# Patient Record
Sex: MALE | Race: WHITE | NOT HISPANIC OR LATINO | Employment: OTHER | URBAN - METROPOLITAN AREA
[De-identification: names, ages, dates, MRNs, and addresses within clinical notes are randomized per-mention and may not be internally consistent; named-entity substitution may affect disease eponyms.]

---

## 2017-01-10 ENCOUNTER — TELEPHONE (OUTPATIENT)
Dept: INTERNAL MEDICINE | Facility: CLINIC | Age: 57
End: 2017-01-10

## 2017-01-10 ENCOUNTER — LAB VISIT (OUTPATIENT)
Dept: LAB | Facility: HOSPITAL | Age: 57
End: 2017-01-10
Attending: INTERNAL MEDICINE
Payer: MEDICARE

## 2017-01-10 ENCOUNTER — EPISODE CHANGES (OUTPATIENT)
Dept: HEPATOLOGY | Facility: CLINIC | Age: 57
End: 2017-01-10

## 2017-01-10 DIAGNOSIS — B18.2 CHRONIC HEPATITIS C WITHOUT HEPATIC COMA: ICD-10-CM

## 2017-01-10 LAB
ALBUMIN SERPL BCP-MCNC: 4.3 G/DL
ALP SERPL-CCNC: 70 U/L
ALT SERPL W/O P-5'-P-CCNC: 55 U/L
ANION GAP SERPL CALC-SCNC: 10 MMOL/L
AST SERPL-CCNC: 35 U/L
BILIRUB SERPL-MCNC: 0.7 MG/DL
BUN SERPL-MCNC: 13 MG/DL
CALCIUM SERPL-MCNC: 9.3 MG/DL
CHLORIDE SERPL-SCNC: 106 MMOL/L
CO2 SERPL-SCNC: 25 MMOL/L
CREAT SERPL-MCNC: 0.8 MG/DL
EST. GFR  (AFRICAN AMERICAN): >60 ML/MIN/1.73 M^2
EST. GFR  (NON AFRICAN AMERICAN): >60 ML/MIN/1.73 M^2
GLUCOSE SERPL-MCNC: 154 MG/DL
POTASSIUM SERPL-SCNC: 4.2 MMOL/L
PROT SERPL-MCNC: 8 G/DL
SODIUM SERPL-SCNC: 141 MMOL/L

## 2017-01-10 PROCEDURE — 80053 COMPREHEN METABOLIC PANEL: CPT

## 2017-01-10 PROCEDURE — 87522 HEPATITIS C REVRS TRNSCRPJ: CPT

## 2017-01-10 NOTE — TELEPHONE ENCOUNTER
Alissa in PAP asked if patient can come in , called pt no answer message left for pt to come in to see alissa

## 2017-01-10 NOTE — TELEPHONE ENCOUNTER
I think I've ordered the generic. He's part of the pharmacy assistance program. Can you call Alissa HERNANDEZ to see if she has any other ideas of how to get it cheaper? We can also try glipizide, which is a fairly cheap alternative. But there's a higher risk of low sugars so he'll have to monitor his glucose closely.

## 2017-01-10 NOTE — TELEPHONE ENCOUNTER
----- Message from Mitali Reyes PharmD sent at 1/10/2017  9:43 AM CST -----  Regarding: Januvia too expensive   Contact: [phone]  Mon[place]z is $47 under his [facility] medicare plan and he is requesting a cheaper alternative. We already used the free trial coupon last month. Please advise if you'd like to change to generic alternative. Thanks.

## 2017-01-13 LAB
HCV LOG: <1.08 LOG (10) IU/ML
HCV RNA QUANT PCR: <12 IU/ML
HCV, QUALITATIVE: NOT DETECTED IU/ML

## 2017-01-17 ENCOUNTER — TELEPHONE (OUTPATIENT)
Dept: HEPATOLOGY | Facility: CLINIC | Age: 57
End: 2017-01-17

## 2017-01-17 DIAGNOSIS — K74.60 HEPATIC CIRRHOSIS, UNSPECIFIED HEPATIC CIRRHOSIS TYPE: Primary | ICD-10-CM

## 2017-01-17 DIAGNOSIS — B18.2 CHRONIC HEPATITIS C WITHOUT HEPATIC COMA: ICD-10-CM

## 2017-01-18 ENCOUNTER — OFFICE VISIT (OUTPATIENT)
Dept: INTERNAL MEDICINE | Facility: CLINIC | Age: 57
End: 2017-01-18
Payer: MEDICARE

## 2017-01-18 VITALS
WEIGHT: 173.06 LBS | HEART RATE: 86 BPM | BODY MASS INDEX: 27.16 KG/M2 | SYSTOLIC BLOOD PRESSURE: 134 MMHG | HEIGHT: 67 IN | TEMPERATURE: 98 F | DIASTOLIC BLOOD PRESSURE: 88 MMHG | RESPIRATION RATE: 18 BRPM

## 2017-01-18 DIAGNOSIS — R07.81 PLEURITIC CHEST PAIN: ICD-10-CM

## 2017-01-18 DIAGNOSIS — E11.9 TYPE 2 DIABETES MELLITUS WITHOUT RETINOPATHY: Primary | ICD-10-CM

## 2017-01-18 DIAGNOSIS — E78.5 HYPERLIPIDEMIA, UNSPECIFIED HYPERLIPIDEMIA TYPE: ICD-10-CM

## 2017-01-18 DIAGNOSIS — K62.5 BRIGHT RED BLOOD PER RECTUM: ICD-10-CM

## 2017-01-18 DIAGNOSIS — B18.2 CHRONIC HEPATITIS C WITHOUT HEPATIC COMA: ICD-10-CM

## 2017-01-18 DIAGNOSIS — I87.2 CHRONIC VENOUS INSUFFICIENCY: ICD-10-CM

## 2017-01-18 DIAGNOSIS — K74.60 CIRRHOSIS OF LIVER WITHOUT ASCITES, UNSPECIFIED HEPATIC CIRRHOSIS TYPE: ICD-10-CM

## 2017-01-18 DIAGNOSIS — I85.10 SECONDARY ESOPHAGEAL VARICES WITHOUT BLEEDING: ICD-10-CM

## 2017-01-18 DIAGNOSIS — J06.9 UPPER RESPIRATORY TRACT INFECTION, UNSPECIFIED TYPE: ICD-10-CM

## 2017-01-18 PROCEDURE — 3044F HG A1C LEVEL LT 7.0%: CPT | Mod: S$GLB,,, | Performed by: INTERNAL MEDICINE

## 2017-01-18 PROCEDURE — 99499 UNLISTED E&M SERVICE: CPT | Mod: S$GLB,,, | Performed by: INTERNAL MEDICINE

## 2017-01-18 PROCEDURE — 99214 OFFICE O/P EST MOD 30 MIN: CPT | Mod: S$GLB,,, | Performed by: INTERNAL MEDICINE

## 2017-01-18 PROCEDURE — 1159F MED LIST DOCD IN RCRD: CPT | Mod: S$GLB,,, | Performed by: INTERNAL MEDICINE

## 2017-01-18 PROCEDURE — 99999 PR PBB SHADOW E&M-EST. PATIENT-LVL III: CPT | Mod: PBBFAC,,, | Performed by: INTERNAL MEDICINE

## 2017-01-18 PROCEDURE — 3079F DIAST BP 80-89 MM HG: CPT | Mod: S$GLB,,, | Performed by: INTERNAL MEDICINE

## 2017-01-18 PROCEDURE — 3075F SYST BP GE 130 - 139MM HG: CPT | Mod: S$GLB,,, | Performed by: INTERNAL MEDICINE

## 2017-01-18 PROCEDURE — 3060F POS MICROALBUMINURIA REV: CPT | Mod: S$GLB,,, | Performed by: INTERNAL MEDICINE

## 2017-01-18 RX ORDER — BENZONATATE 100 MG/1
100 CAPSULE ORAL 3 TIMES DAILY PRN
Qty: 30 CAPSULE | Refills: 0 | Status: SHIPPED | OUTPATIENT
Start: 2017-01-18 | End: 2017-01-28

## 2017-01-18 RX ORDER — PRAVASTATIN SODIUM 20 MG/1
20 TABLET ORAL DAILY
Qty: 90 TABLET | Refills: 3 | Status: SHIPPED | OUTPATIENT
Start: 2017-01-18 | End: 2018-03-05 | Stop reason: SDUPTHER

## 2017-01-18 NOTE — TELEPHONE ENCOUNTER
MA called pt to relay provider message. Call successful. Spoke with pt. Provider message relayed. Pt verbalized understanding. Labs scheduled. Appt reminder mailed.Lp

## 2017-01-18 NOTE — TELEPHONE ENCOUNTER
HCV LAB REVIEW  Completed 24 weeks of Harvoni - 11/29  Erika 1b, cirrhotic  Prior PegIFN + RBV failure    Pertinent labs:  1/10  CMP stable -- AST 35, ALT 55  HCV neg - SVR6    pls call pt:  1.) HCV is negative. This is great news  There is a small chance the virus can return. We will monitor blood for this.  2.) Liver enzymes are still elevated but have improved slightly. We need to evaluate this further and continue to monitor   3.) HBV labs were orderd after last labs but doesn't look like they were done      Next labs due - pls schedule  - HBV DNA, HBsAg now  - CMP, HCV RNA - SVR12 - 2/21

## 2017-01-18 NOTE — MR AVS SNAPSHOT
[facility]  [address]  [facility] [address]  Phone: [phone]  Fax: [phone]                  Graham Caceres   2017 8:40 AM   Office Visit    Description:  Male : 1960   Provider:  Jennifer Manzanares MD   Department:  [facility]           Reason for Visit     Medication Management     Follow-up           Diagnoses this Visit        Comments    Type 2 diabetes mellitus without retinopathy    -  Primary     Chronic hepatitis C without hepatic coma         Cirrhosis of liver without ascites, unspecified hepatic cirrhosis type         Secondary esophageal varices without bleeding         Upper respiratory tract infection, unspecified type         Hyperlipidemia, unspecified hyperlipidemia type         Bright red blood per rectum         Chronic venous insufficiency         Pleuritic chest pain                To Do List           Future Appointments        Provider Department Dept Phone    2017 9:45 AM [facility] [id number] LB LIMIT [facility] [phone]    2017 10:00 AM LAB, APPOINTMENT [facility] INTMED [facility] [phone]    4/10/2017 9:00 AM LAB, APPOINTMENT [place] [facility] [phone]    4/10/2017 9:30 AM [facility] US 11 ALL [facility] [phone]      Goals (5 Years of Data)     None       These Medications        Disp Refills Start End    pravastatin (PRAVACHOL) 20 MG tablet 90 tablet 3 2017    Take 1 tablet (20 mg total) by mouth once daily. - Oral    Pharmacy: [facility] [address] [facility] Ph #: [phone]       benzonatate (TESSALON) 100 MG capsule 30 capsule 0 2017    Take 1 capsule (100 mg total) by mouth 3 (three) times daily as needed. - Oral    Pharmacy: [facility]  [address] Ph #: [phone]         [facility]     [facility] Care [facility] -   Assistance  Registered nurses in the [facility] provide clinical advisement, health education, appointment booking, and other advisory services.  Call for this free service at [phone].             Medications           Message regarding Medications     Verify the changes and/or additions to your medication regime listed below are the same as discussed with your clinician today.  If any of these changes or additions are incorrect, please notify your healthcare provider.        START taking these NEW medications        Refills    pravastatin (PRAVACHOL) 20 MG tablet 3    Sig: Take 1 tablet (20 mg total) by mouth once daily.    Class: Normal    Route: Oral    benzonatate (TESSALON) 100 MG capsule 0    Sig: Take 1 capsule (100 mg total) by mouth 3 (three) times daily as needed.    Class: Normal    Route: Oral      STOP taking these medications     clotrimazole-betamethasone 1-0.05% (LOTRISONE) cream Apply topically 2 (two) times daily.           Verify that the below list of medications is an accurate representation of the medications you are currently taking.  If none reported, the list may be blank. If incorrect, please contact your healthcare provider. Carry this list with you in case of emergency.           Current Medications     blood sugar diagnostic Strp Use for blood glucose testing before meals and at bedtime    blood-glucose meter kit Use as instructed    lancets Misc Use for blood glucose testing before meals and at bedtime    SITagliptan (JANUVIA) 25 MG Tab Take 1 tablet (25 mg total) by mouth once daily.    benzonatate (TESSALON) 100 MG capsule Take 1 capsule (100 mg total) by mouth 3 (three) times daily as needed.    pravastatin (PRAVACHOL) 20 MG tablet Take 1 tablet (20 mg total) by mouth once daily.           Clinical Reference Information           Vital Signs - Last Recorded  Most recent update: 1/18/2017  9:02 AM by Taylor Bill MA    BP Pulse Temp Resp Ht Wt    134/88 86 98 °F  "(36.7 °C) (Oral) 18 5' 7" (1.702 m) 78.5 kg (173 lb 1 oz)    BMI                27.11 kg/m2          Blood Pressure          Most Recent Value    BP  134/88      Allergies as of 1/18/2017     No Known Allergies      Immunizations Administered on Date of Encounter - 1/18/2017     None      Orders Placed During Today's Visit      Normal Orders This Visit    COMPRESSION STOCKINGS     Future Labs/Procedures Expected by Expires    CBC auto differential  1/18/2017 3/19/2018    Hemoglobin A1c  1/18/2017 (Approximate) 3/19/2018    Lipid panel  1/18/2017 3/19/2018    X-Ray Chest PA And Lateral  1/18/2017 1/18/2018      [facility] Sign-Up     Activating your [facility] account is as easy as 1-2-3!     1) Visit [email or web address], select Sign Up Now, enter this activation code and your date of birth, then select Next.  Activation code not generated  Current Patient Portal Status: Account disabled      2) Create a username and password to use when you visit [facility] in the future and select a security question in case you lose your password and select Next.    3) Enter your e-mail address and click Sign Up!    Additional Information  If you have questions, please e-mail [email or web address] or call [phone] to talk to our [facility] staff. Remember, [facility] is NOT to be used for urgent needs. For medical emergencies, dial 911.         "

## 2017-01-18 NOTE — PROGRESS NOTES
"Subjective:       Patient ID: Graham Caceres is a 57 y.o. male.    Chief Complaint: Medication Management and Follow-up    HPI   DM2 - started on januvia 25mg daily; pharmacy assistance program was able to get him the medication at an affordable rate. Previously on insulin. Checks glucose daily - 130s-140s.     HLD - discussed cholesterol medicine but pt is not currently on it. Reports mom took lipitor and it caused "liver cancer".     Chronic hep C - esophageal varices. Hep C RNA 1/10/17 was undetectable. Does have elevated ALT.    HTN previously but no longer on medication and doing well    Does not exercise. Does not follow a diet. Eats a lot of seafood. Doesn't think he'll be able to change diet at this time.    Varicose veins of the legs.     Review of Systems   Constitutional: Negative for chills and fever.   HENT: Positive for congestion (chest congestion x 2-3 days) and rhinorrhea.         Ear fullness and popping on the R.   Eyes: Negative for visual disturbance.   Respiratory: Positive for cough (slight yellow mucus. takes tylenol sinus and cold. hoarse voice. ). Negative for shortness of breath.    Cardiovascular: Negative for chest pain, palpitations and leg swelling.   Gastrointestinal: Positive for blood in stool (intermittently. had Cscope 2016, which was normal). Negative for abdominal distention, abdominal pain, constipation, diarrhea, nausea and vomiting.   Endocrine: Positive for polyuria. Negative for polydipsia.   Skin: Negative for rash.   Neurological: Negative for dizziness, light-headedness and headaches.         Objective:      Physical Exam    Visit Vitals    /88    Pulse 86    Temp 98 °F (36.7 °C) (Oral)    Resp 18    Ht 5' 7" (1.702 m)    Wt 78.5 kg (173 lb 1 oz)    BMI 27.11 kg/m2     GEN - A+OX4, NAD   HEENT - PERRL, EOMI, OP clear  Neck - No thyromegaly or cervical LAD. No thyroid masses felt.  CV - RRR, no m/r   Chest - CTAB, no wheezing or rhonchi  Abd - S/NT/ND/+BS. "   Ext - 2+BDP and radial pulses. No C/C/E. Varicose veins.  Skin - No rash.    Labs reviewed.    Assessment/Plan     Hsian was seen today for medication management and follow-up.    Diagnoses and all orders for this visit:    Type 2 diabetes mellitus without retinopathy  -     Hemoglobin A1c; Future  -     Lipid panel; Future  -     pravastatin (PRAVACHOL) 20 MG tablet; Take 1 tablet (20 mg total) by mouth once daily.    Chronic hepatitis C without hepatic coma s/p Harvoni 11/2016. Undetectable VL.    Cirrhosis of liver without ascites, unspecified hepatic cirrhosis type - check CMP.    Secondary esophageal varices without bleeding - no issues currently.    Upper respiratory tract infection, unspecified type  -     X-Ray Chest PA And Lateral; Future  -     benzonatate (TESSALON) 100 MG capsule; Take 1 capsule (100 mg total) by mouth 3 (three) times daily as needed.    Hyperlipidemia, unspecified hyperlipidemia type  -     Lipid panel; Future  -     pravastatin (PRAVACHOL) 20 MG tablet; Take 1 tablet (20 mg total) by mouth once daily.    Bright red blood per rectum  -     CBC auto differential; Future    Chronic venous insufficiency  -     COMPRESSION STOCKINGS    Pleuritic chest pain  -     X-Ray Chest PA And Lateral; Future      Return in about 3 months (around 4/18/2017).      Jennifer Manzanares MD  Department of Internal Medicine - [facility]  9:09 AM

## 2017-01-19 ENCOUNTER — TELEPHONE (OUTPATIENT)
Dept: INTERNAL MEDICINE | Facility: CLINIC | Age: 57
End: 2017-01-19

## 2017-01-19 ENCOUNTER — HOSPITAL ENCOUNTER (OUTPATIENT)
Dept: RADIOLOGY | Facility: HOSPITAL | Age: 57
Discharge: HOME | End: 2017-01-19
Attending: INTERNAL MEDICINE
Payer: MEDICARE

## 2017-01-19 DIAGNOSIS — R07.81 PLEURITIC CHEST PAIN: ICD-10-CM

## 2017-01-19 DIAGNOSIS — J06.9 UPPER RESPIRATORY TRACT INFECTION, UNSPECIFIED TYPE: ICD-10-CM

## 2017-01-19 PROCEDURE — 71020 XR CHEST PA AND LATERAL: CPT | Mod: TC

## 2017-01-19 PROCEDURE — 71020 XR CHEST PA AND LATERAL: CPT | Mod: 26,,, | Performed by: RADIOLOGY

## 2017-01-19 NOTE — TELEPHONE ENCOUNTER
Chest xray does not show any signs of pneumonia. Please let him know to th take the cough medicine as needed. If symptoms worsen, he can return to get evaluated again. Please make sure he gets his labs done in a month also.

## 2017-01-20 NOTE — TELEPHONE ENCOUNTER
Spoke to patient, results given via phone as instructed, patient expressed understanding verbally, no additional questions.

## 2017-01-25 ENCOUNTER — EPISODE CHANGES (OUTPATIENT)
Dept: HEPATOLOGY | Facility: CLINIC | Age: 57
End: 2017-01-25

## 2017-01-26 ENCOUNTER — TELEPHONE (OUTPATIENT)
Dept: HEPATOLOGY | Facility: CLINIC | Age: 57
End: 2017-01-26

## 2017-01-26 DIAGNOSIS — R79.89 ELEVATED FERRITIN: Primary | ICD-10-CM

## 2017-01-26 NOTE — TELEPHONE ENCOUNTER
HCV LAB REVIEW  Completed 24 weeks of Harvoni - 11/29  Erika 1b, cirrhotic  Prior PegIFN + RBV failure    Pertinent labs:  1/19/17  HBV DNA neg  HBsAg neg    pls call pt:  1.) HBV labs were negative   3.) keep appt for labs 2/21      Next labs due   - CMP, HCV RNA - SVR12 - 2/21  - schedule f/u visit in 4/2017 when pt comes for u/s and labs    thanks

## 2017-02-21 ENCOUNTER — LAB VISIT (OUTPATIENT)
Dept: LAB | Facility: HOSPITAL | Age: 57
End: 2017-02-21
Attending: INTERNAL MEDICINE
Payer: MEDICARE

## 2017-02-21 DIAGNOSIS — B18.2 CHRONIC HEPATITIS C WITHOUT HEPATIC COMA: ICD-10-CM

## 2017-02-21 DIAGNOSIS — K74.60 HEPATIC CIRRHOSIS, UNSPECIFIED HEPATIC CIRRHOSIS TYPE: ICD-10-CM

## 2017-02-21 DIAGNOSIS — R79.89 ELEVATED FERRITIN: ICD-10-CM

## 2017-02-21 LAB
ALBUMIN SERPL BCP-MCNC: 4.3 G/DL
ALP SERPL-CCNC: 72 U/L
ALT SERPL W/O P-5'-P-CCNC: 56 U/L
ANION GAP SERPL CALC-SCNC: 9 MMOL/L
AST SERPL-CCNC: 32 U/L
BILIRUB SERPL-MCNC: 1.3 MG/DL
BUN SERPL-MCNC: 15 MG/DL
CALCIUM SERPL-MCNC: 9.6 MG/DL
CHLORIDE SERPL-SCNC: 104 MMOL/L
CO2 SERPL-SCNC: 26 MMOL/L
CREAT SERPL-MCNC: 0.8 MG/DL
EST. GFR  (AFRICAN AMERICAN): >60 ML/MIN/1.73 M^2
EST. GFR  (NON AFRICAN AMERICAN): >60 ML/MIN/1.73 M^2
FERRITIN SERPL-MCNC: 170 NG/ML
GLUCOSE SERPL-MCNC: 178 MG/DL
POTASSIUM SERPL-SCNC: 4.3 MMOL/L
PROT SERPL-MCNC: 8.3 G/DL
SODIUM SERPL-SCNC: 139 MMOL/L

## 2017-02-21 PROCEDURE — 36415 COLL VENOUS BLD VENIPUNCTURE: CPT

## 2017-02-21 PROCEDURE — 82728 ASSAY OF FERRITIN: CPT

## 2017-02-21 PROCEDURE — 87522 HEPATITIS C REVRS TRNSCRPJ: CPT

## 2017-02-21 PROCEDURE — 80053 COMPREHEN METABOLIC PANEL: CPT

## 2017-02-22 ENCOUNTER — EPISODE CHANGES (OUTPATIENT)
Dept: HEPATOLOGY | Facility: CLINIC | Age: 57
End: 2017-02-22

## 2017-02-24 ENCOUNTER — TELEPHONE (OUTPATIENT)
Dept: HEPATOLOGY | Facility: CLINIC | Age: 57
End: 2017-02-24

## 2017-02-24 DIAGNOSIS — Z86.19 HISTORY OF HEPATITIS C: Primary | ICD-10-CM

## 2017-02-24 LAB
HCV LOG: <1.08 LOG (10) IU/ML
HCV RNA QUANT PCR: <12 IU/ML
HCV, QUALITATIVE: NOT DETECTED IU/ML

## 2017-02-24 NOTE — TELEPHONE ENCOUNTER
HCV LAB REVIEW  Completed 24 weeks of Harvoni - 11/29  Erika 1b, cirrhotic  Prior PegIFN + RBV failure    Pertinent labs:  2/21/17  Ferritin 170  CMP stable - ALT 56, TBili 1.3  HCV neg    Spoke to pt:  1. These labs document SVR12 following successful HCV treatment with Harvoni   This is consistent with a cure. Relapse is not expected.   No immunity is conferred and patient could be reinfected.     2. Still needs longterm cirrhosis monitoring and HCC screening    3. Still has elevated ALT, likely due to fatty liver  Reviewed goal of wt loss, low fat diet, optimal mngmt of DM and hyperlipidemia    Next appts - Please schedule   - pt already scheduled for visit and HCC screening 4/2017  - HCV RNA - SVR24 - 5/22/17

## 2017-04-07 ENCOUNTER — OFFICE VISIT (OUTPATIENT)
Dept: HEPATOLOGY | Facility: CLINIC | Age: 57
End: 2017-04-07
Payer: MEDICARE

## 2017-04-07 ENCOUNTER — HOSPITAL ENCOUNTER (OUTPATIENT)
Dept: RADIOLOGY | Facility: HOSPITAL | Age: 57
Discharge: HOME | End: 2017-04-07
Attending: INTERNAL MEDICINE
Payer: MEDICARE

## 2017-04-07 VITALS
WEIGHT: 175.69 LBS | TEMPERATURE: 98 F | HEART RATE: 82 BPM | OXYGEN SATURATION: 97 % | SYSTOLIC BLOOD PRESSURE: 140 MMHG | DIASTOLIC BLOOD PRESSURE: 89 MMHG | BODY MASS INDEX: 27.57 KG/M2 | HEIGHT: 67 IN

## 2017-04-07 DIAGNOSIS — Z86.19 HISTORY OF HEPATITIS C: ICD-10-CM

## 2017-04-07 DIAGNOSIS — K76.0 FATTY LIVER DISEASE, NONALCOHOLIC: ICD-10-CM

## 2017-04-07 DIAGNOSIS — K74.60 UNSPECIFIED CIRRHOSIS OF LIVER: ICD-10-CM

## 2017-04-07 DIAGNOSIS — R74.8 ABNORMAL TRANSAMINASES: ICD-10-CM

## 2017-04-07 DIAGNOSIS — K74.60 CIRRHOSIS OF LIVER WITHOUT ASCITES, UNSPECIFIED HEPATIC CIRRHOSIS TYPE: Primary | ICD-10-CM

## 2017-04-07 PROCEDURE — 3077F SYST BP >= 140 MM HG: CPT | Mod: S$GLB,,, | Performed by: PHYSICIAN ASSISTANT

## 2017-04-07 PROCEDURE — 99999 PR PBB SHADOW E&M-EST. PATIENT-LVL III: CPT | Mod: PBBFAC,,, | Performed by: PHYSICIAN ASSISTANT

## 2017-04-07 PROCEDURE — 99214 OFFICE O/P EST MOD 30 MIN: CPT | Mod: S$GLB,,, | Performed by: PHYSICIAN ASSISTANT

## 2017-04-07 PROCEDURE — 3079F DIAST BP 80-89 MM HG: CPT | Mod: S$GLB,,, | Performed by: PHYSICIAN ASSISTANT

## 2017-04-07 PROCEDURE — 1160F RVW MEDS BY RX/DR IN RCRD: CPT | Mod: S$GLB,,, | Performed by: PHYSICIAN ASSISTANT

## 2017-04-07 PROCEDURE — 76705 ECHO EXAM OF ABDOMEN: CPT | Mod: 26,,, | Performed by: RADIOLOGY

## 2017-04-07 PROCEDURE — 99499 UNLISTED E&M SERVICE: CPT | Mod: S$GLB,,, | Performed by: PHYSICIAN ASSISTANT

## 2017-04-07 NOTE — PROGRESS NOTES
"HEPATOLOGY CLINIC VISIT NOTE - HCV clinic    CHIEF COMPLAINT:  HCV Cirrhosis    HISTORY: This is a 57 y.o.   male w/ HCV cirrhosis, s/p successful antiviral therapy, here for f/u.     Interval history:  Completed Harvoni for HCV, achieved SVR12 - 2/2017  Transaminases have trended down but ALT remains mildly elevated in 50s.    Feels well  Denies jaundice, dark urine, hematemesis, melena, slowed mentation, abdominal distention.     Noted pt has hyperlipidemia (11/2016 , Total chol 295, trig 167). Was prescribed pravastatin 1/2017 but tells me he is not taking it "worried it's not okay for his liver"  Also has DM but this is better controlled recently w/ HbA1c 6.2 in 11/2016 (down from 7.5 in 5/2016)  U/S reveals increased echogenicity consistent w/ hepatic steatosis    Suspect NAFLD  Serologic eval has been unyielding    HCV history:  Completed 24 weeks harvoni w/ SVR12 as of 2/2017  - genotype 1B  - Prior HCV tx w/ Dr Laurent: PegIFN + RBV (10-15 yrs ago) for few months - nonresponse      Cirrhosis history:  FibroScan 4/28/16 - kPA 17.3, F4  Labs / imaging / pretreatment APRI / FIB-4 were consistent w/ advanced fibrosis:    Well compensated. No ascites, jaundice, HE  MELD-Na score: 6 at 4/7/2017  9:06 AM  MELD score: 6 at 4/7/2017  9:06 AM  Calculated from:  Serum Creatinine: 0.8 mg/dL (Rounded to 1) at 4/7/2017  9:06 AM  Serum Sodium: 142 mmol/L (Rounded to 137) at 4/7/2017  9:06 AM  Total Bilirubin: 0.6 mg/dL (Rounded to 1) at 4/7/2017  9:06 AM  INR(ratio): 0.9 (Rounded to 1) at 4/7/2017  9:06 AM  Age: 57 years      - HCC screening - u/s 4/7/17 w/ no liver lesions, AFP 4/2017 normal  - Varices screening - EGD 5/2016 Dr Soto - no varices                     Past Medical History:   Diagnosis Date    Cirrhosis     Diverticulosis     History of hepatitis C, s/p successful Rx w/ SVR12 - 2/2017 4/7/2016    S/p harvoni w/ SVR    Hyperlipidemia 04/2016    Hypertension     Internal hemorrhoid     New " onset type 2 diabetes mellitus 4/2016    Positive QuantiFERON-TB Gold test 4/2016    Sebopsoriasis      Past Surgical History:   Procedure Laterality Date    COLONOSCOPY  05/19/2016    repeat in 10 yrs    COLONOSCOPY Left 5/19/2016    Procedure: COLONOSCOPY - colon cancer screening;  Surgeon: David Soto MD;  Location: [facility] ([address] [place] [address]);  Service: Endoscopy;  Laterality: Left;         FAMILY HISTORY: Negative for liver disease    MEDS & ALLERGIES:  Reviewed in epic  Takes ginseng occasionally. Denies other herbal products      SOCIAL HISTORY:   Moved from [facility] [facility] during teenage years  Resides in [place], not . 1 daughter from prior marriage  Not working, on disability. Worked at [employer] many years ago  Alcohol - none  Tobacco - none  Drugs - none      ROS:   No fever, chills, weight loss.   No chest pain, palpitations, dyspnea, cough  No abdominal pain, change in bowel pattern, nausea, vomiting  No skin rashes   No lower extremity edema  No depression or anxiety      PHYSICAL EXAM:  Friendly  male, in no acute distress; alert and oriented to person, place and time  VITALS: reviewed  HEENT: Sclerae anicteric.   LUNGS: Normal respiratory effort.   ABDOMEN: Flat, soft, nontender.   SKIN: Warm and dry. No jaundice, No obvious rashes.   NEURO/PSYCH: Normal gate. Memory intact. Thought and speech pattern appropriate. Behavior normal. No depression or anxiety noted.      RECENT LABS:  Lab Results   Component Value Date    WBC 6.78 04/07/2017    HGB 16.8 04/07/2017     04/07/2017     Lab Results   Component Value Date    INR 0.9 04/07/2017     Lab Results   Component Value Date    AST 29 04/07/2017    ALT 52 (H) 04/07/2017    BILITOT 0.6 04/07/2017    ALBUMIN 4.2 04/07/2017    ALKPHOS 79 04/07/2017    CREATININE 0.8 04/07/2017    BUN 16 04/07/2017     04/07/2017    K 4.5 04/07/2017    AFP 3.8 04/07/2017         RECENT IMAGING:  U/S abdomen 4/7/17  Narrative   Technique:  Limited abdominal ultrasound  Comparison: Limited abdominal ultrasound 10/07/2016    Clinical indication:  Follow up for cirrhosis of the liver.      Findings:    The visualized portion of the pancreas is unremarkable.      The liver is enlarged in size measuring 17.6 cm.  The liver demonstrates heterogeneously hyperechoic echogenicity with elevated hepatorenal index at 1.5.  No focal hepatic lesions are seen.    The gallbladder demonstrates a tiny, echogenic focus consistent with fine cholelithiasis and biliary sludge.  Additionally, there are multiple, punctate, echogenic foci at the gallbladder wall with comet tail artifact consistent with adenomyomatosis.  The common duct is not dilated, measuring 2 mm.  The gallbladder wall is not thickened.  There is no sonographic Mason sign.  No dilated intrahepatic radicles are seen.  No pericholecystic fluid.    The spleen is normal in size measuring 11.2 x 4.5 cm with a homogeneous echotexture.    There is no evidence of ascites.   Impression   Hepatomegaly with hepatic steatosis.  Cholelithiasis versus trace biliary sludge without evidence for acute cholecystitis.  Gallbladder adenomyomatosis             ASSESSMENT  57 y.o.   male with:  1. HISTORY OF CHRONIC HEPATITIS C, GENOTYPE 1B - S/P successful Rx w/ SVR12 2/2017  -- completed 24weeks of Harvoni  -- Prior HCV treatment - PegIFN + RBV for few months many years ago - nonresponse  -- (+) Immunity to HAV & HBV  2. WELL COMPENSATED CIRRHOSIS   -- MELD score 4/2017 - 6  -- HCC screening - up to date w/ U/S and AFP 4/2017  -- EGD 5/2016 varices screening - no varices  3. ELEVATED TRANSAMINASES - LIKELY DUE TO NAFLD  -- serologic eval unyielding  4. DIABETES  5. HYPERLIPIDEMIA        EDUCATION:  Discussed that SVR12 is equivalent to CURE. Relapse is not expected. No immunity conferred. Could be reinfected.    Cirrhosis will continue to exist. Needs lifelong HCC screening and liver monitoring every 6 months  indefinitely    Discussed lack of FDA approved therapies for fatty liver disease  Discussed risk of fatty liver, SANDOVAL, worsening of cirrhosis  Discussed importance of optimal lipid and glucose mngmt as well as health weight, exercise, diet        PLAN:  1. F/U visit w/ CBC, CMP, INR, AFP, U/S abdomen due 10/2017   2. EGD for varices screen due 5/2019 as long as liver disease remains well compensated  3. Take pravachol per PCP recommendations  4. Due for f/u w/ PCP per last notes.  5. Add LFT to next HCV RNA in 5/2017

## 2017-04-10 ENCOUNTER — EPISODE CHANGES (OUTPATIENT)
Dept: HEPATOLOGY | Facility: CLINIC | Age: 57
End: 2017-04-10

## 2017-05-22 ENCOUNTER — EPISODE CHANGES (OUTPATIENT)
Dept: HEPATOLOGY | Facility: CLINIC | Age: 57
End: 2017-05-22

## 2017-05-26 DIAGNOSIS — E11.9 TYPE 2 DIABETES MELLITUS WITHOUT COMPLICATION: ICD-10-CM

## 2017-06-09 ENCOUNTER — EPISODE CHANGES (OUTPATIENT)
Dept: HEPATOLOGY | Facility: CLINIC | Age: 57
End: 2017-06-09

## 2017-06-09 ENCOUNTER — TELEPHONE (OUTPATIENT)
Dept: PHARMACY | Facility: CLINIC | Age: 57
End: 2017-06-09

## 2017-06-09 ENCOUNTER — TELEPHONE (OUTPATIENT)
Dept: INTERNAL MEDICINE | Facility: CLINIC | Age: 57
End: 2017-06-09

## 2017-06-09 ENCOUNTER — LAB VISIT (OUTPATIENT)
Dept: LAB | Facility: HOSPITAL | Age: 57
End: 2017-06-09
Attending: INTERNAL MEDICINE
Payer: MEDICARE

## 2017-06-09 DIAGNOSIS — Z86.19 HISTORY OF HEPATITIS C: ICD-10-CM

## 2017-06-09 DIAGNOSIS — R74.8 ABNORMAL TRANSAMINASES: ICD-10-CM

## 2017-06-09 DIAGNOSIS — K74.60 CIRRHOSIS OF LIVER WITHOUT ASCITES, UNSPECIFIED HEPATIC CIRRHOSIS TYPE: ICD-10-CM

## 2017-06-09 DIAGNOSIS — K76.0 FATTY LIVER DISEASE, NONALCOHOLIC: ICD-10-CM

## 2017-06-09 LAB
ALBUMIN SERPL BCP-MCNC: 4.4 G/DL
ALP SERPL-CCNC: 72 U/L
ALT SERPL W/O P-5'-P-CCNC: 61 U/L
AST SERPL-CCNC: 38 U/L
BILIRUB DIRECT SERPL-MCNC: 0.2 MG/DL
BILIRUB SERPL-MCNC: 0.6 MG/DL
PROT SERPL-MCNC: 8 G/DL

## 2017-06-09 PROCEDURE — 87522 HEPATITIS C REVRS TRNSCRPJ: CPT

## 2017-06-09 PROCEDURE — 80076 HEPATIC FUNCTION PANEL: CPT

## 2017-06-12 LAB
HCV LOG: <1.08 LOG (10) IU/ML
HCV RNA QUANT PCR: <12 IU/ML
HCV, QUALITATIVE: NOT DETECTED IU/ML

## 2017-06-15 DIAGNOSIS — Z86.19 HISTORY OF HEPATITIS C: Primary | ICD-10-CM

## 2017-06-23 ENCOUNTER — LAB VISIT (OUTPATIENT)
Dept: LAB | Facility: HOSPITAL | Age: 57
End: 2017-06-23
Attending: INTERNAL MEDICINE
Payer: MEDICARE

## 2017-06-23 ENCOUNTER — OFFICE VISIT (OUTPATIENT)
Dept: INTERNAL MEDICINE | Facility: CLINIC | Age: 57
End: 2017-06-23
Payer: MEDICARE

## 2017-06-23 ENCOUNTER — TELEPHONE (OUTPATIENT)
Dept: INTERNAL MEDICINE | Facility: CLINIC | Age: 57
End: 2017-06-23

## 2017-06-23 VITALS
DIASTOLIC BLOOD PRESSURE: 86 MMHG | BODY MASS INDEX: 26.66 KG/M2 | WEIGHT: 175.94 LBS | SYSTOLIC BLOOD PRESSURE: 132 MMHG | TEMPERATURE: 98 F | HEART RATE: 85 BPM | RESPIRATION RATE: 17 BRPM | HEIGHT: 68 IN

## 2017-06-23 DIAGNOSIS — M54.5 CHRONIC MIDLINE LOW BACK PAIN, WITH SCIATICA PRESENCE UNSPECIFIED: ICD-10-CM

## 2017-06-23 DIAGNOSIS — E11.9 CONTROLLED TYPE 2 DIABETES MELLITUS WITHOUT COMPLICATION, WITHOUT LONG-TERM CURRENT USE OF INSULIN: Primary | ICD-10-CM

## 2017-06-23 DIAGNOSIS — M41.115 JUVENILE IDIOPATHIC SCOLIOSIS OF THORACOLUMBAR REGION: ICD-10-CM

## 2017-06-23 DIAGNOSIS — B35.3 TINEA PEDIS OF BOTH FEET: ICD-10-CM

## 2017-06-23 DIAGNOSIS — M25.511 RIGHT SHOULDER PAIN, UNSPECIFIED CHRONICITY: ICD-10-CM

## 2017-06-23 DIAGNOSIS — E11.69 HYPERLIPIDEMIA ASSOCIATED WITH TYPE 2 DIABETES MELLITUS: ICD-10-CM

## 2017-06-23 DIAGNOSIS — E78.5 HYPERLIPIDEMIA ASSOCIATED WITH TYPE 2 DIABETES MELLITUS: ICD-10-CM

## 2017-06-23 DIAGNOSIS — K74.60 CIRRHOSIS OF LIVER WITHOUT ASCITES, UNSPECIFIED HEPATIC CIRRHOSIS TYPE: ICD-10-CM

## 2017-06-23 DIAGNOSIS — E11.9 TYPE 2 DIABETES MELLITUS WITHOUT COMPLICATION: ICD-10-CM

## 2017-06-23 DIAGNOSIS — E11.9 CONTROLLED TYPE 2 DIABETES MELLITUS WITHOUT COMPLICATION, WITHOUT LONG-TERM CURRENT USE OF INSULIN: ICD-10-CM

## 2017-06-23 DIAGNOSIS — G89.29 CHRONIC MIDLINE LOW BACK PAIN, WITH SCIATICA PRESENCE UNSPECIFIED: ICD-10-CM

## 2017-06-23 LAB
ANION GAP SERPL CALC-SCNC: 8 MMOL/L
BASOPHILS # BLD AUTO: 0.03 K/UL
BASOPHILS NFR BLD: 0.5 %
BUN SERPL-MCNC: 14 MG/DL
CALCIUM SERPL-MCNC: 9.3 MG/DL
CHLORIDE SERPL-SCNC: 103 MMOL/L
CHOLEST/HDLC SERPL: 7 {RATIO}
CO2 SERPL-SCNC: 27 MMOL/L
CREAT SERPL-MCNC: 0.8 MG/DL
DIFFERENTIAL METHOD: ABNORMAL
EOSINOPHIL # BLD AUTO: 0.1 K/UL
EOSINOPHIL NFR BLD: 1.6 %
ERYTHROCYTE [DISTWIDTH] IN BLOOD BY AUTOMATED COUNT: 12.7 %
EST. GFR  (AFRICAN AMERICAN): >60 ML/MIN/1.73 M^2
EST. GFR  (NON AFRICAN AMERICAN): >60 ML/MIN/1.73 M^2
ESTIMATED AVG GLUCOSE: 146 MG/DL
GLUCOSE SERPL-MCNC: 183 MG/DL
HBA1C MFR BLD HPLC: 6.7 %
HCT VFR BLD AUTO: 47.7 %
HDL/CHOLESTEROL RATIO: 14.2 %
HDLC SERPL-MCNC: 281 MG/DL
HDLC SERPL-MCNC: 40 MG/DL
HGB BLD-MCNC: 16.7 G/DL
LDLC SERPL CALC-MCNC: 194.2 MG/DL
LYMPHOCYTES # BLD AUTO: 2.3 K/UL
LYMPHOCYTES NFR BLD: 40.9 %
MCH RBC QN AUTO: 32 PG
MCHC RBC AUTO-ENTMCNC: 35 %
MCV RBC AUTO: 91 FL
MONOCYTES # BLD AUTO: 0.2 K/UL
MONOCYTES NFR BLD: 4.2 %
NEUTROPHILS # BLD AUTO: 3 K/UL
NEUTROPHILS NFR BLD: 52.6 %
NONHDLC SERPL-MCNC: 241 MG/DL
PLATELET # BLD AUTO: 200 K/UL
PMV BLD AUTO: 11.4 FL
POTASSIUM SERPL-SCNC: 4.1 MMOL/L
RBC # BLD AUTO: 5.22 M/UL
SODIUM SERPL-SCNC: 138 MMOL/L
TRIGL SERPL-MCNC: 234 MG/DL
WBC # BLD AUTO: 5.65 K/UL

## 2017-06-23 PROCEDURE — 3044F HG A1C LEVEL LT 7.0%: CPT | Mod: S$GLB,,, | Performed by: INTERNAL MEDICINE

## 2017-06-23 PROCEDURE — 85025 COMPLETE CBC W/AUTO DIFF WBC: CPT

## 2017-06-23 PROCEDURE — 36415 COLL VENOUS BLD VENIPUNCTURE: CPT

## 2017-06-23 PROCEDURE — 83036 HEMOGLOBIN GLYCOSYLATED A1C: CPT

## 2017-06-23 PROCEDURE — 99999 PR PBB SHADOW E&M-EST. PATIENT-LVL IV: CPT | Mod: PBBFAC,,, | Performed by: INTERNAL MEDICINE

## 2017-06-23 PROCEDURE — 80048 BASIC METABOLIC PNL TOTAL CA: CPT

## 2017-06-23 PROCEDURE — 80061 LIPID PANEL: CPT

## 2017-06-23 PROCEDURE — 99499 UNLISTED E&M SERVICE: CPT | Mod: S$GLB,,, | Performed by: INTERNAL MEDICINE

## 2017-06-23 PROCEDURE — 99214 OFFICE O/P EST MOD 30 MIN: CPT | Mod: S$GLB,,, | Performed by: INTERNAL MEDICINE

## 2017-06-23 RX ORDER — INSULIN LISPRO 100 [IU]/ML
2 INJECTION, SOLUTION INTRAVENOUS; SUBCUTANEOUS
Qty: 15 ML | Refills: 1 | COMMUNITY
Start: 2017-06-23 | End: 2017-06-26

## 2017-06-23 RX ORDER — LANCETS
EACH MISCELLANEOUS
Qty: 100 EACH | Refills: 5 | Status: SHIPPED | OUTPATIENT
Start: 2017-06-23 | End: 2017-06-26 | Stop reason: SDUPTHER

## 2017-06-23 NOTE — TELEPHONE ENCOUNTER
Blood counts and kidney functions are normal.  Diabetes is controlled at 6.7, but is higher than it was 7 months ago, which was 6.2. Goal is for a1c to be <7.    His cholesterol is out of control. Please see if he's taking his pravastatin. If not, then start taking. If so, I'm going to increase the dosage. I recommend low fat diet. (let me know so I can order pravastatin if needed)

## 2017-06-23 NOTE — PROGRESS NOTES
"Subjective:       Patient ID: Graham Caceres is a 57 y.o. male.    Chief Complaint: Medication Management and Diabetes    HPI   DM2 - Januvia 25mg daily. Pt was doing humalog 2 units TID till he get's januvia via pharmacy assistance program. Checks glucose once every now and then. Watches what he eats.   Foot 5/18/16  Eye 5/24/16  MAC 5/2016  A1C 11/4/16 6.2  Has esophageal varices so no ASA.    HLD - on pravastatin 20mg daily.     H/o Hep C s/p Harvoni. RNA neg. Cirrhosis. ALT 61, chronic elevation.     C/o thinking he injured the R shoulder while sleeping. Cannot extend it back. Only minimally better.   Also has scoliosis. Chronically w/ intermittent lower back pain.     Review of Systems   Constitutional: Negative for appetite change, chills, fever and unexpected weight change.   HENT: Negative for congestion, postnasal drip and rhinorrhea.    Eyes: Negative for visual disturbance (wears glasses. ).   Respiratory: Negative for shortness of breath.    Cardiovascular: Negative for chest pain and leg swelling.   Gastrointestinal: Negative for abdominal pain, blood in stool, constipation, diarrhea, nausea and vomiting.   Endocrine: Negative for polydipsia and polyuria.   Genitourinary: Negative for dysuria and hematuria.   Musculoskeletal: Positive for arthralgias (hand pain sometimes. ) and back pain.   Skin: Negative for rash.   Neurological: Negative for dizziness and headaches.   Psychiatric/Behavioral: Negative for dysphoric mood. The patient is not nervous/anxious.          Objective:      Physical Exam    /86   Pulse 85   Temp 97.9 °F (36.6 °C) (Oral)   Resp 17   Ht 5' 8" (1.727 m)   Wt 79.8 kg (175 lb 14.8 oz)   BMI 26.75 kg/m²     Gen - A+OX4, NAD  HEENT PERRL, OP clear. MMM.   Neck - no cervical LAD  CV - RRR  CHEST - CTAB, no wheezing/rhonchi  Abd - S/NT/ND+BS  Ext -2 + B DP and radial pulses. No LE edema.   Feet - scaly skin at the ventral area. Calluses at the heels. Decreased sensation to " monofilament.   MSK - scoliosis of the thoracolumbar spine. Normal gait. No pain on palpation of the spine. Hypertrophy around the upper lumbar/lower T spine paraspinal on the R compared to the R. Pain on extension of the R shoulder - limited compared to L. Normal abduction and flexion. 5/5 BLE and BUE strength.     Previous labs reviewed.    Assessment/Plan     Hsian was seen today for medication management and diabetes.    Diagnoses and all orders for this visit:    Controlled type 2 diabetes mellitus without complication, without long-term current use of insulin - a1c today. Start januvia 25mg daily.   -     lancets Misc; Use daily.  -     blood sugar diagnostic Strp; DAILY  -     Ambulatory Referral to Optometry  -     Basic metabolic panel; Future    Hyperlipidemia associated with type 2 diabetes mellitus - cont pravastatin 20mg daily.  -     Lipid panel; Future    Cirrhosis of liver without ascites, unspecified hepatic cirrhosis type - f/u w/ Dr. Ly.  -     Basic metabolic panel; Future  -     CBC auto differential; Future    Tinea pedis of both feet - use the clotrimazole-hydrocortisone on B feet BID x 4 weeks.     Chronic midline low back pain, with sciatica presence unspecified  -     Ambulatory consult to Physical Therapy    Juvenile idiopathic scoliosis of thoracolumbar region  -     Ambulatory consult to Physical Therapy    Right shoulder pain, unspecified chronicity  -     Ambulatory consult to Physical Therap    Return in about 3 months (around 9/23/2017).      Jennifer Manzanares MD  Department of Internal Medicine - [facility]  9:15 AM

## 2017-06-26 ENCOUNTER — TELEPHONE (OUTPATIENT)
Dept: INTERNAL MEDICINE | Facility: CLINIC | Age: 57
End: 2017-06-26

## 2017-06-26 DIAGNOSIS — E11.9 CONTROLLED TYPE 2 DIABETES MELLITUS WITHOUT COMPLICATION, WITHOUT LONG-TERM CURRENT USE OF INSULIN: ICD-10-CM

## 2017-06-26 RX ORDER — LANCETS
EACH MISCELLANEOUS
Qty: 100 EACH | Refills: 5 | Status: SHIPPED | OUTPATIENT
Start: 2017-06-26 | End: 2020-01-14

## 2017-06-26 RX ORDER — INSULIN PUMP SYRINGE, 3 ML
EACH MISCELLANEOUS
Qty: 1 EACH | Refills: 0 | Status: ON HOLD | OUTPATIENT
Start: 2017-06-26 | End: 2018-08-12

## 2017-06-26 NOTE — TELEPHONE ENCOUNTER
I didn't specify any brands. Resent in accucheck. Let them know it's whatever brand is covered by insurance.

## 2017-06-26 NOTE — TELEPHONE ENCOUNTER
----- Message from Fabiola Dunbar sent at 6/26/2017  9:50 AM CDT -----  Contact: [employer]/ Sw[facility] [phone]  You prescribed OneTouch test strips. Pt's insurance won't cover this but they will cover Accu-Check.  Pt will need a rx for a meter, test strips and lancets called in .

## 2017-06-26 NOTE — TELEPHONE ENCOUNTER
The pharmacy would like to know if she can change the directions to test 4 times a day instead of daily.      Please advise,

## 2017-06-26 NOTE — LETTER
June 26, 2017    Graham Caceres  [address] [facility] [address]           [facility] - Internal Medicine  Internal Medicine  [facility]  [place] [address]  Phone: [phone]  Fax: [phone]   Dear Mr. Graham Caceres:    Below are the results from your recent visit:    Resulted Orders   Microalbumin/creatinine urine ratio   Result Value Ref Range    Microalbum.,U,Random 62.0 ug/mL    Creatinine, Random Ur 188.0 23.0 - 375.0 mg/dL      Comment:      The random urine reference ranges provided were established   for 24 hour urine collections.  No reference ranges exist for  random urine specimens.  Correlate clinically.      Microalb Creat Ratio 33.0 (H) 0.0 - 30.0 ug/mg    Narrative    CareTouch Bulk Order     The urine does show a mild amount of protein. This is the first sign of kidney issue with diabetes. I would like to restart you on a very small dose of the lisinopril (2.5mg) daily to help protect the kidneys. Let me know if you're ok with it and I can send it to your pharmacy.       Sincerely,    Jennifer Manzanares MD

## 2017-06-26 NOTE — TELEPHONE ENCOUNTER
His diabetes is well controlled so it's not appropriate. He's supposed to be off the humalog and take just the januvia (by mouth) daily. Please let pharmacist know to stop humalog.

## 2017-06-27 NOTE — ADDENDUM NOTE
Encounter addended by: Taylor Bill MA on: 6/27/2017  1:44 PM<BR>    Actions taken: Letter status changed

## 2017-07-10 ENCOUNTER — OFFICE VISIT (OUTPATIENT)
Dept: OPTOMETRY | Facility: CLINIC | Age: 57
End: 2017-07-10
Payer: MEDICARE

## 2017-07-10 DIAGNOSIS — E11.9 TYPE 2 DIABETES MELLITUS WITHOUT RETINOPATHY: Primary | ICD-10-CM

## 2017-07-10 DIAGNOSIS — H52.13 BILATERAL MYOPIA: ICD-10-CM

## 2017-07-10 DIAGNOSIS — H25.13 NUCLEAR SCLEROTIC CATARACT OF BOTH EYES: ICD-10-CM

## 2017-07-10 PROCEDURE — 99999 PR PBB SHADOW E&M-EST. PATIENT-LVL II: CPT | Mod: PBBFAC,,, | Performed by: OPTOMETRIST

## 2017-07-10 PROCEDURE — 92014 COMPRE OPH EXAM EST PT 1/>: CPT | Mod: S$GLB,,, | Performed by: OPTOMETRIST

## 2017-07-10 PROCEDURE — 92015 DETERMINE REFRACTIVE STATE: CPT | Mod: S$GLB,,, | Performed by: OPTOMETRIST

## 2017-07-10 PROCEDURE — 99499 UNLISTED E&M SERVICE: CPT | Mod: S$GLB,,, | Performed by: OPTOMETRIST

## 2017-07-10 NOTE — LETTER
July 11, 2017      Jennifer Manzanares MD  [address]  [facility] [address]           [facility] - Optometry  [address]  [facility] [address]  Phone: [phone]  Fax: [phone]          Patient: Graham Caceres   MR Number: [id number]   YOB: 1960   Date of Visit: 7/10/2017       Dear Dr. Jennifer Manzanares:    Thank you for referring Graham Caceres to me for evaluation. Attached you will find relevant portions of my assessment and plan of care.    If you have questions, please do not hesitate to call me. I look forward to following Graham Caceres along with you.    Sincerely,    Mulu Freed, OD    Enclosure  CC:  No Recipients    If you would like to receive this communication electronically, please contact [email or web address] or [phone] to request more information on [employer] Link access.    For providers and/or their staff who would like to refer a patient to [facility], please contact us through our one-stop-shop provider referral line, [facility], at [phone].    If you feel you have received this communication in error or would no longer like to receive these types of communications, please e-mail [email or web address]

## 2017-07-11 NOTE — PROGRESS NOTES
HPI     Last eye exam was 5/24/16 with Dr. Soler.  Patient states no vision changes since last exam. Didn't update glasses   after last exam.   Patient denies diplopia, headaches, flashes/floaters, and pain.      Hemoglobin A1C       Date                     Value               Ref Range             Status                06/23/2017               6.7 (H)             4.0 - 5.6 %           Final                  Last edited by Qiana Sadler on 7/10/2017  9:11 AM. (History)        ROS     Positive for: Endocrine, Eyes    Negative for: Constitutional, Gastrointestinal, Neurological, Skin,   Genitourinary, Musculoskeletal, HENT, Cardiovascular, Respiratory,   Psychiatric, Allergic/Imm, Heme/Lymph    Last edited by Mulu Freed, OD on 7/11/2017  8:26 AM. (History)        Assessment /Plan     For exam results, see Encounter Report.    Type 2 diabetes mellitus without retinopathy    Nuclear sclerotic cataract of both eyes    Bilateral myopia            1.  No retinopathy--monitor yearly.   Educated pt eye health correlates with blood sugar control.    2.  Educated on cataracts and affects on vision.  Early-monitor.  3.  Distance rx given.  Discussed LASIK.        RTC 1 year for dm exam.

## 2017-10-17 ENCOUNTER — OFFICE VISIT (OUTPATIENT)
Dept: INTERNAL MEDICINE | Facility: CLINIC | Age: 57
End: 2017-10-17
Payer: MEDICARE

## 2017-10-17 ENCOUNTER — LAB VISIT (OUTPATIENT)
Dept: LAB | Facility: HOSPITAL | Age: 57
End: 2017-10-17
Attending: INTERNAL MEDICINE
Payer: MEDICARE

## 2017-10-17 VITALS
HEIGHT: 68 IN | BODY MASS INDEX: 26.45 KG/M2 | RESPIRATION RATE: 16 BRPM | DIASTOLIC BLOOD PRESSURE: 86 MMHG | SYSTOLIC BLOOD PRESSURE: 132 MMHG | TEMPERATURE: 98 F | WEIGHT: 174.5 LBS | HEART RATE: 81 BPM

## 2017-10-17 DIAGNOSIS — E11.69 HYPERLIPIDEMIA ASSOCIATED WITH TYPE 2 DIABETES MELLITUS: ICD-10-CM

## 2017-10-17 DIAGNOSIS — E11.9 TYPE 2 DIABETES MELLITUS WITHOUT RETINOPATHY: ICD-10-CM

## 2017-10-17 DIAGNOSIS — E78.5 HYPERLIPIDEMIA ASSOCIATED WITH TYPE 2 DIABETES MELLITUS: ICD-10-CM

## 2017-10-17 DIAGNOSIS — Z91.148 NON COMPLIANCE W MEDICATION REGIMEN: ICD-10-CM

## 2017-10-17 DIAGNOSIS — E11.9 TYPE 2 DIABETES MELLITUS WITHOUT RETINOPATHY: Primary | ICD-10-CM

## 2017-10-17 LAB
ALBUMIN SERPL BCP-MCNC: 4.4 G/DL
ALP SERPL-CCNC: 78 U/L
ALT SERPL W/O P-5'-P-CCNC: 39 U/L
ANION GAP SERPL CALC-SCNC: 9 MMOL/L
AST SERPL-CCNC: 27 U/L
BILIRUB SERPL-MCNC: 1 MG/DL
BUN SERPL-MCNC: 12 MG/DL
CALCIUM SERPL-MCNC: 9.7 MG/DL
CHLORIDE SERPL-SCNC: 102 MMOL/L
CO2 SERPL-SCNC: 28 MMOL/L
CREAT SERPL-MCNC: 0.8 MG/DL
EST. GFR  (AFRICAN AMERICAN): >60 ML/MIN/1.73 M^2
EST. GFR  (NON AFRICAN AMERICAN): >60 ML/MIN/1.73 M^2
GLUCOSE SERPL-MCNC: 119 MG/DL
POTASSIUM SERPL-SCNC: 4.3 MMOL/L
PROT SERPL-MCNC: 8.6 G/DL
SODIUM SERPL-SCNC: 139 MMOL/L

## 2017-10-17 PROCEDURE — 80053 COMPREHEN METABOLIC PANEL: CPT

## 2017-10-17 PROCEDURE — 36415 COLL VENOUS BLD VENIPUNCTURE: CPT

## 2017-10-17 PROCEDURE — 99499 UNLISTED E&M SERVICE: CPT | Mod: S$GLB,,, | Performed by: INTERNAL MEDICINE

## 2017-10-17 PROCEDURE — 99214 OFFICE O/P EST MOD 30 MIN: CPT | Mod: S$GLB,,, | Performed by: INTERNAL MEDICINE

## 2017-10-17 PROCEDURE — 99999 PR PBB SHADOW E&M-EST. PATIENT-LVL III: CPT | Mod: PBBFAC,,, | Performed by: INTERNAL MEDICINE

## 2017-10-17 PROCEDURE — 83036 HEMOGLOBIN GLYCOSYLATED A1C: CPT

## 2017-10-17 NOTE — PROGRESS NOTES
"Subjective:       Patient ID: Graham Caceres is a 57 y.o. male.    Chief Complaint: Medication Refill (med adjustment may be needed)    HPI   He stopped all his medicines on his own 2-3 weeks ago.     DM2 - was on januvia 25mg daily. Checks glucose once a day in the morning and was 160s while he was on januvia.   Last a1c 6/23/17 - 6.7.    HLD - stopped pravastatin.     HCV s/p treatment. HCV undetectable 6/2017.    Review of Systems  Comprehensive review of systems otherwise negative. See history/subjective section for more details.    Objective:      Physical Exam    /86   Pulse 81   Temp 97.8 °F (36.6 °C) (Oral)   Resp 16   Ht 5' 8" (1.727 m)   Wt 79.2 kg (174 lb 8 oz)   BMI 26.53 kg/m²     Gen - A+OX4, NAD  HEENT PERRL, OP clear. MMM.   Neck - no cervical LAD  CV - RRR  CHEST - CTAB, no wheezing/rhonchi  Abd - S/NT/ND+BS  Ext -2 + B DP and radial pulses. No LE edema.     Previous labs reviewed.    Assessment/Plan     Graham was seen today for medication refill.    Diagnoses and all orders for this visit:    Type 2 diabetes mellitus without retinopathy - restart Januvia 25 mg daily.  Reassurance that we will monitor kidney function.  -     Hemoglobin A1c; Future  -     Comprehensive metabolic panel; Future    Hyperlipidemia associated with type 2 diabetes mellitus - restart pravastatin 20 mg daily.  -     Comprehensive metabolic panel; Future    Non compliance w medication regimen - long discussion in regards to taking medicine as prescribed and consistently.    Declines flu vaccine.  Return in about 3 months (around 1/17/2018).      Jennifer Manzanares MD  Department of Internal Medicine - [facility]  3:33 PM  "

## 2017-10-18 ENCOUNTER — TELEPHONE (OUTPATIENT)
Dept: INTERNAL MEDICINE | Facility: CLINIC | Age: 57
End: 2017-10-18

## 2017-10-18 LAB
ESTIMATED AVG GLUCOSE: 148 MG/DL
HBA1C MFR BLD HPLC: 6.8 %

## 2017-10-18 NOTE — TELEPHONE ENCOUNTER
His hemoglobin A1c is 6.8, which is still in the diabetes range.  Restart Januvia and pravastatin.  His kidney function is normal.  Please call and let patient know.

## 2017-10-20 ENCOUNTER — TELEPHONE (OUTPATIENT)
Dept: HEPATOLOGY | Facility: CLINIC | Age: 57
End: 2017-10-20

## 2017-10-20 NOTE — TELEPHONE ENCOUNTER
----- Message from Fabby Hoffman MA sent at 10/18/2017 10:15 AM CDT -----  Contact: patient       ----- Message -----  From: Christophe Madden  Sent: 10/17/2017   4:00 PM  To: [facility] Non-Clinical Staff    Patient called to schedule an appointment       Please call [phone]      Thanks!!!

## 2017-11-20 ENCOUNTER — OFFICE VISIT (OUTPATIENT)
Dept: HEPATOLOGY | Facility: CLINIC | Age: 57
End: 2017-11-20
Payer: MEDICARE

## 2017-11-20 ENCOUNTER — HOSPITAL ENCOUNTER (OUTPATIENT)
Dept: RADIOLOGY | Facility: HOSPITAL | Age: 57
Discharge: HOME | End: 2017-11-20
Attending: INTERNAL MEDICINE
Payer: MEDICARE

## 2017-11-20 VITALS
TEMPERATURE: 97 F | SYSTOLIC BLOOD PRESSURE: 167 MMHG | RESPIRATION RATE: 18 BRPM | BODY MASS INDEX: 26.97 KG/M2 | HEIGHT: 68 IN | DIASTOLIC BLOOD PRESSURE: 100 MMHG | HEART RATE: 87 BPM | WEIGHT: 177.94 LBS | OXYGEN SATURATION: 97 %

## 2017-11-20 DIAGNOSIS — R74.8 ABNORMAL TRANSAMINASES: ICD-10-CM

## 2017-11-20 DIAGNOSIS — K74.69 OTHER CIRRHOSIS OF LIVER: Primary | ICD-10-CM

## 2017-11-20 DIAGNOSIS — K76.0 FATTY LIVER DISEASE, NONALCOHOLIC: ICD-10-CM

## 2017-11-20 DIAGNOSIS — Z86.19 HISTORY OF HEPATITIS C: ICD-10-CM

## 2017-11-20 DIAGNOSIS — K76.0 NAFLD (NONALCOHOLIC FATTY LIVER DISEASE): ICD-10-CM

## 2017-11-20 DIAGNOSIS — K74.60 CIRRHOSIS OF LIVER WITHOUT ASCITES, UNSPECIFIED HEPATIC CIRRHOSIS TYPE: ICD-10-CM

## 2017-11-20 PROCEDURE — 76705 ECHO EXAM OF ABDOMEN: CPT | Mod: TC

## 2017-11-20 PROCEDURE — 76705 ECHO EXAM OF ABDOMEN: CPT | Mod: 26,,, | Performed by: INTERNAL MEDICINE

## 2017-11-20 PROCEDURE — 99213 OFFICE O/P EST LOW 20 MIN: CPT | Mod: S$GLB,,, | Performed by: PHYSICIAN ASSISTANT

## 2017-11-20 PROCEDURE — 99499 UNLISTED E&M SERVICE: CPT | Mod: S$GLB,,, | Performed by: PHYSICIAN ASSISTANT

## 2017-11-20 PROCEDURE — 99999 PR PBB SHADOW E&M-EST. PATIENT-LVL III: CPT | Mod: PBBFAC,,, | Performed by: PHYSICIAN ASSISTANT

## 2017-11-20 NOTE — PROGRESS NOTES
HEPATOLOGY CLINIC VISIT NOTE - HCV clinic    CHIEF COMPLAINT:  HCV Cirrhosis    HISTORY: This is a 57 y.o.   male w/ HCV cirrhosis, s/p successful antiviral therapy, as well as fatty liver disease, here for f/u.     Interval history:  At last visit pt stated he was not taking pravachol - prescribed by PCP for hyperlipidemia - due to concern for his liver. At that time I advised he take pravachol per PCP recommendations as it would be beneficial for his fatty liver disease    Today pt states he just recently started taking it; denies problems with it  Prior labs (after successful HCV rx) had revealed mild transaminase elevation.  Serologic eval was unyielding and fatty liver is felt to be etiology  Most recent LFT 10/17/17 improved w/ AST 27, ALT 39    Feels well  Denies jaundice, dark urine, hematemesis, melena, slowed mentation, abdominal distention.       Cirrhosis history:  FibroScan 4/28/16 - kPA 17.3, F4  Labs / imaging / pretreatment APRI / FIB-4 were consistent w/ advanced fibrosis:    Well compensated.   No ascites, jaundice, HE  MELD score - 6    - HCC screening - Prelim u/s today w/ no liver lesions, AFP today pending  - Varices screening - EGD 5/2016 Dr Soto - no varices    HCV history:  Completed 24 weeks harvoni w/ SVR12 as of 2/2017  - genotype 1B  - Prior HCV tx w/ Dr Laurent: PegIFN + RBV (10-15 yrs ago) for few months - nonresponse                     Past Medical History:   Diagnosis Date    Cirrhosis     Diverticulosis     History of hepatitis C, s/p successful Rx w/ SVR24 - 6/2017 4/7/2016    S/p harvoni w/ SVR    Hyperlipidemia 04/2016    Hypertension     Internal hemorrhoid     New onset type 2 diabetes mellitus 4/2016    Positive QuantiFERON-TB Gold test 4/2016    Sebopsoriasis      Past Surgical History:   Procedure Laterality Date    COLONOSCOPY  05/19/2016    repeat in 10 yrs    COLONOSCOPY Left 5/19/2016    Procedure: COLONOSCOPY - colon cancer screening;  Surgeon: David  MIRACLE Soto MD;  [facility] ([facility]);  Service: Endoscopy;  Laterality: Left;       FAMILY HISTORY: Negative for liver disease    MEDS & ALLERGIES:  Reviewed in epic  Takes ginseng occasionally. Denies other herbal products      SOCIAL HISTORY:   Moved from [place] during teenage years  Resides in [place], not . 1 daughter from prior marriage  Not working, on disability. Worked at [employer] many years ago  Alcohol - none  Tobacco - none  Drugs - none      ROS:   No fever, chills, weight loss. Some fatigue after eating  No chest pain, palpitations, dyspnea, cough  No abdominal pain, change in bowel pattern, nausea, vomiting  No skin rashes   No headaches  No lower extremity edema  No depression or anxiety      PHYSICAL EXAM:  Friendly  male, in no acute distress; alert and oriented to person, place and time  VITALS: reviewed.   HEENT: Sclerae anicteric.   LUNGS: Normal respiratory effort. Clear to auscultation bilaterally w/ good air exchange   CVS: Regular rate and rhythm w/ no murmurs  ABDOMEN: Nondistended. Soft. Nontender. No organomegaly or masses. Good bowel sounds. No ascites, hernias, masses.   EXTREMITIES: No edema.   SKIN: No jaundice or spider telangectasias. No rashes on exposed skin  NEURO/PSYCH: Normal gait. Memory intact. Thought and speech patterns appropriate. No obvious depression or anxiety.       RECENT LABS:  Lab Results   Component Value Date    WBC 5.13 11/20/2017    HGB 16.5 11/20/2017     11/20/2017     Lab Results   Component Value Date    INR 1.0 11/20/2017     Lab Results   Component Value Date    AST 27 10/17/2017    ALT 39 10/17/2017    BILITOT 1.0 10/17/2017    ALBUMIN 4.4 10/17/2017    ALKPHOS 78 10/17/2017    CREATININE 0.8 10/17/2017    BUN 12 10/17/2017     10/17/2017    K 4.3 10/17/2017    AFP 3.8 04/07/2017         RECENT IMAGING:  PRELIMINARY REPORT  Results for orders placed during the hospital encounter of 11/20/17   US Abdomen  Limited    Narrative Reason for study: Cirrhosis  Comparison: Ultrasound abdomen 4/7/17  Technique: Limited right upper quadrant ultrasound was performed.    Findings: The liver is enlarged in size, measuring 17.3cm.  Hepatic parenchyma is homogeneous without evidence for masses.  No intra- or extrahepatic biliary ductal dilatation. The common bile duct measures 0.3 cm.  The gallbladder demonstrates a few tiny gallstones. No evidence for pericholecystic fluid, abnormal gallbladder wall thickening, or increased vascularity.  Sonographic Mason's sign is negative. The visualized portions of the pancreas appear normal. The spleen is normal and measures 12.1 x 4.4 cm. No ascites.    Impression Hepatomegaly.    Few tiny gallstones without sonographic evidence for acute cholecystitis.  ______________________________________   Electronically signed by resident: TRACE KIM MD  Date:     11/20/17  Time:    10:00  As the supervising and teaching physician, I personally reviewed the images and resident's interpretation and I agree with the findings.  Electronically signed by: Katelin Murguia MD  Date:     11/20/17  Time:    10:33          ASSESSMENT  57 y.o.   male with:  1. WELL COMPENSATED CIRRHOSIS   -- MELD score - 6  -- HCC screening - AFP pending. Prelim report of u/s today w/ no lesions  -- EGD 5/2016 varices screening - no varices  2. HISTORY OF CHRONIC HEPATITIS C, GENOTYPE 1B - S/P successful Rx w/ SVR12 2/2017  -- completed 24weeks of Harvoni  -- Prior HCV treatment - PegIFN + RBV for few months many years ago - nonresponse  -- (+) Immunity to HAV & HBV  3. NAFLD  -- Hx of elevated transaminases w/ recent improvement  4. HYPERLIPIDEMIA  -- now on pravastatin  5. DM  -- recent HbA1c 6.8        EDUCATION:  Cirrhosis will continue to exist. Needs lifelong HCC screening and liver monitoring every 6 months indefinitely    Discussed importance of optimal lipid and glucose mngmt as well as health weight, exercise,  diet for fatty liver        PLAN:  1. Await AFP and final U/S report  2. Continue lipid and DM mngmt w/ PCP. Statins are okay  3. F/U visit w/ CBC, CMP, INR, AFP, U/S abdomen due 5/2018  4. EGD for varices screen due 5/2019 as long as liver disease remains well compensated

## 2017-12-05 ENCOUNTER — TELEPHONE (OUTPATIENT)
Dept: INTERNAL MEDICINE | Facility: CLINIC | Age: 57
End: 2017-12-05

## 2017-12-05 NOTE — TELEPHONE ENCOUNTER
----- Message from Catherine Peng sent at 12/5/2017  2:30 PM CST -----  Good Afternoon,               My name is Catherine and I schedule the HRA's here at [facility] in [place]. While I was on the line with patient Graham Caceres, he requested he would like to have his medication refilled and assistance with helping to pay for his medication. Please contact patient regarding his request. Thanks, your assistance in this matter would be greatly appreciated.

## 2017-12-19 ENCOUNTER — TELEPHONE (OUTPATIENT)
Dept: INTERNAL MEDICINE | Facility: CLINIC | Age: 57
End: 2017-12-19

## 2017-12-19 DIAGNOSIS — E11.9 TYPE 2 DIABETES MELLITUS WITHOUT RETINOPATHY: ICD-10-CM

## 2017-12-19 NOTE — TELEPHONE ENCOUNTER
----- Message from Tara Markham sent at 12/19/2017  2:38 PM CST -----  Good Afternoon    Patient called [facility] requesting a medication refill. Currently the patient does have enough medication until the next business day. However, the patient will need a refill. Please contact the patient for additional inquiries in regards to mediation refill. (Diabetes Medication)    Respectfully,  Tara Markham     ([facility])  [facility]

## 2017-12-19 NOTE — TELEPHONE ENCOUNTER
Refilled januvia. Please make sure this was all he needs refill on since it was not specified in the note. Thanks!

## 2018-01-16 ENCOUNTER — PES CALL (OUTPATIENT)
Dept: ADMINISTRATIVE | Facility: CLINIC | Age: 58
End: 2018-01-16

## 2018-02-20 ENCOUNTER — TELEPHONE (OUTPATIENT)
Dept: INTERNAL MEDICINE | Facility: CLINIC | Age: 58
End: 2018-02-20

## 2018-02-20 DIAGNOSIS — E11.65 TYPE 2 DIABETES MELLITUS WITH HYPERGLYCEMIA, WITHOUT LONG-TERM CURRENT USE OF INSULIN: Primary | ICD-10-CM

## 2018-02-20 DIAGNOSIS — E11.9 TYPE 2 DIABETES MELLITUS WITHOUT RETINOPATHY: ICD-10-CM

## 2018-02-20 NOTE — TELEPHONE ENCOUNTER
Pt sugar levels have been 150-200 for the past month. He can't make his upcoming appt and needs it rescheduled for next week. Will consult with Taylor.     Pt also would like to know if he should increase his Januvia mg to help. I advised him not to until I could consult.     Please advise.

## 2018-02-20 NOTE — TELEPHONE ENCOUNTER
----- Message from Jinny Jaquez sent at 2/20/2018 11:24 AM CST -----  Contact: pt   Pt would like to be called back regarding speaking with nurse about sugar levels and other health concerns.     Pt can be reached at [phone].

## 2018-02-21 NOTE — TELEPHONE ENCOUNTER
He can take 2 of the januvia 25mg daily. Please have him come in for labs this week then. Ordered a1c, CMP, lipid panel and microalbumin. Also given his complaint of blurry vision, please schedule him an eye appt on the same day.

## 2018-02-23 ENCOUNTER — TELEPHONE (OUTPATIENT)
Dept: INTERNAL MEDICINE | Facility: CLINIC | Age: 58
End: 2018-02-23

## 2018-02-23 NOTE — TELEPHONE ENCOUNTER
----- Message from Minor Lane sent at 2/22/2018  4:13 PM CST -----  Contact: Patient [phone]  He said he would like to know if he can start taking a low dose high blood pressure pill.    Thank you

## 2018-02-23 NOTE — TELEPHONE ENCOUNTER
150/90 130-140/89-93  Pt stated [facility] to [facility]

## 2018-02-23 NOTE — TELEPHONE ENCOUNTER
----- Message from Minor Lane sent at 2/22/2018  4:13 PM CST -----  Contact: Patient [phone]  He said he would like to know if he can start taking a low dose high blood pressure pill.    Thank you

## 2018-03-01 ENCOUNTER — LAB VISIT (OUTPATIENT)
Dept: LAB | Facility: HOSPITAL | Age: 58
End: 2018-03-01
Attending: INTERNAL MEDICINE
Payer: MEDICARE

## 2018-03-01 ENCOUNTER — NURSE TRIAGE (OUTPATIENT)
Dept: ADMINISTRATIVE | Facility: CLINIC | Age: 58
End: 2018-03-01

## 2018-03-01 ENCOUNTER — OFFICE VISIT (OUTPATIENT)
Dept: OPTOMETRY | Facility: CLINIC | Age: 58
End: 2018-03-01
Payer: MEDICARE

## 2018-03-01 DIAGNOSIS — E11.65 TYPE 2 DIABETES MELLITUS WITH HYPERGLYCEMIA, WITHOUT LONG-TERM CURRENT USE OF INSULIN: ICD-10-CM

## 2018-03-01 DIAGNOSIS — E11.9 TYPE 2 DIABETES MELLITUS WITHOUT RETINOPATHY: Primary | ICD-10-CM

## 2018-03-01 LAB
ALBUMIN SERPL BCP-MCNC: 4.2 G/DL
ALP SERPL-CCNC: 64 U/L
ALT SERPL W/O P-5'-P-CCNC: 42 U/L
ANION GAP SERPL CALC-SCNC: 9 MMOL/L
AST SERPL-CCNC: 27 U/L
BILIRUB SERPL-MCNC: 0.7 MG/DL
BUN SERPL-MCNC: 14 MG/DL
CALCIUM SERPL-MCNC: 9.5 MG/DL
CHLORIDE SERPL-SCNC: 105 MMOL/L
CHOLEST SERPL-MCNC: 249 MG/DL
CHOLEST/HDLC SERPL: 6.1 {RATIO}
CO2 SERPL-SCNC: 27 MMOL/L
CREAT SERPL-MCNC: 0.8 MG/DL
EST. GFR  (AFRICAN AMERICAN): >60 ML/MIN/1.73 M^2
EST. GFR  (NON AFRICAN AMERICAN): >60 ML/MIN/1.73 M^2
ESTIMATED AVG GLUCOSE: 137 MG/DL
GLUCOSE SERPL-MCNC: 164 MG/DL
HBA1C MFR BLD HPLC: 6.4 %
HDLC SERPL-MCNC: 41 MG/DL
HDLC SERPL: 16.5 %
LDLC SERPL CALC-MCNC: 163 MG/DL
NONHDLC SERPL-MCNC: 208 MG/DL
POTASSIUM SERPL-SCNC: 4.1 MMOL/L
PROT SERPL-MCNC: 8 G/DL
SODIUM SERPL-SCNC: 141 MMOL/L
TRIGL SERPL-MCNC: 225 MG/DL

## 2018-03-01 PROCEDURE — 99499 UNLISTED E&M SERVICE: CPT | Mod: S$GLB,,, | Performed by: OPTOMETRIST

## 2018-03-01 PROCEDURE — 80053 COMPREHEN METABOLIC PANEL: CPT

## 2018-03-01 PROCEDURE — 80061 LIPID PANEL: CPT

## 2018-03-01 PROCEDURE — 36415 COLL VENOUS BLD VENIPUNCTURE: CPT | Mod: PO

## 2018-03-01 PROCEDURE — 83036 HEMOGLOBIN GLYCOSYLATED A1C: CPT

## 2018-03-01 PROCEDURE — 92014 COMPRE OPH EXAM EST PT 1/>: CPT | Mod: S$GLB,,, | Performed by: OPTOMETRIST

## 2018-03-01 PROCEDURE — 99999 PR PBB SHADOW E&M-EST. PATIENT-LVL II: CPT | Mod: PBBFAC,,, | Performed by: OPTOMETRIST

## 2018-03-01 NOTE — PROGRESS NOTES
HPI     . Graham Caceres  Referred by Jennifer Manzanares MD   for diabetic eye exam     Patient says he is concern about the overall health of his eye, is wants   to know if his eyes has been affected by his diabetes and recent unstable   blood sugar levels     Patient declines refraction --had routine done 7/10/17 by Dr Freed    (-)drops  (-)flashes  (-)floaters  (-)diplopia    Diabetic yes   Last blood sugar level reading 167 yesterday morning   Hemoglobin A1C       Date                     Value               Ref Range             Status                10/17/2017               6.8 (H)             4.0 - 5.6 %           Final                      06/23/2017               6.7 (H)             4.0 - 5.6 %           Final                     11/04/2016               6.2                 4.5 - 6.2 %           Final                OCULAR HISTORY  Last Eye Exam 07/10/17 Dr. Freed   (-)eye surgery   +Nuclear sclerotic cataract of both eyes    FAMILY HISTORY  (-)Glaucoma none       Last edited by Lew Lujan, OD on 3/1/2018  8:38 AM. (History)        ROS     Positive for: Endocrine (DM)    Negative for: Constitutional, Gastrointestinal, Neurological, Skin,   Genitourinary, Musculoskeletal, HENT, Cardiovascular, Eyes, Respiratory,   Psychiatric, Allergic/Imm, Heme/Lymph    Last edited by Lew Lujan, OD on 3/1/2018  8:38 AM. (History)        Assessment /Plan     For exam results, see Encounter Report.    Type 2 diabetes mellitus without retinopathy      1. Small Cats OU (deferred refraction today since pt had it done in July)  2. DM- WITHOUT RETINOPATHY.  Advised yearly DFE.  Also discussed visual flux assoc w sugar flux    PLAN:    rtc 1 yr

## 2018-03-01 NOTE — LETTER
March 1, 2018      Jennifer Manzanares MD  [address]  [place] [address]           [place] - Optometry  [address] [facility]  [place] [address]  Phone: [phone]  Fax: [phone]          Patient: Graham Caceers   MR Number: [id number]   YOB: 1960   Date of Visit: 3/1/2018       Dear Dr. Jennifer Manzanares:    Thank you for referring Graham Caceres to me for evaluation. Attached you will find relevant portions of my assessment and plan of care.    If you have questions, please do not hesitate to call me. I look forward to following Graham Caceres along with you.    Sincerely,    Lew Lujan, OD    Enclosure  CC:  No Recipients    If you would like to receive this communication electronically, please contact [email or web address] or [phone] to request more information on [employer] Link access.    For providers and/or their staff who would like to refer a patient to [facility], please contact us through our one-stop-shop provider referral line, [facility], at [phone].    If you feel you have received this communication in error or would no longer like to receive these types of communications, please e-mail [email or web address]

## 2018-03-01 NOTE — TELEPHONE ENCOUNTER
Reason for Disposition   Intermittent chest pains persist > 3 days    Protocols used: ST CHEST PAIN-A-OH    Pt c/o chest pain that occurs at night for over a month now. No symptoms at this time. Care advice given.

## 2018-03-02 NOTE — TELEPHONE ENCOUNTER
"Um. Dispositions says "You need to be examined today. Let me get you an appointment" and I don't see any appointments.     Can you call pt to schedule UC visit. I'm not in office now or tomorrow?  "

## 2018-03-02 NOTE — TELEPHONE ENCOUNTER
Called pt, no answer msg left for pt to call office, pt needs UC appt asap referred by oncall nurse to be seen in clinic for chest pain

## 2018-03-05 ENCOUNTER — TELEPHONE (OUTPATIENT)
Dept: INTERNAL MEDICINE | Facility: CLINIC | Age: 58
End: 2018-03-05

## 2018-03-05 DIAGNOSIS — E11.9 TYPE 2 DIABETES MELLITUS WITHOUT RETINOPATHY: ICD-10-CM

## 2018-03-05 DIAGNOSIS — E78.5 HYPERLIPIDEMIA, UNSPECIFIED HYPERLIPIDEMIA TYPE: ICD-10-CM

## 2018-03-05 RX ORDER — PRAVASTATIN SODIUM 20 MG/1
20 TABLET ORAL DAILY
Qty: 90 TABLET | Refills: 3 | Status: SHIPPED | OUTPATIENT
Start: 2018-03-05 | End: 2018-06-08 | Stop reason: SDUPTHER

## 2018-03-05 NOTE — TELEPHONE ENCOUNTER
Diabetes is controlled. He needs to get back on his cholesterol medicine though. His cholesterol is out of control.

## 2018-03-12 ENCOUNTER — LAB VISIT (OUTPATIENT)
Dept: LAB | Facility: HOSPITAL | Age: 58
End: 2018-03-12
Attending: NURSE PRACTITIONER
Payer: MEDICARE

## 2018-03-12 ENCOUNTER — OFFICE VISIT (OUTPATIENT)
Dept: INTERNAL MEDICINE | Facility: CLINIC | Age: 58
End: 2018-03-12
Payer: MEDICARE

## 2018-03-12 VITALS
HEIGHT: 67 IN | BODY MASS INDEX: 27.78 KG/M2 | OXYGEN SATURATION: 96 % | WEIGHT: 177 LBS | DIASTOLIC BLOOD PRESSURE: 96 MMHG | TEMPERATURE: 98 F | SYSTOLIC BLOOD PRESSURE: 134 MMHG | HEART RATE: 87 BPM

## 2018-03-12 DIAGNOSIS — R07.89 OTHER CHEST PAIN: ICD-10-CM

## 2018-03-12 DIAGNOSIS — E11.9 CONTROLLED TYPE 2 DIABETES MELLITUS WITHOUT COMPLICATION, WITHOUT LONG-TERM CURRENT USE OF INSULIN: ICD-10-CM

## 2018-03-12 DIAGNOSIS — I10 HYPERTENSION, ESSENTIAL: ICD-10-CM

## 2018-03-12 DIAGNOSIS — R07.89 OTHER CHEST PAIN: Primary | ICD-10-CM

## 2018-03-12 LAB
ANION GAP SERPL CALC-SCNC: 11 MMOL/L
BASOPHILS # BLD AUTO: 0.06 K/UL
BASOPHILS NFR BLD: 0.8 %
BUN SERPL-MCNC: 13 MG/DL
CALCIUM SERPL-MCNC: 9.9 MG/DL
CHLORIDE SERPL-SCNC: 104 MMOL/L
CO2 SERPL-SCNC: 27 MMOL/L
CREAT SERPL-MCNC: 0.8 MG/DL
DIFFERENTIAL METHOD: NORMAL
EOSINOPHIL # BLD AUTO: 0.1 K/UL
EOSINOPHIL NFR BLD: 1.9 %
ERYTHROCYTE [DISTWIDTH] IN BLOOD BY AUTOMATED COUNT: 12.6 %
EST. GFR  (AFRICAN AMERICAN): >60 ML/MIN/1.73 M^2
EST. GFR  (NON AFRICAN AMERICAN): >60 ML/MIN/1.73 M^2
GLUCOSE SERPL-MCNC: 111 MG/DL
HCT VFR BLD AUTO: 47.1 %
HGB BLD-MCNC: 16.1 G/DL
LYMPHOCYTES # BLD AUTO: 3 K/UL
LYMPHOCYTES NFR BLD: 39.7 %
MCH RBC QN AUTO: 31 PG
MCHC RBC AUTO-ENTMCNC: 34.2 G/DL
MCV RBC AUTO: 91 FL
MONOCYTES # BLD AUTO: 0.4 K/UL
MONOCYTES NFR BLD: 5.5 %
NEUTROPHILS # BLD AUTO: 3.9 K/UL
NEUTROPHILS NFR BLD: 51.8 %
NRBC BLD-RTO: 0 /100 WBC
PLATELET # BLD AUTO: 225 K/UL
PMV BLD AUTO: 10.9 FL
POTASSIUM SERPL-SCNC: 4.7 MMOL/L
RBC # BLD AUTO: 5.2 M/UL
SODIUM SERPL-SCNC: 142 MMOL/L
TROPONIN I SERPL DL<=0.01 NG/ML-MCNC: <0.006 NG/ML
WBC # BLD AUTO: 7.51 K/UL

## 2018-03-12 PROCEDURE — 36415 COLL VENOUS BLD VENIPUNCTURE: CPT

## 2018-03-12 PROCEDURE — 85025 COMPLETE CBC W/AUTO DIFF WBC: CPT

## 2018-03-12 PROCEDURE — 3080F DIAST BP >= 90 MM HG: CPT | Mod: CPTII,S$GLB,, | Performed by: NURSE PRACTITIONER

## 2018-03-12 PROCEDURE — 84484 ASSAY OF TROPONIN QUANT: CPT

## 2018-03-12 PROCEDURE — 99214 OFFICE O/P EST MOD 30 MIN: CPT | Mod: S$GLB,,, | Performed by: NURSE PRACTITIONER

## 2018-03-12 PROCEDURE — 80048 BASIC METABOLIC PNL TOTAL CA: CPT

## 2018-03-12 PROCEDURE — 3075F SYST BP GE 130 - 139MM HG: CPT | Mod: CPTII,S$GLB,, | Performed by: NURSE PRACTITIONER

## 2018-03-12 PROCEDURE — 93005 ELECTROCARDIOGRAM TRACING: CPT | Mod: S$GLB,,, | Performed by: NURSE PRACTITIONER

## 2018-03-12 PROCEDURE — 93010 ELECTROCARDIOGRAM REPORT: CPT | Mod: S$GLB,,, | Performed by: INTERNAL MEDICINE

## 2018-03-12 PROCEDURE — 99499 UNLISTED E&M SERVICE: CPT | Mod: S$GLB,,, | Performed by: NURSE PRACTITIONER

## 2018-03-12 PROCEDURE — 99999 PR PBB SHADOW E&M-EST. PATIENT-LVL IV: CPT | Mod: PBBFAC,,, | Performed by: NURSE PRACTITIONER

## 2018-03-12 RX ORDER — OMEPRAZOLE 20 MG/1
20 CAPSULE, DELAYED RELEASE ORAL DAILY
Qty: 30 CAPSULE | Refills: 0 | Status: ON HOLD | OUTPATIENT
Start: 2018-03-12 | End: 2018-08-12

## 2018-03-12 RX ORDER — HYDROCHLOROTHIAZIDE 12.5 MG/1
12.5 TABLET ORAL DAILY
Qty: 90 TABLET | Refills: 1 | Status: SHIPPED | OUTPATIENT
Start: 2018-03-12 | End: 2018-05-25

## 2018-04-17 ENCOUNTER — TELEPHONE (OUTPATIENT)
Dept: INTERNAL MEDICINE | Facility: CLINIC | Age: 58
End: 2018-04-17

## 2018-04-17 DIAGNOSIS — I83.90 VARICOSE VEIN OF LEG: Primary | ICD-10-CM

## 2018-04-17 NOTE — TELEPHONE ENCOUNTER
----- Message from Albania Sarkar sent at 4/17/2018  8:15 AM CDT -----  Contact: Patient [phone]      ----- Message -----  From: Ivonne Watts  Sent: 4/17/2018   7:33 AM  To: Gl[facility] Staff    Requesting a Referral    Requesting to see: Podiatry or doctor who specializes varicose veins    Reason for request: Veins popping out in feet    Specific physician requested: No    Please call patient to schedule appt when referral has been placed.    Thank You

## 2018-04-17 NOTE — TELEPHONE ENCOUNTER
"Pt states that he's discussed this with Dr. Manzanares and she said she would send him to the "vein doctor." Pt says that veins are sometimes painful and warm. I believe pt is requesting referral for vascular surgery because he mentioned discussing surgery with Dr. Manzanares. Pt has referral to podiatry placed by another physician in March.  "

## 2018-04-19 DIAGNOSIS — R60.9 EDEMA, UNSPECIFIED TYPE: ICD-10-CM

## 2018-04-19 DIAGNOSIS — M79.606 PAIN OF LOWER EXTREMITY, UNSPECIFIED LATERALITY: Primary | ICD-10-CM

## 2018-04-19 NOTE — TELEPHONE ENCOUNTER
Spoke with pt and informed that referral for vascular surgery has been placed. Pt given vascular surgery info and transferred to dept to schedule appt.

## 2018-04-24 ENCOUNTER — TELEPHONE (OUTPATIENT)
Dept: PULMONOLOGY | Facility: CLINIC | Age: 58
End: 2018-04-24

## 2018-04-27 ENCOUNTER — INITIAL CONSULT (OUTPATIENT)
Dept: VASCULAR SURGERY | Facility: CLINIC | Age: 58
End: 2018-04-27
Attending: SURGERY
Payer: MEDICARE

## 2018-04-27 ENCOUNTER — HOSPITAL ENCOUNTER (OUTPATIENT)
Dept: VASCULAR SURGERY | Facility: CLINIC | Age: 58
Discharge: HOME | End: 2018-04-27
Attending: SURGERY
Payer: MEDICARE

## 2018-04-27 VITALS
SYSTOLIC BLOOD PRESSURE: 156 MMHG | WEIGHT: 174.19 LBS | HEIGHT: 68 IN | HEART RATE: 73 BPM | TEMPERATURE: 98 F | DIASTOLIC BLOOD PRESSURE: 88 MMHG | BODY MASS INDEX: 26.4 KG/M2

## 2018-04-27 DIAGNOSIS — M79.606 PAIN OF LOWER EXTREMITY, UNSPECIFIED LATERALITY: ICD-10-CM

## 2018-04-27 DIAGNOSIS — I83.90 VARICOSE VEIN OF LEG: Primary | ICD-10-CM

## 2018-04-27 DIAGNOSIS — R60.9 EDEMA, UNSPECIFIED TYPE: ICD-10-CM

## 2018-04-27 PROCEDURE — 3079F DIAST BP 80-89 MM HG: CPT | Mod: CPTII,S$GLB,, | Performed by: SURGERY

## 2018-04-27 PROCEDURE — 93970 EXTREMITY STUDY: CPT | Mod: S$GLB,,, | Performed by: SURGERY

## 2018-04-27 PROCEDURE — 3077F SYST BP >= 140 MM HG: CPT | Mod: CPTII,S$GLB,, | Performed by: SURGERY

## 2018-04-27 PROCEDURE — 99203 OFFICE O/P NEW LOW 30 MIN: CPT | Mod: S$GLB,,, | Performed by: SURGERY

## 2018-04-27 PROCEDURE — 99999 PR PBB SHADOW E&M-EST. PATIENT-LVL III: CPT | Mod: PBBFAC,,, | Performed by: SURGERY

## 2018-04-27 NOTE — LETTER
April 27, 2018      Jennifer Manzanares MD  [address]  [place] [address]           [facility] - Vascular Surgery  [address]  [facility] [address]  Phone: [phone]  Fax: [phone]          Patient: Graham Caceres   MR Number: [id number]   YOB: 1960   Date of Visit: 4/27/2018       Dear Dr. Jennifer Manzanares:    Thank you for referring Graham Caceres to me for evaluation. Attached you will find relevant portions of my assessment and plan of care.    If you have questions, please do not hesitate to call me. I look forward to following Graham Caceres along with you.    Sincerely,    Darryl Martinez MD    Enclosure  CC:  No Recipients    If you would like to receive this communication electronically, please contact [email or web address] or [phone] to request more information on [employer] access.    For providers and/or their staff who would like to refer a patient to [facility], please contact us through our one-stop-shop provider referral line, [facility] , at [phone].    If you feel you have received this communication in error or would no longer like to receive these types of communications, please e-mail [email or web address]

## 2018-04-27 NOTE — PROGRESS NOTES
"Graham MIRNA Morris  04/27/2018    HPI:  Patient is a 58 y.o. male referred by Dr. Manzanares with a h/o DM2, HTN, HLD, cirrhosis (just completed harvoni treatment 2017) who is here today for evaluation of  "hot and large" veins on bilateral lower extremities which occur every 2 weeks. They do not get inflamed. He denies any fevers or chills. He has BLE varicosities which have been present for a few years. Never worn compression stockings. Occasionally legs get heavy and tired as day goes on. Denies lower leg edema, ulceration, previous episodes of thrombophlebitis, previous venous procedures.     Denies any history of DVT    Denies  MI/stroke  Tobacco use: Denies    Past Medical History:   Diagnosis Date    Cirrhosis     Diverticulosis     History of hepatitis C, s/p successful Rx w/ SVR24 - 6/2017 4/7/2016    S/p harvoni w/ SVR    Hyperlipidemia 04/2016    Hypertension     Internal hemorrhoid     New onset type 2 diabetes mellitus 4/2016    Positive QuantiFERON-TB Gold test 4/2016    Sebopsoriasis      Past Surgical History:   Procedure Laterality Date    COLONOSCOPY  05/19/2016    repeat in 10 yrs    COLONOSCOPY Left 5/19/2016    Procedure: COLONOSCOPY - colon cancer screening;  Surgeon: David Soto MD;  Location: [facility] ([address] [place] [address]);  Service: Endoscopy;  Laterality: Left;     Family History   Problem Relation Age of Onset    Melanoma Neg Hx      Social History     Social History    Marital status: Single     Spouse name: N/A    Number of children: N/A    Years of education: N/A     Occupational History    disabled      Social History Main Topics    Smoking status: Never Smoker    Smokeless tobacco: Not on file    Alcohol use Not on file    Drug use: No    Sexual activity: Not on file     Other Topics Concern    Not on file     Social History Narrative    No narrative on file       Current Outpatient Prescriptions:     blood sugar diagnostic Strp, DAILY, Disp: 100 each, Rfl: 5    blood-glucose " meter kit, Use as instructed, Disp: 1 each, Rfl: 0    hydroCHLOROthiazide (HYDRODIURIL) 12.5 MG Tab, Take 1 tablet (12.5 mg total) by mouth once daily., Disp: 90 tablet, Rfl: 1    lancets Misc, Use daily., Disp: 100 each, Rfl: 5    omeprazole (PRILOSEC) 20 MG capsule, Take 1 capsule (20 mg total) by mouth once daily., Disp: 30 capsule, Rfl: 0    pravastatin (PRAVACHOL) 20 MG tablet, Take 1 tablet (20 mg total) by mouth once daily., Disp: 90 tablet, Rfl: 3    SITagliptin (JANUVIA) 25 MG Tab, Take 2 tablets (50 mg total) by mouth once daily., Disp: 90 tablet, Rfl: 3    REVIEW OF SYSTEMS:  General: negative; ENT: negative; Allergy and Immunology: negative; Hematological and Lymphatic: Negative; Endocrine: negative; Respiratory: no cough, shortness of breath, or wheezing; Cardiovascular: no chest pain or dyspnea on exertion; Gastrointestinal: no abdominal pain/back, change in bowel habits, or bloody stools; Genito-Urinary: no dysuria, trouble voiding, or hematuria; Musculoskeletal: negative  Neurological: no TIA or stroke symptoms; Psychiatric: no nervousness, anxiety or depression.    PHYSICAL EXAM:   Right Arm BP - Sittin/88 (18 1410)  Left Arm BP - Sittin/94 (18 1410)  Pulse: 73  Temp: 98.1 °F (36.7 °C)      General appearance:  Alert, well-appearing, and in no distress.  Oriented to person, place, and time   Neurological: Normal speech, no focal findings noted; CN II - XII grossly intact           Musculoskeletal: Digits/nail without cyanosis/clubbing.  Normal muscle strength/tone.                 Neck: Supple, no significant adenopathy; thyroid is not enlarged                  No carotid bruit can be auscultated                Chest:  Clear to auscultation, no wheezes, rales or rhonchi, symmetric air entry     No use of accessory muscles             Cardiac: Normal rate and regular rhythm, S1 and S2 normal; PMI non-displaced          Abdomen: Soft, nontender, nondistended, no masses or  organomegaly     No rebound tenderness noted; bowel sounds normal     Pulsatile aortic mass is not palpable.     No groin adenopathy     No caput medusa       Extremities:   Bilateral Lower legs varicosities     2+ pedal pulses palpable.     No pre-tibial edema     No ulcerations    LAB RESULTS:  Lab Results   Component Value Date    K 4.7 03/12/2018    K 4.1 03/01/2018    K 4.3 10/17/2017    CREATININE 0.8 03/12/2018    CREATININE 0.8 03/01/2018    CREATININE 0.8 10/17/2017     Lab Results   Component Value Date    WBC 7.51 03/12/2018    WBC 5.13 11/20/2017    WBC 5.65 06/23/2017    HCT 47.1 03/12/2018    HCT 48.9 11/20/2017    HCT 47.7 06/23/2017     03/12/2018     11/20/2017     06/23/2017     Lab Results   Component Value Date    HGBA1C 6.4 (H) 03/01/2018    HGBA1C 6.8 (H) 10/17/2017    HGBA1C 6.7 (H) 06/23/2017     IMAGING:  Venous Duplex 4/27/18:  No reflux, no thrombus noted bilaterally    Right Leg:        Compression       Color fill        Reflux            Thrombosis    Common Femoral    complete          complete          -                 none  Femoral prox      complete          complete          -                 none  Femoral mid       complete          complete          -                 none  Popliteal Fossa   complete          complete          -                 none  Posterior Tibial  complete          complete          -                 none  GSV               complete          complete          none              none  SSV               complete          complete          none              none  SFJ               complete          complete          -                 none    Left Leg:         Compression       Color fill        Reflux            Thrombosis    Common Femoral    complete          complete          -                 none  Femoral prox      complete          complete          -                 none  Femoral mid       complete          complete          -                  none  Popliteal Fossa   complete          complete          -                 none  Posterior Tibial  complete          complete          -                 none  GSV               complete          complete          none              none  SSV               complete          complete          none              none  SFJ               complete          complete          -                 none    The diagnostic criteria used in our lab: Significant reflux is present when retrograde flow is noted both in the vein and at the saphenofemoral junction (SFJ) or saphenopoliteal junction (SPJ)for 500 milliseconds (.5 second) following provocative   maneuvers when the patient  is in the  reverse trendelenburg position.    Report Summary:  Impression:   Right Leg:  Color flow evaluation of the lower extremity demonstrates fully compressible and patent veins. There is no evidence of venous thrombosis in the deep or superficial veins. No significant reflux noted in the GSV including the saphenofemoral   junction (SFJ). No reflux noted in the SSV or accessory saphenous vein.     Left Leg:  Color flow evaluation of the lower extremity demonstrates fully compressible and patent veins. There is no evidence of venous thrombosis in the deep or superficial veins. No significant reflux noted in the GSV including the saphenofemoral   junction (SFJ). No reflux noted in the SSV or accessory saphenous vein.        IMP/PLAN:  58 y.o. male with spider telangiectasias of the bilateral ankles.  No venous reflux on today's duplex.  No history of DVT.  He seems concerned re: cosmesis, I recommended he see a dermatologist or private vein center if he chose to pursue that further.      1) leg elevation with extended standing  2) continue statin    Darryl Martinez MD  Vascular & Endovascular Surgery

## 2018-05-25 ENCOUNTER — OFFICE VISIT (OUTPATIENT)
Dept: INTERNAL MEDICINE | Facility: CLINIC | Age: 58
End: 2018-05-25
Payer: MEDICARE

## 2018-05-25 VITALS
TEMPERATURE: 98 F | SYSTOLIC BLOOD PRESSURE: 136 MMHG | HEIGHT: 67 IN | DIASTOLIC BLOOD PRESSURE: 82 MMHG | BODY MASS INDEX: 27.41 KG/M2 | WEIGHT: 174.63 LBS | HEART RATE: 88 BPM

## 2018-05-25 DIAGNOSIS — E11.69 HYPERLIPIDEMIA ASSOCIATED WITH TYPE 2 DIABETES MELLITUS: ICD-10-CM

## 2018-05-25 DIAGNOSIS — I15.2 HYPERTENSION ASSOCIATED WITH DIABETES: ICD-10-CM

## 2018-05-25 DIAGNOSIS — E11.59 HYPERTENSION ASSOCIATED WITH DIABETES: ICD-10-CM

## 2018-05-25 DIAGNOSIS — Z86.19 HISTORY OF HEPATITIS C: ICD-10-CM

## 2018-05-25 DIAGNOSIS — E78.5 HYPERLIPIDEMIA ASSOCIATED WITH TYPE 2 DIABETES MELLITUS: ICD-10-CM

## 2018-05-25 DIAGNOSIS — E11.9 TYPE 2 DIABETES MELLITUS WITHOUT RETINOPATHY: Primary | ICD-10-CM

## 2018-05-25 LAB
CREAT UR-MCNC: 103 MG/DL
MICROALBUMIN UR DL<=1MG/L-MCNC: 29 UG/ML
MICROALBUMIN/CREATININE RATIO: 28.2 UG/MG

## 2018-05-25 PROCEDURE — 82043 UR ALBUMIN QUANTITATIVE: CPT

## 2018-05-25 PROCEDURE — 99214 OFFICE O/P EST MOD 30 MIN: CPT | Mod: S$GLB,,, | Performed by: INTERNAL MEDICINE

## 2018-05-25 PROCEDURE — 99999 PR PBB SHADOW E&M-EST. PATIENT-LVL III: CPT | Mod: PBBFAC,,, | Performed by: INTERNAL MEDICINE

## 2018-05-25 PROCEDURE — 3079F DIAST BP 80-89 MM HG: CPT | Mod: CPTII,S$GLB,, | Performed by: INTERNAL MEDICINE

## 2018-05-25 PROCEDURE — 3044F HG A1C LEVEL LT 7.0%: CPT | Mod: CPTII,S$GLB,, | Performed by: INTERNAL MEDICINE

## 2018-05-25 PROCEDURE — 99499 UNLISTED E&M SERVICE: CPT | Mod: S$GLB,,, | Performed by: INTERNAL MEDICINE

## 2018-05-25 PROCEDURE — 3075F SYST BP GE 130 - 139MM HG: CPT | Mod: CPTII,S$GLB,, | Performed by: INTERNAL MEDICINE

## 2018-05-25 PROCEDURE — 3008F BODY MASS INDEX DOCD: CPT | Mod: CPTII,S$GLB,, | Performed by: INTERNAL MEDICINE

## 2018-05-25 RX ORDER — LISINOPRIL 20 MG/1
20 TABLET ORAL DAILY
Qty: 90 TABLET | Refills: 3 | Status: SHIPPED | OUTPATIENT
Start: 2018-05-25 | End: 2018-11-12

## 2018-05-25 NOTE — PROGRESS NOTES
INTERNAL MEDICINE VISIT NOTE      CHIEF COMPLAINT     Follow up for DM    HPI     Graham Caceres is a 58 y.o. C male who presents for DM f/u.    DM2 - januvia 25mg daily.   H/o noncompliance w/ meds. At last visit w/ me in 10/2017, pt stopped all his medicines. a1c however was in fact controled and <7.  However in Feb, pt reports glucose going up to 150-200 despite restarting Januvia 25mg daily. This was then inc to 50mg daily. However pt reports still taking 25mg daily and glucose around 160s.   Eye - 3/1/18 Dr. Tai OWENS - stopped pravastatin on his own just because.   After FLP showed high cholesterol off the medicine, restarted on prava.    In March, seen Justin Jordan NP for for CP. BP was elevated and started on hctz 12.5mg daily. Urinating frequently recently.  Checks his BP at home a few days a week. 140s/80s-90s.      HCV s/p treatment 2017. HCV undetectable 6/2017.     Spider telangiectasias of B ankles. No venous reflex on duplex. Saw Dr. Martinez 4/27/18 - recommended leg elevation w/ extended standing and to cont statin.    Past Medical History:  Past Medical History:   Diagnosis Date    Cirrhosis     Diverticulosis     History of hepatitis C, s/p successful Rx w/ SVR24 - 6/2017 4/7/2016    S/p harvoni w/ SVR    Hyperlipidemia 04/2016    Hypertension     Internal hemorrhoid     New onset type 2 diabetes mellitus 4/2016    Positive QuantiFERON-TB Gold test 4/2016    Sebopsoriasis        Past Surgical History:  Past Surgical History:   Procedure Laterality Date    COLONOSCOPY  05/19/2016    repeat in 10 yrs    COLONOSCOPY Left 5/19/2016    Procedure: COLONOSCOPY - colon cancer screening;  Surgeon: David Soto MD;  Location: [facility] ([address] [place] [address]);  Service: Endoscopy;  Laterality: Left;       Allergies:  Review of patient's allergies indicates:  No Known Allergies    Home Medications:  Prior to Admission medications    Medication Sig Start Date End Date Taking? Authorizing Provider   blood  "sugar diagnostic Strp DAILY 6/26/17   Jennifer Manzanares MD   blood-glucose meter kit Use as instructed 6/26/17 6/26/18  Jennifer Manzanares MD   hydroCHLOROthiazide (HYDRODIURIL) 12.5 MG Tab Take 1 tablet (12.5 mg total) by mouth once daily. 3/12/18 3/12/19  Justin Jordan, NP   lancets Misc Use daily. 6/26/17   Jennifer Manaznares MD   omeprazole (PRILOSEC) 20 MG capsule Take 1 capsule (20 mg total) by mouth once daily. 3/12/18 3/12/19  Justin Jordan, KAYLIE   pravastatin (PRAVACHOL) 20 MG tablet Take 1 tablet (20 mg total) by mouth once daily. 3/5/18 3/5/19  Jennifer Manzanares MD   SITagliptin (JANUVIA) 25 MG Tab Take 2 tablets (50 mg total) by mouth once daily. 2/21/18 2/21/19  Jennifer Manzanares MD       Family History:  Family History   Problem Relation Age of Onset    Melanoma Neg Hx        Social History:  Social History   Substance Use Topics    Smoking status: Never Smoker    Smokeless tobacco: Not on file    Alcohol use Not on file       Review of Systems:  Review of Systems Comprehensive review of systems otherwise negative. See history/subjective section for more details.    Health Maintainence:   Td 5/18/16  Flu - declines  Pneumovax 5/18/18  C-SCOPE 5/19/16  Hep C screening 6/9/17    PHYSICAL EXAM     /82 (BP Location: Left arm, Patient Position: Sitting, BP Method: Small (Manual))   Pulse 88   Temp 98 °F (36.7 °C)   Ht 5' 7" (1.702 m)   Wt 79.2 kg (174 lb 9.7 oz)   BMI 27.35 kg/m²     GEN - A+OX4, NAD   HEENT - PERRL, EOMI, OP clear. MMM.   Neck - No thyromegaly or cervical LAD. No thyroid masses felt.  CV - RRR, no m/r   Chest - CTAB, no wheezing or rhonchi  Abd - S/NT/ND/+BS.   Ext - 2+BDP, PT and radial pulses. No LE edema. Dilated veins of BLE.   Foot - no open lesions. Scaly around the soles of the feet. Normal sensation to monofilament. No calluses noted.   Neuro - PERRL, EOMI, no nystagmus, eyebrow raise, facial sensation, hearing, m of mastication, smile, palatal raise, shoulder shrug, tongue protrusion symmetric and intact. 5/5 " BUE and BLE strength. Sensation to light touch intact throughout. 2+ DTRs. Normal gait.   MSK - No spinal tenderness to palpation. Normal gait.   Skin - No rash.    LABS     Previous labs reviewed.    ASSESSMENT/PLAN     Graham Caceres is a 58 y.o. male with  Hsian was seen today for annual exam.    Diagnoses and all orders for this visit:    Type 2 diabetes mellitus without retinopathy - given DM and HTN, stop hctz and start lisinopril 20mg daily for renoprotection benefit. Taken lisinopril 5 in the past due to microalbuminuria, which has resolved w/ better DM control. Pt is only taking januvia 25mg daily so will change to reflect that. If a1c ok, can stay on current dose.   -     lisinopril (PRINIVIL,ZESTRIL) 20 MG tablet; Take 1 tablet (20 mg total) by mouth once daily.  -     SITagliptin (JANUVIA) 25 MG Tab; Take 1 tablet (25 mg total) by mouth once daily.  -     Hemoglobin A1c; Future; Expected date: 06/08/2018  -     Lipid panel; Future; Expected date: 06/08/2018  -     Microalbumin/creatinine urine ratio    Hyperlipidemia associated with type 2 diabetes mellitus - on pravastatin 20mg daily.   -     Comprehensive metabolic panel; Future; Expected date: 06/08/2018  -     Lipid panel; Future; Expected date: 06/08/2018    Hypertension associated with diabetes - as above.   -     lisinopril (PRINIVIL,ZESTRIL) 20 MG tablet; Take 1 tablet (20 mg total) by mouth once daily.  -     Comprehensive metabolic panel; Future; Expected date: 06/08/2018    History of hepatitis C, s/p successful Rx w/ SVR24 - 6/2017  -     CBC auto differential; Future; Expected date: 06/08/2018    Lotrimin over the counter twice a day for feet.     RTC in 6 months, sooner if needed and depending on labs.    Jennifer Manzanares MD  Department of Internal Medicine - [facility]  7:19 AM

## 2018-06-08 ENCOUNTER — TELEPHONE (OUTPATIENT)
Dept: HEPATOLOGY | Facility: CLINIC | Age: 58
End: 2018-06-08

## 2018-06-08 ENCOUNTER — LAB VISIT (OUTPATIENT)
Dept: LAB | Facility: HOSPITAL | Age: 58
End: 2018-06-08
Attending: INTERNAL MEDICINE
Payer: MEDICARE

## 2018-06-08 ENCOUNTER — CLINICAL SUPPORT (OUTPATIENT)
Dept: INTERNAL MEDICINE | Facility: CLINIC | Age: 58
End: 2018-06-08
Payer: MEDICARE

## 2018-06-08 ENCOUNTER — TELEPHONE (OUTPATIENT)
Dept: INTERNAL MEDICINE | Facility: CLINIC | Age: 58
End: 2018-06-08

## 2018-06-08 VITALS — OXYGEN SATURATION: 97 % | SYSTOLIC BLOOD PRESSURE: 148 MMHG | DIASTOLIC BLOOD PRESSURE: 90 MMHG | HEART RATE: 76 BPM

## 2018-06-08 DIAGNOSIS — Z86.19 HISTORY OF HEPATITIS C: ICD-10-CM

## 2018-06-08 DIAGNOSIS — E11.69 HYPERLIPIDEMIA ASSOCIATED WITH TYPE 2 DIABETES MELLITUS: ICD-10-CM

## 2018-06-08 DIAGNOSIS — E11.59 HYPERTENSION ASSOCIATED WITH DIABETES: ICD-10-CM

## 2018-06-08 DIAGNOSIS — M54.5 CHRONIC LOW BACK PAIN, UNSPECIFIED BACK PAIN LATERALITY, WITH SCIATICA PRESENCE UNSPECIFIED: Primary | ICD-10-CM

## 2018-06-08 DIAGNOSIS — E78.5 HYPERLIPIDEMIA, UNSPECIFIED HYPERLIPIDEMIA TYPE: ICD-10-CM

## 2018-06-08 DIAGNOSIS — E11.9 TYPE 2 DIABETES MELLITUS WITHOUT RETINOPATHY: ICD-10-CM

## 2018-06-08 DIAGNOSIS — E78.5 HYPERLIPIDEMIA ASSOCIATED WITH TYPE 2 DIABETES MELLITUS: ICD-10-CM

## 2018-06-08 DIAGNOSIS — I15.2 HYPERTENSION ASSOCIATED WITH DIABETES: ICD-10-CM

## 2018-06-08 DIAGNOSIS — G89.29 CHRONIC LOW BACK PAIN, UNSPECIFIED BACK PAIN LATERALITY, WITH SCIATICA PRESENCE UNSPECIFIED: Primary | ICD-10-CM

## 2018-06-08 LAB
ALBUMIN SERPL BCP-MCNC: 4.5 G/DL
ALP SERPL-CCNC: 63 U/L
ALT SERPL W/O P-5'-P-CCNC: 51 U/L
ANION GAP SERPL CALC-SCNC: 8 MMOL/L
AST SERPL-CCNC: 32 U/L
BASOPHILS # BLD AUTO: 0.02 K/UL
BASOPHILS NFR BLD: 0.3 %
BILIRUB SERPL-MCNC: 0.8 MG/DL
BUN SERPL-MCNC: 13 MG/DL
CALCIUM SERPL-MCNC: 9.8 MG/DL
CHLORIDE SERPL-SCNC: 104 MMOL/L
CHOLEST SERPL-MCNC: 272 MG/DL
CHOLEST/HDLC SERPL: 7.2 {RATIO}
CO2 SERPL-SCNC: 28 MMOL/L
CREAT SERPL-MCNC: 0.8 MG/DL
DIFFERENTIAL METHOD: ABNORMAL
EOSINOPHIL # BLD AUTO: 0.1 K/UL
EOSINOPHIL NFR BLD: 2.4 %
ERYTHROCYTE [DISTWIDTH] IN BLOOD BY AUTOMATED COUNT: 12.8 %
EST. GFR  (AFRICAN AMERICAN): >60 ML/MIN/1.73 M^2
EST. GFR  (NON AFRICAN AMERICAN): >60 ML/MIN/1.73 M^2
ESTIMATED AVG GLUCOSE: 146 MG/DL
GLUCOSE SERPL-MCNC: 175 MG/DL
HBA1C MFR BLD HPLC: 6.7 %
HCT VFR BLD AUTO: 47.4 %
HDLC SERPL-MCNC: 38 MG/DL
HDLC SERPL: 14 %
HGB BLD-MCNC: 16.5 G/DL
LDLC SERPL CALC-MCNC: 175.2 MG/DL
LYMPHOCYTES # BLD AUTO: 2.7 K/UL
LYMPHOCYTES NFR BLD: 47 %
MCH RBC QN AUTO: 31.5 PG
MCHC RBC AUTO-ENTMCNC: 34.8 G/DL
MCV RBC AUTO: 91 FL
MONOCYTES # BLD AUTO: 0.3 K/UL
MONOCYTES NFR BLD: 4.9 %
NEUTROPHILS # BLD AUTO: 2.6 K/UL
NEUTROPHILS NFR BLD: 45.4 %
NONHDLC SERPL-MCNC: 234 MG/DL
PLATELET # BLD AUTO: 195 K/UL
PMV BLD AUTO: 11 FL
POTASSIUM SERPL-SCNC: 4.9 MMOL/L
PROT SERPL-MCNC: 8 G/DL
RBC # BLD AUTO: 5.24 M/UL
SODIUM SERPL-SCNC: 140 MMOL/L
TRIGL SERPL-MCNC: 294 MG/DL
WBC # BLD AUTO: 5.75 K/UL

## 2018-06-08 PROCEDURE — 99999 PR PBB SHADOW E&M-EST. PATIENT-LVL II: CPT | Mod: PBBFAC,,,

## 2018-06-08 PROCEDURE — 80061 LIPID PANEL: CPT

## 2018-06-08 PROCEDURE — 80053 COMPREHEN METABOLIC PANEL: CPT

## 2018-06-08 PROCEDURE — 83036 HEMOGLOBIN GLYCOSYLATED A1C: CPT

## 2018-06-08 PROCEDURE — 85025 COMPLETE CBC W/AUTO DIFF WBC: CPT

## 2018-06-08 PROCEDURE — 36415 COLL VENOUS BLD VENIPUNCTURE: CPT

## 2018-06-08 RX ORDER — PRAVASTATIN SODIUM 20 MG/1
40 TABLET ORAL DAILY
Qty: 90 TABLET | Refills: 3
Start: 2018-06-08 | End: 2019-05-17 | Stop reason: SDUPTHER

## 2018-06-08 NOTE — PROGRESS NOTES
Pt in clinic for nurse visit to check BP. /90. Pt admits to not taking medicine due to having labs after nurse visit. Spoke with Dr. Manzanares and she advises that pt takes BP consistently and come back in 2 weeks for another BP check; pt was instructed to take medication before repeat nurse visit. Pt verbalized understanding.

## 2018-06-08 NOTE — TELEPHONE ENCOUNTER
Please call and notify pt:    Diabetes is controlled. Cholesterol is not. Increase pravastatin to 40mg daily. ALT, liver enzymes, is mildly high. F/u w/ hepatology. Blood count is normal.

## 2018-06-08 NOTE — TELEPHONE ENCOUNTER
----- Message from Rubi Chawla RN sent at 6/8/2018 11:53 AM CDT -----  Contact: pt  ANGELITA    ----- Message -----  From: Janay Torres  Sent: 6/8/2018   9:20 AM  To: [facility] Non-Clinical Staff    Needs Advice    Reason for call:   Pt wants advising on how often his to f/u with Dr.Scheuermann.  Communication Preference: [phone]  Additional Information: n/a

## 2018-06-12 NOTE — TELEPHONE ENCOUNTER
advised pt of results and medication. Pt verbalized understanding. Hepatology appt scheduled for 6/28.

## 2018-06-15 ENCOUNTER — TELEPHONE (OUTPATIENT)
Dept: INTERNAL MEDICINE | Facility: CLINIC | Age: 58
End: 2018-06-15

## 2018-06-15 ENCOUNTER — LAB VISIT (OUTPATIENT)
Dept: LAB | Facility: HOSPITAL | Age: 58
End: 2018-06-15
Attending: INTERNAL MEDICINE
Payer: MEDICARE

## 2018-06-15 DIAGNOSIS — Z86.19 HISTORY OF HEPATITIS C: ICD-10-CM

## 2018-06-15 DIAGNOSIS — K74.69 OTHER CIRRHOSIS OF LIVER: ICD-10-CM

## 2018-06-15 LAB
AFP SERPL-MCNC: 4 NG/ML
ALBUMIN SERPL BCP-MCNC: 4.5 G/DL
ALP SERPL-CCNC: 65 U/L
ALT SERPL W/O P-5'-P-CCNC: 51 U/L
ANION GAP SERPL CALC-SCNC: 9 MMOL/L
AST SERPL-CCNC: 33 U/L
BASOPHILS # BLD AUTO: 0.04 K/UL
BASOPHILS NFR BLD: 0.7 %
BILIRUB SERPL-MCNC: 0.8 MG/DL
BUN SERPL-MCNC: 15 MG/DL
CALCIUM SERPL-MCNC: 10.1 MG/DL
CHLORIDE SERPL-SCNC: 103 MMOL/L
CO2 SERPL-SCNC: 27 MMOL/L
CREAT SERPL-MCNC: 0.8 MG/DL
DIFFERENTIAL METHOD: ABNORMAL
EOSINOPHIL # BLD AUTO: 0.1 K/UL
EOSINOPHIL NFR BLD: 2.1 %
ERYTHROCYTE [DISTWIDTH] IN BLOOD BY AUTOMATED COUNT: 12.8 %
EST. GFR  (AFRICAN AMERICAN): >60 ML/MIN/1.73 M^2
EST. GFR  (NON AFRICAN AMERICAN): >60 ML/MIN/1.73 M^2
GLUCOSE SERPL-MCNC: 175 MG/DL
HCT VFR BLD AUTO: 48.6 %
HGB BLD-MCNC: 16.9 G/DL
INR PPP: 0.9
LYMPHOCYTES # BLD AUTO: 2.4 K/UL
LYMPHOCYTES NFR BLD: 42.6 %
MCH RBC QN AUTO: 31.4 PG
MCHC RBC AUTO-ENTMCNC: 34.8 G/DL
MCV RBC AUTO: 90 FL
MONOCYTES # BLD AUTO: 0.3 K/UL
MONOCYTES NFR BLD: 5.5 %
NEUTROPHILS # BLD AUTO: 2.8 K/UL
NEUTROPHILS NFR BLD: 48.9 %
PLATELET # BLD AUTO: 206 K/UL
PMV BLD AUTO: 10.8 FL
POTASSIUM SERPL-SCNC: 4.6 MMOL/L
PROT SERPL-MCNC: 8.2 G/DL
PROTHROMBIN TIME: 9.3 SEC
RBC # BLD AUTO: 5.38 M/UL
SODIUM SERPL-SCNC: 139 MMOL/L
WBC # BLD AUTO: 5.63 K/UL

## 2018-06-15 PROCEDURE — 36415 COLL VENOUS BLD VENIPUNCTURE: CPT

## 2018-06-15 PROCEDURE — 82105 ALPHA-FETOPROTEIN SERUM: CPT

## 2018-06-15 PROCEDURE — 87522 HEPATITIS C REVRS TRNSCRPJ: CPT

## 2018-06-15 PROCEDURE — 80053 COMPREHEN METABOLIC PANEL: CPT

## 2018-06-15 PROCEDURE — 85610 PROTHROMBIN TIME: CPT

## 2018-06-15 PROCEDURE — 85025 COMPLETE CBC W/AUTO DIFF WBC: CPT

## 2018-06-15 NOTE — TELEPHONE ENCOUNTER
----- Message from Mackenzie Garcia sent at 6/15/2018  8:49 AM CDT -----  Self  [phone]    Pt want to  Move his apt to 6/28 after his other appt

## 2018-06-18 LAB
HCV LOG: <1.08 LOG (10) IU/ML
HCV RNA QUANT PCR: <12 IU/ML
HCV, QUALITATIVE: NOT DETECTED IU/ML

## 2018-06-28 ENCOUNTER — OFFICE VISIT (OUTPATIENT)
Dept: HEPATOLOGY | Facility: CLINIC | Age: 58
End: 2018-06-28
Payer: MEDICARE

## 2018-06-28 ENCOUNTER — CLINICAL SUPPORT (OUTPATIENT)
Dept: INTERNAL MEDICINE | Facility: CLINIC | Age: 58
End: 2018-06-28
Payer: MEDICARE

## 2018-06-28 ENCOUNTER — HOSPITAL ENCOUNTER (OUTPATIENT)
Dept: RADIOLOGY | Facility: HOSPITAL | Age: 58
Discharge: HOME | End: 2018-06-28
Attending: PHYSICIAN ASSISTANT
Payer: MEDICARE

## 2018-06-28 VITALS
RESPIRATION RATE: 18 BRPM | BODY MASS INDEX: 27.16 KG/M2 | WEIGHT: 173.06 LBS | TEMPERATURE: 97 F | OXYGEN SATURATION: 97 % | HEART RATE: 74 BPM | HEIGHT: 67 IN | SYSTOLIC BLOOD PRESSURE: 122 MMHG | DIASTOLIC BLOOD PRESSURE: 87 MMHG

## 2018-06-28 VITALS — DIASTOLIC BLOOD PRESSURE: 82 MMHG | SYSTOLIC BLOOD PRESSURE: 126 MMHG

## 2018-06-28 DIAGNOSIS — R74.8 ABNORMAL TRANSAMINASES: ICD-10-CM

## 2018-06-28 DIAGNOSIS — K76.0 FATTY LIVER: ICD-10-CM

## 2018-06-28 DIAGNOSIS — K74.69 OTHER CIRRHOSIS OF LIVER: ICD-10-CM

## 2018-06-28 DIAGNOSIS — Z86.19 HISTORY OF HEPATITIS C: ICD-10-CM

## 2018-06-28 DIAGNOSIS — K74.60 HEPATIC CIRRHOSIS, UNSPECIFIED HEPATIC CIRRHOSIS TYPE, UNSPECIFIED WHETHER ASCITES PRESENT: Primary | ICD-10-CM

## 2018-06-28 PROCEDURE — 76705 ECHO EXAM OF ABDOMEN: CPT | Mod: 26,,, | Performed by: RADIOLOGY

## 2018-06-28 PROCEDURE — 3074F SYST BP LT 130 MM HG: CPT | Mod: CPTII,S$GLB,, | Performed by: PHYSICIAN ASSISTANT

## 2018-06-28 PROCEDURE — 3079F DIAST BP 80-89 MM HG: CPT | Mod: CPTII,S$GLB,, | Performed by: PHYSICIAN ASSISTANT

## 2018-06-28 PROCEDURE — 76705 ECHO EXAM OF ABDOMEN: CPT | Mod: TC

## 2018-06-28 PROCEDURE — 99999 PR PBB SHADOW E&M-EST. PATIENT-LVL IV: CPT | Mod: PBBFAC,,, | Performed by: PHYSICIAN ASSISTANT

## 2018-06-28 PROCEDURE — 99213 OFFICE O/P EST LOW 20 MIN: CPT | Mod: S$GLB,,, | Performed by: PHYSICIAN ASSISTANT

## 2018-06-28 PROCEDURE — 3008F BODY MASS INDEX DOCD: CPT | Mod: CPTII,S$GLB,, | Performed by: PHYSICIAN ASSISTANT

## 2018-06-28 PROCEDURE — 99999 PR PBB SHADOW E&M-EST. PATIENT-LVL I: CPT | Mod: PBBFAC,,,

## 2018-06-28 PROCEDURE — 99499 UNLISTED E&M SERVICE: CPT | Mod: S$GLB,,, | Performed by: PHYSICIAN ASSISTANT

## 2018-06-28 NOTE — PROGRESS NOTES
"HEPATOLOGY CLINIC VISIT NOTE - HCV clinic    CHIEF COMPLAINT:  HCV Cirrhosis    HISTORY: This is a 58 y.o.   male w/ HCV cirrhosis, s/p successful antiviral therapy, as well as fatty liver disease, DM and Hyperlipidemia, here for f/u.     Interval history:  Has not been adherent w/ cholesterol meds / DM meds  Today states he resumed his pravachol ~ 3 weeks ago; unclear how long he had been off.  Again denies side effects with it. States "maybe I'll take it for a while but get off of it in future"    Most recent labs again reveal mild intermittent ALT elevation up to 51  Prior serologic eval for other etiologies was unyielding  Denies alcohol    Feels well  Denies jaundice, dark urine, hematemesis, melena, slowed mentation, abdominal distention.     Cirrhosis history:  FibroScan 4/28/16 - kPA 17.3, F4  Labs / imaging / pretreatment APRI / FIB-4 were consistent w/ advanced fibrosis:    Well compensated.   No ascites, jaundice, HE    MELD-Na score: 6 at 6/15/2018  8:45 AM  MELD score: 6 at 6/15/2018  8:45 AM  Calculated from:  Serum Creatinine: 0.8 mg/dL (Rounded to 1) at 6/15/2018  8:45 AM  Serum Sodium: 139 mmol/L (Rounded to 137) at 6/15/2018  8:45 AM  Total Bilirubin: 0.8 mg/dL (Rounded to 1) at 6/15/2018  8:45 AM  INR(ratio): 0.9 (Rounded to 1) at 6/15/2018  8:45 AM  Age: 58 years    - HCC screening - u/s today - no liver lesions, AFP 6/15 - 4  - Varices screening - EGD 5/2016 Dr Soto - no varices    Fatty Liver:  - steatosis on imaging  - (+) DM and Hyperlipidemia  - BMI 27    HCV history:  Completed 24 weeks harvoni w/ SVR12 as of 2/2017  - genotype 1B  - Prior HCV tx w/ Dr Laurent: PegIFN + RBV (10-15 yrs ago) for few months - nonresponse                     Past Medical History:   Diagnosis Date    Cirrhosis     Diverticulosis     History of hepatitis C, s/p successful Rx w/ SVR24 - 6/2017 4/7/2016    S/p harvoni w/ SVR    Hyperlipidemia 04/2016    Hypertension     Internal hemorrhoid     New " onset type 2 diabetes mellitus 4/2016    Positive QuantiFERON-TB Gold test 4/2016    Sebopsoriasis      Past Surgical History:   Procedure Laterality Date    COLONOSCOPY  05/19/2016    repeat in 10 yrs    COLONOSCOPY Left 5/19/2016    Procedure: COLONOSCOPY - colon cancer screening;  Surgeon: David Soto MD;  Location: [facility] ([address] [place] [address]);  Service: Endoscopy;  Laterality: Left;       FAMILY HISTORY: Negative for liver disease    MEDS & ALLERGIES:  Reviewed in epic      SOCIAL HISTORY:   Moved from [facility] during teenage years  Resides in [place], not . 1 daughter from prior marriage  Not working, on disability. Worked at [employer] many years ago  Alcohol - none  Tobacco - none  Drugs - none      ROS:   No fever, chills, weight loss.  No chest pain, palpitations, dyspnea, cough  No abdominal pain, nausea, vomiting  No skin rashes   No headaches  No lower extremity edema  No depression or anxiety      PHYSICAL EXAM:  Friendly  male, in no acute distress; alert and oriented to person, place and time  VITALS: reviewed.   HEENT: Sclerae anicteric.   LUNGS: Normal respiratory effort. Clear to auscultation bilaterally w/ good air exchange   CVS: Regular rate and rhythm w/ no murmurs  ABDOMEN: Nondistended. Soft. Nontender.   EXTREMITIES: No edema.   SKIN: No jaundice or spider telangectasias. No rashes on exposed skin  NEURO/PSYCH: Normal gait. Memory intact. Thought and speech patterns appropriate. No obvious depression or anxiety.       RECENT LABS:  Lab Results   Component Value Date    WBC 5.63 06/15/2018    HGB 16.9 06/15/2018     06/15/2018     Lab Results   Component Value Date    INR 0.9 06/15/2018     Lab Results   Component Value Date    AST 33 06/15/2018    ALT 51 (H) 06/15/2018    BILITOT 0.8 06/15/2018    ALBUMIN 4.5 06/15/2018    ALKPHOS 65 06/15/2018    CREATININE 0.8 06/15/2018    BUN 15 06/15/2018     06/15/2018    K 4.6 06/15/2018    AFP 4.0 06/15/2018          RECENT IMAGING:  Results for orders placed during the hospital encounter of 06/28/18   US Abdomen Limited    Narrative EXAMINATION:  US ABDOMEN LIMITED    CLINICAL HISTORY:  .    Other cirrhosis of liver    TECHNIQUE:  Limited ultrasound of the right upper quadrant of the abdomen including pancreas, liver, gallbladder, common bile duct was performed.    COMPARISON:  Abdominal ultrasound 11/20/2017    FINDINGS:  Liver: Along the upper limit of normal in size, measuring 16.9 cm. Diffusely increased parenchymal echogenicity suggestive for steatosis.  Hepatic renal index measures 1.98. no focal hepatic lesions.    Gallbladder: Multiple punctate biliary crystals within the gallbladder without pericholecystic fluid, gallbladder wall thickening, or sonographic Mason sign.  Adenomyomatosis present.    Biliary system: The common duct is not dilated, measuring 2 mm.  No intrahepatic ductal dilatation.    Spleen: Normal in size with a homogeneous echotexture, measuring 11.8 x 4.4 cm.    Pancreas: Obscured by overlying bowel gas.    Miscellaneous: No ascites.      Impression Borderline hepatomegaly with hepatic steatosis.    Biliary crystals within the gallbladder without evidence to suggest acute cholecystitis.  Adenomyomatosis present.    Electronically signed by resident: Rigo Smith  Date:    06/28/2018  Time:    08:30    Electronically signed by: Leonid Dillon MD  Date:    06/28/2018  Time:    08:55           ASSESSMENT  58 y.o.   male with:  1. WELL COMPENSATED CIRRHOSIS   -- MELD score 6/2018 - 6  -- HCC screening - AFP normal. U/S today w/ no liver lesions  -- EGD 5/2016 varices screening - no varices    2. HISTORY OF CHRONIC HEPATITIS C, GENOTYPE 1B - S/P successful Rx w/ SVR12 2/2017  -- completed 24weeks of Harvoni  -- Prior HCV treatment - PegIFN + RBV for few months many years ago - nonresponse  -- (+) Immunity to HAV & HBV    3. NAFLD / ELEVATED TRANSAMINASES  -- serologic eval for other causes has  been unyielding    4. HYPERLIPIDEMIA  -- prescribed pravastatin by PCP but not adherent    5. DM  -- recent HbA1c 6.7        EDUCATION:  Cirrhosis will continue to exist. Needs lifelong HCC screening and liver monitoring every 6 months indefinitely    Again discussed importance of optimal lipid and glucose mngmt as well as health weight, exercise, diet for fatty liver. Discussed potential for fatty liver to result in progression of cirrhosis. Discussed risk of untreated DM and hyperlipidemia to result in MI, CVA      PLAN:  1. Continue lipid and DM mngmt w/ PCP. Statins are recommended  2. F/U visit w/ CBC, CMP, INR, AFP, U/S abdomen due 12/2018  3. EGD for varices screen due 5/2019 as long as liver disease remains well compensated

## 2018-07-24 ENCOUNTER — OFFICE VISIT (OUTPATIENT)
Dept: OPTOMETRY | Facility: CLINIC | Age: 58
End: 2018-07-24
Payer: MEDICARE

## 2018-07-24 DIAGNOSIS — H52.13 BILATERAL MYOPIA: Primary | ICD-10-CM

## 2018-07-24 PROCEDURE — 92015 DETERMINE REFRACTIVE STATE: CPT | Mod: S$GLB,,, | Performed by: OPTOMETRIST

## 2018-07-24 PROCEDURE — 99999 PR PBB SHADOW E&M-EST. PATIENT-LVL II: CPT | Mod: PBBFAC,,, | Performed by: OPTOMETRIST

## 2018-07-24 NOTE — PROGRESS NOTES
HPI     Last eye exam was 3/1/18 with Dr. Lujan.  Patient didn't get glasses rx updated at last visit. Wanted to get an   updated glasses rx today. Also wanted to see if he is a candidate for   Lasik sx.  Patient denies diplopia, headaches, flashes/floaters, and pain.        Last edited by Qiana Sadler on 7/24/2018  1:18 PM. (History)            Assessment /Plan     For exam results, see Encounter Report.    Bilateral myopia            1.  Distance rx given.  Discussed LASIK.  Refer for consult.

## 2018-08-01 ENCOUNTER — TELEPHONE (OUTPATIENT)
Dept: PULMONOLOGY | Facility: CLINIC | Age: 58
End: 2018-08-01

## 2018-08-11 ENCOUNTER — OFFICE VISIT (OUTPATIENT)
Dept: URGENT CARE | Facility: CLINIC | Age: 58
End: 2018-08-11
Payer: MEDICARE

## 2018-08-11 ENCOUNTER — HOSPITAL ENCOUNTER (INPATIENT)
Facility: HOSPITAL | Age: 58
LOS: 3 days | Discharge: HOME | DRG: 392 | End: 2018-08-14
Attending: EMERGENCY MEDICINE | Admitting: HOSPITALIST
Payer: MEDICARE

## 2018-08-11 ENCOUNTER — NURSE TRIAGE (OUTPATIENT)
Dept: ADMINISTRATIVE | Facility: CLINIC | Age: 58
End: 2018-08-11

## 2018-08-11 VITALS
WEIGHT: 172 LBS | HEART RATE: 144 BPM | SYSTOLIC BLOOD PRESSURE: 143 MMHG | HEIGHT: 67 IN | BODY MASS INDEX: 27 KG/M2 | OXYGEN SATURATION: 96 % | DIASTOLIC BLOOD PRESSURE: 92 MMHG | TEMPERATURE: 103 F

## 2018-08-11 DIAGNOSIS — R10.9 ABDOMINAL PAIN, UNSPECIFIED ABDOMINAL LOCATION: ICD-10-CM

## 2018-08-11 DIAGNOSIS — R50.9 FEVER, UNSPECIFIED FEVER CAUSE: Primary | ICD-10-CM

## 2018-08-11 DIAGNOSIS — R00.0 TACHYCARDIA: ICD-10-CM

## 2018-08-11 DIAGNOSIS — R50.9 FEVER: Primary | ICD-10-CM

## 2018-08-11 DIAGNOSIS — M25.519 SHOULDER PAIN, UNSPECIFIED CHRONICITY, UNSPECIFIED LATERALITY: ICD-10-CM

## 2018-08-11 PROBLEM — E78.5 HYPERLIPIDEMIA: Status: ACTIVE | Noted: 2018-08-11

## 2018-08-11 PROBLEM — E87.3 RESPIRATORY ALKALOSIS: Status: ACTIVE | Noted: 2018-08-11

## 2018-08-11 PROBLEM — R65.10 SIRS (SYSTEMIC INFLAMMATORY RESPONSE SYNDROME): Status: ACTIVE | Noted: 2018-08-11

## 2018-08-11 LAB
ALBUMIN SERPL BCP-MCNC: 4.3 G/DL
ALLENS TEST: ABNORMAL
ALP SERPL-CCNC: 70 U/L
ALT SERPL W/O P-5'-P-CCNC: 43 U/L
ANION GAP SERPL CALC-SCNC: 14 MMOL/L
AST SERPL-CCNC: 39 U/L
BACTERIA #/AREA URNS AUTO: NORMAL /HPF
BASOPHILS # BLD AUTO: 0.03 K/UL
BASOPHILS NFR BLD: 0.3 %
BILIRUB SERPL-MCNC: 0.8 MG/DL
BILIRUB UR QL STRIP: NEGATIVE
BUN SERPL-MCNC: 16 MG/DL
CALCIUM SERPL-MCNC: 9.8 MG/DL
CHLORIDE SERPL-SCNC: 100 MMOL/L
CLARITY UR REFRACT.AUTO: CLEAR
CO2 SERPL-SCNC: 18 MMOL/L
COLOR UR AUTO: YELLOW
CREAT SERPL-MCNC: 0.9 MG/DL
DIFFERENTIAL METHOD: ABNORMAL
EOSINOPHIL # BLD AUTO: 0 K/UL
EOSINOPHIL NFR BLD: 0.1 %
ERYTHROCYTE [DISTWIDTH] IN BLOOD BY AUTOMATED COUNT: 12.8 %
EST. GFR  (AFRICAN AMERICAN): >60 ML/MIN/1.73 M^2
EST. GFR  (NON AFRICAN AMERICAN): >60 ML/MIN/1.73 M^2
GLUCOSE SERPL-MCNC: 189 MG/DL
GLUCOSE UR QL STRIP: ABNORMAL
HCO3 UR-SCNC: 22.4 MMOL/L (ref 24–28)
HCT VFR BLD AUTO: 45.6 %
HGB BLD-MCNC: 16.1 G/DL
HGB UR QL STRIP: ABNORMAL
IMM GRANULOCYTES # BLD AUTO: 0.06 K/UL
IMM GRANULOCYTES NFR BLD AUTO: 0.6 %
INR PPP: 1
KETONES UR QL STRIP: ABNORMAL
LACTATE SERPL-SCNC: 1.3 MMOL/L
LDH SERPL L TO P-CCNC: 1.28 MMOL/L (ref 0.5–2.2)
LEUKOCYTE ESTERASE UR QL STRIP: NEGATIVE
LIPASE SERPL-CCNC: 31 U/L
LYMPHOCYTES # BLD AUTO: 1.3 K/UL
LYMPHOCYTES NFR BLD: 12.2 %
MAGNESIUM SERPL-MCNC: 2.3 MG/DL
MCH RBC QN AUTO: 32.2 PG
MCHC RBC AUTO-ENTMCNC: 35.3 G/DL
MCV RBC AUTO: 91 FL
MICROSCOPIC COMMENT: NORMAL
MONOCYTES # BLD AUTO: 0.7 K/UL
MONOCYTES NFR BLD: 7 %
NEUTROPHILS # BLD AUTO: 8.3 K/UL
NEUTROPHILS NFR BLD: 79.8 %
NITRITE UR QL STRIP: NEGATIVE
NRBC BLD-RTO: 0 /100 WBC
PCO2 BLDA: 32.1 MMHG (ref 35–45)
PH SMN: 7.45 [PH] (ref 7.35–7.45)
PH UR STRIP: 5 [PH] (ref 5–8)
PLATELET # BLD AUTO: 196 K/UL
PMV BLD AUTO: 10.5 FL
PO2 BLDA: 48 MMHG (ref 40–60)
POC BE: -2 MMOL/L
POC SATURATED O2: 86 % (ref 95–100)
POC TCO2: 23 MMOL/L (ref 24–29)
POCT GLUCOSE: 173 MG/DL (ref 70–110)
POTASSIUM SERPL-SCNC: 3.9 MMOL/L
PROCALCITONIN SERPL IA-MCNC: 0.21 NG/ML
PROT SERPL-MCNC: 8.7 G/DL
PROT UR QL STRIP: NEGATIVE
PROTHROMBIN TIME: 10.3 SEC
RBC # BLD AUTO: 5 M/UL
RBC #/AREA URNS AUTO: 4 /HPF (ref 0–4)
SAMPLE: ABNORMAL
SITE: ABNORMAL
SODIUM SERPL-SCNC: 132 MMOL/L
SP GR UR STRIP: 1.01 (ref 1–1.03)
SQUAMOUS #/AREA URNS AUTO: 0 /HPF
URN SPEC COLLECT METH UR: ABNORMAL
UROBILINOGEN UR STRIP-ACNC: NEGATIVE EU/DL
WBC # BLD AUTO: 10.37 K/UL

## 2018-08-11 PROCEDURE — 96365 THER/PROPH/DIAG IV INF INIT: CPT

## 2018-08-11 PROCEDURE — 3008F BODY MASS INDEX DOCD: CPT | Mod: CPTII,S$GLB,, | Performed by: PHYSICIAN ASSISTANT

## 2018-08-11 PROCEDURE — 83690 ASSAY OF LIPASE: CPT

## 2018-08-11 PROCEDURE — 63600175 PHARM REV CODE 636 W HCPCS: Performed by: HOSPITALIST

## 2018-08-11 PROCEDURE — 83605 ASSAY OF LACTIC ACID: CPT

## 2018-08-11 PROCEDURE — 99285 EMERGENCY DEPT VISIT HI MDM: CPT | Mod: 25

## 2018-08-11 PROCEDURE — 96374 THER/PROPH/DIAG INJ IV PUSH: CPT | Mod: 59

## 2018-08-11 PROCEDURE — 85025 COMPLETE CBC W/AUTO DIFF WBC: CPT

## 2018-08-11 PROCEDURE — 96361 HYDRATE IV INFUSION ADD-ON: CPT | Mod: 59

## 2018-08-11 PROCEDURE — 99285 EMERGENCY DEPT VISIT HI MDM: CPT | Mod: ,,, | Performed by: EMERGENCY MEDICINE

## 2018-08-11 PROCEDURE — 81001 URINALYSIS AUTO W/SCOPE: CPT

## 2018-08-11 PROCEDURE — 93005 ELECTROCARDIOGRAM TRACING: CPT

## 2018-08-11 PROCEDURE — 3080F DIAST BP >= 90 MM HG: CPT | Mod: CPTII,S$GLB,, | Performed by: PHYSICIAN ASSISTANT

## 2018-08-11 PROCEDURE — 84145 PROCALCITONIN (PCT): CPT

## 2018-08-11 PROCEDURE — 3077F SYST BP >= 140 MM HG: CPT | Mod: CPTII,S$GLB,, | Performed by: PHYSICIAN ASSISTANT

## 2018-08-11 PROCEDURE — 99223 1ST HOSP IP/OBS HIGH 75: CPT | Mod: ,,, | Performed by: HOSPITALIST

## 2018-08-11 PROCEDURE — 87040 BLOOD CULTURE FOR BACTERIA: CPT | Mod: 59

## 2018-08-11 PROCEDURE — 93010 ELECTROCARDIOGRAM REPORT: CPT | Mod: ,,, | Performed by: INTERNAL MEDICINE

## 2018-08-11 PROCEDURE — 63600175 PHARM REV CODE 636 W HCPCS: Performed by: EMERGENCY MEDICINE

## 2018-08-11 PROCEDURE — 20600001 HC STEP DOWN PRIVATE ROOM

## 2018-08-11 PROCEDURE — 99214 OFFICE O/P EST MOD 30 MIN: CPT | Mod: S$GLB,,, | Performed by: PHYSICIAN ASSISTANT

## 2018-08-11 PROCEDURE — 83735 ASSAY OF MAGNESIUM: CPT

## 2018-08-11 PROCEDURE — 85610 PROTHROMBIN TIME: CPT

## 2018-08-11 PROCEDURE — 82803 BLOOD GASES ANY COMBINATION: CPT

## 2018-08-11 PROCEDURE — 25000003 PHARM REV CODE 250: Performed by: HOSPITALIST

## 2018-08-11 PROCEDURE — 25000003 PHARM REV CODE 250: Performed by: EMERGENCY MEDICINE

## 2018-08-11 PROCEDURE — 80053 COMPREHEN METABOLIC PANEL: CPT

## 2018-08-11 RX ORDER — HYDRALAZINE HYDROCHLORIDE 25 MG/1
25 TABLET, FILM COATED ORAL EVERY 8 HOURS PRN
Status: DISCONTINUED | OUTPATIENT
Start: 2018-08-12 | End: 2018-08-12

## 2018-08-11 RX ORDER — ACETAMINOPHEN 500 MG
500 TABLET ORAL EVERY 6 HOURS PRN
Status: DISCONTINUED | OUTPATIENT
Start: 2018-08-11 | End: 2018-08-14 | Stop reason: HOSPADM

## 2018-08-11 RX ORDER — IBUPROFEN 200 MG
24 TABLET ORAL
Status: DISCONTINUED | OUTPATIENT
Start: 2018-08-11 | End: 2018-08-14 | Stop reason: HOSPADM

## 2018-08-11 RX ORDER — SODIUM CHLORIDE 0.9 % (FLUSH) 0.9 %
5 SYRINGE (ML) INJECTION
Status: DISCONTINUED | OUTPATIENT
Start: 2018-08-11 | End: 2018-08-14 | Stop reason: HOSPADM

## 2018-08-11 RX ORDER — ENOXAPARIN SODIUM 100 MG/ML
40 INJECTION SUBCUTANEOUS EVERY 24 HOURS
Status: DISCONTINUED | OUTPATIENT
Start: 2018-08-11 | End: 2018-08-14 | Stop reason: HOSPADM

## 2018-08-11 RX ORDER — INSULIN ASPART 100 [IU]/ML
0-5 INJECTION, SOLUTION INTRAVENOUS; SUBCUTANEOUS
Status: DISCONTINUED | OUTPATIENT
Start: 2018-08-11 | End: 2018-08-14 | Stop reason: HOSPADM

## 2018-08-11 RX ORDER — GLUCAGON 1 MG
1 KIT INJECTION
Status: DISCONTINUED | OUTPATIENT
Start: 2018-08-11 | End: 2018-08-14 | Stop reason: HOSPADM

## 2018-08-11 RX ORDER — KETOROLAC TROMETHAMINE 30 MG/ML
15 INJECTION, SOLUTION INTRAMUSCULAR; INTRAVENOUS
Status: COMPLETED | OUTPATIENT
Start: 2018-08-11 | End: 2018-08-11

## 2018-08-11 RX ORDER — LOSARTAN POTASSIUM 25 MG/1
25 TABLET ORAL DAILY
Status: DISCONTINUED | OUTPATIENT
Start: 2018-08-11 | End: 2018-08-11

## 2018-08-11 RX ORDER — LOSARTAN POTASSIUM 25 MG/1
25 TABLET ORAL DAILY
Status: DISCONTINUED | OUTPATIENT
Start: 2018-08-12 | End: 2018-08-11

## 2018-08-11 RX ORDER — IBUPROFEN 200 MG
16 TABLET ORAL
Status: DISCONTINUED | OUTPATIENT
Start: 2018-08-11 | End: 2018-08-14 | Stop reason: HOSPADM

## 2018-08-11 RX ADMIN — ACETAMINOPHEN 500 MG: 500 TABLET, FILM COATED ORAL at 11:08

## 2018-08-11 RX ADMIN — KETOROLAC TROMETHAMINE 15 MG: 30 INJECTION, SOLUTION INTRAMUSCULAR at 07:08

## 2018-08-11 RX ADMIN — VANCOMYCIN HYDROCHLORIDE 1250 MG: 10 INJECTION, POWDER, LYOPHILIZED, FOR SOLUTION INTRAVENOUS at 11:08

## 2018-08-11 RX ADMIN — SODIUM CHLORIDE 2000 ML: 0.9 INJECTION, SOLUTION INTRAVENOUS at 06:08

## 2018-08-11 RX ADMIN — CEFTRIAXONE SODIUM 2 G: 2 INJECTION, POWDER, FOR SOLUTION INTRAMUSCULAR; INTRAVENOUS at 07:08

## 2018-08-11 NOTE — ED PROVIDER NOTES
Encounter Date: 8/11/2018    SCRIBE #1 NOTE: I, Bahman Charlton, am scribing for, and in the presence of,  Dr. Lee. I have scribed the entire note.       History     Chief Complaint   Patient presents with    Abdominal Pain     since yesterday.     Fever     highest 103     Time patient was seen by the provider: 6:14 PM      The patient is a 58 y.o. 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 He endorses chills and cough (not productive, he attributes this to taking Lisinopril). He denies shakes, pain radiating to his back, diarrhea, dysuria, painful bowel movements, sore throat, ear pain, chest pain, nausea, vomiting, other pain, and history of recent travel. He took 500 mg tylenol without relief of his symptoms at 4 PM yesterday and 9 AM this morning. He endorses history of a biopsy in 2010 and denies history of other abdominal surgeries.       The history is provided by the patient.     Review of patient's allergies indicates:  No Known Allergies  Past Medical History:   Diagnosis Date    Cirrhosis     Diverticulosis     History of hepatitis C, s/p successful Rx w/ [id number] - 6/2017 4/7/2016    S/p harvoni w/ SVR    Hyperlipidemia 04/2016    Hypertension     Internal hemorrhoid     New onset type 2 diabetes mellitus 4/2016    Positive QuantiFERON-TB Gold test 4/2016    Sebopsoriasis      Past Surgical History:   Procedure Laterality Date    COLONOSCOPY  05/19/2016    repeat in 10 yrs    COLONOSCOPY Left 5/19/2016    Procedure: COLONOSCOPY - colon cancer screening;  Surgeon: David Soto MD;  Location: [address];  Service: Endoscopy;  Laterality: Left;     Family History   Problem Relation Age of Onset    Melanoma Neg Hx      Social History   Substance Use Topics    Smoking status: Never Smoker    Smokeless tobacco: Not on  file    Alcohol use Not on file     Review of Systems   Constitutional: Positive for chills and fever.        Negative for shakes.    HENT: Negative for ear pain and sore throat.    Eyes: Negative for visual disturbance.   Respiratory: Positive for cough (not productive). Negative for shortness of breath.    Cardiovascular: Negative for chest pain.   Gastrointestinal: Positive for abdominal pain (does not radiate to his back). Negative for diarrhea, nausea and vomiting.        Negative for painful bowel movements.   Genitourinary: Negative for dysuria.   Musculoskeletal: Negative for back pain.   Skin: Negative for rash.   Neurological: Negative for weakness.       Physical Exam     Initial Vitals [08/11/18 1805]   BP Pulse Resp Temp SpO2   (!) 170/93 (!) 143 20 (!) 103.1 °F (39.5 °C) 95 %      MAP       --         Physical Exam    Nursing note and vitals reviewed.    Gen/Constitutional: Febrile. Interactive. No acute distress  Head: Normocephalic, Atraumatic  Neck: supple, no masses or LAD, no neck stiffness, negative brudzinkis, neg Kernigs sign  Eyes: Positive scleral icterus. PERRLA, conjunctiva clear  Ears, Nose and Throat: No rhinorrhea or stridor.  Cardiac: Reg Rhythm, No murmur  Pulmonary: CTA Bilat, no wheezes, rhonchi, rales.  GI: Abdomen slightly tender diffusely, soft,  non-distended; no rebound or guarding  : No CVA tenderness.  Musculoskeletal: Extremities warm, well perfused, no erythema, no edema  Skin: No rashes  Neuro: Alert and Oriented x 3; No focal motor or sensory deficits.    Psych: Normal affect      ED Course   Procedures  Labs Reviewed   CBC W/ AUTO DIFFERENTIAL - Abnormal; Notable for the following:        Result Value    MCH 32.2 (*)     Immature Granulocytes 0.6 (*)     Gran # (ANC) 8.3 (*)     Immature Grans (Abs) 0.06 (*)     Gran% 79.8 (*)     Lymph% 12.2 (*)     All other components within normal limits   COMPREHENSIVE METABOLIC PANEL - Abnormal; Notable for the following:      Sodium 132 (*)     CO2 18 (*)     Glucose 189 (*)     Total Protein 8.7 (*)     All other components within normal limits   URINALYSIS, REFLEX TO URINE CULTURE - Abnormal; Notable for the following:     Glucose, UA 1+ (*)     Ketones, UA Trace (*)     Occult Blood UA 1+ (*)     All other components within normal limits    Narrative:     Preferred Collection Type->Urine, Clean Catch   ISTAT PROCEDURE - Abnormal; Notable for the following:     POC PH 7.451 (*)     POC PCO2 32.1 (*)     POC HCO3 22.4 (*)     POC SATURATED O2 86 (*)     POC TCO2 23 (*)     All other components within normal limits   CULTURE, BLOOD   CULTURE, BLOOD   LACTIC ACID, PLASMA   MAGNESIUM   PROTIME-INR   LIPASE   URINALYSIS MICROSCOPIC    Narrative:     Preferred Collection Type->Urine, Clean Catch   PROCALCITONIN     EKG Readings: (Independently Interpreted)   Sinus tachycardia. Rate: 142. No STEMI.       Imaging Results          X-Ray Chest AP Portable (Final result)  Result time 08/11/18 18:41:57    Final result by Vineet Roche MD (08/11/18 18:41:57)                 Impression:      1. No acute cardiopulmonary process, hypoventilatory exam.  No large focal consolidation.      Electronically signed by: Vineet Roche MD  Date:    08/11/2018  Time:    18:41             Narrative:    EXAMINATION:  XR CHEST AP PORTABLE    CLINICAL HISTORY:  Sepsis;    TECHNIQUE:  Single frontal view of the chest was performed.    COMPARISON:  01/19/2017    FINDINGS:  The cardiomediastinal silhouette is not enlarged.  There is no pleural effusion.  The trachea is midline.  The lungs are symmetrically expanded bilaterally with mildly coarse interstitial attenuation, and bilateral basilar subsegmental atelectasis or scarring.  There is prominent bilateral nipple shadow..  No large focal consolidation seen.  There is no pneumothorax.  The osseous structures are remarkable for degenerative changes..                                 Medical Decision Making:    History:   Old Medical Records: I decided to obtain old medical records.  Independently Interpreted Test(s):   I have ordered and independently interpreted EKG Reading(s) - see prior notes  Clinical Tests:   Lab Tests: Reviewed and Ordered  Radiological Study: Reviewed and Ordered  Medical Tests: Reviewed and Ordered    The patient is a 58 y.o. 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    DDX: SBP, PNA, UTI, Bacteremia, Viral, gastroenteritis, colitis, meningitis    Tachycardia and febrile on arrival. ECG and telemetry w/o signs of ischemia. 2/4 SIRS criteria w/ tachycardia and fever to 103. Toradol 15mg given for fever and body aches. Ceftriaxone given for abd tenderness, although no significant ascites fluid appreciated on exam or bedside US. CXR w/o acute consolidations and UA clear. At this time unknown etiology of source. No meningitic signs, doubt acute meningitis. Blood cultures obtained to r/o bacteremia. Discussed case with hospital medicine team and will admit for IV antibiotics, further infectious work-up and ongoing management. Patient agreeable to admission plan and no hemodynamic instability.              Scribe Attestation:   Scribe #1: I performed the above scribed service and the documentation accurately describes the services I performed. I attest to the accuracy of the note.    I, Dr. Bridger Lee, personally performed the services described in this documentation. All medical record entries made by the scribe were at my direction and in my presence.  I have reviewed the chart and agree that the record reflects my personal performance and is accurate and complete.              Clinical Impression:   The primary encounter diagnosis was Fever. A diagnosis of Abdominal pain, unspecified abdominal location was also pertinent to this visit.      Disposition:   Disposition:  Admitted  Condition: Fair             Bridger Lee DO  Dept of Emergency Medicine   [facility]  Spectralink: [id number]             Bridger Lee DO  08/14/18 [id number]

## 2018-08-11 NOTE — ED TRIAGE NOTES
Pt reports fever that began this am and abdominal pain x couple days.  Pt was seen at urgent care today and sent here for further evaluation.

## 2018-08-12 PROBLEM — R19.7 WATERY DIARRHEA: Status: ACTIVE | Noted: 2018-08-12

## 2018-08-12 LAB
ALBUMIN SERPL BCP-MCNC: 3.6 G/DL
ALP SERPL-CCNC: 59 U/L
ALT SERPL W/O P-5'-P-CCNC: 33 U/L
ANION GAP SERPL CALC-SCNC: 11 MMOL/L
AST SERPL-CCNC: 27 U/L
BASOPHILS # BLD AUTO: 0.02 K/UL
BASOPHILS NFR BLD: 0.2 %
BILIRUB SERPL-MCNC: 0.8 MG/DL
BUN SERPL-MCNC: 11 MG/DL
CALCIUM SERPL-MCNC: 8.8 MG/DL
CCP AB SER IA-ACNC: <0.5 U/ML
CHLORIDE SERPL-SCNC: 103 MMOL/L
CO2 SERPL-SCNC: 21 MMOL/L
CREAT SERPL-MCNC: 0.7 MG/DL
CRP SERPL-MCNC: 136.9 MG/L
DIFFERENTIAL METHOD: ABNORMAL
EOSINOPHIL # BLD AUTO: 0 K/UL
EOSINOPHIL NFR BLD: 0 %
ERYTHROCYTE [DISTWIDTH] IN BLOOD BY AUTOMATED COUNT: 13 %
ERYTHROCYTE [SEDIMENTATION RATE] IN BLOOD BY WESTERGREN METHOD: 30 MM/HR
EST. GFR  (AFRICAN AMERICAN): >60 ML/MIN/1.73 M^2
EST. GFR  (NON AFRICAN AMERICAN): >60 ML/MIN/1.73 M^2
ESTIMATED AVG GLUCOSE: 143 MG/DL
GLUCOSE SERPL-MCNC: 227 MG/DL
HBA1C MFR BLD HPLC: 6.6 %
HCT VFR BLD AUTO: 40.8 %
HGB BLD-MCNC: 14.4 G/DL
IMM GRANULOCYTES # BLD AUTO: 0.02 K/UL
IMM GRANULOCYTES NFR BLD AUTO: 0.2 %
LYMPHOCYTES # BLD AUTO: 1.1 K/UL
LYMPHOCYTES NFR BLD: 12.8 %
MAGNESIUM SERPL-MCNC: 1.8 MG/DL
MCH RBC QN AUTO: 32.4 PG
MCHC RBC AUTO-ENTMCNC: 35.3 G/DL
MCV RBC AUTO: 92 FL
MONOCYTES # BLD AUTO: 0.5 K/UL
MONOCYTES NFR BLD: 5.8 %
NEUTROPHILS # BLD AUTO: 7.1 K/UL
NEUTROPHILS NFR BLD: 81 %
NRBC BLD-RTO: 0 /100 WBC
PHOSPHATE SERPL-MCNC: 2 MG/DL
PLATELET # BLD AUTO: 153 K/UL
PLATELET BLD QL SMEAR: ABNORMAL
PMV BLD AUTO: 10.6 FL
POCT GLUCOSE: 159 MG/DL (ref 70–110)
POCT GLUCOSE: 167 MG/DL (ref 70–110)
POCT GLUCOSE: 229 MG/DL (ref 70–110)
POCT GLUCOSE: 242 MG/DL (ref 70–110)
POTASSIUM SERPL-SCNC: 3.4 MMOL/L
PROT SERPL-MCNC: 7.1 G/DL
RBC # BLD AUTO: 4.45 M/UL
RHEUMATOID FACT SERPL-ACNC: 33 IU/ML
SODIUM SERPL-SCNC: 135 MMOL/L
WBC # BLD AUTO: 8.76 K/UL
WBC #/AREA STL HPF: NORMAL /[HPF]

## 2018-08-12 PROCEDURE — 86200 CCP ANTIBODY: CPT

## 2018-08-12 PROCEDURE — 87427 SHIGA-LIKE TOXIN AG IA: CPT

## 2018-08-12 PROCEDURE — 63600175 PHARM REV CODE 636 W HCPCS: Performed by: STUDENT IN AN ORGANIZED HEALTH CARE EDUCATION/TRAINING PROGRAM

## 2018-08-12 PROCEDURE — 85652 RBC SED RATE AUTOMATED: CPT

## 2018-08-12 PROCEDURE — 36415 COLL VENOUS BLD VENIPUNCTURE: CPT

## 2018-08-12 PROCEDURE — 86592 SYPHILIS TEST NON-TREP QUAL: CPT

## 2018-08-12 PROCEDURE — 83036 HEMOGLOBIN GLYCOSYLATED A1C: CPT

## 2018-08-12 PROCEDURE — 87521 HEPATITIS C PROBE&RVRS TRNSC: CPT

## 2018-08-12 PROCEDURE — 63600175 PHARM REV CODE 636 W HCPCS: Performed by: HOSPITALIST

## 2018-08-12 PROCEDURE — 87045 FECES CULTURE AEROBIC BACT: CPT

## 2018-08-12 PROCEDURE — 86140 C-REACTIVE PROTEIN: CPT

## 2018-08-12 PROCEDURE — 25000003 PHARM REV CODE 250: Performed by: HOSPITALIST

## 2018-08-12 PROCEDURE — 86431 RHEUMATOID FACTOR QUANT: CPT

## 2018-08-12 PROCEDURE — 89055 LEUKOCYTE ASSESSMENT FECAL: CPT

## 2018-08-12 PROCEDURE — 84100 ASSAY OF PHOSPHORUS: CPT

## 2018-08-12 PROCEDURE — 87209 SMEAR COMPLEX STAIN: CPT

## 2018-08-12 PROCEDURE — 25000003 PHARM REV CODE 250: Performed by: STUDENT IN AN ORGANIZED HEALTH CARE EDUCATION/TRAINING PROGRAM

## 2018-08-12 PROCEDURE — 86038 ANTINUCLEAR ANTIBODIES: CPT

## 2018-08-12 PROCEDURE — 80053 COMPREHEN METABOLIC PANEL: CPT

## 2018-08-12 PROCEDURE — 99233 SBSQ HOSP IP/OBS HIGH 50: CPT | Mod: ,,, | Performed by: HOSPITALIST

## 2018-08-12 PROCEDURE — 85025 COMPLETE CBC W/AUTO DIFF WBC: CPT

## 2018-08-12 PROCEDURE — 86703 HIV-1/HIV-2 1 RESULT ANTBDY: CPT

## 2018-08-12 PROCEDURE — 20600001 HC STEP DOWN PRIVATE ROOM

## 2018-08-12 PROCEDURE — 83735 ASSAY OF MAGNESIUM: CPT

## 2018-08-12 PROCEDURE — 94761 N-INVAS EAR/PLS OXIMETRY MLT: CPT

## 2018-08-12 PROCEDURE — 87046 STOOL CULTR AEROBIC BACT EA: CPT

## 2018-08-12 RX ORDER — POTASSIUM CHLORIDE 20 MEQ/1
40 TABLET, EXTENDED RELEASE ORAL ONCE
Status: COMPLETED | OUTPATIENT
Start: 2018-08-12 | End: 2018-08-12

## 2018-08-12 RX ADMIN — VANCOMYCIN HYDROCHLORIDE 1250 MG: 10 INJECTION, POWDER, LYOPHILIZED, FOR SOLUTION INTRAVENOUS at 11:08

## 2018-08-12 RX ADMIN — PIPERACILLIN AND TAZOBACTAM 4.5 G: 4; .5 INJECTION, POWDER, LYOPHILIZED, FOR SOLUTION INTRAVENOUS; PARENTERAL at 05:08

## 2018-08-12 RX ADMIN — PIPERACILLIN AND TAZOBACTAM 4.5 G: 4; .5 INJECTION, POWDER, LYOPHILIZED, FOR SOLUTION INTRAVENOUS; PARENTERAL at 08:08

## 2018-08-12 RX ADMIN — ACETAMINOPHEN 500 MG: 500 TABLET, FILM COATED ORAL at 05:08

## 2018-08-12 RX ADMIN — ACETAMINOPHEN 500 MG: 500 TABLET, FILM COATED ORAL at 08:08

## 2018-08-12 RX ADMIN — INSULIN ASPART 2 UNITS: 100 INJECTION, SOLUTION INTRAVENOUS; SUBCUTANEOUS at 11:08

## 2018-08-12 RX ADMIN — POTASSIUM CHLORIDE 40 MEQ: 1500 TABLET, EXTENDED RELEASE ORAL at 08:08

## 2018-08-12 RX ADMIN — ENOXAPARIN SODIUM 40 MG: 100 INJECTION SUBCUTANEOUS at 05:08

## 2018-08-12 RX ADMIN — PIPERACILLIN AND TAZOBACTAM 4.5 G: 4; .5 INJECTION, POWDER, LYOPHILIZED, FOR SOLUTION INTRAVENOUS; PARENTERAL at 01:08

## 2018-08-12 RX ADMIN — VANCOMYCIN HYDROCHLORIDE 1250 MG: 10 INJECTION, POWDER, LYOPHILIZED, FOR SOLUTION INTRAVENOUS at 12:08

## 2018-08-12 RX ADMIN — INSULIN ASPART 2 UNITS: 100 INJECTION, SOLUTION INTRAVENOUS; SUBCUTANEOUS at 08:08

## 2018-08-12 NOTE — ASSESSMENT & PLAN NOTE
--meets 2/4 SIRS criteria (fever, tachycardia)  --however no obvious source of infection at this time   --procal 0.2   --UA only significant for rare bacteria - not convincing of UTI   --CXR  no consolidations - not convincing for PNA however he does complain of cough for past several weeks and started requiring oxygen for brief desaturation to 89% on RA   --BCx pending  --received Rocephin x 1 in the ER and 3 L IVF  --although low suspicion for infection, given fever of unclear etiology, will start with broad spectrum antibiotics with vanc and zosyn   --differential: infection, DVT/PE, cancer, rheumatologic  --ESR, CRP, CARY, RF, RPR, HIV  --considered PE/DVT however patient appears to be at low risk.  Wells score 16.2 % chance of PE/DVT.  If oxygen requirement increases, will considering imaging.    --considered HCC, however recently had an undetectably low level of his HepC and underwent recent liver US 6/2018 that revealed no masses, next US liver is due in 12/2018.

## 2018-08-12 NOTE — PROGRESS NOTES
[facility] Hospital Medicine  Progress Note    Patient Name: Graham Caceres  MRN: [id number]  Patient Class: IP- Inpatient   Admission Date: 8/11/2018  Length of Stay: 1 days  Attending Physician: Diandra Armstrong MD  Primary Care Provider: Jennifer Manzanares MD    Hospital Medicine Team: [facility] HOSP MED 3 Fredy Blum MD    Subjective:     Principal Problem:SIRS (systemic inflammatory response syndrome)    HPI:  57 yo M with PMH Hep C Cirrhosis (s/p Harvoni treatment), Fatty Liver Disease, DM2, HLD, HTN presents on 8/11 to the [facility] ER after presenting at urgent care with fevers at home and generalized achiness x 2 days.  Patient states he first started feeling feverish in the afternoon in 8/10.  He took two tylenol (?500 mg each) but his fever did not resolve.  He took another two tylenol in the evening and went to bed.  In the morning he awoke still feeling feverish.  He took his temp with an oral thermometer and got a read of 39.3 Celsius.  He took one more tylenol.  He is aware of his daily 2 gram limit of tylenol given his liver history.  He then presented to urgent care who advised him to go to the ER where his Tmax was 103.3.  The ER reported he had abdominal pain and reportedly performed a bedside US which revealed no ascites to tap.  They gave him a dose of Rocephin 2 grams after collecting blood cultures.  Upon my interview, he initially denied abdominal pain but then stated he had mild upper abdominal pain the previous day but not the day of admission.  He denies headache, neck pain/stiffness, sore throat, chest pain, SOB, nausea, vomiting, diarrhea, dysuria, skin changes/rashes, leg swelling.  He states he has been coughing for the past several weeks.  He attributed the cough to the lisinopril he was recently prescribed about 3 months ago by his PCP.  The cough has not worsened in the recent days.  He denies recent travel, recent surgery, recent trauma.  He says he spent time with a friend a week prior  who was coughing.  He does not know how he got Hep C but is from [facility].  He denies previous IVDA.  He has had one lifetime sexual partner.  Per hepatology outpatient notes, he is to undergo q 6 monthly screening for HCC and liver monitoring.  As of 6/25, quantitative HCV RNA was undetectably low.      In the ER, Tmax was 103.3 but improved to 99.6 without intervention.  He was tachycardic to the 130s.  WBC was 10.7, lactate 1.3, procal 0.21, UA significant only for rare bacteria, CXR negative for consolidations.        Hospital Course:  No notes on file    Interval History: NAEON. Patient reports having watery diarrhea for the past 3 days. Has not gotten antibiotics recently. Still spiking fevers on vanc/zosyn. Denies nausea/vomiting. Abdominal pain has subsided. Denies rash, dysuria, HA.     Review of Systems   Constitutional: Positive for fever. Negative for chills.   HENT: Negative for congestion and sore throat.    Eyes: Negative for itching and visual disturbance.   Respiratory: Negative for cough, chest tightness and shortness of breath.    Cardiovascular: Negative for chest pain and palpitations.   Gastrointestinal: Positive for diarrhea. Negative for abdominal distention, abdominal pain, nausea and vomiting.   Genitourinary: Negative for dysuria and frequency.   Musculoskeletal: Negative for back pain and joint swelling.   Skin: Negative for color change and pallor.   Neurological: Negative for light-headedness and headaches.     Objective:     Vital Signs (Most Recent):  Temp: 99.4 °F (37.4 °C) (08/12/18 1150)  Pulse: 107 (08/12/18 1150)  Resp: 20 (08/12/18 1150)  BP: 132/80 (08/12/18 1150)  SpO2: (!) 94 % (08/12/18 1150) Vital Signs (24h Range):  Temp:  [99.2 °F (37.3 °C)-103.3 °F (39.6 °C)] 99.4 °F (37.4 °C)  Pulse:  [105-144] 107  Resp:  [12-20] 20  SpO2:  [89 %-98 %] 94 %  BP: (129-170)/(72-93) 132/80     Weight: 79.7 kg (175 lb 11.3 oz)  Body mass index is 27.52 kg/m².    Intake/Output Summary (Last  24 hours) at 8/12/2018 1349  Last data filed at 8/12/2018 0300  Gross per 24 hour   Intake 240 ml   Output 475 ml   Net -235 ml      Physical Exam   Constitutional: No distress.   HENT:   Head: Normocephalic and atraumatic.   Eyes: Conjunctivae are normal. No scleral icterus.   Neck: Neck supple.   Cardiovascular: Normal rate, regular rhythm and normal heart sounds.   Pulmonary/Chest: Effort normal and breath sounds normal. No stridor. No respiratory distress.   Abdominal: Soft. Bowel sounds are normal. He exhibits no distension. There is no tenderness. There is no rebound and no guarding.   Musculoskeletal: He exhibits no edema or tenderness.   Neurological: He is alert. No cranial nerve deficit.   Skin: Skin is warm and dry. No rash noted. He is not diaphoretic. No erythema.   Psychiatric: He has a normal mood and affect. His behavior is normal.       Significant Labs:   Recent Lab Results       08/12/18  1208 08/12/18  1149 08/12/18  1046 08/12/18  0809 08/12/18  0126      Immature Granulocytes          Immature Grans (Abs)          Procalcitonin          Albumin     3.6     Alkaline Phosphatase     59     Allens Test          ALT     33     Anion Gap     11     Appearance, UA          AST     27     Bacteria, UA          Baso #          Basophil%          Bilirubin (UA)          Total Bilirubin     0.8  Comment:  For infants and newborns, interpretation of results should be based  on gestational age, weight and in agreement with clinical  observations.  Premature Infant recommended reference ranges:  Up to 24 hours.............<8.0 mg/dL  Up to 48 hours............<12.0 mg/dL  3-5 days..................<15.0 mg/dL  6-29 days.................<15.0 mg/dL       Blood Culture, Routine          Site          BUN, Bld     11     Calcium     8.8     CCP Antibodies   <0.5       Chloride     103     CO2     21     Color, UA          Creatinine     0.7     CRP     136.9     Differential Method          eGFR if   American     >60.0     eGFR if non      >60.0  Comment:  Calculation used to obtain the estimated glomerular filtration  rate (eGFR) is the CKD-EPI equation.        Eos #          Eosinophil%          Estimated Avg Glucose     143     Glucose     227     Glucose, UA          Gran # (ANC)          Gran%          Hematocrit 40.8         Hemoglobin 14.4         Hemoglobin A1C     6.6  Comment:  ADA Screening Guidelines:  5.7-6.4%  Consistent with prediabetes  >or=6.5%  Consistent with diabetes  High levels of fetal hemoglobin interfere with the HbA1C  assay. Heterozygous hemoglobin variants (HbS, HgC, etc)do  not significantly interfere with this assay.   However, presence of multiple variants may affect accuracy.       Coumadin Monitoring INR          Ketones, UA          Lactate, Andrea          Leukocytes, UA          Lipase          Lymph #          Lymph%          Magnesium     1.8     MCH 32.4         MCHC 35.3         MCV 92         Microscopic Comment          Mono #          Mono%          MPV 10.6         Nitrite, UA          nRBC          Occult Blood UA          pH, UA          Phosphorus     2.0     Platelets 153         POC BE          POC HCO3          POC Lactate          POC PCO2          POC PH          POC PO2          POC SATURATED O2          POC TCO2          POCT Glucose  242  229      Potassium     3.4     Total Protein     7.1     Protein, UA          Protime          RBC 4.45         RBC, UA          RDW 13.0         Rheumatoid Factor     33.0     Sample          Sed Rate     30     Sodium     135     Specific [place], UA          Specimen UA          Squam Epithel, UA          Urobilinogen, UA          WBC 8.76                     08/11/18  2125 08/11/18  1831 08/11/18  1830 08/11/18  1824 08/11/18  1821      Immature Granulocytes     0.6     Immature Grans (Abs)     0.06  Comment:  Mild elevation in immature granulocytes is non specific and   can be seen in a variety of  conditions including stress response,   acute inflammation, trauma and pregnancy. Correlation with other   laboratory and clinical findings is essential.       Procalcitonin   0.21  Comment:  Please re-baseline procalcitonin if a patient is transferred from  other facilities to [facility].  A concentration < 0.25 ng/mL represents a low risk bacterial   infection.  Procalcitonin may not be accurate among patients with localized   infection, recent trauma or major surgery, immunosuppressed state,   invasive fungal infection, renal dysfunction. Decisions regarding   initiation or continuation of antibiotic therapy should not be based   solely on procalcitonin levels.         Albumin     4.3     Alkaline Phosphatase     70     Allens Test    N/A      ALT     43     Anion Gap     14     Appearance, UA  Clear        AST     39  Comment:  *Result may be interfered by visible hemolysis     Bacteria, UA  Rare        Baso #     0.03     Basophil%     0.3     Bilirubin (UA)  Negative        Total Bilirubin     0.8  Comment:  For infants and newborns, interpretation of results should be based  on gestational age, weight and in agreement with clinical  observations.  Premature Infant recommended reference ranges:  Up to 24 hours.............<8.0 mg/dL  Up to 48 hours............<12.0 mg/dL  3-5 days..................<15.0 mg/dL  6-29 days.................<15.0 mg/dL       Blood Culture, Routine   No Growth to date[P]  No Growth to date[P]     Site    Other      BUN, Bld     16     Calcium     9.8     CCP Antibodies          Chloride     100     CO2     18     Color, UA  Yellow        Creatinine     0.9     CRP          Differential Method     Automated     eGFR if      >60.0     eGFR if non      >60.0  Comment:  Calculation used to obtain the estimated glomerular filtration  rate (eGFR) is the CKD-EPI equation.        Eos #     0.0     Eosinophil%     0.1     Estimated Avg Glucose           Glucose     189     Glucose, UA  1+        Gran # (ANC)     8.3     Gran%     79.8     Hematocrit     45.6     Hemoglobin     16.1     Hemoglobin A1C          Coumadin Monitoring INR     1.0  Comment:  Coumadin Therapy:  2.0 - 3.0 for INR for all indicators except mechanical heart valves  and antiphospholipid syndromes which should use 2.5 - 3.5.       Ketones, UA  Trace        Lactate, Andrea     1.3  Comment:  Falsely low lactic acid results can be found in samples   containing >=13.0 mg/dL total bilirubin and/or >=3.5 mg/dL   direct bilirubin.       Leukocytes, UA  Negative        Lipase     31     Lymph #     1.3     Lymph%     12.2     Magnesium     2.3     MCH     32.2     MCHC     35.3     MCV     91     Microscopic Comment  SEE COMMENT  Comment:  Other formed elements not mentioned in the report are not   present in the microscopic examination.           Mono #     0.7     Mono%     7.0     MPV     10.5     Nitrite, UA  Negative        nRBC     0     Occult Blood UA  1+        pH, UA  5.0        Phosphorus          Platelets     196     POC BE    -2      POC HCO3    22.4      POC Lactate    1.28      POC PCO2    32.1      POC PH    7.451      POC PO2    48      POC SATURATED O2    86      POC TCO2    23      POCT Glucose 173         Potassium     3.9     Total Protein     8.7     Protein, UA  Negative  Comment:  Recommend a 24 hour urine protein or a urine   protein/creatinine ratio if globulin induced proteinuria is  clinically suspected.          Protime     10.3     RBC     5.00     RBC, UA  4        RDW     12.8     Rheumatoid Factor          Sample    VENOUS      Sed Rate          Sodium     132     Specific [place], UA  1.010        Specimen UA  Urine, Clean Catch        Squam Epithel, UA  0        Urobilinogen, UA  Negative        WBC     10.37                     Significant Imaging: I have reviewed all pertinent imaging results/findings within the past 24 hours.     CXR: No acute cardiopulmonary  process, hypoventilatory exam.  No large focal consolidation.    EKG: sinus tachycardia    Assessment/Plan:      * SIRS (systemic inflammatory response syndrome)    --meets 2/4 SIRS criteria (fever, tachycardia)  --however no obvious source of infection at this time   --procal 0.2   --UA only significant for rare bacteria - not convincing of UTI   --CXR  no consolidations - not convincing for PNA however he does complain of cough for past several weeks and started requiring oxygen for brief desaturation to 89% on RA   --BCx pending  --received Rocephin x 1 in the ER and 3 L IVF  --although low suspicion for infection, given fever of unclear etiology, will start with broad spectrum antibiotics with vanc and zosyn   --differential: infection, DVT/PE, cancer, rheumatologic  --ESR, CRP, CARY, RF, RPR, HIV  --considered PE/DVT however patient appears to be at low risk.  Wells score 16.2 % chance of PE/DVT.  If oxygen requirement increases, will considering imaging.    --considered HCC, however recently had an undetectably low level of his HepC and underwent recent liver US 6/2018 that revealed no masses, next US liver is due in 12/2018.        Watery diarrhea    Watery diarrhea associated with fever; no recent abx therapy  -colonoscopy 2015: mild diverticular disease  -EGD 2015: normal    -f/u stool culture        Hyperlipidemia    --continue home pravastatin  --likely on this particular statin due to chronic liver disease        Respiratory alkalosis    --VBG on admission: pH 7.451, pCO2 32.1  --possibly 2/2 tachypnea, coughing  --bicarb is 18 on admission (likely compensatory however would monitor).        Fever    -Persistent fever in the setting of watery diarrhea X 3 days  -BCx NGTD  -CXR negative for pneumonia  -Stool culture ordered  -on empirical vanc/zosyn    -increase vanc to 1750mg BID, monitor vanc trough    -Elevated CRP, RF; considered rheumatologic etiology; ordered CCP antibodies, which was negative         Hepatic cirrhosis due to chronic hepatitis C infection    --s/p 24 week Harvoni treatment  --As of 6/25, quantitative HCV RNA was undetectably low and underwent recent liver US 6/2018 that revealed no masses, next US liver is due in 12/2018.  --Per hepatology outpatient notes, he has to undergo q 6 monthly screening for HCC and liver monitoring.    --cirrhosis likely the cause of his hyponatremia on admission  --LFTs normal, coags normal, platelets normal, albumin normal        Type 2 diabetes mellitus without retinopathy    --last A1C 6.7 on 6/8  --takes Januvia at home, hold while inpatient  --SSI + hypoglycemic protocol + BID glucose checks (adjust if necessary)  --on statin and ACE/ARB        HTN (hypertension)    --takes lisinopril 20 mg at home and was started on this for renoprotection for T2DM 3 months ago, however patient was made aware this class of drugs can make him cough.  He states his PCP has been out on maternity leave and has been unable to ask for a medication change due to the coughing.  He would like to be on something else.  --given he meets SIRS criteria, will hold off on an antihypertensive for now.  Consider changing home antihypertensive regimen on discharge with outpatient follow up  --PRN hydralazine PO for SBP >180          VTE Risk Mitigation (From admission, onward)        Ordered     enoxaparin injection 40 mg  Daily      08/11/18 2217     IP VTE LOW RISK PATIENT  Once      08/11/18 2217              Fredy Blum MD  Department of Hospital Medicine   [facility]

## 2018-08-12 NOTE — ASSESSMENT & PLAN NOTE
Watery diarrhea associated with fever; no recent abx therapy  -colonoscopy 2015: mild diverticular disease  -EGD 2015: normal    -f/u stool culture

## 2018-08-12 NOTE — ASSESSMENT & PLAN NOTE
--VBG on admission: pH 7.451, pCO2 32.1  --possibly 2/2 tachypnea, coughing  --bicarb is 18 on admission (likely compensatory however would monitor).

## 2018-08-12 NOTE — PLAN OF CARE
CM met with patient to obtain discharge planning assessment.  Patient admitted with fever.  Planned discharge is home - Plan (A) or home with home health - Plan (B).    PCP:  Jennifer Manzanares MD     Payor:  Payor: [employer] [place] MEDICARE / Plan: [employer] MEDICARE HMO / Product Type: Capitation /      Pharmacy:    [id number] - [place]  [address]  [address]  [place] [address]  Phone: [phone] Fax: [phone]    [facility] [address] [place] [address] [place]  [address] AT [facility] [place]  [address]  [place] [address]  Phone: [phone] Fax: [phone]       08/12/18 1528   Discharge Assessment   Assessment Type Discharge Planning Assessment   Confirmed/corrected address and phone number on facesheet? Yes   Assessment information obtained from? Patient   Communicated expected length of stay with patient/caregiver no   Prior to hospitilization cognitive status: Alert/Oriented   Prior to hospitalization functional status: Independent   Current cognitive status: Alert/Oriented   Current Functional Status: Independent   Lives With alone   Able to Return to Prior Arrangements yes   Is patient able to care for self after discharge? Yes   Patient's perception of discharge disposition home or selfcare   Readmission Within The Last 30 Days no previous admission in last 30 days   Patient currently being followed by outpatient case management? No   Patient currently receives any other outside agency services? No   Equipment Currently Used at Home none   Do you have any problems affording any of your prescribed medications? No   Is the patient taking medications as prescribed? yes   Does the patient have transportation home? Yes   Transportation Available family or friend will provide   Does the patient receive services at the Coumadin Clinic? No   Discharge Plan A Home   Discharge Plan B Home;Home Health   Patient/Family In Agreement With Plan yes

## 2018-08-12 NOTE — ASSESSMENT & PLAN NOTE
-Persistent fever in the setting of watery diarrhea X 3 days  -BCx NGTD  -CXR negative for pneumonia  -Stool culture ordered  -on empirical vanc/zosyn    -increase vanc to 1750mg BID, monitor vanc trough    -Elevated CRP, RF; considered rheumatologic etiology; ordered CCP antibodies, which was negative

## 2018-08-12 NOTE — SUBJECTIVE & OBJECTIVE
Interval History: NAEON. Patient reports having watery diarrhea for the past 3 days. Has not gotten antibiotics recently. Still spiking fevers on vanc/zosyn. Denies nausea/vomiting. Abdominal pain has subsided. Denies rash, dysuria, HA.     Review of Systems   Constitutional: Positive for fever. Negative for chills.   HENT: Negative for congestion and sore throat.    Eyes: Negative for itching and visual disturbance.   Respiratory: Negative for cough, chest tightness and shortness of breath.    Cardiovascular: Negative for chest pain and palpitations.   Gastrointestinal: Positive for diarrhea. Negative for abdominal distention, abdominal pain, nausea and vomiting.   Genitourinary: Negative for dysuria and frequency.   Musculoskeletal: Negative for back pain and joint swelling.   Skin: Negative for color change and pallor.   Neurological: Negative for light-headedness and headaches.     Objective:     Vital Signs (Most Recent):  Temp: 99.4 °F (37.4 °C) (08/12/18 1150)  Pulse: 107 (08/12/18 1150)  Resp: 20 (08/12/18 1150)  BP: 132/80 (08/12/18 1150)  SpO2: (!) 94 % (08/12/18 1150) Vital Signs (24h Range):  Temp:  [99.2 °F (37.3 °C)-103.3 °F (39.6 °C)] 99.4 °F (37.4 °C)  Pulse:  [105-144] 107  Resp:  [12-20] 20  SpO2:  [89 %-98 %] 94 %  BP: (129-170)/(72-93) 132/80     Weight: 79.7 kg (175 lb 11.3 oz)  Body mass index is 27.52 kg/m².    Intake/Output Summary (Last 24 hours) at 8/12/2018 1349  Last data filed at 8/12/2018 0300  Gross per 24 hour   Intake 240 ml   Output 475 ml   Net -235 ml      Physical Exam   Constitutional: No distress.   HENT:   Head: Normocephalic and atraumatic.   Eyes: Conjunctivae are normal. No scleral icterus.   Neck: Neck supple.   Cardiovascular: Normal rate, regular rhythm and normal heart sounds.   Pulmonary/Chest: Effort normal and breath sounds normal. No stridor. No respiratory distress.   Abdominal: Soft. Bowel sounds are normal. He exhibits no distension. There is no tenderness. There  is no rebound and no guarding.   Musculoskeletal: He exhibits no edema or tenderness.   Neurological: He is alert. No cranial nerve deficit.   Skin: Skin is warm and dry. No rash noted. He is not diaphoretic. No erythema.   Psychiatric: He has a normal mood and affect. His behavior is normal.       Significant Labs:   Recent Lab Results       08/12/18  1208 08/12/18  1149 08/12/18  1046 08/12/18  0809 08/12/18  0126      Immature Granulocytes          Immature Grans (Abs)          Procalcitonin          Albumin     3.6     Alkaline Phosphatase     59     Allens Test          ALT     33     Anion Gap     11     Appearance, UA          AST     27     Bacteria, UA          Baso #          Basophil%          Bilirubin (UA)          Total Bilirubin     0.8  Comment:  For infants and newborns, interpretation of results should be based  on gestational age, weight and in agreement with clinical  observations.  Premature Infant recommended reference ranges:  Up to 24 hours.............<8.0 mg/dL  Up to 48 hours............<12.0 mg/dL  3-5 days..................<15.0 mg/dL  6-29 days.................<15.0 mg/dL       Blood Culture, Routine          Site          BUN, Bld     11     Calcium     8.8     CCP Antibodies   <0.5       Chloride     103     CO2     21     Color, UA          Creatinine     0.7     CRP     136.9     Differential Method          eGFR if      >60.0     eGFR if non      >60.0  Comment:  Calculation used to obtain the estimated glomerular filtration  rate (eGFR) is the CKD-EPI equation.        Eos #          Eosinophil%          Estimated Avg Glucose     143     Glucose     227     Glucose, UA          Gran # (ANC)          Gran%          Hematocrit 40.8         Hemoglobin 14.4         Hemoglobin A1C     6.6  Comment:  ADA Screening Guidelines:  5.7-6.4%  Consistent with prediabetes  >or=6.5%  Consistent with diabetes  High levels of fetal hemoglobin interfere with the  HbA1C  assay. Heterozygous hemoglobin variants (HbS, HgC, etc)do  not significantly interfere with this assay.   However, presence of multiple variants may affect accuracy.       Coumadin Monitoring INR          Ketones, UA          Lactate, Andrea          Leukocytes, UA          Lipase          Lymph #          Lymph%          Magnesium     1.8     MCH 32.4         MCHC 35.3         MCV 92         Microscopic Comment          Mono #          Mono%          MPV 10.6         Nitrite, UA          nRBC          Occult Blood UA          pH, UA          Phosphorus     2.0     Platelets 153         POC BE          POC HCO3          POC Lactate          POC PCO2          POC PH          POC PO2          POC SATURATED O2          POC TCO2          POCT Glucose  242  229      Potassium     3.4     Total Protein     7.1     Protein, UA          Protime          RBC 4.45         RBC, UA          RDW 13.0         Rheumatoid Factor     33.0     Sample          Sed Rate     30     Sodium     135     Specific [place], UA          Specimen UA          Squam Epithel, UA          Urobilinogen, UA          WBC 8.76                     08/11/18  2125 08/11/18  1831 08/11/18  1830 08/11/18  1824 08/11/18  1821      Immature Granulocytes     0.6     Immature Grans (Abs)     0.06  Comment:  Mild elevation in immature granulocytes is non specific and   can be seen in a variety of conditions including stress response,   acute inflammation, trauma and pregnancy. Correlation with other   laboratory and clinical findings is essential.       Procalcitonin   0.21  Comment:  Please re-baseline procalcitonin if a patient is transferred from  other facilities to [facility].  A concentration < 0.25 ng/mL represents a low risk bacterial   infection.  Procalcitonin may not be accurate among patients with localized   infection, recent trauma or major surgery, immunosuppressed state,   invasive fungal infection, renal dysfunction. Decisions regarding    initiation or continuation of antibiotic therapy should not be based   solely on procalcitonin levels.         Albumin     4.3     Alkaline Phosphatase     70     Allens Test    N/A      ALT     43     Anion Gap     14     Appearance, UA  Clear        AST     39  Comment:  *Result may be interfered by visible hemolysis     Bacteria, UA  Rare        Baso #     0.03     Basophil%     0.3     Bilirubin (UA)  Negative        Total Bilirubin     0.8  Comment:  For infants and newborns, interpretation of results should be based  on gestational age, weight and in agreement with clinical  observations.  Premature Infant recommended reference ranges:  Up to 24 hours.............<8.0 mg/dL  Up to 48 hours............<12.0 mg/dL  3-5 days..................<15.0 mg/dL  6-29 days.................<15.0 mg/dL       Blood Culture, Routine   No Growth to date[P]  No Growth to date[P]     Site    Other      BUN, Bld     16     Calcium     9.8     CCP Antibodies          Chloride     100     CO2     18     Color, UA  Yellow        Creatinine     0.9     CRP          Differential Method     Automated     eGFR if      >60.0     eGFR if non      >60.0  Comment:  Calculation used to obtain the estimated glomerular filtration  rate (eGFR) is the CKD-EPI equation.        Eos #     0.0     Eosinophil%     0.1     Estimated Avg Glucose          Glucose     189     Glucose, UA  1+        Gran # (ANC)     8.3     Gran%     79.8     Hematocrit     45.6     Hemoglobin     16.1     Hemoglobin A1C          Coumadin Monitoring INR     1.0  Comment:  Coumadin Therapy:  2.0 - 3.0 for INR for all indicators except mechanical heart valves  and antiphospholipid syndromes which should use 2.5 - 3.5.       Ketones, UA  Trace        Lactate, Andrea     1.3  Comment:  Falsely low lactic acid results can be found in samples   containing >=13.0 mg/dL total bilirubin and/or >=3.5 mg/dL   direct bilirubin.       Leukocytes, UA   Negative        Lipase     31     Lymph #     1.3     Lymph%     12.2     Magnesium     2.3     MCH     32.2     MCHC     35.3     MCV     91     Microscopic Comment  SEE COMMENT  Comment:  Other formed elements not mentioned in the report are not   present in the microscopic examination.           Mono #     0.7     Mono%     7.0     MPV     10.5     Nitrite, UA  Negative        nRBC     0     Occult Blood UA  1+        pH, UA  5.0        Phosphorus          Platelets     196     POC BE    -2      POC HCO3    22.4      POC Lactate    1.28      POC PCO2    32.1      POC PH    7.451      POC PO2    48      POC SATURATED O2    86      POC TCO2    23      POCT Glucose 173         Potassium     3.9     Total Protein     8.7     Protein, UA  Negative  Comment:  Recommend a 24 hour urine protein or a urine   protein/creatinine ratio if globulin induced proteinuria is  clinically suspected.          Protime     10.3     RBC     5.00     RBC, UA  4        RDW     12.8     Rheumatoid Factor          Sample    VENOUS      Sed Rate          Sodium     132     Specific [place], UA  1.010        Specimen UA  Urine, Clean Catch        Squam Epithel, UA  0        Urobilinogen, UA  Negative        WBC     10.37                     Significant Imaging: I have reviewed all pertinent imaging results/findings within the past 24 hours.     CXR: No acute cardiopulmonary process, hypoventilatory exam.  No large focal consolidation.    EKG: sinus tachycardia

## 2018-08-12 NOTE — ASSESSMENT & PLAN NOTE
--takes lisinopril 20 mg at home and was started on this for renoprotection for T2DM 3 months ago, however patient was made aware this class of drugs can make him cough.  He states his PCP has been out on maternity leave and has been unable to ask for a medication change due to the coughing.  He would like to be on something else.  --given he meets SIRS criteria, will hold off on an antihypertensive for now.  Consider changing home antihypertensive regimen on discharge with outpatient follow up  --PRN hydralazine PO for SBP >180

## 2018-08-12 NOTE — ASSESSMENT & PLAN NOTE
--last A1C 6.7 on 6/8  --takes Januvia at home, hold while inpatient  --SSI + hypoglycemic protocol + BID glucose checks (adjust if necessary)  --on statin and ACE/ARB

## 2018-08-12 NOTE — HPI
59 yo M with PMH Hep C Cirrhosis (s/p Harvoni treatment), Fatty Liver Disease, DM2, HLD, HTN [place] on 8/11 [place] [facility] [place] presenting at urgent care with fevers at home and generalized achiness x 2 days.  Patient states he first started feeling feverish in the afternoon in 8/10.  He took two tylenol (?500 mg each) but his fever did not resolve.  He took another two tylenol in the evening and went to bed.  In the morning he awoke still feeling feverish.  He took his temp with an oral thermometer and got a read of 39.3 Celsius.  He took one more tylenol.  He is aware of his daily 2 gram limit of tylenol given his liver history.  He then presented to urgent care who advised him to go to the ER where his Tmax was 103.3.  The ER reported he had abdominal pain and reportedly performed a bedside US which revealed no ascites to tap.  They gave him a dose of Rocephin 2 grams after collecting blood cultures.  Upon my interview, he initially denied abdominal pain but then stated he had mild upper abdominal pain the previous day but not the day of admission.  He denies headache, neck pain/stiffness, sore throat, chest pain, SOB, nausea, vomiting, diarrhea, dysuria, skin changes/rashes, leg swelling.  He states he has been coughing for the past several weeks.  He attributed the cough to the lisinopril he was recently prescribed about 3 months ago by his PCP.  The cough has not worsened in the recent days.  He denies recent travel, recent surgery, recent trauma.  He says he spent time with a friend a week prior who was coughing.  He does not know how he got Hep C but is from [facility].  He denies previous IVDA.  He has had one lifetime sexual partner.  Per hepatology outpatient notes, he is to undergo q 6 monthly screening for HCC and liver monitoring.  As of 6/25, quantitative HCV RNA was undetectably low.      In the ER, Tmax was 103.3 but improved to 99.6 without intervention.  He was tachycardic to the 130s.  WBC was  10.7, lactate 1.3, procal 0.21, UA significant only for rare bacteria, CXR negative for consolidations.

## 2018-08-12 NOTE — NURSING
Pt arrived to floor via stretcher. Oriented pt to room. Temp 99.6F, . Sats 88% on room air. Placed pt on 2L nasal cannula, sats 96%. Pt denies pain. Call light in reach. TM

## 2018-08-12 NOTE — SUBJECTIVE & OBJECTIVE
Past Medical History:   Diagnosis Date    Cirrhosis     Diverticulosis     History of hepatitis C, s/p successful Rx w/ [id number] - 6/2017 4/7/2016    S/p harvoni w/ SVR    Hyperlipidemia 04/2016    Hypertension     Internal hemorrhoid     New onset type 2 diabetes mellitus 4/2016    Positive QuantiFERON-TB Gold test 4/2016    Sebopsoriasis        Past Surgical History:   Procedure Laterality Date    COLONOSCOPY  05/19/2016    repeat in 10 yrs    COLONOSCOPY Left 5/19/2016    Procedure: COLONOSCOPY - colon cancer screening;  Surgeon: David Soto MD;  Location: [address];  Service: Endoscopy;  Laterality: Left;       Review of patient's allergies indicates:  No Known Allergies    No current facility-administered medications on file prior to encounter.      Current Outpatient Prescriptions on File Prior to Encounter   Medication Sig    blood sugar diagnostic Strp DAILY    blood-glucose meter kit Use as instructed    lancets Misc Use daily.    lisinopril (PRINIVIL,ZESTRIL) 20 MG tablet Take 1 tablet (20 mg total) by mouth once daily.    omeprazole (PRILOSEC) 20 MG capsule Take 1 capsule (20 mg total) by mouth once daily.    pravastatin (PRAVACHOL) 20 MG tablet Take 2 tablets (40 mg total) by mouth once daily.    SITagliptin (JANUVIA) 25 MG Tab Take 1 tablet (25 mg total) by mouth once daily.     Family History     None        Social History Main Topics    Smoking status: Never Smoker    Smokeless tobacco: Not on file    Alcohol use Not on file    Drug use: No    Sexual activity: Not on file     Review of Systems   Constitutional: Positive for fever. Negative for chills.   HENT: Negative for congestion and sore throat.    Eyes: Negative for discharge and visual disturbance.   Respiratory: Negative for cough and shortness of breath.    Cardiovascular: Negative for chest pain and leg swelling.   Gastrointestinal: Negative for constipation, diarrhea, nausea and vomiting.   Genitourinary:  Negative for dysuria and frequency.   Musculoskeletal: Negative for joint swelling and myalgias.   Skin: Negative for rash.   Neurological: Negative for dizziness and light-headedness.     Objective:     Vital Signs (Most Recent):  Temp: 99.6 °F (37.6 °C) (08/11/18 2058)  Pulse: (!) 113 (08/11/18 2151)  Resp: 18 (08/11/18 2058)  BP: 129/72 (08/11/18 2058)  SpO2: 96 % (08/11/18 2105) Vital Signs (24h Range):  Temp:  [99.6 °F (37.6 °C)-103.3 °F (39.6 °C)] 99.6 °F (37.6 °C)  Pulse:  [113-144] 113  Resp:  [12-20] 18  SpO2:  [89 %-96 %] 96 %  BP: (129-170)/(72-93) 129/72     Weight: 79.7 kg (175 lb 11.3 oz)  Body mass index is 27.52 kg/m².    Physical Exam   Constitutional: He appears well-developed.   Well appearing male   HENT:   Head: Normocephalic and atraumatic.   No neck stiffness, negative Kernig and Brudzinske' sign   Eyes: Conjunctivae and EOM are normal. Right eye exhibits no discharge. Left eye exhibits no discharge. No scleral icterus.   Neck: Normal range of motion.   Cardiovascular: Normal rate.    No murmur heard.  Pulmonary/Chest: Effort normal. No respiratory distress.   Abdominal: Soft. Bowel sounds are normal. He exhibits no distension. There is no tenderness.   Musculoskeletal: Normal range of motion. He exhibits no edema.   Neurological: He is alert.   Skin: Skin is warm and dry. No rash noted.         CRANIAL NERVES     CN III, IV, VI   Extraocular motions are normal.        Significant Labs:   CBC:   Recent Labs  Lab 08/11/18  1821   WBC 10.37   HGB 16.1   HCT 45.6        CMP:   Recent Labs  Lab 08/11/18  1821   *   K 3.9      CO2 18*   *   BUN 16   CREATININE 0.9   CALCIUM 9.8   PROT 8.7*   ALBUMIN 4.3   BILITOT 0.8   ALKPHOS 70   AST 39   ALT 43   ANIONGAP 14   EGFRNONAA >60.0       Significant Imaging: I have reviewed all pertinent imaging results/findings within the past 24 hours.

## 2018-08-12 NOTE — ASSESSMENT & PLAN NOTE
--s/p 24 week Harvoni treatment  --As of 6/25, quantitative HCV RNA was undetectably low and underwent recent liver US 6/2018 that revealed no masses, next US liver is due in 12/2018.  --Per hepatology outpatient notes, he has to undergo q 6 monthly screening for HCC and liver monitoring.    --cirrhosis likely the cause of his hyponatremia on admission  --LFTs normal, coags normal, platelets normal, albumin normal

## 2018-08-12 NOTE — NURSING
MD Shaikh notified of pt's vitals (temp 101.9 oral, , /89). Antibiotics already started on pt. Tylenol given. WCTM

## 2018-08-12 NOTE — ED NOTES
Telemetry Verification   Patient placed on Telemetry Box  Verified with War Room  Box # [id number]   Monitor Tech Nicky   Rate 126   Rhythm Sinus tach

## 2018-08-12 NOTE — H&P
[facility] Hospital Medicine  History & Physical    Patient Name: Graham Caceres  MRN: [id number]  Admission Date: 8/11/2018  Attending Physician: Silver Lyn MD   Primary Care Provider: Jennifer Manzanares MD    Hospital Medicine Team: [facility] [place] HOSP MED 2 Kaitlynn Shaikh MD     Patient information was obtained from patient, past medical records and ER records.     Subjective:     Principal Problem:SIRS (systemic inflammatory response syndrome)    Chief Complaint:   Chief Complaint   Patient presents with    Abdominal Pain     since yesterday.     Fever     highest 103        HPI: 59 yo M with PMH Hep C Cirrhosis (s/p Harvoni treatment), Fatty Liver Disease, DM2, HLD, HTN presents on 8/11 to the [facility] [place] ER after presenting at urgent care with fevers at home and generalized achiness x 2 days.  Patient states he first started feeling feverish in the afternoon in 8/10.  He took two tylenol (?500 mg each) but his fever did not resolve.  He took another two tylenol in the evening and went to bed.  In the morning he awoke still feeling feverish.  He took his temp with an oral thermometer and got a read of 39.3 Celsius.  He took one more tylenol.  He is aware of his daily 2 gram limit of tylenol given his liver history.  He then presented to urgent care who advised him to go to the ER where his Tmax was 103.3.  The ER reported he had abdominal pain and reportedly performed a bedside US which revealed no ascites to tap.  They gave him a dose of Rocephin 2 grams after collecting blood cultures.  Upon my interview, he initially denied abdominal pain but then stated he had mild upper abdominal pain the previous day but not the day of admission.  He denies headache, neck pain/stiffness, sore throat, chest pain, SOB, nausea, vomiting, diarrhea, dysuria, skin changes/rashes, leg swelling.  He states he has been coughing for the past several weeks.  He attributed the cough to the lisinopril he was recently prescribed about 3  months ago by his PCP.  The cough has not worsened in the recent days.  He denies recent travel, recent surgery, recent trauma.  He says he spent time with a friend a week prior who was coughing.  He does not know how he got Hep C but is from [facility].  He denies previous IVDA.  He has had one lifetime sexual partner.  Per hepatology outpatient notes, he is to undergo q 6 monthly screening for HCC and liver monitoring.  As of 6/25, quantitative HCV RNA was undetectably low.      In the ER, Tmax was 103.3 but improved to 99.6 without intervention.  He was tachycardic to the 130s.  WBC was 10.7, lactate 1.3, procal 0.21, UA significant only for rare bacteria, CXR negative for consolidations.        Past Medical History:   Diagnosis Date    Cirrhosis     Diverticulosis     History of hepatitis C, s/p successful Rx w/ [id number] - 6/2017 4/7/2016    S/p harvoni w/ SVR    Hyperlipidemia 04/2016    Hypertension     Internal hemorrhoid     New onset type 2 diabetes mellitus 4/2016    Positive QuantiFERON-TB Gold test 4/2016    Sebopsoriasis        Past Surgical History:   Procedure Laterality Date    COLONOSCOPY  05/19/2016    repeat in 10 yrs    COLONOSCOPY Left 5/19/2016    Procedure: COLONOSCOPY - colon cancer screening;  Surgeon: David Soto MD;  Location: [address]);  Service: Endoscopy;  Laterality: Left;       Review of patient's allergies indicates:  No Known Allergies    No current facility-administered medications on file prior to encounter.      Current Outpatient Prescriptions on File Prior to Encounter   Medication Sig    blood sugar diagnostic Strp DAILY    blood-glucose meter kit Use as instructed    lancets Misc Use daily.    lisinopril (PRINIVIL,ZESTRIL) 20 MG tablet Take 1 tablet (20 mg total) by mouth once daily.    omeprazole (PRILOSEC) 20 MG capsule Take 1 capsule (20 mg total) by mouth once daily.    pravastatin (PRAVACHOL) 20 MG tablet Take 2 tablets (40 mg total) by mouth  once daily.    SITagliptin (JANUVIA) 25 MG Tab Take 1 tablet (25 mg total) by mouth once daily.     Family History     None        Social History Main Topics    Smoking status: Never Smoker    Smokeless tobacco: Not on file    Alcohol use Not on file    Drug use: No    Sexual activity: Not on file     Review of Systems   Constitutional: Positive for fever. Negative for chills.   HENT: Negative for congestion and sore throat.    Eyes: Negative for discharge and visual disturbance.   Respiratory: Negative for cough and shortness of breath.    Cardiovascular: Negative for chest pain and leg swelling.   Gastrointestinal: Negative for constipation, diarrhea, nausea and vomiting.   Genitourinary: Negative for dysuria and frequency.   Musculoskeletal: Negative for joint swelling and myalgias.   Skin: Negative for rash.   Neurological: Negative for dizziness and light-headedness.     Objective:     Vital Signs (Most Recent):  Temp: 99.6 °F (37.6 °C) (08/11/18 2058)  Pulse: (!) 113 (08/11/18 2151)  Resp: 18 (08/11/18 2058)  BP: 129/72 (08/11/18 2058)  SpO2: 96 % (08/11/18 2105) Vital Signs (24h Range):  Temp:  [99.6 °F (37.6 °C)-103.3 °F (39.6 °C)] 99.6 °F (37.6 °C)  Pulse:  [113-144] 113  Resp:  [12-20] 18  SpO2:  [89 %-96 %] 96 %  BP: (129-170)/(72-93) 129/72     Weight: 79.7 kg (175 lb 11.3 oz)  Body mass index is 27.52 kg/m².    Physical Exam   Constitutional: He appears well-developed.   Well appearing male   HENT:   Head: Normocephalic and atraumatic.   No neck stiffness, negative Kernig and Brudzinske' sign   Eyes: Conjunctivae and EOM are normal. Right eye exhibits no discharge. Left eye exhibits no discharge. No scleral icterus.   Neck: Normal range of motion.   Cardiovascular: Normal rate.    No murmur heard.  Pulmonary/Chest: Effort normal. No respiratory distress.   Abdominal: Soft. Bowel sounds are normal. He exhibits no distension. There is no tenderness.   Musculoskeletal: Normal range of motion. He  exhibits no edema.   Neurological: He is alert.   Skin: Skin is warm and dry. No rash noted.         CRANIAL NERVES     CN III, IV, VI   Extraocular motions are normal.        Significant Labs:   CBC:   Recent Labs  Lab 08/11/18  1821   WBC 10.37   HGB 16.1   HCT 45.6        CMP:   Recent Labs  Lab 08/11/18  1821   *   K 3.9      CO2 18*   *   BUN 16   CREATININE 0.9   CALCIUM 9.8   PROT 8.7*   ALBUMIN 4.3   BILITOT 0.8   ALKPHOS 70   AST 39   ALT 43   ANIONGAP 14   EGFRNONAA >60.0       Significant Imaging: I have reviewed all pertinent imaging results/findings within the past 24 hours.    Assessment/Plan:     * SIRS (systemic inflammatory response syndrome)    --meets 2/4 SIRS criteria (fever, tachycardia)  --however no obvious source of infection at this time   --procal 0.2   --UA only significant for rare bacteria - not convincing of UTI   --CXR  no consolidations - not convincing for PNA however he does complain of cough for past several weeks and started requiring oxygen for brief desaturation to 89% on RA   --BCx pending  --received Rocephin x 1 in the ER and 3 L IVF  --although low suspicion for infection, given fever of unclear etiology, will start with broad spectrum antibiotics with vanc and zosyn   --differential: infection, DVT/PE, cancer, rheumatologic  --ESR, CRP, CARY, RF, RPR, HIV  --considered PE/DVT however patient appears to be at low risk.  Wells score 16.2 % chance of PE/DVT.  If oxygen requirement increases, will considering imaging.    --considered HCC, however recently had an undetectably low level of his HepC and underwent recent liver US 6/2018 that revealed no masses, next US liver is due in 12/2018.  No signs of decompensation such as ascites or jaundice.  No palpable masses.  Last AFP 6/15/18 was WNL.        Respiratory alkalosis    --VBG on admission: pH 7.451, pCO2 32.1  --possibly 2/2 tachypnea, coughing  --bicarb is 18 on admission (likely compensatory  however would monitor).        Hepatic cirrhosis due to chronic hepatitis C infection    --s/p 24 week Harvoni treatment  --As of 6/25, quantitative HCV RNA was undetectably low and underwent recent liver US 6/2018 that revealed no masses, next US liver is due in 12/2018.  --Per hepatology outpatient notes, he has to undergo q 6 monthly screening for HCC and liver monitoring.    --cirrhosis likely the cause of his hyponatremia on admission  --LFTs normal, coags normal, platelets normal, albumin normal  --for fevers, PRN tylenol 500 mg q6 not to exceed 2 grams      HTN (hypertension)    --takes lisinopril 20 mg at home and was started on this for renoprotection for T2DM 3 months ago, however patient was made aware this class of drugs can make him cough.  He states his PCP has been out on maternity leave and has been unable to ask for a medication change due to the coughing.  He would like to be on something else.  --given he meets SIRS criteria, will hold off on an antihypertensive for now.  Consider changing home antihypertensive regimen on discharge with outpatient follow up  --PRN hydralazine PO for SBP >180        Type 2 diabetes mellitus without retinopathy    --last A1C 6.7 on 6/8  --takes Januvia at home, hold while inpatient  --SSI + hypoglycemic protocol + BID glucose checks (adjust if necessary)  --on statin and ACE/ARB        Hyperlipidemia    --continue home pravastatin  --likely on this particular statin due to chronic liver disease          VTE Risk Mitigation         Ordered     enoxaparin injection 40 mg  Daily      08/11/18 2217     IP VTE LOW RISK PATIENT  Once      08/11/18 2217     Place sequential compression device  Until discontinued      08/11/18 2217     Place DEEP hose  Until discontinued      08/11/18 2217             Kaitlynn Shaikh MD  Department of Hospital Medicine   [facility]

## 2018-08-12 NOTE — PLAN OF CARE
Problem: Patient Care Overview  Goal: Plan of Care Review  Outcome: Ongoing (interventions implemented as appropriate)  Overnight, pt Tmax 101.9F. Tylenol given. IV abx administered as ordered. Blood glucose monitoring performed. Weaned pt to 1L nasal cannula. Pt stated he has diarrhea, not seen by RN. Pt denies pain. Call light in reach. WCTM

## 2018-08-13 LAB
ALBUMIN SERPL BCP-MCNC: 3.5 G/DL
ALP SERPL-CCNC: 67 U/L
ALT SERPL W/O P-5'-P-CCNC: 56 U/L
ANA SER QL IF: NORMAL
ANION GAP SERPL CALC-SCNC: 10 MMOL/L
AST SERPL-CCNC: 50 U/L
BASOPHILS # BLD AUTO: 0.02 K/UL
BASOPHILS NFR BLD: 0.3 %
BILIRUB SERPL-MCNC: 1 MG/DL
BUN SERPL-MCNC: 7 MG/DL
C DIFF GDH STL QL: NEGATIVE
C DIFF TOX A+B STL QL IA: NEGATIVE
CALCIUM SERPL-MCNC: 9.2 MG/DL
CHLORIDE SERPL-SCNC: 102 MMOL/L
CO2 SERPL-SCNC: 24 MMOL/L
CREAT SERPL-MCNC: 0.8 MG/DL
DIFFERENTIAL METHOD: ABNORMAL
EOSINOPHIL # BLD AUTO: 0.1 K/UL
EOSINOPHIL NFR BLD: 0.7 %
ERYTHROCYTE [DISTWIDTH] IN BLOOD BY AUTOMATED COUNT: 13.2 %
EST. GFR  (AFRICAN AMERICAN): >60 ML/MIN/1.73 M^2
EST. GFR  (NON AFRICAN AMERICAN): >60 ML/MIN/1.73 M^2
GLUCOSE SERPL-MCNC: 173 MG/DL
HCT VFR BLD AUTO: 41.6 %
HGB BLD-MCNC: 14.2 G/DL
HIV 1+2 AB+HIV1 P24 AG SERPL QL IA: NEGATIVE
IMM GRANULOCYTES # BLD AUTO: 0.02 K/UL
IMM GRANULOCYTES NFR BLD AUTO: 0.3 %
LYMPHOCYTES # BLD AUTO: 1.6 K/UL
LYMPHOCYTES NFR BLD: 20.9 %
MCH RBC QN AUTO: 31.5 PG
MCHC RBC AUTO-ENTMCNC: 34.1 G/DL
MCV RBC AUTO: 92 FL
MONOCYTES # BLD AUTO: 0.6 K/UL
MONOCYTES NFR BLD: 8 %
NEUTROPHILS # BLD AUTO: 5.2 K/UL
NEUTROPHILS NFR BLD: 69.8 %
NRBC BLD-RTO: 0 /100 WBC
O+P STL TRI STN: NORMAL
PLATELET # BLD AUTO: 164 K/UL
PMV BLD AUTO: 10.8 FL
POCT GLUCOSE: 141 MG/DL (ref 70–110)
POCT GLUCOSE: 147 MG/DL (ref 70–110)
POCT GLUCOSE: 173 MG/DL (ref 70–110)
POCT GLUCOSE: 192 MG/DL (ref 70–110)
POTASSIUM SERPL-SCNC: 3.9 MMOL/L
PROT SERPL-MCNC: 7.6 G/DL
RBC # BLD AUTO: 4.51 M/UL
RPR SER QL: NORMAL
SODIUM SERPL-SCNC: 136 MMOL/L
WBC # BLD AUTO: 7.42 K/UL

## 2018-08-13 PROCEDURE — G8979 MOBILITY GOAL STATUS: HCPCS | Mod: CH

## 2018-08-13 PROCEDURE — 63600175 PHARM REV CODE 636 W HCPCS: Performed by: HOSPITALIST

## 2018-08-13 PROCEDURE — 25000003 PHARM REV CODE 250: Performed by: HOSPITALIST

## 2018-08-13 PROCEDURE — 20600001 HC STEP DOWN PRIVATE ROOM

## 2018-08-13 PROCEDURE — 87324 CLOSTRIDIUM AG IA: CPT

## 2018-08-13 PROCEDURE — 85025 COMPLETE CBC W/AUTO DIFF WBC: CPT

## 2018-08-13 PROCEDURE — 36415 COLL VENOUS BLD VENIPUNCTURE: CPT

## 2018-08-13 PROCEDURE — G8980 MOBILITY D/C STATUS: HCPCS | Mod: CH

## 2018-08-13 PROCEDURE — G8978 MOBILITY CURRENT STATUS: HCPCS | Mod: CH

## 2018-08-13 PROCEDURE — 97161 PT EVAL LOW COMPLEX 20 MIN: CPT

## 2018-08-13 PROCEDURE — 80053 COMPREHEN METABOLIC PANEL: CPT

## 2018-08-13 PROCEDURE — 99232 SBSQ HOSP IP/OBS MODERATE 35: CPT | Mod: GC,,, | Performed by: HOSPITALIST

## 2018-08-13 PROCEDURE — 97165 OT EVAL LOW COMPLEX 30 MIN: CPT

## 2018-08-13 PROCEDURE — 86480 TB TEST CELL IMMUN MEASURE: CPT

## 2018-08-13 RX ADMIN — ENOXAPARIN SODIUM 40 MG: 100 INJECTION SUBCUTANEOUS at 05:08

## 2018-08-13 RX ADMIN — PIPERACILLIN AND TAZOBACTAM 4.5 G: 4; .5 INJECTION, POWDER, LYOPHILIZED, FOR SOLUTION INTRAVENOUS; PARENTERAL at 01:08

## 2018-08-13 RX ADMIN — PIPERACILLIN AND TAZOBACTAM 4.5 G: 4; .5 INJECTION, POWDER, LYOPHILIZED, FOR SOLUTION INTRAVENOUS; PARENTERAL at 09:08

## 2018-08-13 NOTE — PHARMACY MED REC
"Admission Medication Reconciliation - Pharmacy Consult Note    The home medication history was taken by the medication reconciliation technician - Carmen Peralta.    You may click on the "Admission" tab below to review the medication list.  Based on information gathered and subsequent review by the clinical pharmacist, the items below may need attention.    Potentially problematic discrepancies with current MAR    o Patient IS taking the following which was not ordered upon admit  o Pravastatin 20 mg daily      Potential issues to be addressed PRIOR TO DISCHARGE    None    Please address this information as you see fit.  Feel free to contact us if you have any questions or require assistance.    PHARMACIST NAME  Angeline Ackerman  EXT  [phone]                      .    .            "

## 2018-08-13 NOTE — ASSESSMENT & PLAN NOTE
-Persistent fever in the setting of watery diarrhea X 3 days  -BCx NGTD  -CXR negative for pneumonia  -Stool culture ordered  -discontinue  vanc/zosyn  -- c.diff ordered

## 2018-08-13 NOTE — PLAN OF CARE
Problem: Patient Care Overview  Goal: Plan of Care Review  Outcome: Ongoing (interventions implemented as appropriate)  No acute changes overnight. Tmax 99F overnight. Pt denies pain. IV abx administered. Blood glucose monitoring performed. No SSI needed.Pt states he is ready to go home. Call light in reach. WCTM

## 2018-08-13 NOTE — PLAN OF CARE
Problem: Patient Care Overview  Goal: Plan of Care Review  Outcome: Ongoing (interventions implemented as appropriate)  Pt has remained afebrile throughout shift, IV abx d/c'd. Neg stool cultures to date. Pending c diff. No c/o pain, no falls this shift. WCTM.

## 2018-08-13 NOTE — ASSESSMENT & PLAN NOTE
--meets 2/4 SIRS criteria (fever, tachycardia)  -- cultures NGTD  -- most likely secondary to colitis secondary to viral, patient reports multiple episodes of watery BM  -- improving since admission  -- discontinue abx and follow fevers  -- c.diff ordered

## 2018-08-13 NOTE — SUBJECTIVE & OBJECTIVE
Interval History:   NAEON. Patient had low grade fever, on ROS patient reports history of diarrhea, suspecting viral colitis, states diarrhea improving. Will discontinue abx. And follow patient, C.diff ordered. Plan for discharge alejandro.     Review of Systems   Constitutional: Positive for fever. Negative for chills.   HENT: Negative for congestion and sore throat.    Eyes: Negative for itching and visual disturbance.   Respiratory: Negative for cough, chest tightness and shortness of breath.    Cardiovascular: Negative for chest pain and palpitations.   Gastrointestinal: Positive for diarrhea. Negative for abdominal distention, abdominal pain, nausea and vomiting.   Genitourinary: Negative for dysuria and frequency.   Musculoskeletal: Negative for back pain and joint swelling.   Skin: Negative for color change and pallor.   Neurological: Negative for light-headedness and headaches.     Objective:     Vital Signs (Most Recent):  Temp: 98.3 °F (36.8 °C) (08/13/18 1236)  Pulse: 98 (08/13/18 1236)  Resp: 18 (08/13/18 1236)  BP: 127/78 (08/13/18 1236)  SpO2: 96 % (08/13/18 1236) Vital Signs (24h Range):  Temp:  [97.7 °F (36.5 °C)-100.9 °F (38.3 °C)] 98.3 °F (36.8 °C)  Pulse:  [] 98  Resp:  [14-18] 18  SpO2:  [92 %-98 %] 96 %  BP: (120-158)/(78-87) 127/78     Weight: 79.7 kg (175 lb 11.3 oz)  Body mass index is 27.52 kg/m².    Intake/Output Summary (Last 24 hours) at 8/13/2018 1555  Last data filed at 8/13/2018 0400  Gross per 24 hour   Intake 480 ml   Output --   Net 480 ml      Physical Exam   Constitutional: No distress.   HENT:   Head: Normocephalic and atraumatic.   Eyes: Conjunctivae are normal. No scleral icterus.   Neck: Neck supple.   Cardiovascular: Normal rate, regular rhythm and normal heart sounds.   Pulmonary/Chest: Effort normal and breath sounds normal. No stridor. No respiratory distress.   Abdominal: Soft. Bowel sounds are normal. He exhibits no distension. There is no tenderness. There is no rebound  and no guarding.   Musculoskeletal: He exhibits no edema or tenderness.   Neurological: He is alert. No cranial nerve deficit.   Skin: Skin is warm and dry. No rash noted. He is not diaphoretic. No erythema.   Psychiatric: He has a normal mood and affect. His behavior is normal.       Significant Labs:   A1C:   Recent Labs   Lab  03/01/18   0739  06/08/18   0918  08/12/18   0126   HGBA1C  6.4*  6.7*  6.6*     CBC:   Recent Labs   Lab  08/11/18   1821  08/12/18   1208  08/13/18   0611   WBC  10.37  8.76  7.42   HGB  16.1  14.4  14.2   HCT  45.6  40.8  41.6   PLT  196  153  164     CMP:   Recent Labs   Lab  08/11/18   1821  08/12/18   0126  08/13/18   0611   NA  132*  135*  136   K  3.9  3.4*  3.9   CL  100  103  102   CO2  18*  21*  24   GLU  189*  227*  173*   BUN  16  11  7   CREATININE  0.9  0.7  0.8   CALCIUM  9.8  8.8  9.2   PROT  8.7*  7.1  7.6   ALBUMIN  4.3  3.6  3.5   BILITOT  0.8  0.8  1.0   ALKPHOS  70  59  67   AST  39  27  50*   ALT  43  33  56*   ANIONGAP  14  11  10   EGFRNONAA  >60.0  >60.0  >60.0     Lactic Acid:   Recent Labs   Lab  08/11/18   1821   LACTATE  1.3     Urine Culture: No results for input(s): LABURIN in the last 48 hours.  Urine Studies:   Recent Labs   Lab  08/11/18   1831   COLORU  Yellow   APPEARANCEUA  Clear   PHUR  5.0   SPECGRAV  1.010   PROTEINUA  Negative   GLUCUA  1+*   KETONESU  Trace*   BILIRUBINUA  Negative   OCCULTUA  1+*   NITRITE  Negative   UROBILINOGEN  Negative   LEUKOCYTESUR  Negative   RBCUA  4   BACTERIA  Rare   SQUAMEPITHEL  0     All pertinent labs within the past 24 hours have been reviewed.    Significant Imaging: I have reviewed all pertinent imaging results/findings within the past 24 hours.

## 2018-08-13 NOTE — PLAN OF CARE
Rounded with IM3. Patient with SO at bedside. Patient states that he is feeling better but still with several episodes of diarrhea. Patient has no discharge needs; may benefit from outpatient therapy for chronic back and shoulder pain. Will follow.

## 2018-08-13 NOTE — ASSESSMENT & PLAN NOTE
Watery diarrhea associated with fever; no recent abx therapy  -colonoscopy 2015: mild diverticular disease  -EGD 2015: normal

## 2018-08-13 NOTE — PROGRESS NOTES
[facility] Hospital Medicine  Progress Note    Patient Name: Graham Caceres  MRN: [id number]  Patient Class: IP- Inpatient   Admission Date: 8/11/2018  Length of Stay: 2 days  Attending Physician: Edith Ramos MD  Primary Care Provider: Jennifer Manzanares MD    Hospital Medicine Team: [facility] 3 Xavier Kee MD    Subjective:     Principal Problem:SIRS (systemic inflammatory response syndrome)    HPI:  57 yo M with PMH Hep C Cirrhosis (s/p Harvoni treatment), Fatty Liver Disease, DM2, HLD, HTN presents on 8/11 to the [facility] after presenting at urgent care with fevers at home and generalized achiness x 2 days.  Patient states he first started feeling feverish in the afternoon in 8/10.  He took two tylenol (?500 mg each) but his fever did not resolve.  He took another two tylenol in the evening and went to bed.  In the morning he awoke still feeling feverish.  He took his temp with an oral thermometer and got a read of 39.3 Celsius.  He took one more tylenol.  He is aware of his daily 2 gram limit of tylenol given his liver history.  He then presented to urgent care who advised him to go to the ER where his Tmax was 103.3.  The ER reported he had abdominal pain and reportedly performed a bedside US which revealed no ascites to tap.  They gave him a dose of Rocephin 2 grams after collecting blood cultures.  Upon my interview, he initially denied abdominal pain but then stated he had mild upper abdominal pain the previous day but not the day of admission.  He denies headache, neck pain/stiffness, sore throat, chest pain, SOB, nausea, vomiting, diarrhea, dysuria, skin changes/rashes, leg swelling.  He states he has been coughing for the past several weeks.  He attributed the cough to the lisinopril he was recently prescribed about 3 months ago by his PCP.  The cough has not worsened in the recent days.  He denies recent travel, recent surgery, recent trauma.  He says he spent time with a friend a  week prior who was coughing.  He does not know how he got Hep C but is from [facility].  He denies previous IVDA.  He has had one lifetime sexual partner.  Per hepatology outpatient notes, he is to undergo q 6 monthly screening for HCC and liver monitoring.  As of 6/25, quantitative HCV RNA was undetectably low.      In the ER, Tmax was 103.3 but improved to 99.6 without intervention.  He was tachycardic to the 130s.  WBC was 10.7, lactate 1.3, procal 0.21, UA significant only for rare bacteria, CXR negative for consolidations.        Hospital Course:  No notes on file    Interval History:   NAEON. Patient had low grade fever, on ROS patient reports history of diarrhea, suspecting viral colitis, states diarrhea improving. Will discontinue abx. And follow patient, C.diff ordered. Plan for discharge alejadnro.     Review of Systems   Constitutional: Positive for fever. Negative for chills.   HENT: Negative for congestion and sore throat.    Eyes: Negative for itching and visual disturbance.   Respiratory: Negative for cough, chest tightness and shortness of breath.    Cardiovascular: Negative for chest pain and palpitations.   Gastrointestinal: Positive for diarrhea. Negative for abdominal distention, abdominal pain, nausea and vomiting.   Genitourinary: Negative for dysuria and frequency.   Musculoskeletal: Negative for back pain and joint swelling.   Skin: Negative for color change and pallor.   Neurological: Negative for light-headedness and headaches.     Objective:     Vital Signs (Most Recent):  Temp: 98.3 °F (36.8 °C) (08/13/18 1236)  Pulse: 98 (08/13/18 1236)  Resp: 18 (08/13/18 1236)  BP: 127/78 (08/13/18 1236)  SpO2: 96 % (08/13/18 1236) Vital Signs (24h Range):  Temp:  [97.7 °F (36.5 °C)-100.9 °F (38.3 °C)] 98.3 °F (36.8 °C)  Pulse:  [] 98  Resp:  [14-18] 18  SpO2:  [92 %-98 %] 96 %  BP: (120-158)/(78-87) 127/78     Weight: 79.7 kg (175 lb 11.3 oz)  Body mass index is 27.52 kg/m².    Intake/Output Summary (Last  24 hours) at 8/13/2018 1555  Last data filed at 8/13/2018 0400  Gross per 24 hour   Intake 480 ml   Output --   Net 480 ml      Physical Exam   Constitutional: No distress.   HENT:   Head: Normocephalic and atraumatic.   Eyes: Conjunctivae are normal. No scleral icterus.   Neck: Neck supple.   Cardiovascular: Normal rate, regular rhythm and normal heart sounds.   Pulmonary/Chest: Effort normal and breath sounds normal. No stridor. No respiratory distress.   Abdominal: Soft. Bowel sounds are normal. He exhibits no distension. There is no tenderness. There is no rebound and no guarding.   Musculoskeletal: He exhibits no edema or tenderness.   Neurological: He is alert. No cranial nerve deficit.   Skin: Skin is warm and dry. No rash noted. He is not diaphoretic. No erythema.   Psychiatric: He has a normal mood and affect. His behavior is normal.       Significant Labs:   A1C:   Recent Labs   Lab  03/01/18   0739  06/08/18   0918  08/12/18   0126   HGBA1C  6.4*  6.7*  6.6*     CBC:   Recent Labs   Lab  08/11/18   1821  08/12/18   1208  08/13/18   0611   WBC  10.37  8.76  7.42   HGB  16.1  14.4  14.2   HCT  45.6  40.8  41.6   PLT  196  153  164     CMP:   Recent Labs   Lab  08/11/18   1821  08/12/18   0126  08/13/18   0611   NA  132*  135*  136   K  3.9  3.4*  3.9   CL  100  103  102   CO2  18*  21*  24   GLU  189*  227*  173*   BUN  16  11  7   CREATININE  0.9  0.7  0.8   CALCIUM  9.8  8.8  9.2   PROT  8.7*  7.1  7.6   ALBUMIN  4.3  3.6  3.5   BILITOT  0.8  0.8  1.0   ALKPHOS  70  59  67   AST  39  27  50*   ALT  43  33  56*   ANIONGAP  14  11  10   EGFRNONAA  >60.0  >60.0  >60.0     Lactic Acid:   Recent Labs   Lab  08/11/18   1821   LACTATE  1.3     Urine Culture: No results for input(s): LABURIN in the last 48 hours.  Urine Studies:   Recent Labs   Lab  08/11/18   1831   COLORU  Yellow   APPEARANCEUA  Clear   PHUR  5.0   SPECGRAV  1.010   PROTEINUA  Negative   GLUCUA  1+*   KETONESU  Trace*   BILIRUBINUA  Negative    OCCULTUA  1+*   NITRITE  Negative   UROBILINOGEN  Negative   LEUKOCYTESUR  Negative   RBCUA  4   BACTERIA  Rare   SQUAMEPITHEL  0     All pertinent labs within the past 24 hours have been reviewed.    Significant Imaging: I have reviewed all pertinent imaging results/findings within the past 24 hours.    Assessment/Plan:      * SIRS (systemic inflammatory response syndrome)    --meets 2/4 SIRS criteria (fever, tachycardia)  -- cultures NGTD  -- most likely secondary to colitis secondary to viral, patient reports multiple episodes of watery BM  -- improving since admission  -- discontinue abx and follow fevers  -- c.diff ordered         Watery diarrhea    Watery diarrhea associated with fever; no recent abx therapy  -colonoscopy 2015: mild diverticular disease  -EGD 2015: normal            Hyperlipidemia    --continue home pravastatin  --likely on this particular statin due to chronic liver disease        Respiratory alkalosis    --VBG on admission: pH 7.451, pCO2 32.1  --possibly 2/2 tachypnea, coughing  --bicarb is 18 on admission (likely compensatory however would monitor).        Fever    -Persistent fever in the setting of watery diarrhea X 3 days  -BCx NGTD  -CXR negative for pneumonia  -Stool culture ordered  -discontinue  vanc/zosyn  -- c.diff ordered           Hepatic cirrhosis due to chronic hepatitis C infection    --s/p 24 week Harvoni treatment  --As of 6/25, quantitative HCV RNA was undetectably low and underwent recent liver US 6/2018 that revealed no masses, next US liver is due in 12/2018.  --Per hepatology outpatient notes, he has to undergo q 6 monthly screening for HCC and liver monitoring.    --cirrhosis likely the cause of his hyponatremia on admission  --LFTs normal, coags normal, platelets normal, albumin normal        Type 2 diabetes mellitus without retinopathy    --last A1C 6.7 on 6/8  --takes Januvia at home, hold while inpatient  --SSI + hypoglycemic protocol + BID glucose checks  (adjust if necessary)  --on statin and ACE/ARB        HTN (hypertension)    --takes lisinopril 20 mg at home and was started on this for renoprotection for T2DM 3 months ago, however patient was made aware this class of drugs can make him cough.  He states his PCP has been out on maternity leave and has been unable to ask for a medication change due to the coughing.  He would like to be on something else.  --given he meets SIRS criteria, will hold off on an antihypertensive for now.  Consider changing home antihypertensive regimen on discharge with outpatient follow up  --PRN hydralazine PO for SBP >180          VTE Risk Mitigation (From admission, onward)        Ordered     enoxaparin injection 40 mg  Daily      08/11/18 2217     IP VTE LOW RISK PATIENT  Once      08/11/18 2217              Xavier Kee MD  Department of Hospital Medicine   [facility]

## 2018-08-13 NOTE — PLAN OF CARE
Problem: Occupational Therapy Goal  Goal: Occupational Therapy Goal  Outcome: Outcome(s) achieved Date Met: 08/13/18  Eval and D/C - no needs.    LUIS Arias

## 2018-08-13 NOTE — PT/OT/SLP EVAL
Physical Therapy Evaluation and Discharge Note    Patient Name:  Graham Caceres   MRN:  [id number]    Recommendations:     Discharge Recommendations:  outpatient PT   Discharge Equipment Recommendations: none   Barriers to discharge: None    Assessment:     Graham Caceres is a 58 y.o. male admitted with a medical diagnosis of SIRS (systemic inflammatory response syndrome). .  At this time, patient is functioning at their prior level of function and does not require further acute PT services.     Recent Surgery: * No surgery found *      Plan:     During this hospitalization, patient does not require further acute PT services.  Please re-consult if situation changes.     Plan of Care Reviewed with: patient    Subjective     Communicated with RN prior to session.  Patient found sitting on couch upon PT entry to room, agreeable to evaluation.      Chief Complaint: pt endorse hx of back pain and shoulder pain  Patient comments/goals: to return home and receive OP PT for back/shoulder  Pain/Comfort:  · Pain Rating 1: (not rated)  · Location - Side 1: Left  · Location - Orientation 1: lower  · Location 1: back  · Pain Rating Post-Intervention 1: (not rated)    Patients cultural, spiritual, Moravian conflicts given the current situation: none stated    Living Environment:  Pt lives with a SO in a [facility] with 1 ANTOINETTE.   Prior to admission, patients level of function was ( I ) with all ADLs and chores. Pt was still driving but not working.  Patient has the following equipment: none.  DME owned (not currently used): none.  Upon discharge, patient will have assistance from ( none needed at this time ).    Objective:     Patient found with: (no active lines)     General Precautions: Standard, fall   Orthopedic Precautions:N/A   Braces: N/A     Exams:  · Cognitive Exam:  Patient is oriented to Person, Place, Time and Situation and follows 100% of verbal commands   · Fine Motor Coordination: -       Intact  · Gross Motor Coordination:   WFL  · RLE ROM: WFL  · RLE Strength: WFL  · LLE ROM: WFL  · LLE Strength: WFL    Functional Mobility:  · Transfers:  Sit to Stand:  independence with no AD  · Bed to Chair: independence with  no AD  using  Step Transfer  · Gait: x 74 feet ( I ) with no AD. no increase in sway or LOB  · Balance: ( I ) withy dynamic balance    AM-PAC 6 CLICK MOBILITY  Total Score:24       Therapeutic Activities and Exercises:  ·  Whiteboard updated in patients room to current assistance level  · All of patients questions were answered within the scope of PT    · Patient education  · Pt edu on benefits of OP PT  · Patient educated on the role of PT and POC  · Patient educated on importance  activity while in the hosptial per tolerance for improved endurance and to limit deconditioning   · Patient educated on safe transfers with nursing as appropriate    Patient left standing in room with all lines intact, call button in reach and RN present.    GOALS:   Multidisciplinary Problems     Physical Therapy Goals     Not on file          Multidisciplinary Problems (Resolved)        Problem: Physical Therapy Goal    Goal Priority Disciplines Outcome Goal Variances Interventions   Physical Therapy Goal   (Resolved)     PT, PT/OT Outcome(s) achieved                     History:     Past Medical History:   Diagnosis Date    Cirrhosis     Diverticulosis     History of hepatitis C, s/p successful Rx w/ SVR24 - 6/2017 4/7/2016    S/p harvoni w/ SVR    Hyperlipidemia 04/2016    Hypertension     Internal hemorrhoid     New onset type 2 diabetes mellitus 4/2016    Positive QuantiFERON-TB Gold test 4/2016    Sebopsoriasis        Past Surgical History:   Procedure Laterality Date    COLONOSCOPY  05/19/2016    repeat in 10 yrs       Clinical Decision Making:     History  Co-morbidities and personal factors that may impact the plan of care Examination  Body Structures and Functions, activity limitations and participation restrictions that may  impact the plan of care Clinical Presentation   Decision Making/ Complexity Score   Co-morbidities:   [] Time since onset of injury / illness / exacerbation  [] Status of current condition  []Patient's cognitive status and safety concerns    [] Multiple Medical Problems (see med hx)  Personal Factors:   [] Patient's age  [] Prior Level of function   [] Patient's home situation (environment and family support)  [] Patient's level of motivation  [] Expected progression of patient      HISTORY:(criteria)    [] 02008 - no personal factors/history    [x] 62627 - has 1-2 personal factor/comorbidity     [] 24858 - has >3 personal factor/comorbidity     Body Regions:  [] Objective examination findings  [] Head     []  Neck  [x] Trunk   [] Upper Extremity  [] Lower Extremity    Body Systems:  [] For communication ability, affect, cognition, language, and learning style: the assessment of the ability to make needs known, consciousness, orientation (person, place, and time), expected emotional /behavioral responses, and learning preferences (eg, learning barriers, education  needs)  [] For the neuromuscular system: a general assessment of gross coordinated movement (eg, balance, gait, locomotion, transfers, and transitions) and motor function  (motor control and motor learning)  [x] For the musculoskeletal system: the assessment of gross symmetry, gross range of motion, gross strength, height, and weight  [] For the integumentary system: the assessment of pliability(texture), presence of scar formation, skin color, and skin integrity  [] For cardiovascular/pulmonary system: the assessment of heart rate, respiratory rate, blood pressure, and edema     Activity limitations:    [] Patient's cognitive status and saf ety concerns          [] Status of current condition      [] Weight bearing restriction  [] Cardiopulmunary Restriction    Participation Restrictions:   [] Goals and goal agreement with the patient     [] Rehab  potential (prognosis) and probable outcome      Examination of Body System: (criteria)    [] 03228 - addressing 1-2 elements    [] 94199 - addressing a total of 3 or more elements     [] 09825 -  Addressing a total of 4 or more elements         Clinical Presentation: (criteria)  Stable - 30343     On examination of body system using standardized tests and measures patient presents with 1-2 elements from any of the following: body structures and functions, activity limitations, and/or participation restrictions.  Leading to a clinical presentation that is considered stable and/or uncomplicated                              Clinical Decision Making  (Eval Complexity):  Low- 93974     Time Tracking:     PT Received On: 08/13/18  PT Start Time: 0922     PT Stop Time: 0931  PT Total Time (min): 10 min     Billable Minutes: Evaluation 10 min      Shayan Monet PT  08/13/2018

## 2018-08-13 NOTE — PT/OT/SLP EVAL
Occupational Therapy   Evaluation and Discharge Note    Name: Graham Caceres  MRN: [id number]  Admitting Diagnosis:  SIRS (systemic inflammatory response syndrome)      Recommendations:     Discharge Recommendations: outpatient PT  Discharge Equipment Recommendations:  none  Barriers to discharge:       History:     Occupational Profile:  Living Environment: Pt lives with a friend in a [place] with a tub/shower.  Previous level of function: Independent but on disability.  Equipment Owned:  none  Assistance upon Discharge: Friend    Past Medical History:   Diagnosis Date    Cirrhosis     Diverticulosis     History of hepatitis C, s/p successful Rx w/ SVR24 - 6/2017 4/7/2016    S/p harvoni w/ SVR    Hyperlipidemia 04/2016    Hypertension     Internal hemorrhoid     New onset type 2 diabetes mellitus 4/2016    Positive QuantiFERON-TB Gold test 4/2016    Sebopsoriasis        Past Surgical History:   Procedure Laterality Date    COLONOSCOPY  05/19/2016    repeat in 10 yrs       Subjective     Pain/Comfort:  · Pain Rating 1: 0/10   · Pt with c/o R shld pain but did not rate - plans to be treated in OP setting.    Patients cultural, spiritual, Caodaism conflicts given the current situation:      Objective:     Patient found with: peripheral IV    General Precautions: Standard,     Orthopedic Precautions:    Braces:       Occupational Performance:    Bed Mobility:    · Independent    Functional Mobility/Transfers:  Independent    Activities of Daily Living:  Independent    Cognitive/Visual Perceptual:  Cognitive/Psychosocial Skills:     -       Oriented to: Person, Place, Time and Situation   -       Safety awareness/insight to disability: intact     Physical Exam:  BUE AROM/MMT: WNL    Patient left up in chair with call button in reach    AMPA 6 Click:  [facility] Total Score: 24    Treatment & Education:  UE ROM/MMT  Bed mobility training / assessment  Functional mobility assessment  Sit/standing balance  "assessment  Educated on importance of sitting OOB in bedside chair to promote increased strength, endurance & breathing.  Discussed D/C of OT  Education:    Assessment:     Graham Caceres is a 58 y.o. male with a medical diagnosis of SIRS (systemic inflammatory response syndrome). At this time, patient is functioning at their prior level of function and does not require further acute OT services.     Clinical Decision Makin.  OT Low:  "Pt evaluation falls under low complexity for evaluation coding due to performance deficits noted in 1-3 areas as stated above and 0 co-morbities affecting current functional status. Data obtained from problem focused assessments. No modifications or assistance was required for completion of evaluation. Only brief occupational profile and history review completed."     Plan:     During this hospitalization, patient does not require further acute OT services.  Please re-consult if situation changes.    · Plan of Care Reviewed with: patient    This Plan of care has been discussed with the patient who was involved in its development and understands and is in agreement with the identified goals and treatment plan    GOALS:   Multidisciplinary Problems     Occupational Therapy Goals     Not on file          Multidisciplinary Problems (Resolved)        Problem: Occupational Therapy Goal    Goal Priority Disciplines Outcome Interventions   Occupational Therapy Goal   (Resolved)     OT, PT/OT Outcome(s) achieved                    Time Tracking:     OT Date of Treatment: 18  OT Start Time: 922  OT Stop Time: 931  OT Total Time (min): 9 min    Billable Minutes:Evaluation 9    LUIS Arias  2018    "

## 2018-08-13 NOTE — PLAN OF CARE
Problem: Patient Care Overview  Goal: Plan of Care Review  Outcome: Ongoing (interventions implemented as appropriate)  Continue ATC IV abx, source of fevers still unclear. Tmax 100.9 this shift, given prn tylenol as pt feeling chills. Stool samples sent. Multiple loose bowel movements today. No c/o pain or N/V. WCTM.

## 2018-08-14 ENCOUNTER — TELEPHONE (OUTPATIENT)
Dept: URGENT CARE | Facility: CLINIC | Age: 58
End: 2018-08-14

## 2018-08-14 VITALS
BODY MASS INDEX: 27.57 KG/M2 | SYSTOLIC BLOOD PRESSURE: 134 MMHG | OXYGEN SATURATION: 91 % | WEIGHT: 175.69 LBS | TEMPERATURE: 98 F | HEART RATE: 79 BPM | HEIGHT: 67 IN | DIASTOLIC BLOOD PRESSURE: 85 MMHG | RESPIRATION RATE: 20 BRPM

## 2018-08-14 LAB
ALBUMIN SERPL BCP-MCNC: 3.5 G/DL
ALP SERPL-CCNC: 74 U/L
ALT SERPL W/O P-5'-P-CCNC: 91 U/L
ANION GAP SERPL CALC-SCNC: 11 MMOL/L
AST SERPL-CCNC: 66 U/L
BASOPHILS # BLD AUTO: 0.03 K/UL
BASOPHILS NFR BLD: 0.6 %
BILIRUB SERPL-MCNC: 0.8 MG/DL
BUN SERPL-MCNC: 11 MG/DL
CALCIUM SERPL-MCNC: 9.5 MG/DL
CHLORIDE SERPL-SCNC: 103 MMOL/L
CO2 SERPL-SCNC: 24 MMOL/L
CREAT SERPL-MCNC: 0.8 MG/DL
DIFFERENTIAL METHOD: ABNORMAL
EOSINOPHIL # BLD AUTO: 0.1 K/UL
EOSINOPHIL NFR BLD: 2 %
ERYTHROCYTE [DISTWIDTH] IN BLOOD BY AUTOMATED COUNT: 13.1 %
EST. GFR  (AFRICAN AMERICAN): >60 ML/MIN/1.73 M^2
EST. GFR  (NON AFRICAN AMERICAN): >60 ML/MIN/1.73 M^2
GLUCOSE SERPL-MCNC: 148 MG/DL
HCT VFR BLD AUTO: 41.1 %
HGB BLD-MCNC: 14.1 G/DL
IMM GRANULOCYTES # BLD AUTO: 0.03 K/UL
IMM GRANULOCYTES NFR BLD AUTO: 0.6 %
LYMPHOCYTES # BLD AUTO: 2.1 K/UL
LYMPHOCYTES NFR BLD: 40.4 %
MCH RBC QN AUTO: 31.8 PG
MCHC RBC AUTO-ENTMCNC: 34.3 G/DL
MCV RBC AUTO: 93 FL
MITOGEN IGNF BCKGRD COR BLD-ACNC: 0.22 IU/ML
MITOGEN NIL: 7.77 IU/ML
MONOCYTES # BLD AUTO: 0.4 K/UL
MONOCYTES NFR BLD: 7.6 %
NEUTROPHILS # BLD AUTO: 2.5 K/UL
NEUTROPHILS NFR BLD: 48.8 %
NRBC BLD-RTO: 0 /100 WBC
PLATELET # BLD AUTO: 178 K/UL
PMV BLD AUTO: 10.9 FL
POCT GLUCOSE: 162 MG/DL (ref 70–110)
POCT GLUCOSE: 192 MG/DL (ref 70–110)
POTASSIUM SERPL-SCNC: 3.7 MMOL/L
PROT SERPL-MCNC: 7.5 G/DL
RBC # BLD AUTO: 4.44 M/UL
SODIUM SERPL-SCNC: 138 MMOL/L
TB GOLD PLUS: POSITIVE
TB1 - NIL: 1.95 IU/ML
TB2 - NIL: 1.4 IU/ML
WBC # BLD AUTO: 5.12 K/UL

## 2018-08-14 PROCEDURE — 99238 HOSP IP/OBS DSCHRG MGMT 30/<: CPT | Mod: GC,,, | Performed by: HOSPITALIST

## 2018-08-14 PROCEDURE — 85025 COMPLETE CBC W/AUTO DIFF WBC: CPT

## 2018-08-14 PROCEDURE — 80053 COMPREHEN METABOLIC PANEL: CPT

## 2018-08-14 PROCEDURE — 36415 COLL VENOUS BLD VENIPUNCTURE: CPT

## 2018-08-14 NOTE — ASSESSMENT & PLAN NOTE
--meets 2/4 SIRS criteria (fever, tachycardia)  -- cultures NGTD  -- most likely secondary to colitis secondary to viral, patient reports multiple episodes of watery BM  -- improving since admission  -- discontinue abx and follow fevers

## 2018-08-14 NOTE — NURSING
Pt to return to home today with wife. Discharge papers given, all questions encouraged and answered. No prescriptions needed. IV out, all belongings with pt. Pt refused w/c, walked down to parking garage.

## 2018-08-14 NOTE — HOSPITAL COURSE
Mr. Caceres presented with fever 103F, abdominal pain, diarrhea for few days. Was put on empirical abx with vanc/zosyn. Blood culture, urine culture, and stool cultures are NGTD, stool negative for ova or parasites. C. Diff negative. CXR was negative for pneumonia, and did not show any sign of acute exacerbation of his latent TB. No source of infection was found. Rheumatological etiology was suspected, follow-up labs showed elevated CRP, ESR, and RF. Regarding patient's h/o Hep C cirrhosis, he was treated with Harvoni, and is being followed outpatient. LFT on admission was normal. Preliminary diagnosis was viral gastroenteritis. He has been afebrile for 48 hours, and is stable for discharge with PCP outpatient follow-up.     Vitals:    08/14/18 1203   BP: 134/85   Pulse: 79   Resp: 20   Temp: 97.8 °F (36.6 °C)     Physical Exam   Constitutional: No distress.   HENT:   Head: Normocephalic and atraumatic.   Eyes: Conjunctivae are normal. No scleral icterus.   Neck: Neck supple.   Cardiovascular: Normal rate, regular rhythm and normal heart sounds.   Pulmonary/Chest: Effort normal and breath sounds normal. No stridor. No respiratory distress.   Abdominal: Soft. Bowel sounds are normal. He exhibits no distension. There is no tenderness. There is no rebound and no guarding.   Musculoskeletal: He exhibits no edema or tenderness.   Neurological: He is alert. No cranial nerve deficit.   Skin: Skin is warm and dry. No rash noted. He is not diaphoretic. No erythema.   Psychiatric: He has a normal mood and affect. His behavior is normal.

## 2018-08-14 NOTE — ASSESSMENT & PLAN NOTE
Watery diarrhea associated with fever; no recent abx therapy  -colonoscopy 2015: mild diverticular disease  -EGD 2015: normal    - stool workup negative  - likely due to viral gastroenteritis

## 2018-08-14 NOTE — PLAN OF CARE
Problem: Patient Care Overview  Goal: Plan of Care Review  Outcome: Ongoing (interventions implemented as appropriate)  Negative screening for C-diff : soft stools x 2, urine output good : Afebrile this shift : uneventful shift : will cont to monitor.

## 2018-08-14 NOTE — ASSESSMENT & PLAN NOTE
-Persistent fever in the setting of watery diarrhea X 3 days  -BCx NGTD  -CXR negative for pneumonia  -Stool culture ordered  -discontinue  vanc/zosyn  -- stool workup negative  -- rheum workup: elevated ESR, CRP, RF  -- more likely to be 2/2 viral gastroenteritis; rec f/u outpatient of elevated rheum workup

## 2018-08-14 NOTE — PLAN OF CARE
08/14/18 1320   Final Note   Assessment Type Discharge Planning Assessment   Discharge Disposition Home

## 2018-08-15 LAB
BACTERIA STL CULT: NO GROWTH
BACTERIA STL CULT: NORMAL
BACTERIA STL CULT: NORMAL
HCV RNA SERPL QL NAA+PROBE: NOT DETECTED

## 2018-08-16 ENCOUNTER — PATIENT OUTREACH (OUTPATIENT)
Dept: ADMINISTRATIVE | Facility: CLINIC | Age: 58
End: 2018-08-16

## 2018-08-16 LAB
BACTERIA BLD CULT: NORMAL
BACTERIA BLD CULT: NORMAL

## 2018-08-16 NOTE — PATIENT INSTRUCTIONS
Febrile Illness, Uncertain Cause (Adult)  You have a fever, but the cause is not certain. A fever is a natural reaction of the body to an illness such as infection due to a virus or bacteria. In most cases, the temperature itself is not harmful. It actually helps the body fight infections. A fever does not need to be treated unless you feel very uncomfortable.  Sometimes a fever can be an early sign of a more serious infection, so make sure to follow up if your condition worsens.  Home care  Unless given other instructions by your healthcare provider, follow these guidelines when caring for yourself at home.  General care  · If your symptoms are severe, rest at home for the first 2 to 3 days. When you resume activity, don't let yourself get too tired.  · Do not smoke. Also avoid being exposed to secondhand smoke.  · Your appetite may be poor, so a light diet is fine. Avoid dehydration by drinking 6 to 8 glasses of fluids per day (such as water, soft drinks, sports drinks, juices, tea, or soup). Extra fluids will help loosen secretions in the nose and lungs.  Medicines  · You can take acetaminophen or ibuprofen for pain, unless you were given a different fever-reducing/pain medicine to use. (Note: If you have chronic liver or kidney disease or have ever had a stomach ulcer or gastrointestinal bleeding, talk with your healthcare provider before using these medicines. Also talk to your provider if you are taking medicine to prevent blood clots.) Aspirin should never be given to anyone younger than 18 years of age who is ill with a viral infection or fever. It may cause severe liver or brain damage.  · If you were given antibiotics, take them until they are used up, or your healthcare provider tells you to stop. It is important to finish the antibiotics even though you feel better. This is to make sure the infection has cleared. Be aware that antibiotics are not usually given for a fever with an unknown  cause.  · Over-the-counter medicines will not shorten the duration of the illness. However, they may be helpful for the following symptoms: cough, sore throat, or nasal and sinus congestion. Ask your pharmacist for product suggestions. (Note: Do not use decongestants if you have high blood pressure.)  Follow-up care  Follow up with your healthcare provider, or as advised.  · If a culture was done, you will be notified if your treatment needs to be changed. You can call as directed for the results.  · If X-rays, a CT, or an ultrasound were done, a specialist will review them. You will be notified of any findings that may affect your care.  Call 911  Contact emergency services right away if any of these occur:  · Trouble breathing or swallowing, or wheezing  · Chest pain  · Confusion  · Extreme drowsiness or trouble awakening  · Fainting or loss of consciousness  · Rapid heart rate  · Low blood pressure  · Vomiting blood, or large amounts of blood in stool  · Seizure  When to seek medical advice  Call your healthcare provider right away if any of these occur:  · Cough with lots of colored sputum (mucus) or blood in your sputum  · Severe headache  · Face, neck, throat, or ear pain  · Feeling drowsy  · Abdominal pain  · Repeated vomiting or diarrhea  · Joint pain or a new rash  · Burning when urinating  · Fever of 100.4°F (38°C) or higher, that does not get better after taking fever-reducing medicine  · Feeling weak or dizzy  Date Last Reviewed: 7/30/2015  © 8103-1103 [employer]. [address] [place] [address], [place] [address]. All rights reserved. This information is not intended as a substitute for professional medical care. Always follow your healthcare professional's instructions.

## 2018-08-22 ENCOUNTER — TELEPHONE (OUTPATIENT)
Dept: INTERNAL MEDICINE | Facility: CLINIC | Age: 58
End: 2018-08-22

## 2018-08-29 ENCOUNTER — CLINICAL SUPPORT (OUTPATIENT)
Dept: REHABILITATION | Facility: HOSPITAL | Age: 58
End: 2018-08-29
Payer: MEDICARE

## 2018-08-29 DIAGNOSIS — M25.611 DECREASED ROM OF RIGHT SHOULDER: ICD-10-CM

## 2018-08-29 DIAGNOSIS — M54.50 CHRONIC BILATERAL LOW BACK PAIN WITHOUT SCIATICA: ICD-10-CM

## 2018-08-29 DIAGNOSIS — M62.81 MUSCLE WEAKNESS: ICD-10-CM

## 2018-08-29 DIAGNOSIS — G89.29 CHRONIC BILATERAL LOW BACK PAIN WITHOUT SCIATICA: ICD-10-CM

## 2018-08-29 DIAGNOSIS — M25.511 ACUTE PAIN OF RIGHT SHOULDER: ICD-10-CM

## 2018-08-29 PROCEDURE — G8979 MOBILITY GOAL STATUS: HCPCS | Mod: CI,PO

## 2018-08-29 PROCEDURE — 97110 THERAPEUTIC EXERCISES: CPT | Mod: PO

## 2018-08-29 PROCEDURE — G8978 MOBILITY CURRENT STATUS: HCPCS | Mod: CJ,PO

## 2018-08-29 PROCEDURE — 97161 PT EVAL LOW COMPLEX 20 MIN: CPT | Mod: PO

## 2018-08-29 NOTE — PLAN OF CARE
[facility]  Physical Therapy Initial Evaluation    Name: Graham Caceres  Clinic Number: [id number]    Therapy Diagnosis:   Encounter Diagnoses   Name Primary?    Chronic bilateral low back pain without sciatica     Acute pain of right shoulder     Decreased ROM of right shoulder     Muscle weakness      Physician: Fredy Blum MD    Physician Orders: PT Eval and Treat   Evaluation Date: 8/29/2018  Authorization period Expiration: 12/31/2018  Plan of Care Certification Period: 10/24/2018    Visit #: 1/ Visits authorized: 20  Time In:1100  Time Out: 1130    Precautions: Standard  Subjective     History of current condition - Graham reports: onset of R shoulder pain approx 2 months ago, states he thinks he slept on it wrong and woke up with pain in the back of his shoulder that has gotten a little better since but still hurts.  R hand dominant.  Patient also c/o several year hx of LBP, more than 10 years. States he was told that his L hip wasn't sitting correctly in the joint which was affecting his low back.        Past Medical History:   Diagnosis Date    Cirrhosis     Diverticulosis     History of hepatitis C, s/p successful Rx w/ SVR24 - 6/2017 4/7/2016    S/p harvoni w/ SVR    Hyperlipidemia 04/2016    Hypertension     Internal hemorrhoid     New onset type 2 diabetes mellitus 4/2016    Positive QuantiFERON-TB Gold test 4/2016    Sebopsoriasis      Graham Caceres  has a past surgical history that includes Colonoscopy (05/19/2016); ESOPHAGOGASTRODUODENOSCOPY (EGD), varices screening, also needs colonoscopy. needs labs prior (Left, 5/19/2016); and COLONOSCOPY - colon cancer screening (Left, 5/19/2016).    Graham has a current medication list which includes the following prescription(s): blood sugar diagnostic, lancets, lisinopril, pravastatin, and sitagliptin.    Review of patient's allergies indicates:  No Known Allergies     Prior Therapy: none for shoulder, chiropractic for low  back previously with some relief  Occupation: N/A  Prior Level of Function: no limtiation with all reaching/lifting activity with R UE  Current Functional Limitations:    Reaching: increased R shoulder pain overhead and behind his back  Dressing/grooming: mild limitation  Sleeping: mild limitation, will wake up intermittently with R shoulder pain  Lifting: avoids heavy lifting due to LBP  Sitting: increased LBP after 10-20 minutes  Standing/walking: increased LBP after 30 minutes    Pain: R shoulder  Current 3/10  Worst 5/10  Best 0/10     Lumbar spine:  Current 4/10  Worst 8/10  Best 4/10     Pts goals: decreased LBP, pain-free R shoulder    Objective     Observation/Posture: Gait WNL, mild rounded shoulders B      Active Range of Motion:   Shoulder RIGHT  LEFT   Flexion 150 165   Abduction 110 175   Extension 40 60   ER at 90 80 90   IR Thumb to L5 Thumb to T10      Passive Range of Motion:   Shoulder RIGHT LEFT   Flexion 170 180   Abduction 140 180   Extension NT NT   ER at 90 85 90   IR 70 70   **increased pain reported at end range ER, abd, flexion    Upper Extremity Strength   RIGHT  LEFT   Shoulder flexion: 4/5 Shoulder flexion: 5/5   Shoulder Abduction: 4-/5 Shoulder abduction: 4+/5   Shoulder ER 4+/5 Shoulder ER 4+/5   Shoulder IR 5/5 Shoulder IR 5/5   Lower Trap 4-/5 Lower Trap 4/5   Middle Trap 4/5 Middle Trap 4/5         Special Tests: (N)Negative, (P)Positive   RIGHT LEFT   AC Joint Compression Test N N   Empty Can Test P N   Drop Arm test N N   Subscaputlaris Lift Off N N   Clunk test N N   Speeds test P N   Anterior Apprehension test N N     Lumbar AROM: (measured in percentage)   Degrees Tolerance   Flexion 75    Extension 25    Left Side Bending 100    Right Side Bending 75 Pain L   Left rotation 75    Right Rotation 75      LOWER EXTREMITY STRENGTH:   RIGHT LEFT   Quadriceps 4+/5 4+/5   Hamstrings 4+/5 4+/5     Hip Flexion 4+/5 4/5   Hip Abduction 4/5 4/5   Hip IR 4/5 4/5   Hip ER 4/5 4/5   Hip  Ext 4+/5 4+/5     SLR (-) B    Palpation: denies TTP throughout R shoulder and lsp paraspinals    TREATMENT   Treatment Time In: 1130  Treatment Time Out: 1200  Total Treatment time separate from Evaluation time:30    Educated pt on treatment goals and plan of care. Pt demo good understanding along with good return demonstration of home exercise program.    Hsian received therapeutic exercises to develop strength, ROM, flexibility, posture and core stabilization for 28 minutes including:    Pelvic tilt 30 x 5 seconds  Supine clams OTB 2 x 10  Supine dowel flexion 2 x 10  LTR x 3 minutes  IR/ER walkout OTB 10x each  Supine ab iso 20 x 5 second hold  Resisted shld extension (to neutral) OTB 2 x 10    Issued written HEP for all ex's performed except for supine ab iso      Assessment     Patient presents with decreased functional use of R UE secondary to R shoulder pain, weakness, and limited mobility with signs and symptoms consistent with mild RC and biceps tendon involvement. Patient will benefit from progressive stretching and strengthening to restore functional strength and mobility. Patient also presenting with hx of chronic LBP, demo mild deficits in core strength, increased pain at end ranges of motion, and audible popping in L hip with c/o L hip pain.  He will benefit from progressive core strengthening, lumbopelvic stretching, and manual tx prn to increase segmental mobility. Pt responds well to initial session and demo good understanding of written HEP.  Pt prognosis is Good.   Pt will benefit from skilled outpatient Physical Therapy to address the deficits stated above and in the chart below, provide pt/family education, and to maximize pt's level of independence.     Plan of care discussed with patient: Yes  Pt's spiritual, cultural and educational needs considered and pt agreeable to plan of care and goals as stated below:     Anticipated Barriers for therapy: none    Medical Necessity is demonstrated by  the following  History  Co-morbidities and personal factors that may impact the plan of care Co-morbidities:   DM, Cirrhosis    Personal Factors:   no deficits     low   Examination  Body Structures and Functions, activity limitations and participation restrictions that may impact the plan of care Body Regions:   back  lower extremities  upper extremities  trunk    Body Systems:    gross symmetry  ROM  strength    Participation Restrictions:   none    Activity limitations:   Learning and applying knowledge  no deficits    General Tasks and Commands  no deficits    Communication  no deficits    Mobility  lifting and carrying objects  walking    Self care  dressing    Domestic Life  no deficits    Interactions/Relationships  no deficits    Life Areas  no deficits    Community and Social Life  no deficits         moderate   Clinical Presentation stable and uncomplicated low   Decision Making/ Complexity Score: low       Functional Limitations Reports - G Codes  Category: Mobility    Tool: FOTO Lumbar Survey   Modifier  Impairment Limitation Restriction    CH  0 % impaired, limited or restricted    CI  @ least 1% but less than 20% impaired, limited or restricted    CJ  @ least 20%<40% impaired, limited or restricted    CK  @ least 40%<60% impaired, limited or restricted    CL  @ least 60% <80% impaired, limited or restricted    CM  @ least 80%<100% impaired limited or restricted    CN  100% impaired, limited or restricted     Current/: CJ = 30% Limitation   Goal/ : CI = 15% Limitation    Short Term Goals:    1. Patient to consistently report L shoulder pain at rest 0/10 within 2 weeks  2. Increase L shoulder AROM WNL all planes, pain-free, within 3 weeks to restore normal glenohumeral mechanics  3.  Patient able to use L shoulder for all dressing/grooming activity without limitation within 3 weeks to return to prior level of function  4.. Patient to consistently report LBP less than 2/10 pain at rest within 3  weeks   5. Patient able to tolerate at least 15 minutes sustained standing/walking with low back pain less than 3/10 within 3 weeks to improve cody to functional activity      Long Term Goals:  1.  Patient to report L shoulder pain less than 1/10 at all times within 8 weeks to improve tolerance to all reaching/lifting activity  2. Increase L UE strength to at least 4+/5 throughout within 8 weeks to allow patient to perform lifting/carrying tasks without limitation  3. Patient able to lift 5# overhead x 5 reps using L UE only to allow for full use of UE with overhead activity  4.. Patient able to tolerate at least 60 minutes sustained standing/walking with LBP less than 2/10 within 8 weeks for improved functional mobility  5. Increase lumbar AROM to WFL all planes, pain-free, within 6 weeks for improved functional mobility  6.  Patient able to lift 15# from floor to waist without increased pain or compensation within 8 weeks to return to prior level of function  7. Patient able to tolerate at least 60 minutes sustained sitting with LBP less than 2/10 within 8 weeks    Plan       Outpatient Physical Therapy 1 times weekly for 8 weeks to include the following interventions: Manual Therapy, Neuromuscular Re-ed and Therapeutic Exercise.     Hailey Brownlee, PT

## 2018-09-11 NOTE — PROGRESS NOTES
Physical Therapy Daily Treatment Note     Name: Graham Caceres  Clinic Number: [id number]    Therapy Diagnosis:   Encounter Diagnoses   Name Primary?    Chronic bilateral low back pain without sciatica Yes    Acute pain of right shoulder     Decreased ROM of right shoulder     Muscle weakness      Physician: Fredy Blum MD    Visit Date: 9/12/2018    Evaluation Date: 8/29/2018  Authorization period Expiration: 12/31/2018  Plan of Care Certification Period: 10/24/2018     Visit #: 2/ Visits authorized: 20  Time In:10:00  Time Out:11:00  Treatment time: 60  1: ! Time: 30     Precautions: Standard    Subjective     Pt reports:chronic c/o .(R) shoulder, neck and LBP. States that the pain is present all of the time.   Pain: 6/10       Objective     Hsian received therapeutic exercises to develop lumbar/core/Shoulder strength, endurance, ROM, flexibility, posture and core stabilization for 60 minutes including:    AAROM shoulder flex/abd on pulleys x 2 minutes each    Standing ex's:   Serratus wall slides 2 x 10  Scap retract/ROw with OTB 2 x 10  Shoulder ext with OTB 2 x 10  Shoulder IR/ER walkouts with OTB x 10 each    Supine ex's:  AAROM shoulder flex with dowel 2 x 10   Scap protract 2 x 10  LTR stretch x 20 with 5 sec hold  Pelvic tilt 20 x 5 seconds   Hooklying clams OTB 2 x 10  Hooklying hip add iso 2 x 10  Bridging 2 x 10   Pelvic til with hooklying march x 10 each    Seated exs  (R) UT stretch 3 x 20 sec         Written Home Exercises Provided: Patient edcucated to continue with previously issued HEP to tolerance along with Updated copy of HEP to UT stretching. WIll look to add additional ex's to HEP in future visits.   Exercises were reviewed and Hsian was able to demonstrate them prior to the end of the session.  Hsian demonstrated good  understanding of the education provided.        Assessment     Patient cody Tx fairy well. He was able to perform the above ex's/activities within tolerable level of muscular  fatigue without increased pain. He requires some verbal and tactile cues for overall postural awareness and correct ex performance.WIll monitor and attempt to progress as tolerated.     Pt will continue to benefit from skilled outpatient physical therapy to address the deficits listed in the problem list box on initial evaluation, provide pt/family education and to maximize pt's level of independence in the home and community environment.      Short Term Goals:    1. Patient to consistently report R shoulder pain at rest 0/10 within 2 weeks  2. Increase R shoulder AROM WNL all planes, pain-free, within 3 weeks to restore normal glenohumeral mechanics  3.  Patient able to use R shoulder for all dressing/grooming activity without limitation within 3 weeks to return to prior level of function  4.. Patient to consistently report LBP less than 2/10 pain at rest within 3 weeks   5. Patient able to tolerate at least 15 minutes sustained standing/walking with low back pain less than 3/10 within 3 weeks to improve cody to functional activity        Long Term Goals:  1.  Patient to report R shoulder pain less than 1/10 at all times within 8 weeks to improve tolerance to all reaching/lifting activity  2. Increase R UE strength to at least 4+/5 throughout within 8 weeks to allow patient to perform lifting/carrying tasks without limitation  3. Patient able to lift 5# overhead x 5 reps using R UE only to allow for full use of UE with overhead activity  4.. Patient able to tolerate at least 60 minutes sustained standing/walking with LBP less than 2/10 within 8 weeks for improved functional mobility  5. Increase lumbar AROM to WFL all planes, pain-free, within 6 weeks for improved functional mobility  6.  Patient able to lift 15# from floor to waist without increased pain or compensation within 8 weeks to return to prior level of function  7. Patient able to tolerate at least 60 minutes sustained sitting with LBP less than 2/10  within 8 weeks    Plan     Continue PT towards established plan of care and goals.     Jasen Mooney, PTA

## 2018-09-12 ENCOUNTER — CLINICAL SUPPORT (OUTPATIENT)
Dept: REHABILITATION | Facility: HOSPITAL | Age: 58
End: 2018-09-12
Payer: MEDICARE

## 2018-09-12 DIAGNOSIS — M25.511 ACUTE PAIN OF RIGHT SHOULDER: ICD-10-CM

## 2018-09-12 DIAGNOSIS — M62.81 MUSCLE WEAKNESS: ICD-10-CM

## 2018-09-12 DIAGNOSIS — M54.50 CHRONIC BILATERAL LOW BACK PAIN WITHOUT SCIATICA: Primary | ICD-10-CM

## 2018-09-12 DIAGNOSIS — M25.611 DECREASED ROM OF RIGHT SHOULDER: ICD-10-CM

## 2018-09-12 DIAGNOSIS — G89.29 CHRONIC BILATERAL LOW BACK PAIN WITHOUT SCIATICA: Primary | ICD-10-CM

## 2018-09-12 PROCEDURE — 97110 THERAPEUTIC EXERCISES: CPT | Mod: PO

## 2018-09-26 ENCOUNTER — CLINICAL SUPPORT (OUTPATIENT)
Dept: REHABILITATION | Facility: HOSPITAL | Age: 58
End: 2018-09-26
Payer: MEDICARE

## 2018-09-26 DIAGNOSIS — M54.50 CHRONIC BILATERAL LOW BACK PAIN WITHOUT SCIATICA: ICD-10-CM

## 2018-09-26 DIAGNOSIS — M62.81 MUSCLE WEAKNESS: ICD-10-CM

## 2018-09-26 DIAGNOSIS — M25.511 ACUTE PAIN OF RIGHT SHOULDER: ICD-10-CM

## 2018-09-26 DIAGNOSIS — M25.611 DECREASED ROM OF RIGHT SHOULDER: ICD-10-CM

## 2018-09-26 DIAGNOSIS — G89.29 CHRONIC BILATERAL LOW BACK PAIN WITHOUT SCIATICA: ICD-10-CM

## 2018-09-26 PROCEDURE — 97110 THERAPEUTIC EXERCISES: CPT | Mod: PO

## 2018-09-26 NOTE — PROGRESS NOTES
Physical Therapy Daily Treatment Note     Name: Graham Caceres  Clinic Number: [id number]    Therapy Diagnosis:   Encounter Diagnoses   Name Primary?    Chronic bilateral low back pain without sciatica     Acute pain of right shoulder     Decreased ROM of right shoulder     Muscle weakness      Physician: Fredy Blum MD    Visit Date: 9/26/2018    Evaluation Date: 8/29/2018  Authorization period Expiration: 12/31/2018  Plan of Care Certification Period: 10/24/2018     Visit #: 3/ Visits authorized: 20  Time In:10:05  Time Out:11:00  Treatment time: 55  1: ! Time: 30     Precautions: Standard    Subjective     Pt reports:some continued chronic c/o .(R) shoulder, neck and LBP. States that the pain is present all of the time.  He reports no problems after last session with slight reduction in symptoms..  Pain: 5/10 to (R) shoulder/UT and lower back      Objective     Graham received therapeutic exercises to develop lumbar/core/Shoulder strength, endurance, ROM, flexibility, posture and core stabilization for 45 minutes including:    AAROM shoulder flex/abd on pulleys x 2 minutes each    Standing ex's:   Serratus wall slides 2 x 10  Scap retract/ROw with GTB2 x 10  Shoulder ext with GTB 2 x 10  Shoulder IR/ER walkouts with OTB x 10 each ( Cues for scap stabilization)       Seated exs  (R) UT stretch 3 x 20 sec ((R) hand grasping side of table)     Supine ex's:  AAROM shoulder flex with dowel 2 x 10   Scap protract with 2# 2 x 10  LTR stretch x 20 with 5 sec hold  Pelvic tilt 20 x 5 seconds   Hooklying clams GTB 2 x 10  Hooklying hip add iso 2 x 10  Bridging + Iso hip abd  GTB 2 x 10   Pelvic tilt with hooklying march x 10 each--NP    Patient receives manual therapy for pain control/improved ROM to (R) shoulder/lower back x 10 minutes to include: ( Receives  cryotherapy to lower back while receiving mobs)   GH oscillations.   Inferior/posterior glides   Long axis distraction (B) LE's      Written Home Exercises Provided:  Patient edcucated to continue with previously issued HEP to tolerance along with Updated copy of HEP to UT stretching.   visits.   Exercises were reviewed and Hsian was able to demonstrate them prior to the end of the session.  Hsian demonstrated good  understanding of the education provided.        Assessment     Patient cody Tx fairy well. He was able to perform and progress slightly with the above ex's/activities within tolerable level of muscular fatigue without increased pain. Still requires some verbal and tactile cues for overall postural awareness and correct ex performance. Tactile cues required for stabilization when performing isometric shoulder ER/IR walk outs with Tband.  He rpeorts fair/good relief of symptoms with manual techniques and cryotherapy today. WIll monitor and attempt to progress as tolerated.     Pt will continue to benefit from skilled outpatient physical therapy to address the deficits listed in the problem list box on initial evaluation, provide pt/family education and to maximize pt's level of independence in the home and community environment.      Short Term Goals:    1. Patient to consistently report R shoulder pain at rest 0/10 within 2 weeks  2. Increase R shoulder AROM WNL all planes, pain-free, within 3 weeks to restore normal glenohumeral mechanics  3.  Patient able to use R shoulder for all dressing/grooming activity without limitation within 3 weeks to return to prior level of function  4.. Patient to consistently report LBP less than 2/10 pain at rest within 3 weeks   5. Patient able to tolerate at least 15 minutes sustained standing/walking with low back pain less than 3/10 within 3 weeks to improve cody to functional activity        Long Term Goals:  1.  Patient to report R shoulder pain less than 1/10 at all times within 8 weeks to improve tolerance to all reaching/lifting activity  2. Increase R UE strength to at least 4+/5 throughout within 8 weeks to allow patient to  perform lifting/carrying tasks without limitation  3. Patient able to lift 5# overhead x 5 reps using R UE only to allow for full use of UE with overhead activity  4.. Patient able to tolerate at least 60 minutes sustained standing/walking with LBP less than 2/10 within 8 weeks for improved functional mobility  5. Increase lumbar AROM to WFL all planes, pain-free, within 6 weeks for improved functional mobility  6.  Patient able to lift 15# from floor to waist without increased pain or compensation within 8 weeks to return to prior level of function  7. Patient able to tolerate at least 60 minutes sustained sitting with LBP less than 2/10 within 8 weeks    Plan     Continue PT towards established plan of care and goals.     Jasen Mooney, PTA

## 2018-11-12 ENCOUNTER — TELEPHONE (OUTPATIENT)
Dept: INTERNAL MEDICINE | Facility: CLINIC | Age: 58
End: 2018-11-12

## 2018-11-12 DIAGNOSIS — I15.2 HYPERTENSION ASSOCIATED WITH DIABETES: Primary | ICD-10-CM

## 2018-11-12 DIAGNOSIS — E11.59 HYPERTENSION ASSOCIATED WITH DIABETES: Primary | ICD-10-CM

## 2018-11-12 RX ORDER — LOSARTAN POTASSIUM 50 MG/1
50 TABLET ORAL DAILY
Qty: 90 TABLET | Refills: 3 | Status: SHIPPED | OUTPATIENT
Start: 2018-11-12 | End: 2020-01-17

## 2018-11-12 NOTE — TELEPHONE ENCOUNTER
----- Message from Luciana Hurtado sent at 11/12/2018  2:54 PM CST -----  Contact: [phone]  Patient requesting if the doctor can change his blood pressure medication that takes,  Patient stated it makes him cough at night.  Patient did not know the name of medication.    Please advise, thank you

## 2018-11-12 NOTE — TELEPHONE ENCOUNTER
Changed from lisinopril 20 to losartan 50. Please schedule BP check w/ either Geetha or Alie in 4 weeks.

## 2018-11-12 NOTE — TELEPHONE ENCOUNTER
Gave pt a call and let him know about the medication change. Pt. Verbalized understanding of this thought it was too high of a dosage but I assured the pt that the medications all have different dosages when you change from one to another. Also scheduled a follow up with Alie and sent him out a letter.

## 2018-11-13 ENCOUNTER — TELEPHONE (OUTPATIENT)
Dept: PULMONOLOGY | Facility: CLINIC | Age: 58
End: 2018-11-13

## 2018-11-20 ENCOUNTER — TELEPHONE (OUTPATIENT)
Dept: INTERNAL MEDICINE | Facility: CLINIC | Age: 58
End: 2018-11-20

## 2018-11-20 ENCOUNTER — TELEPHONE (OUTPATIENT)
Dept: HEPATOLOGY | Facility: CLINIC | Age: 58
End: 2018-11-20

## 2018-11-20 DIAGNOSIS — I10 ESSENTIAL HYPERTENSION: ICD-10-CM

## 2018-11-20 DIAGNOSIS — E11.9 TYPE 2 DIABETES MELLITUS WITHOUT RETINOPATHY: Primary | ICD-10-CM

## 2018-11-20 DIAGNOSIS — E78.5 HYPERLIPIDEMIA, UNSPECIFIED HYPERLIPIDEMIA TYPE: ICD-10-CM

## 2018-11-20 NOTE — TELEPHONE ENCOUNTER
----- Message from Luciana Hurtado sent at 11/20/2018 11:07 AM CST -----  Contact: [phone]  Patient is requesting a call from the nurse in regards to lab work. Patient wants to know if should blood work before the appt .with the doctor on 12-.    Please advise, thank you

## 2018-11-20 NOTE — TELEPHONE ENCOUNTER
----- Message from Janay Torres sent at 11/20/2018 10:48 AM CST -----  Contact: Patient  Needs Advice    Reason for call: called to schedule 6 month f/u appt.         Communication Preference: [phone]    Additional Information: n/a

## 2018-12-06 ENCOUNTER — TELEPHONE (OUTPATIENT)
Dept: INTERNAL MEDICINE | Facility: CLINIC | Age: 58
End: 2018-12-06

## 2018-12-06 ENCOUNTER — LAB VISIT (OUTPATIENT)
Dept: LAB | Facility: HOSPITAL | Age: 58
End: 2018-12-06
Attending: INTERNAL MEDICINE
Payer: MEDICARE

## 2018-12-06 DIAGNOSIS — Z86.19 HISTORY OF HEPATITIS C: ICD-10-CM

## 2018-12-06 DIAGNOSIS — E78.5 HYPERLIPIDEMIA, UNSPECIFIED HYPERLIPIDEMIA TYPE: ICD-10-CM

## 2018-12-06 DIAGNOSIS — K74.60 HEPATIC CIRRHOSIS, UNSPECIFIED HEPATIC CIRRHOSIS TYPE, UNSPECIFIED WHETHER ASCITES PRESENT: ICD-10-CM

## 2018-12-06 DIAGNOSIS — K76.0 FATTY LIVER: ICD-10-CM

## 2018-12-06 DIAGNOSIS — E11.9 TYPE 2 DIABETES MELLITUS WITHOUT RETINOPATHY: ICD-10-CM

## 2018-12-06 DIAGNOSIS — I10 ESSENTIAL HYPERTENSION: ICD-10-CM

## 2018-12-06 LAB
AFP SERPL-MCNC: 3.4 NG/ML
ALBUMIN SERPL BCP-MCNC: 4.2 G/DL
ALP SERPL-CCNC: 60 U/L
ALT SERPL W/O P-5'-P-CCNC: 33 U/L
ANION GAP SERPL CALC-SCNC: 7 MMOL/L
AST SERPL-CCNC: 29 U/L
BASOPHILS # BLD AUTO: 0.04 K/UL
BASOPHILS NFR BLD: 0.7 %
BILIRUB SERPL-MCNC: 0.9 MG/DL
BUN SERPL-MCNC: 15 MG/DL
CALCIUM SERPL-MCNC: 9.4 MG/DL
CHLORIDE SERPL-SCNC: 104 MMOL/L
CHOLEST SERPL-MCNC: 261 MG/DL
CHOLEST/HDLC SERPL: 7.1 {RATIO}
CO2 SERPL-SCNC: 29 MMOL/L
CREAT SERPL-MCNC: 0.8 MG/DL
DIFFERENTIAL METHOD: ABNORMAL
EOSINOPHIL # BLD AUTO: 0.1 K/UL
EOSINOPHIL NFR BLD: 2.4 %
ERYTHROCYTE [DISTWIDTH] IN BLOOD BY AUTOMATED COUNT: 12.8 %
EST. GFR  (AFRICAN AMERICAN): >60 ML/MIN/1.73 M^2
EST. GFR  (NON AFRICAN AMERICAN): >60 ML/MIN/1.73 M^2
ESTIMATED AVG GLUCOSE: 154 MG/DL
GLUCOSE SERPL-MCNC: 183 MG/DL
HBA1C MFR BLD HPLC: 7 %
HCT VFR BLD AUTO: 48.1 %
HDLC SERPL-MCNC: 37 MG/DL
HDLC SERPL: 14.2 %
HGB BLD-MCNC: 16.1 G/DL
IMM GRANULOCYTES # BLD AUTO: 0.02 K/UL
IMM GRANULOCYTES NFR BLD AUTO: 0.4 %
INR PPP: 0.9
LDLC SERPL CALC-MCNC: 175.4 MG/DL
LYMPHOCYTES # BLD AUTO: 2 K/UL
LYMPHOCYTES NFR BLD: 37 %
MCH RBC QN AUTO: 31.5 PG
MCHC RBC AUTO-ENTMCNC: 33.5 G/DL
MCV RBC AUTO: 94 FL
MONOCYTES # BLD AUTO: 0.3 K/UL
MONOCYTES NFR BLD: 5.9 %
NEUTROPHILS # BLD AUTO: 2.9 K/UL
NEUTROPHILS NFR BLD: 53.6 %
NONHDLC SERPL-MCNC: 224 MG/DL
NRBC BLD-RTO: 0 /100 WBC
PLATELET # BLD AUTO: 246 K/UL
PMV BLD AUTO: 11 FL
POTASSIUM SERPL-SCNC: 4.5 MMOL/L
PROT SERPL-MCNC: 7.9 G/DL
PROTHROMBIN TIME: 9.9 SEC
RBC # BLD AUTO: 5.11 M/UL
SODIUM SERPL-SCNC: 140 MMOL/L
TRIGL SERPL-MCNC: 243 MG/DL
WBC # BLD AUTO: 5.41 K/UL

## 2018-12-06 PROCEDURE — 83036 HEMOGLOBIN GLYCOSYLATED A1C: CPT | Mod: HCNC

## 2018-12-06 PROCEDURE — 36415 COLL VENOUS BLD VENIPUNCTURE: CPT | Mod: HCNC

## 2018-12-06 PROCEDURE — 80053 COMPREHEN METABOLIC PANEL: CPT | Mod: HCNC

## 2018-12-06 PROCEDURE — 82105 ALPHA-FETOPROTEIN SERUM: CPT | Mod: HCNC

## 2018-12-06 PROCEDURE — 85610 PROTHROMBIN TIME: CPT | Mod: HCNC

## 2018-12-06 PROCEDURE — 80061 LIPID PANEL: CPT | Mod: HCNC

## 2018-12-06 PROCEDURE — 85025 COMPLETE CBC W/AUTO DIFF WBC: CPT | Mod: HCNC

## 2018-12-06 NOTE — TELEPHONE ENCOUNTER
Alie, I just looked at Mr. Caceres's labs. He has an appt w/ you in 4 days. Please confirm he's taking his pravastatin and januvia like he's supposed to; he has a h/o noncompliance w/ meds. If he is, then please increase januvia and consider changing pravastatin 20 to atorva 40. Thanks!

## 2018-12-10 ENCOUNTER — NURSE TRIAGE (OUTPATIENT)
Dept: ADMINISTRATIVE | Facility: CLINIC | Age: 58
End: 2018-12-10

## 2018-12-10 NOTE — TELEPHONE ENCOUNTER
"  Reason for Disposition   Intermittent chest pains persist > 3 days    Protocols used: ST CHEST PAIN-A-[place]    Pt c/o intermittent CP x "a couple of weeks." Pt states pain began when he changed his BP medication. Pt denies CP at this time. Pt states he recently had negative work up for CP. Pt advised per protocol, pt verbalizes understanding, and appointment made today.   "

## 2018-12-19 ENCOUNTER — HOSPITAL ENCOUNTER (OUTPATIENT)
Dept: RADIOLOGY | Facility: HOSPITAL | Age: 58
Discharge: HOME | End: 2018-12-19
Attending: PHYSICIAN ASSISTANT
Payer: MEDICARE

## 2018-12-19 ENCOUNTER — OFFICE VISIT (OUTPATIENT)
Dept: HEPATOLOGY | Facility: CLINIC | Age: 58
End: 2018-12-19
Payer: MEDICARE

## 2018-12-19 VITALS
DIASTOLIC BLOOD PRESSURE: 90 MMHG | SYSTOLIC BLOOD PRESSURE: 129 MMHG | HEART RATE: 80 BPM | OXYGEN SATURATION: 97 % | WEIGHT: 177.94 LBS | TEMPERATURE: 98 F | RESPIRATION RATE: 18 BRPM | HEIGHT: 67 IN | BODY MASS INDEX: 27.93 KG/M2

## 2018-12-19 DIAGNOSIS — Z86.19 HISTORY OF HEPATITIS C: ICD-10-CM

## 2018-12-19 DIAGNOSIS — K74.60 HEPATIC CIRRHOSIS, UNSPECIFIED HEPATIC CIRRHOSIS TYPE, UNSPECIFIED WHETHER ASCITES PRESENT: ICD-10-CM

## 2018-12-19 DIAGNOSIS — K76.0 FATTY LIVER: ICD-10-CM

## 2018-12-19 DIAGNOSIS — K74.60 HEPATIC CIRRHOSIS, UNSPECIFIED HEPATIC CIRRHOSIS TYPE, UNSPECIFIED WHETHER ASCITES PRESENT: Primary | ICD-10-CM

## 2018-12-19 PROCEDURE — 3074F SYST BP LT 130 MM HG: CPT | Mod: CPTII,HCNC,S$GLB, | Performed by: PHYSICIAN ASSISTANT

## 2018-12-19 PROCEDURE — 99999 PR PBB SHADOW E&M-EST. PATIENT-LVL IV: CPT | Mod: PBBFAC,HCNC,, | Performed by: PHYSICIAN ASSISTANT

## 2018-12-19 PROCEDURE — 76705 ECHO EXAM OF ABDOMEN: CPT | Mod: 26,HCNC,, | Performed by: RADIOLOGY

## 2018-12-19 PROCEDURE — 99213 OFFICE O/P EST LOW 20 MIN: CPT | Mod: HCNC,S$GLB,, | Performed by: PHYSICIAN ASSISTANT

## 2018-12-19 PROCEDURE — 3008F BODY MASS INDEX DOCD: CPT | Mod: CPTII,HCNC,S$GLB, | Performed by: PHYSICIAN ASSISTANT

## 2018-12-19 PROCEDURE — 76705 ECHO EXAM OF ABDOMEN: CPT | Mod: TC,HCNC

## 2018-12-19 PROCEDURE — 3080F DIAST BP >= 90 MM HG: CPT | Mod: CPTII,HCNC,S$GLB, | Performed by: PHYSICIAN ASSISTANT

## 2018-12-19 NOTE — PROGRESS NOTES
HEPATOLOGY CLINIC VISIT NOTE - HCV clinic    CHIEF COMPLAINT:  HCV Cirrhosis    HISTORY: This is a 58 y.o.   male w/ HCV cirrhosis (s/p successful antiviral therapy) as well as fatty liver disease, DM and Hyperlipidemia, here for f/u.     Has hx of medication non adherence  States he is taking pravachol most days   Denies side effects with it.    Current labs reveal normalization of liver panel (AST 29, ALT 33)  Prior serologic eval for other etiologies was unyielding    Feels well  Denies jaundice, dark urine, hematemesis, melena, slowed mentation, abdominal distention.     Cirrhosis history:  FibroScan 4/28/16 - kPA 17.3, F4  Labs / imaging / pretreatment APRI / FIB-4 were consistent w/ advanced fibrosis:    Well compensated.   No ascites, jaundice, HE  Albumin 4.2    MELD-Na score: 6 at 12/6/2018  8:20 AM  MELD score: 6 at 12/6/2018  8:20 AM  Calculated from:  Serum Creatinine: 0.8 mg/dL (Rounded to 1 mg/dL) at 12/6/2018  8:20 AM  Serum Sodium: 140 mmol/L (Rounded to 137 mmol/L) at 12/6/2018  8:20 AM  Total Bilirubin: 0.9 mg/dL (Rounded to 1 mg/dL) at 12/6/2018  8:20 AM  INR(ratio): 0.9 (Rounded to 1) at 12/6/2018  8:20 AM  Age: 58 years    - HCC screening - u/s today - no liver lesions, AFP 12/6/18 -3.4  - Varices screening - EGD 5/2016 Dr Soto - no varices    Fatty Liver:  - steatosis on imaging  - (+) DM and Hyperlipidemia w/ hx of medication non adherence  - BMI 27    HCV history:  Completed 24 weeks harvoni w/ SVR12 as of 2/2017  - genotype 1B  - Prior HCV tx w/ Dr Laurent: PegIFN + RBV (10-15 yrs ago) for few months - nonresponse                     Past Medical History:   Diagnosis Date    Cirrhosis     Diverticulosis     History of hepatitis C, s/p successful Rx w/ SVR24 - 6/2017 4/7/2016    S/p harvoni w/ SVR    Hyperlipidemia 04/2016    Hypertension     Internal hemorrhoid     New onset type 2 diabetes mellitus 4/2016    Positive QuantiFERON-TB Gold test 4/2016    Sebopsoriasis      Past  Surgical History:   Procedure Laterality Date    COLONOSCOPY  05/19/2016    repeat in 10 yrs    COLONOSCOPY Left 5/19/2016    Procedure: COLONOSCOPY - colon cancer screening;  Surgeon: David Soto MD;  Location: [facility] (4TH FLR);  Service: Endoscopy;  Laterality: Left;    COLONOSCOPY - colon cancer screening Left 5/19/2016    Performed by David Soto MD at [facility] (4TH FLR)    ESOPHAGOGASTRODUODENOSCOPY (EGD), varices screening, also needs colonoscopy. needs labs prior Left 5/19/2016    Performed by David Soto MD at [facility] (4TH FLR)       FAMILY HISTORY: Negative for liver disease    MEDS & ALLERGIES:  Reviewed in epic      SOCIAL HISTORY:   Moved from [place] during teenage years  Resides in [place], not . 1 daughter from prior marriage  Not working, on disability. Worked at [employer] many years ago  Alcohol - none  Tobacco - none  Drugs - none      ROS:   No fever, chills, weight loss.  No chest pain, palpitations, dyspnea, cough  No abdominal pain, nausea, vomiting  No skin rashes   No headaches  No lower extremity edema  No depression or anxiety      PHYSICAL EXAM:  Friendly  male, in no acute distress; alert and oriented to person, place and time  VITALS: reviewed.   HEENT: Sclerae anicteric.   LUNGS: Normal respiratory effort. Clear to auscultation bilaterally w/ good air exchange   CVS: Regular rate and rhythm w/ no murmurs  ABDOMEN: Nondistended. Soft. Nontender.   EXTREMITIES: No edema.   SKIN: No jaundice or spider telangectasias. No rashes on exposed skin  NEURO/PSYCH: Normal gait. Memory intact. Thought and speech patterns appropriate. No obvious depression or anxiety.       RECENT LABS:  Lab Results   Component Value Date    WBC 5.41 12/06/2018    HGB 16.1 12/06/2018     12/06/2018     Lab Results   Component Value Date    INR 0.9 12/06/2018     Lab Results   Component Value Date    AST 29 12/06/2018    ALT 33 12/06/2018    BILITOT 0.9  12/06/2018    ALBUMIN 4.2 12/06/2018    ALKPHOS 60 12/06/2018    CREATININE 0.8 12/06/2018    BUN 15 12/06/2018     12/06/2018    K 4.5 12/06/2018    AFP 3.4 12/06/2018         RECENT IMAGING:  Results for orders placed during the hospital encounter of 12/19/18   US Abdomen Limited    Narrative EXAMINATION:  US ABDOMEN LIMITED    CLINICAL HISTORY:  .Unspecified cirrhosis of liver    TECHNIQUE:  Limited ultrasound of the right upper quadrant of the abdomen including pancreas, liver, gallbladder, common bile duct was performed.    COMPARISON:  Ultrasound abdomen limited 06/28/2018.    FINDINGS:  Liver: Upper normal in size, measuring 17.6 cm. Increased echogenicity and hepatic renal index at 1.45.  There is fatty sparing near the gallbladder.  No focal hepatic lesions.    Gallbladder: There are multiple gallstones adherent to the gallbladder wall.  Sonographic Mason's negative.  No wall thickening or pericholecystic fluid.  Adenomyomatosis present noting comet tail artifact.    Biliary system: The common duct is not dilated, measuring 2 mm.  No intrahepatic ductal dilatation.    Spleen: Normal in size with a homogeneous echotexture, measuring 11.8 x 3.8 cm.    Pancreas: The visualized portions of pancreas appear normal.    Miscellaneous: No ascites.      Impression Hepatic steatosis.    Multiple small gallstones adherent to the wall without evidence of cholecystitis.  Adenomyomatosis present.    Electronically signed by resident: Josue Hampton  Date:    12/19/2018  Time:    08:29  Electronically signed by: Marlon Orosco MD  Date:    12/19/2018  Time:    08:44           ASSESSMENT  58 y.o.   male with:  1. WELL COMPENSATED CIRRHOSIS   -- MELD score 12/2018 - 6  -- HCC screening - AFP normal. U/S today w/ no liver lesions  -- EGD 5/2016 varices screening - no varices    2. HISTORY OF CHRONIC HEPATITIS C, GENOTYPE 1B - S/P successful Rx w/ SVR12 2/2017  -- completed 24weeks of Harvoni  -- Prior HCV  treatment - PegIFN + RBV for few months many years ago - nonresponse  -- (+) Immunity to HAV & HBV    3. NAFLD / HX OF ELEVATED TRANSAMINASES  -- serologic eval for other causes has been unyielding    4. HYPERLIPIDEMIA  -- prescribed pravastatin by PCP but not adherent    5. DM  -- recent HbA1c 7.0        EDUCATION:  Cirrhosis will continue to exist. Needs lifelong HCC screening and liver monitoring every 6 months indefinitely    Again discussed importance of optimal lipid and glucose mngmt as well as health weight, exercise, diet for fatty liver. Discussed potential for fatty liver to result in progression of cirrhosis. Discussed risk of untreated DM and hyperlipidemia to result in MI, CVA      PLAN:  1. Strongly advised pt to r/s his recent missed PCP appt for continued lipid and DM mngmt w/ PCP. Statins are recommended  2. F/U visit w/ CBC, CMP, INR, AFP, U/S abdomen due 6/2019  3. EGD for varices screen due 5/2019 as long as liver disease remains well compensated

## 2019-05-08 ENCOUNTER — PES CALL (OUTPATIENT)
Dept: ADMINISTRATIVE | Facility: CLINIC | Age: 59
End: 2019-05-08

## 2019-05-09 ENCOUNTER — TELEPHONE (OUTPATIENT)
Dept: INTERNAL MEDICINE | Facility: CLINIC | Age: 59
End: 2019-05-09

## 2019-05-09 DIAGNOSIS — E11.65 UNCONTROLLED TYPE 2 DIABETES MELLITUS WITH HYPERGLYCEMIA: Primary | ICD-10-CM

## 2019-05-09 NOTE — TELEPHONE ENCOUNTER
----- Message from Brooklynn Dueñas sent at 5/9/2019 11:29 AM CDT -----  Contact: Pt  Pt is requesting an appt due to annual. Patient also stated that his sugar level is high and would like for Dr. Manzanares to refer him to a specialist concerning his diabetes.     Pt can be reached at [phone]

## 2019-05-09 NOTE — TELEPHONE ENCOUNTER
Before calling to schedule annual, do you want to put in referral and I can schedule that as well? Or wait until seen.

## 2019-05-10 NOTE — TELEPHONE ENCOUNTER
Called pt and scheduled his appointment with Dr. Manzanares also made pt aware that Dr. Manzanares put in an endocrin referral gave pt number to schedule.

## 2019-05-16 NOTE — PROGRESS NOTES
CHIEF COMPLAINT: Type 2 Diabetes     HPI: Mr. Graham Caceres is a 59 y.o. male who was diagnosed with Type 2 DM x 4-5 years ago.   Pt is being seen by me for the first time.  Needs lab work, has h/o hepatitis (treated)/cirrhosis of liver/elevated liver enzymes.  Lab Results   Component Value Date    HGBA1C 7.0 (H) 12/06/2018   Having higher readings -upper 100s, 195 mg/dl.     PREVIOUS DIABETES MEDICATIONS TRIED  lantus     CURRENT DIABETIC MEDS: januvia 25 mg daily     Pt is monitoring blood glucose readings 2 times a day.  Needs >100 strips per month related to fluctuations with blood glucose reading, a1c trends, and activity level.    Diabetes Management Status    Statin: Taking  ACE/ARB: Taking    Screening or Prevention Patient's value Goal Complete/Controlled?   HgA1C Testing and Control   Lab Results   Component Value Date    HGBA1C 7.0 (H) 12/06/2018      Annually/Less than 8% Yes   Lipid profile : 12/06/2018 Annually Yes   LDL control Lab Results   Component Value Date    LDLCALC 175.4 (H) 12/06/2018    Annually/Less than 100 mg/dl  No   Nephropathy screening Lab Results   Component Value Date    LABMICR 29.0 05/25/2018     Lab Results   Component Value Date    PROTEINUA Negative 08/11/2018    Annually Yes   Blood pressure BP Readings from Last 1 Encounters:   12/19/18 (!) 129/90    Less than 140/90 Yes   Dilated retinal exam : 03/01/2018 Annually No   Foot exam   : 05/25/2018 Annually Yes     REVIEW OF SYSTEMS  General: no weakness, fatigue, or weight changes.   Eyes: no double or blurred vision, eye pain, or redness; Last Eye Exam=3/2018  Cardiovascular: no chest pain, palpitations, edema, or murmurs.   Respiratory: no cough or dyspnea.   GI: no heartburn, nausea, or changes in bowel patterns; good appetite.   Skin: no rashes, dryness, itching, or reactions at insulin injection sites.  Neuro: no numbness, tingling, tremors, or vertigo.   Endocrine: no polyuria, polydipsia, polyphagia, heat or cold  "intolerance.     Vital Signs  /85 (BP Location: Left arm, Patient Position: Sitting, BP Method: Medium (Manual))   Pulse 77   Ht 5' 7" (1.702 m)   Wt 79.9 kg (176 lb 2.4 oz)   BMI 27.59 kg/m²     Hemoglobin A1C   Date Value Ref Range Status   12/06/2018 7.0 (H) 4.0 - 5.6 % Final     Comment:     ADA Screening Guidelines:  5.7-6.4%  Consistent with prediabetes  >or=6.5%  Consistent with diabetes  High levels of fetal hemoglobin interfere with the HbA1C  assay. Heterozygous hemoglobin variants (HbS, HgC, etc)do  not significantly interfere with this assay.   However, presence of multiple variants may affect accuracy.     08/12/2018 6.6 (H) 4.0 - 5.6 % Final     Comment:     ADA Screening Guidelines:  5.7-6.4%  Consistent with prediabetes  >or=6.5%  Consistent with diabetes  High levels of fetal hemoglobin interfere with the HbA1C  assay. Heterozygous hemoglobin variants (HbS, HgC, etc)do  not significantly interfere with this assay.   However, presence of multiple variants may affect accuracy.     06/08/2018 6.7 (H) 4.0 - 5.6 % Final     Comment:     ADA Screening Guidelines:  5.7-6.4%  Consistent with prediabetes  >or=6.5%  Consistent with diabetes  High levels of fetal hemoglobin interfere with the HbA1C  assay. Heterozygous hemoglobin variants (HbS, HgC, etc)do  not significantly interfere with this assay.   However, presence of multiple variants may affect accuracy.          Chemistry        Component Value Date/Time     12/06/2018 0820    K 4.5 12/06/2018 0820     12/06/2018 0820    CO2 29 12/06/2018 0820    BUN 15 12/06/2018 0820    CREATININE 0.8 12/06/2018 0820     (H) 12/06/2018 0820        Component Value Date/Time    CALCIUM 9.4 12/06/2018 0820    ALKPHOS 60 12/06/2018 0820    AST 29 12/06/2018 0820    ALT 33 12/06/2018 0820    BILITOT 0.9 12/06/2018 0820    ESTGFRAFRICA >60.0 12/06/2018 0820    EGFRNONAA >60.0 12/06/2018 0820           No results found for: TSH   Lab Results "   Component Value Date    CHOL 261 (H) 12/06/2018    CHOL 272 (H) 06/08/2018    CHOL 249 (H) 03/01/2018     Lab Results   Component Value Date    HDL 37 (L) 12/06/2018    HDL 38 (L) 06/08/2018    HDL 41 03/01/2018     Lab Results   Component Value Date    LDLCALC 175.4 (H) 12/06/2018    LDLCALC 175.2 (H) 06/08/2018    LDLCALC 163.0 (H) 03/01/2018     Lab Results   Component Value Date    TRIG 243 (H) 12/06/2018    TRIG 294 (H) 06/08/2018    TRIG 225 (H) 03/01/2018     Lab Results   Component Value Date    CHOLHDL 14.2 (L) 12/06/2018    CHOLHDL 14.0 (L) 06/08/2018    CHOLHDL 16.5 (L) 03/01/2018         PHYSICAL EXAMINATION  Constitutional: Appears well, no distress  Neck: Supple, trachea midline.   Respiratory: CTA without wheezes, even and unlabored.  Cardiovascular: RRR; no carotid bruits or murmurs; (+) DP pulses; no edema. +varicose veins  Lymph: no lymphadenopathy palpated  Skin: warm and dry; no injection site reactions, no acanthosis nigracans observed.  Neuro:patient alert and cooperative, normal affect; steady gait.    Diabetes Foot Exam:     Visual Inspection:  Dry Skin -  Bilateral and Onychomycosis -  Bilateral\  Fungal infection to great toes     Pedal Pulses:   Right: Present  Left: Present      Assessment/Plan  1. Type 2 diabetes mellitus without retinopathy  Hemoglobin A1c    Hemoglobin A1c    pravastatin (PRAVACHOL) 20 MG tablet   2. Hyperlipidemia, unspecified hyperlipidemia type  pravastatin (PRAVACHOL) 20 MG tablet   3. Hepatic cirrhosis due to chronic hepatitis C infection     4. Overweight (BMI 25.0-29.9)     1. Reviewed renal functions, can either do januvia 100 mg daily  Will start trulicity 0.75 mg weekly.  Send message to pt assistance.   BG goal  mg/dl  a1c goal less than 7%  a1c today and next time  BG checks 1-2 times a day  2.   Lab Results   Component Value Date    LDLCALC 175.4 (H) 12/06/2018     Send rx -PA needed for pravastatin  3. F/u with hepatology  4. Body mass index is  27.59 kg/m². glp1a added   Discussed MOA, SEs   H/o pancreatitis or medullary thyroid ca     FOLLOW UP  Follow up in about 4 months (around 9/17/2019).

## 2019-05-17 ENCOUNTER — OFFICE VISIT (OUTPATIENT)
Dept: ENDOCRINOLOGY | Facility: CLINIC | Age: 59
End: 2019-05-17
Payer: MEDICARE

## 2019-05-17 ENCOUNTER — TELEPHONE (OUTPATIENT)
Dept: ENDOCRINOLOGY | Facility: CLINIC | Age: 59
End: 2019-05-17

## 2019-05-17 VITALS
HEIGHT: 67 IN | WEIGHT: 176.13 LBS | HEART RATE: 77 BPM | SYSTOLIC BLOOD PRESSURE: 134 MMHG | DIASTOLIC BLOOD PRESSURE: 85 MMHG | BODY MASS INDEX: 27.64 KG/M2

## 2019-05-17 DIAGNOSIS — E11.9 TYPE 2 DIABETES MELLITUS WITHOUT RETINOPATHY: Primary | ICD-10-CM

## 2019-05-17 DIAGNOSIS — B18.2 HEPATIC CIRRHOSIS DUE TO CHRONIC HEPATITIS C INFECTION: ICD-10-CM

## 2019-05-17 DIAGNOSIS — E78.5 HYPERLIPIDEMIA, UNSPECIFIED HYPERLIPIDEMIA TYPE: ICD-10-CM

## 2019-05-17 DIAGNOSIS — K74.60 HEPATIC CIRRHOSIS DUE TO CHRONIC HEPATITIS C INFECTION: ICD-10-CM

## 2019-05-17 DIAGNOSIS — E66.3 OVERWEIGHT (BMI 25.0-29.9): ICD-10-CM

## 2019-05-17 DIAGNOSIS — E11.9 TYPE 2 DIABETES MELLITUS WITHOUT RETINOPATHY: ICD-10-CM

## 2019-05-17 DIAGNOSIS — L84 PRE-ULCERATIVE CALLUSES: ICD-10-CM

## 2019-05-17 PROBLEM — R50.9 FEVER: Status: RESOLVED | Noted: 2018-08-11 | Resolved: 2019-05-17

## 2019-05-17 PROCEDURE — 3008F PR BODY MASS INDEX (BMI) DOCUMENTED: ICD-10-PCS | Mod: HCNC,CPTII,S$GLB, | Performed by: NURSE PRACTITIONER

## 2019-05-17 PROCEDURE — 99499 UNLISTED E&M SERVICE: CPT | Mod: HCNC,S$GLB,, | Performed by: NURSE PRACTITIONER

## 2019-05-17 PROCEDURE — 3079F PR MOST RECENT DIASTOLIC BLOOD PRESSURE 80-89 MM HG: ICD-10-PCS | Mod: HCNC,CPTII,S$GLB, | Performed by: NURSE PRACTITIONER

## 2019-05-17 PROCEDURE — 3045F PR MOST RECENT HEMOGLOBIN A1C LEVEL 7.0-9.0%: CPT | Mod: HCNC,CPTII,S$GLB, | Performed by: NURSE PRACTITIONER

## 2019-05-17 PROCEDURE — 99999 PR PBB SHADOW E&M-EST. PATIENT-LVL III: ICD-10-PCS | Mod: PBBFAC,HCNC,, | Performed by: NURSE PRACTITIONER

## 2019-05-17 PROCEDURE — 3075F PR MOST RECENT SYSTOLIC BLOOD PRESS GE 130-139MM HG: ICD-10-PCS | Mod: HCNC,CPTII,S$GLB, | Performed by: NURSE PRACTITIONER

## 2019-05-17 PROCEDURE — 3075F SYST BP GE 130 - 139MM HG: CPT | Mod: HCNC,CPTII,S$GLB, | Performed by: NURSE PRACTITIONER

## 2019-05-17 PROCEDURE — 3008F BODY MASS INDEX DOCD: CPT | Mod: HCNC,CPTII,S$GLB, | Performed by: NURSE PRACTITIONER

## 2019-05-17 PROCEDURE — 99214 OFFICE O/P EST MOD 30 MIN: CPT | Mod: HCNC,S$GLB,, | Performed by: NURSE PRACTITIONER

## 2019-05-17 PROCEDURE — 99999 PR PBB SHADOW E&M-EST. PATIENT-LVL III: CPT | Mod: PBBFAC,HCNC,, | Performed by: NURSE PRACTITIONER

## 2019-05-17 PROCEDURE — 99499 RISK ADDL DX/OHS AUDIT: ICD-10-PCS | Mod: HCNC,S$GLB,, | Performed by: NURSE PRACTITIONER

## 2019-05-17 PROCEDURE — 3045F PR MOST RECENT HEMOGLOBIN A1C LEVEL 7.0-9.0%: ICD-10-PCS | Mod: HCNC,CPTII,S$GLB, | Performed by: NURSE PRACTITIONER

## 2019-05-17 PROCEDURE — 3079F DIAST BP 80-89 MM HG: CPT | Mod: HCNC,CPTII,S$GLB, | Performed by: NURSE PRACTITIONER

## 2019-05-17 PROCEDURE — 99214 PR OFFICE/OUTPT VISIT, EST, LEVL IV, 30-39 MIN: ICD-10-PCS | Mod: HCNC,S$GLB,, | Performed by: NURSE PRACTITIONER

## 2019-05-17 RX ORDER — PRAVASTATIN SODIUM 20 MG/1
40 TABLET ORAL DAILY
Qty: 180 TABLET | Refills: 3 | Status: SHIPPED | OUTPATIENT
Start: 2019-05-17 | End: 2019-06-25

## 2019-05-17 RX ORDER — LANCETS
EACH MISCELLANEOUS
Qty: 50 EACH | Refills: 11 | Status: SHIPPED | OUTPATIENT
Start: 2019-05-17 | End: 2020-04-24 | Stop reason: SDUPTHER

## 2019-05-17 RX ORDER — INSULIN PUMP SYRINGE, 3 ML
EACH MISCELLANEOUS
Qty: 1 EACH | Refills: 0 | Status: SHIPPED | OUTPATIENT
Start: 2019-05-17 | End: 2020-04-24 | Stop reason: SDUPTHER

## 2019-05-17 NOTE — PATIENT INSTRUCTIONS
Snacks can be an important part of a balanced, healthy meal plan. They allow you to eat more frequently, feeling full and satisfied throughout the day. Also, they allow you to spread carbohydrates evenly, which may stabilize blood sugars.  Plus, snacks are enjoyable!     The amount of carbohydrate needed at snacks varies. Generally, about 15-30 grams of carbohydrate per snack is recommended.  Below you will find some tasty treats.       0-5 gm carb   Crystal Light   Vitamin Water Zero   Herbal tea, unsweetened   2 tsp peanut butter on celery   1./2 cup sugar-free jell-o   1 sugar-free popsicle   ¼ cup blueberries   8oz Blue Halle unsweetened almond milk   5 baby carrots & celery sticks, cucumbers, bell peppers dipped in ¼ cup salsa, 2Tbsp light ranch dressing or 2Tbsp plain Greek yogurt   10 Goldfish crackers   ½ oz low-fat cheese or string cheese   1 closed handful of nuts, unsalted   1 Tbsp of sunflower seeds, unsalted   1 cup Smart Pop popcorn   1 whole grain brown rice cake        15 gm carb   1 small piece of fruit or ½ banana or 1/2 cup lite canned fruit   3 fanny cracker squares   3 cups Smart Pop popcorn, top spray butter, Mendoza lite salt or cinnamon and Truvia   5 Vanilla Wafers   ½ cup low fat, no added sugar ice cream or frozen yogurt (Blue bell, Blue Bunny, Weight Watchers, Skinny Cow)   ½ turkey, ham, or chicken sandwich   ½ c fruit with ½ c Cottage cheese   4-6 unsalted wheat crackers with 1 oz low fat cheese or 1 tbsp peanut butter    30-45 goldfish crackers (depending on flavor)    7-8 Church mini brown rice cakes (caramel, apple cinnamon, chocolate)    12 Church mini brown rice cakes (cheddar, bbq, ranch)    1/3 cup hummus dip with raw veg   1/2 whole wheat prosper, 1Tbsp hummus   Mini Pizza (1/2 whole wheat English muffin, low-fat  cheese, tomato sauce)   100 calorie snack pack (Oreo, Chips Ahoy, Ritz Mix, Baked Cheetos)   4-6 oz. light or Greek Style yogurt  (Chodevyn, Yoplaileroy, Okmayra, [facility])   ½ cup sugar-free pudding     6 in. wheat tortilla or prosper oven toasted chips (topped with spray butter flavoring, cinnamon, Truvia OR spray butter, garlic powder, chili powder)    18 BBQ Popchips (available at [employer], Whole Foods, Fresh Market)                   Diabetes Support Group Meetings         Date: Topic:   February 14 Eat Fit SHAHANA/Health Promotion   March 14 Taking Care of Your Smile   April 11 Spring into Healthy Eating/Cooking Demo   May 9 Ease Your Mind with Diabetes   Rafia 13 Summer Treats/Cooking Demo   July 11 Eat Fit SHAHANA/Super Market Sweep   August 8 Taking Care of Your Eyes and Feet   Sept 12 Technology/ADA updates   October 10 Recipes & Treats/Cooking Demo   November 14 Heart Health/Pump it up!   December 12 Year-End Close Out        Meetings are held in the [facility] (A) of the [facility] located at [address] [place] [address]. Please call [phone] for additional information.    Free service, offered every 2nd Thursday of every month! Family members and/or friends are welcome as well!  Support group is for patients with type 1 or type 2 diabetes.    From 3:30p to 4:30p        Dulaglutide injection  What is this medicine?  DULAGLUTIDE (DOO la GLOO tide) is used to improve blood sugar control in adults with type 2 diabetes. This medicine may be used with other oral diabetes medicines.  How should I use this medicine?  This medicine is for injection under the skin of your upper leg (thigh), stomach area, or upper arm. It is usually given once every week (every 7 days). You will be taught how to prepare and give this medicine. Use exactly as directed. Take your medicine at regular intervals. Do not take it more often than directed.  If you use this medicine with insulin, you should inject this medicine and the insulin separately. Do not mix them together. Do not give the injections right next to  each other. Change (rotate) injection sites with each injection.  It is important that you put your used needles and syringes in a special sharps container. Do not put them in a trash can. If you do not have a sharps container, call your pharmacist or healthcare provider to get one.  A special MedGuide will be given to you by the pharmacist with each prescription and refill. Be sure to read this information carefully each time.  Talk to your pediatrician regarding the use of this medicine in children. Special care may be needed.  What side effects may I notice from receiving this medicine?  Side effects that you should report to your doctor or health care professional as soon as possible:  · allergic reactions like skin rash, itching or hives, swelling of the face, lips, or tongue  · breathing problems  · signs and symptoms of low blood sugar such as feeling anxious, confusion, dizziness, increased hunger, unusually weak or tired, sweating, shakiness, cold, irritable, headache, blurred vision, fast heartbeat, loss of consciousness  · unusual stomach upset or pain  · vomiting  Side effects that usually do not require medical attention (Report these to your doctor or health care professional if they continue or are bothersome.):diarrhea  · heartburn  · loss of appetite  · nausea  · pain, redness, or irritation at site where injected  What may interact with this medicine?  Do not take this medicine with any of the following medications:  · gatifloxacin  Many medications may cause changes in blood sugar, these include:  · alcohol containing beverages  · aspirin and aspirin-like drugs  · chloramphenicol  · chromium  · diuretics  · female hormones, such as estrogens or progestins, birth control pills  · heart medicines  · isoniazid  · male hormones or anabolic steroids  · medications for weight loss  · medicines for allergies, asthma, cold, or cough  · medicines for mental problems  · medicines called MAO inhibitors -  Nardil, Parnate, Marplan, Eldepryl  · niacin  · NSAIDS, such as ibuprofen  · pentamidine  · phenytoin  · probenecid  · quinolone antibiotics such as ciprofloxacin, levofloxacin, ofloxacin  · some herbal dietary supplements  · steroid medicines such as prednisone or cortisone  · thyroid hormonesSome medications can hide the warning symptoms of low blood sugar (hypoglycemia). You may need to monitor your blood sugar more closely if you are taking one of these medications. These include:  · beta-blockers, often used for high blood pressure or heart problems (examples include atenolol, metoprolol, propranolol)  · clonidine  · guanethidine  · reserpine  What if I miss a dose?  If you miss a dose, take it as soon as you can within 3 days after the missed dose. Then take your next dose at your regular weekly time. If it has been longer than 3 days after the missed dose, do not take the missed dose. Take the next dose at your regular time. Do not take double or extra doses. If you have questions about a missed dose, contact your health care provider for advice.  Where should I keep my medicine?  Keep out of the reach of children.  Store this medicine in a refrigerator between 2 and 8 degrees C (36 and 46 degrees F). Do not freeze or use if the medicine has been frozen. Protect from light and excessive heat. Each single-dose pen or prefilled syringe can be kept at room temperature, not to exceed 30 degrees C (86 degrees F) for a total of 14 days, if needed. Store in the carton until use. Throw away any unused medicine after the expiration date.  What should I tell my health care provider before I take this medicine?  They need to know if you have any of these conditions:  · endocrine tumors (MEN 2) or if someone in your family had these tumors  · history of pancreatitis  · kidney disease  · liver disease  · stomach problems  · thyroid cancer or if someone in your family had thyroid cancer  · an unusual or allergic reaction  to dulaglutide, other medicines, foods, dyes, or preservatives  · pregnant or trying to get pregnant  · breast-feeding  What should I watch for while using this medicine?  Visit your doctor or health care professional for regular checks on your progress.  A test called the HbA1C (A1C) will be monitored. This is a simple blood test. It measures your blood sugar control over the last 2 to 3 months. You will receive this test every 3 to 6 months.  Learn how to check your blood sugar. Learn the symptoms of low and high blood sugar and how to manage them.  Always carry a quick-source of sugar with you in case you have symptoms of low blood sugar. Examples include hard sugar candy or glucose tablets. Make sure others know that you can choke if you eat or drink when you develop serious symptoms of low blood sugar, such as seizures or unconsciousness. They must get medical help at once.  Tell your doctor or health care professional if you have high blood sugar. You might need to change the dose of your medicine. If you are sick or exercising more than usual, you might need to change the dose of your medicine.  Do not skip meals. Ask your doctor or health care professional if you should avoid alcohol. Many nonprescription cough and cold products contain sugar or alcohol. These can affect blood sugar.  Wear a medical ID bracelet or chain, and carry a card that describes your disease and details of your medicine and dosage times.  NOTE:This sheet is a summary. It may not cover all possible information. If you have questions about this medicine, talk to your doctor, pharmacist, or health care provider. Copyright© 2017 [employer] Standard        Hypoglycemia (Low Blood Sugar)     Fast-acting sugar includes a cup of nonfat milk.     Too little sugar (glucose) in your blood is called hypoglycemia or low blood sugar. Low blood sugar usually means anything lower than 70 mg/dL. Talk with your healthcare provider about your target range and  what level is too low for you. Diabetes itself doesnt cause low blood sugar. But some of the treatments for diabetes, such as pills or insulin, may raise your risk for it. Low blood sugar may cause you to pass out or have a seizure. So always treat low blood sugar right away, but don't overeat.  Special note: Always carry a source of fast-acting sugar and a snack in case of hypoglycemia.   What you may notice  If you have low blood sugar, you may have one or more of these symptoms:  · Shakiness or dizziness  · Cold, clammy skin or sweating  · Feelings of hunger  · Headache  · Nervousness  · A hard, fast heartbeat  · Weakness  · Confusion or irritability  · Blurred vision  · Having nightmares or waking up confused or sweating  · Numbness or tingling in the lips or tongue  What you should do  Here are tips to follow if you have hypoglycemia:   · First check your blood sugar. If it is too low (out of your target range), eat or drink 15 to 20 grams of fast-acting sugar. This may be 3 to 4 glucose tablets, 4 ounces (half a cup) of fruit juice or regular (nondiet) soda, 8 ounces (1 cup) of fat-free milk, or 1 tablespoon of honey. Dont take more than this, or your blood sugar may go too high.  · Wait 15 minutes. Then recheck your blood sugar if you can.  · If your blood sugar is still too low, repeat the steps above and check your blood sugar again. If your blood sugar still has not returned to your target range, contact your healthcare provider or seek emergency care.  · Once your blood sugar returns to target range, eat a snack or meal.  Preventing low blood sugar  Things you can do include the following:   · If your condition needs a strict treatment plan, eat your meals and snacks at the same times each day. Dont skip meals!  · If your treatment plan lets you change when you eat and what you eat, learn how to change the time and dose of your rapid-acting insulin to match this.   · Ask your healthcare provider if it  is safe for you to drink alcohol. Never drink on an empty stomach.  · Take your medicine at the prescribed times.  · Always carry a source of fast-acting sugar and a snack when youre away from home.  Other things to do  Additional tips include the following:  · Carry a medical ID card, a compact USB drive, or wear a medical alert bracelet or necklace. It should say that you have diabetes. It should also say what to do if you pass out or have a seizure.  · Make sure your family, friends, and coworkers know the signs of low blood sugar. Tell them what to do if your blood sugar falls very low and you cant treat yourself.  · Keep a glucagon emergency kit handy. Be sure your family, friends, and coworkers know how and when to use it. Check it regularly and replace the glucagon before it expires.  · Talk with your health care team about other things you can do to prevent low blood sugar.     If you have unexplained hypoglycemia or hypoglycemia several times, call your healthcare provider.   Date Last Reviewed: 5/1/2016 © 2000-2017 [employer]. [address] [place] [address], [place] [address]. All rights reserved. This information is not intended as a substitute for professional medical care. Always follow your healthcare professional's instructions.

## 2019-05-17 NOTE — LETTER
May 17, 2019      Jennifer Manzanares MD  [address]  [facility] [address]           [facility] End[facility]inology 6[facility] [place]  [address]  [facility] [address]  Phone: [phone]          Patient: Graham Caceres   MR Number: [id number]   YOB: 1960   Date of Visit: 5/17/2019       Dear Dr. Jennifer Manzanares:    Thank you for referring Graham Caceres to me for evaluation. Attached you will find relevant portions of my assessment and plan of care.    If you have questions, please do not hesitate to call me. I look forward to following Graham Caceres along with you.    Sincerely,    RALF Kan, FNP    Enclosure  CC:  No Recipients    If you would like to receive this communication electronically, please contact [email or web address] or [phone] to request more information on [facility] Link access.    For providers and/or their staff who would like to refer a patient to [facility], please contact us through our one-stop-shop provider referral line, [facility], at [phone].    If you feel you have received this communication in error or would no longer like to receive these types of communications, please e-mail [email or web address]

## 2019-05-27 ENCOUNTER — OFFICE VISIT (OUTPATIENT)
Dept: OPTOMETRY | Facility: CLINIC | Age: 59
End: 2019-05-27
Payer: COMMERCIAL

## 2019-05-27 ENCOUNTER — OFFICE VISIT (OUTPATIENT)
Dept: PODIATRY | Facility: CLINIC | Age: 59
End: 2019-05-27
Payer: MEDICARE

## 2019-05-27 VITALS
DIASTOLIC BLOOD PRESSURE: 88 MMHG | SYSTOLIC BLOOD PRESSURE: 124 MMHG | WEIGHT: 176 LBS | HEART RATE: 95 BPM | HEIGHT: 67 IN | BODY MASS INDEX: 27.62 KG/M2

## 2019-05-27 DIAGNOSIS — H52.13 BILATERAL MYOPIA: ICD-10-CM

## 2019-05-27 DIAGNOSIS — L84 CORN OR CALLUS: ICD-10-CM

## 2019-05-27 DIAGNOSIS — E11.9 TYPE 2 DIABETES MELLITUS WITHOUT RETINOPATHY: ICD-10-CM

## 2019-05-27 DIAGNOSIS — H25.13 NUCLEAR SCLEROTIC CATARACT OF BOTH EYES: ICD-10-CM

## 2019-05-27 DIAGNOSIS — Z01.00 ROUTINE EYE EXAM: Primary | ICD-10-CM

## 2019-05-27 DIAGNOSIS — L60.9 DISEASE OF NAIL: ICD-10-CM

## 2019-05-27 DIAGNOSIS — E11.9 TYPE 2 DIABETES MELLITUS WITHOUT RETINOPATHY: Primary | ICD-10-CM

## 2019-05-27 PROCEDURE — 92015 PR REFRACTION: ICD-10-PCS | Mod: S$GLB,,, | Performed by: OPTOMETRIST

## 2019-05-27 PROCEDURE — 92014 PR EYE EXAM, EST PATIENT,COMPREHESV: ICD-10-PCS | Mod: S$GLB,,, | Performed by: OPTOMETRIST

## 2019-05-27 PROCEDURE — 99999 PR PBB SHADOW E&M-EST. PATIENT-LVL III: CPT | Mod: PBBFAC,HCNC,, | Performed by: PODIATRIST

## 2019-05-27 PROCEDURE — 99999 PR PBB SHADOW E&M-EST. PATIENT-LVL III: ICD-10-PCS | Mod: PBBFAC,HCNC,, | Performed by: PODIATRIST

## 2019-05-27 PROCEDURE — 99203 OFFICE O/P NEW LOW 30 MIN: CPT | Mod: HCNC,S$GLB,, | Performed by: PODIATRIST

## 2019-05-27 PROCEDURE — 99999 PR PBB SHADOW E&M-EST. PATIENT-LVL II: ICD-10-PCS | Mod: PBBFAC,,, | Performed by: OPTOMETRIST

## 2019-05-27 PROCEDURE — 3008F PR BODY MASS INDEX (BMI) DOCUMENTED: ICD-10-PCS | Mod: HCNC,CPTII,S$GLB, | Performed by: PODIATRIST

## 2019-05-27 PROCEDURE — 99499 RISK ADDL DX/OHS AUDIT: ICD-10-PCS | Mod: S$GLB,,, | Performed by: OPTOMETRIST

## 2019-05-27 PROCEDURE — 99499 UNLISTED E&M SERVICE: CPT | Mod: S$GLB,,, | Performed by: OPTOMETRIST

## 2019-05-27 PROCEDURE — 3074F PR MOST RECENT SYSTOLIC BLOOD PRESSURE < 130 MM HG: ICD-10-PCS | Mod: HCNC,CPTII,S$GLB, | Performed by: PODIATRIST

## 2019-05-27 PROCEDURE — 3045F PR MOST RECENT HEMOGLOBIN A1C LEVEL 7.0-9.0%: CPT | Mod: HCNC,CPTII,S$GLB, | Performed by: PODIATRIST

## 2019-05-27 PROCEDURE — 3074F SYST BP LT 130 MM HG: CPT | Mod: HCNC,CPTII,S$GLB, | Performed by: PODIATRIST

## 2019-05-27 PROCEDURE — 99999 PR PBB SHADOW E&M-EST. PATIENT-LVL II: CPT | Mod: PBBFAC,,, | Performed by: OPTOMETRIST

## 2019-05-27 PROCEDURE — 3045F PR MOST RECENT HEMOGLOBIN A1C LEVEL 7.0-9.0%: ICD-10-PCS | Mod: HCNC,CPTII,S$GLB, | Performed by: PODIATRIST

## 2019-05-27 PROCEDURE — 99203 PR OFFICE/OUTPT VISIT, NEW, LEVL III, 30-44 MIN: ICD-10-PCS | Mod: HCNC,S$GLB,, | Performed by: PODIATRIST

## 2019-05-27 PROCEDURE — 92015 DETERMINE REFRACTIVE STATE: CPT | Mod: S$GLB,,, | Performed by: OPTOMETRIST

## 2019-05-27 PROCEDURE — 3008F BODY MASS INDEX DOCD: CPT | Mod: HCNC,CPTII,S$GLB, | Performed by: PODIATRIST

## 2019-05-27 PROCEDURE — 3079F PR MOST RECENT DIASTOLIC BLOOD PRESSURE 80-89 MM HG: ICD-10-PCS | Mod: HCNC,CPTII,S$GLB, | Performed by: PODIATRIST

## 2019-05-27 PROCEDURE — 3079F DIAST BP 80-89 MM HG: CPT | Mod: HCNC,CPTII,S$GLB, | Performed by: PODIATRIST

## 2019-05-27 PROCEDURE — 92014 COMPRE OPH EXAM EST PT 1/>: CPT | Mod: S$GLB,,, | Performed by: OPTOMETRIST

## 2019-05-27 RX ORDER — KETOCONAZOLE 20 MG/G
CREAM TOPICAL DAILY
Qty: 1 TUBE | Refills: 2 | Status: SHIPPED | OUTPATIENT
Start: 2019-05-27 | End: 2022-01-26 | Stop reason: SDUPTHER

## 2019-05-27 RX ORDER — AMMONIUM LACTATE 12 G/100G
CREAM TOPICAL
Qty: 140 G | Refills: 11 | Status: SHIPPED | OUTPATIENT
Start: 2019-05-27 | End: 2020-09-28

## 2019-05-27 NOTE — LETTER
May 27, 2019      Lizeth Mcdonald, APRN, FNP  [address]  [facility] [address]           [facility]  [address]  [facility] [address]  Phone: [phone]          Patient: Graham Caceres   MR Number: [id number]   YOB: 1960   Date of Visit: 5/27/2019       Dear Lizeth Mcdonald:    Thank you for referring Graham Caceres to me for evaluation. Attached you will find relevant portions of my assessment and plan of care.    If you have questions, please do not hesitate to call me. I look forward to following Graham Caceres along with you.    Sincerely,    Matteo Marks, DPMIRNA    Enclosure  CC:  No Recipients    If you would like to receive this communication electronically, please contact [email or web address] or [phone] to request more information on [employer] Link access.    For providers and/or their staff who would like to refer a patient to [facility], please contact us through our one-stop-shop provider referral line, [facility], at [phone].    If you feel you have received this communication in error or would no longer like to receive these types of communications, please e-mail [email or web address]

## 2019-05-27 NOTE — PROGRESS NOTES
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 (History)            Assessment /Plan     For exam results, see Encounter Report.    Routine eye exam    Bilateral myopia    Type 2 diabetes mellitus without retinopathy    Nuclear sclerotic cataract of both eyes            1-2.  Distance rx given.    3.  No retinopathy--monitor yearly.  BS control.  Eye health normal OU.  4.  Educated on cataracts and affects on vision.  Monitor.

## 2019-05-27 NOTE — PROGRESS NOTES
Subjective:      Patient ID: Graham Caceres is a 59 y.o. male.    Chief Complaint: Diabetes Mellitus (bilateral pcp Dr Manzanares  5/25/18); Diabetic Foot Exam; Ankle Pain (somtimes swellening); and Nail Problem (fungus)    Graham is a 59 y.o. male who presents to the clinic upon referral from Dr. Mcdonald  for evaluation and treatment of diabetic feet. Graham has a past medical history of Cirrhosis, Diverticulosis, History of hepatitis C, s/p successful Rx w/ SVR24 - 6/2017 (4/7/2016), Hyperlipidemia (04/2016), Hypertension, Internal hemorrhoid, New onset type 2 diabetes mellitus (4/2016), Positive QuantiFERON-TB Gold test (4/2016), and Sebopsoriasis. Patient relates no major problem with feet. Only complaints today consist of routine diabetic foot evaluation.    PCP: Jennifer Manzanares MD    Date Last Seen by PCP:   Chief Complaint   Patient presents with    Diabetes Mellitus     bilateral pcp Dr Manzanares  5/25/18    Diabetic Foot Exam    Ankle Pain     somtimes swellening    Nail Problem     fungus         Current shoe gear: Casual shoes    Hemoglobin A1C   Date Value Ref Range Status   05/17/2019 8.1 (H) 4.0 - 5.6 % Final     Comment:     ADA Screening Guidelines:  5.7-6.4%  Consistent with prediabetes  >or=6.5%  Consistent with diabetes  High levels of fetal hemoglobin interfere with the HbA1C  assay. Heterozygous hemoglobin variants (HbS, HgC, etc)do  not significantly interfere with this assay.   However, presence of multiple variants may affect accuracy.     12/06/2018 7.0 (H) 4.0 - 5.6 % Final     Comment:     ADA Screening Guidelines:  5.7-6.4%  Consistent with prediabetes  >or=6.5%  Consistent with diabetes  High levels of fetal hemoglobin interfere with the HbA1C  assay. Heterozygous hemoglobin variants (HbS, HgC, etc)do  not significantly interfere with this assay.   However, presence of multiple variants may affect accuracy.     08/12/2018 6.6 (H) 4.0 - 5.6 % Final     Comment:     ADA Screening Guidelines:  5.7-6.4%  Consistent  with prediabetes  >or=6.5%  Consistent with diabetes  High levels of fetal hemoglobin interfere with the HbA1C  assay. Heterozygous hemoglobin variants (HbS, HgC, etc)do  not significantly interfere with this assay.   However, presence of multiple variants may affect accuracy.             Review of Systems   Constitution: Negative for chills and fever.   HENT: Negative for congestion and tinnitus.    Eyes: Negative for double vision and visual disturbance.   Cardiovascular: Negative for chest pain and claudication.   Respiratory: Negative for hemoptysis and shortness of breath.    Endocrine: Negative for cold intolerance and heat intolerance.   Hematologic/Lymphatic: Negative for adenopathy and bleeding problem.   Skin: Positive for color change, dry skin and nail changes.   Musculoskeletal: Positive for stiffness. Negative for myalgias.   Gastrointestinal: Negative for nausea and vomiting.   Genitourinary: Negative for dysuria and hematuria.   Neurological: Positive for numbness and paresthesias.   Psychiatric/Behavioral: Negative for altered mental status and suicidal ideas.   Allergic/Immunologic: Negative for environmental allergies and persistent infections.           Objective:      Physical Exam   Constitutional: He is oriented to person, place, and time. Vital signs are normal. He appears well-developed and well-nourished.   Cardiovascular:   Pulses:       Dorsalis pedis pulses are 2+ on the right side, and 2+ on the left side.        Posterior tibial pulses are 2+ on the right side, and 2+ on the left side.   Musculoskeletal:        Right foot: There is normal range of motion and no deformity.        Left foot: There is normal range of motion and no deformity.   Inspection and palpation of the muscles joints and bones of both lower extremities reveal that muscle strength for the anterior, lateral, and posterior muscle groups and intrinsic muscle groups of the foot are all 5 over 5 symmetrical. Ankle,  subtalar, midtarsal, and digital joint range of motion  are within normal limits, nonpainful, without crepitus or effusion. Patient exhibits a normal angle and base of gait. Palpation of the tendons reveal no defects.  Limb length discrepancy noted left greater than right.   Feet:   Right Foot:   Skin Integrity: Negative for skin breakdown or erythema.   Left Foot:   Skin Integrity: Negative for skin breakdown or erythema.   Neurological: He is oriented to person, place, and time. He has normal strength. No sensory deficit.   Reflex Scores:       Patellar reflexes are 2+ on the right side and 2+ on the left side.       Achilles reflexes are 2+ on the right side and 2+ on the left side.  Sharp, dull, light touch, vibratory, and proprioceptive sensation are intact bilaterally. Deep tendon reflexes to patellar and Achilles tendon are symmetrical, 2/4 bilaterally. No ankle clonus or Babinski reflexes noted bilaterally. Coordination is normal to both feet and lower extremities.   Skin: Skin is warm, dry and intact. No cyanosis. Nails show no clubbing.    No lesions or rashes or wounds appreciated bilaterally. Bilateral hallux nails thickened and discolored and dry scaly skin noted bilaterally             Assessment:       Encounter Diagnoses   Name Primary?    Type 2 diabetes mellitus without retinopathy Yes    Disease of nail     Corn or callus          Plan:       Hsian was seen today for diabetes mellitus, diabetic foot exam, ankle pain and nail problem.    Diagnoses and all orders for this visit:    Type 2 diabetes mellitus without retinopathy  -     DIABETIC SHOES FOR HOME USE    Disease of nail    Corn or callus    Other orders  -     ketoconazole (NIZORAL) 2 % cream; Apply topically once daily.  -     ammonium lactate 12 % Crea; Apply twice daily to affected parts both feet as needed.      I counseled the patient on his conditions, their implications and medical management.      Shoe inspection. Diabetic Foot  Education. Patient reminded of the importance of good nutrition and blood sugar control to help prevent podiatric complications of diabetes. Patient instructed on proper foot hygeine. We discussed wearing proper shoe gear, daily foot inspections and Diabetic foot education in detail.    Appropriate shoe gear and prescription inserts discussed and prescribed.  Return to clinic in 12 months or sooner if problems arise   .

## 2019-06-20 ENCOUNTER — TELEPHONE (OUTPATIENT)
Dept: PULMONOLOGY | Facility: CLINIC | Age: 59
End: 2019-06-20

## 2019-06-24 ENCOUNTER — HOSPITAL ENCOUNTER (OUTPATIENT)
Dept: RADIOLOGY | Facility: HOSPITAL | Age: 59
Discharge: HOME | End: 2019-06-24
Attending: PHYSICIAN ASSISTANT
Payer: MEDICARE

## 2019-06-24 ENCOUNTER — OFFICE VISIT (OUTPATIENT)
Dept: HEPATOLOGY | Facility: CLINIC | Age: 59
End: 2019-06-24
Payer: MEDICARE

## 2019-06-24 VITALS
HEIGHT: 67 IN | RESPIRATION RATE: 16 BRPM | DIASTOLIC BLOOD PRESSURE: 82 MMHG | HEART RATE: 78 BPM | TEMPERATURE: 97 F | BODY MASS INDEX: 27.78 KG/M2 | WEIGHT: 177 LBS | SYSTOLIC BLOOD PRESSURE: 138 MMHG

## 2019-06-24 DIAGNOSIS — K74.60 HEPATIC CIRRHOSIS, UNSPECIFIED HEPATIC CIRRHOSIS TYPE, UNSPECIFIED WHETHER ASCITES PRESENT: Primary | ICD-10-CM

## 2019-06-24 DIAGNOSIS — K76.0 FATTY LIVER: ICD-10-CM

## 2019-06-24 DIAGNOSIS — Z86.19 HISTORY OF HEPATITIS C: ICD-10-CM

## 2019-06-24 DIAGNOSIS — K74.60 HEPATIC CIRRHOSIS, UNSPECIFIED HEPATIC CIRRHOSIS TYPE, UNSPECIFIED WHETHER ASCITES PRESENT: ICD-10-CM

## 2019-06-24 PROCEDURE — 3075F SYST BP GE 130 - 139MM HG: CPT | Mod: HCNC,CPTII,S$GLB, | Performed by: PHYSICIAN ASSISTANT

## 2019-06-24 PROCEDURE — 99213 OFFICE O/P EST LOW 20 MIN: CPT | Mod: HCNC,S$GLB,, | Performed by: PHYSICIAN ASSISTANT

## 2019-06-24 PROCEDURE — 99999 PR PBB SHADOW E&M-EST. PATIENT-LVL IV: ICD-10-PCS | Mod: PBBFAC,HCNC,, | Performed by: PHYSICIAN ASSISTANT

## 2019-06-24 PROCEDURE — 99999 PR PBB SHADOW E&M-EST. PATIENT-LVL IV: CPT | Mod: PBBFAC,HCNC,, | Performed by: PHYSICIAN ASSISTANT

## 2019-06-24 PROCEDURE — 76705 ECHO EXAM OF ABDOMEN: CPT | Mod: TC,HCNC

## 2019-06-24 PROCEDURE — 3079F DIAST BP 80-89 MM HG: CPT | Mod: HCNC,CPTII,S$GLB, | Performed by: PHYSICIAN ASSISTANT

## 2019-06-24 PROCEDURE — 3008F PR BODY MASS INDEX (BMI) DOCUMENTED: ICD-10-PCS | Mod: HCNC,CPTII,S$GLB, | Performed by: PHYSICIAN ASSISTANT

## 2019-06-24 PROCEDURE — 76705 ECHO EXAM OF ABDOMEN: CPT | Mod: 26,HCNC,, | Performed by: INTERNAL MEDICINE

## 2019-06-24 PROCEDURE — 3075F PR MOST RECENT SYSTOLIC BLOOD PRESS GE 130-139MM HG: ICD-10-PCS | Mod: HCNC,CPTII,S$GLB, | Performed by: PHYSICIAN ASSISTANT

## 2019-06-24 PROCEDURE — 76705 US ABDOMEN LIMITED: ICD-10-PCS | Mod: 26,HCNC,, | Performed by: INTERNAL MEDICINE

## 2019-06-24 PROCEDURE — 3079F PR MOST RECENT DIASTOLIC BLOOD PRESSURE 80-89 MM HG: ICD-10-PCS | Mod: HCNC,CPTII,S$GLB, | Performed by: PHYSICIAN ASSISTANT

## 2019-06-24 PROCEDURE — 3008F BODY MASS INDEX DOCD: CPT | Mod: HCNC,CPTII,S$GLB, | Performed by: PHYSICIAN ASSISTANT

## 2019-06-24 PROCEDURE — 99213 PR OFFICE/OUTPT VISIT, EST, LEVL III, 20-29 MIN: ICD-10-PCS | Mod: HCNC,S$GLB,, | Performed by: PHYSICIAN ASSISTANT

## 2019-06-24 NOTE — PROGRESS NOTES
"HEPATOLOGY CLINIC VISIT NOTE - HCV clinic    CHIEF COMPLAINT:  HCV Cirrhosis    HISTORY: This is a 59 y.o.   male w/ well compensated HCV cirrhosis (s/p successful antiviral therapy) as well as fatty liver disease, DM and Hyperlipidemia, here for f/u.     Has hx of medication non adherence  Noted he saw Endocrine (Lizeth Mcdonald NP) for first visit 5/17/19  Labs at that time revealed worsening of lipid panel and HbA1c.  · Trulicity was prescribed -> pt states today he has not started it yet "letting januvia get out of my system"  · Appears pravastatin was increased from 20mg to 40mg -> pt reports still taking 20mg pills b/c of problems getting new dose filled.     Labs today stable w/ mild ALT elevation  (Prior serologic eval for other etiologies was unyielding)  U/S w/ no liver lesions    Feels well  Denies jaundice, dark urine, hematemesis, melena, slowed mentation, abdominal distention.       Cirrhosis history:  FibroScan 4/28/16 - kPA 17.3, F4  Labs / imaging / pretreatment APRI / FIB-4 were consistent w/ advanced fibrosis:    Well compensated.   No ascites, jaundice, HE  Albumin 4.4    MELD-Na score: 6 at 6/24/2019 10:24 AM  MELD score: 6 at 6/24/2019 10:24 AM  Calculated from:  Serum Creatinine: 0.8 mg/dL (Rounded to 1 mg/dL) at 6/24/2019 10:24 AM  Serum Sodium: 138 mmol/L (Rounded to 137 mmol/L) at 6/24/2019 10:24 AM  Total Bilirubin: 0.8 mg/dL (Rounded to 1 mg/dL) at 6/24/2019 10:24 AM  INR(ratio): 1.0 at 6/24/2019 10:24 AM  Age: 59 years    - HCC screening - u/s today - no liver lesions, AFP today - 3.4  - Varices screening - EGD 5/2016 Dr Soto - no varices    Fatty Liver:  - steatosis on imaging  - (+) DM and Hyperlipidemia (both poorly controlled) w/ hx of medication non adherence  - BMI 27    HCV history:  Completed 24 weeks harvoni w/ SVR12 as of 2/2017  - genotype 1B  - Prior HCV tx w/ Dr Laurent: PegIFN + RBV (10-15 yrs ago) for few months - nonresponse                   Past Medical History: "   Diagnosis Date    Cirrhosis     Diverticulosis     History of hepatitis C, s/p successful Rx w/ SVR24 - 6/2017 4/7/2016    S/p harvoni w/ SVR    Hyperlipidemia 04/2016    Hypertension     Internal hemorrhoid     New onset type 2 diabetes mellitus 4/2016    Positive QuantiFERON-TB Gold test 4/2016    Sebopsoriasis      Past Surgical History:   Procedure Laterality Date    COLONOSCOPY  05/19/2016    repeat in 10 yrs    COLONOSCOPY - colon cancer screening Left 5/19/2016    Performed by David [facility] at [facility] (4TH FLR)    ESOPHAGOGASTRODUODENOSCOPY (EGD), varices screening, also needs colonoscopy. needs labs prior Left 5/19/2016    Performed by David Soto MD at [facility] (4TH FLR)       FAMILY HISTORY: Negative for liver disease    MEDS & ALLERGIES:  Reviewed in epic      SOCIAL HISTORY:   Moved from [facility] during teenage years  Resides in [place], not . 1 daughter from prior marriage  Not working, on disability. Worked at [employer] many years ago  Alcohol - none  Tobacco - none  Drugs - none      ROS:   No fever, chills, weight loss.  No chest pain, palpitations, dyspnea, cough  No abdominal pain, nausea, vomiting  No skin rashes   No headaches  No lower extremity edema  No depression or anxiety      PHYSICAL EXAM:  Friendly  male, in no acute distress; alert and oriented to person, place and time  VITALS: reviewed.   HEENT: Sclerae anicteric.   LUNGS: Normal respiratory effort. Clear to auscultation bilaterally w/ good air exchange   CVS: Regular rate and rhythm w/ no murmurs  ABDOMEN: Nondistended. Soft. Nontender.   EXTREMITIES: No edema.   SKIN: No jaundice or spider telangectasias. No rashes on exposed skin  NEURO/PSYCH: Normal gait. Memory intact. Thought and speech patterns appropriate. No obvious depression or anxiety.       RECENT LABS:  Lab Results   Component Value Date    WBC 5.24 06/24/2019    HGB 16.3 06/24/2019     06/24/2019     Lab Results    Component Value Date    INR 0.9 12/06/2018     Lab Results   Component Value Date    AST 29 12/06/2018    ALT 33 12/06/2018    BILITOT 0.9 12/06/2018    ALBUMIN 4.2 12/06/2018    ALKPHOS 60 12/06/2018    CREATININE 0.8 12/06/2018    BUN 15 12/06/2018     12/06/2018    K 4.5 12/06/2018    AFP 3.4 12/06/2018         RECENT IMAGING:  Results for orders placed during the hospital encounter of 06/24/19   US Abdomen Limited    Narrative EXAMINATION:  US ABDOMEN LIMITED    CLINICAL HISTORY:  Unspecified cirrhosis of liver    TECHNIQUE:  Limited ultrasound of the right upper quadrant of the abdomen (including pancreas, liver, gallbladder, common bile duct, and right kidney) was performed.    COMPARISON:  Ultrasound abdomen 12/19/2018    FINDINGS:  The liver measures 16.7 cm and demonstrates diffusely increased parenchymal echogenicity with an elevated HR I of 1.38, consistent with fatty infiltration.  There is no intra or extrahepatic bile duct dilatation.  The common duct measures 2 mm.    The gallbladder contains tiny floating hyperechoic foci, the largest measuring 1.0 cm, likely a combination of biliary crystals and stones.  No evidence of wall thickening, pericholecystic fluid, or sonographic Mason's sign.    The visualized portions of the pancreas are unremarkable.    The spleen measures 11.3 x 3.9 cm and is unremarkable.    There is no free fluid within the visualized abdomen.      Impression 1.  Hepatic steatosis.    2.  Gallbladder contains tiny floating hyperechoic foci, likely a combination of tiny stones and biliary crystals.    Electronically signed by resident: Anna Hensley  Date:    06/24/2019  Time:    10:28    Electronically signed by: Katelin Murguia MD  Date:    06/24/2019  Time:    11:01         ASSESSMENT  59 y.o.   male with:  1. WELL COMPENSATED CIRRHOSIS   -- MELD score today - 6  -- HCC screening - AFP normal. U/S today w/ no liver lesions  -- EGD 5/2016 varices screening - no  varices    2. HISTORY OF CHRONIC HEPATITIS C, GENOTYPE 1B - S/P successful Rx w/ SVR12 2/2017  -- completed 24weeks of Harvoni  -- Prior HCV treatment - PegIFN + RBV for few months many years ago - nonresponse  -- (+) Immunity to HAV & HBV    3. NAFLD / HX OF ELEVATED TRANSAMINASES  -- serologic eval for other causes has been unyielding    4. HYPERLIPIDEMIA  -- has pravastatin but adherence is in question; possible problems obtaining new dose (see above)    5. DM  -- recent HbA1c 8.1  -- following w/ endocrine now, meds changed recently but not yet started by pt        EDUCATION:  Cirrhosis will continue to exist. Needs lifelong HCC screening and liver monitoring every 6 months indefinitely    Again discussed importance of optimal lipid and glucose mngmt as well as health weight, exercise, diet for fatty liver. Discussed potential for fatty liver to result in progression of cirrhosis. Discussed risk of untreated DM and hyperlipidemia to result in MI, CVA      PLAN:  1. Strongly advised pt to begin trulicity per endocrine instructions and to increase pravastatin to 40mg daily.  2. F/U visit w/ CBC, CMP, INR, AFP, U/S abdomen due 12/2019    Per AASLD guidelines, can hold off on varices screen for now: kPa 17, normal plt, no findings of portal HTN on u/s.

## 2019-06-25 RX ORDER — PRAVASTATIN SODIUM 40 MG/1
40 TABLET ORAL DAILY
Qty: 90 TABLET | Refills: 3 | Status: SHIPPED | OUTPATIENT
Start: 2019-06-25 | End: 2019-06-28 | Stop reason: SDUPTHER

## 2019-06-27 ENCOUNTER — PATIENT OUTREACH (OUTPATIENT)
Dept: ADMINISTRATIVE | Facility: HOSPITAL | Age: 59
End: 2019-06-27

## 2019-06-28 RX ORDER — PRAVASTATIN SODIUM 40 MG/1
40 TABLET ORAL DAILY
Qty: 90 TABLET | Refills: 3 | Status: SHIPPED | OUTPATIENT
Start: 2019-06-28 | End: 2019-07-30 | Stop reason: SDUPTHER

## 2019-07-10 ENCOUNTER — TELEPHONE (OUTPATIENT)
Dept: INTERNAL MEDICINE | Facility: CLINIC | Age: 59
End: 2019-07-10

## 2019-07-10 NOTE — TELEPHONE ENCOUNTER
----- Message from Karen Sotomayor sent at 7/10/2019  9:29 AM CDT -----  Contact: self  Pt accidentally canceled his appt for tomorrow. He would like to know if it can be reinstated. The appt was not available for me to do so.    He can be reached at [phone].    Thank you

## 2019-07-11 ENCOUNTER — OFFICE VISIT (OUTPATIENT)
Dept: INTERNAL MEDICINE | Facility: CLINIC | Age: 59
End: 2019-07-11
Payer: MEDICARE

## 2019-07-11 VITALS
BODY MASS INDEX: 27.57 KG/M2 | HEIGHT: 67 IN | DIASTOLIC BLOOD PRESSURE: 70 MMHG | OXYGEN SATURATION: 95 % | SYSTOLIC BLOOD PRESSURE: 115 MMHG | WEIGHT: 175.69 LBS | HEART RATE: 96 BPM | TEMPERATURE: 99 F

## 2019-07-11 DIAGNOSIS — E11.59 HYPERTENSION ASSOCIATED WITH DIABETES: ICD-10-CM

## 2019-07-11 DIAGNOSIS — K74.60 HEPATIC CIRRHOSIS DUE TO CHRONIC HEPATITIS C INFECTION: ICD-10-CM

## 2019-07-11 DIAGNOSIS — E11.65 UNCONTROLLED TYPE 2 DIABETES MELLITUS WITH HYPERGLYCEMIA: ICD-10-CM

## 2019-07-11 DIAGNOSIS — E11.69 HYPERLIPIDEMIA ASSOCIATED WITH TYPE 2 DIABETES MELLITUS: ICD-10-CM

## 2019-07-11 DIAGNOSIS — E78.5 HYPERLIPIDEMIA ASSOCIATED WITH TYPE 2 DIABETES MELLITUS: ICD-10-CM

## 2019-07-11 DIAGNOSIS — Z00.00 ANNUAL PHYSICAL EXAM: Primary | ICD-10-CM

## 2019-07-11 DIAGNOSIS — B18.2 HEPATIC CIRRHOSIS DUE TO CHRONIC HEPATITIS C INFECTION: ICD-10-CM

## 2019-07-11 DIAGNOSIS — I15.2 HYPERTENSION ASSOCIATED WITH DIABETES: ICD-10-CM

## 2019-07-11 PROCEDURE — 99396 PR PREVENTIVE VISIT,EST,40-64: ICD-10-PCS | Mod: HCNC,S$GLB,, | Performed by: INTERNAL MEDICINE

## 2019-07-11 PROCEDURE — 3078F DIAST BP <80 MM HG: CPT | Mod: HCNC,CPTII,S$GLB, | Performed by: INTERNAL MEDICINE

## 2019-07-11 PROCEDURE — 3074F PR MOST RECENT SYSTOLIC BLOOD PRESSURE < 130 MM HG: ICD-10-PCS | Mod: HCNC,CPTII,S$GLB, | Performed by: INTERNAL MEDICINE

## 2019-07-11 PROCEDURE — 99999 PR PBB SHADOW E&M-EST. PATIENT-LVL III: CPT | Mod: PBBFAC,HCNC,, | Performed by: INTERNAL MEDICINE

## 2019-07-11 PROCEDURE — 3045F PR MOST RECENT HEMOGLOBIN A1C LEVEL 7.0-9.0%: ICD-10-PCS | Mod: HCNC,CPTII,S$GLB, | Performed by: INTERNAL MEDICINE

## 2019-07-11 PROCEDURE — 3078F PR MOST RECENT DIASTOLIC BLOOD PRESSURE < 80 MM HG: ICD-10-PCS | Mod: HCNC,CPTII,S$GLB, | Performed by: INTERNAL MEDICINE

## 2019-07-11 PROCEDURE — 99999 PR PBB SHADOW E&M-EST. PATIENT-LVL III: ICD-10-PCS | Mod: PBBFAC,HCNC,, | Performed by: INTERNAL MEDICINE

## 2019-07-11 PROCEDURE — 3045F PR MOST RECENT HEMOGLOBIN A1C LEVEL 7.0-9.0%: CPT | Mod: HCNC,CPTII,S$GLB, | Performed by: INTERNAL MEDICINE

## 2019-07-11 PROCEDURE — 3074F SYST BP LT 130 MM HG: CPT | Mod: HCNC,CPTII,S$GLB, | Performed by: INTERNAL MEDICINE

## 2019-07-11 PROCEDURE — 99396 PREV VISIT EST AGE 40-64: CPT | Mod: HCNC,S$GLB,, | Performed by: INTERNAL MEDICINE

## 2019-07-11 NOTE — PROGRESS NOTES
INTERNAL MEDICINE ANNUAL VISIT NOTE      CHIEF COMPLAINT     Chief Complaint   Patient presents with    Annual Exam     HPI     Graham Caceres is a 59 y.o. C male who presents for annual exam.    DM2 - trulicity 0.75mg weekly (only on it for 3 weeks).  Checking sugars once every 3 days.   Doesn't feel like it decreases his appetite.   Last a1c 5/17/19 8.1   Follows w/ Lizeth Mcdonald NP  Plan on going to [facility] in Nov. Worried that that trulicity will not work since he'll be on long flights and unable to refrigerate his trulicity. Would like pharmacy assistance on januvia.    HTN -losartan 50mg daily.     Cholesterol is off the charts. Pt reports pravastatin was upped to 40mg daily.   Still eating a lot of crabs and shrimp and high cholesterol foods.     Hep C cirrhosis s/p Harvoni.   Elevated ALT 50.     Past Medical History:  Past Medical History:   Diagnosis Date    Cirrhosis     Diverticulosis     History of hepatitis C, s/p successful Rx w/ SVR24 - 6/2017 4/7/2016    S/p harvoni w/ SVR    Hyperlipidemia 04/2016    Hypertension     Internal hemorrhoid     New onset type 2 diabetes mellitus 4/2016    Positive QuantiFERON-TB Gold test 4/2016    Sebopsoriasis        Past Surgical History:  Past Surgical History:   Procedure Laterality Date    COLONOSCOPY  05/19/2016    repeat in 10 yrs    COLONOSCOPY - colon cancer screening Left 5/19/2016    Performed by David Soto MD at [facility] (4TH FLR)    ESOPHAGOGASTRODUODENOSCOPY (EGD), varices screening, also needs colonoscopy. needs labs prior Left 5/19/2016    Performed by David Soto MD at [facility] (4TH FLR)       Allergies:  Review of patient's allergies indicates:  No Known Allergies    Home Medications:  Prior to Admission medications    Medication Sig Start Date End Date Taking? Authorizing Provider   ammonium lactate 12 % Crea Apply twice daily to affected parts both feet as needed. 5/27/19   Matteo Marks DPM   blood sugar diagnostic  "Strp DAILY  Patient taking differently: Test blood sugar once a week 6/26/17   Jennifer Manzanares MD   blood sugar diagnostic Strp To check BG 2 times daily, to use with insurance preferred meter 5/17/19   RALF Kan FNP   blood-glucose meter kit To check BG 2 times daily, to use with insurance preferred meter 5/17/19   RALF Kan FNP   dulaglutide (TRULICITY) 0.75 mg/0.5 mL PnIj Inject 0.5 mLs (0.75 mg total) into the skin every 7 days. 5/17/19   RALF Kan FNP   ketoconazole (NIZORAL) 2 % cream Apply topically once daily. 5/27/19   Matteo Marks DPM   lancets Misc Use daily.  Patient taking differently: Test blood sugar once a week 6/26/17   Jennifer Manzanares MD   lancets Misc To check BG 2 times daily, to use with insurance preferred meter 5/17/19   RALF Kan FNP   losartan (COZAAR) 50 MG tablet Take 1 tablet (50 mg total) by mouth once daily. 11/12/18 11/12/19  Jennifer Manzanares MD   pravastatin (PRAVACHOL) 40 MG tablet Take 1 tablet (40 mg total) by mouth once daily. 6/28/19 6/27/20  RALF Kan FNP       Family History:  Family History   Problem Relation Age of Onset    Melanoma Neg Hx        Social History:  Social History     Tobacco Use    Smoking status: Never Smoker   Substance Use Topics    Alcohol use: Not on file    Drug use: No       Review of Systems:  Review of Systems Comprehensive review of systems otherwise negative. See history/subjective section for more details.    Health Maintainence:    reviewed.     PHYSICAL EXAM     /70 (BP Location: Left arm, Patient Position: Sitting, BP Method: Medium (Manual))   Pulse 96   Temp 98.9 °F (37.2 °C)   Ht 5' 7" (1.702 m)   Wt 79.7 kg (175 lb 11.3 oz)   SpO2 95%   BMI 27.52 kg/m²     GEN - A+OX4, NAD   HEENT - PERRL, EOMI, OP clear. MMM.   Neck - No thyromegaly or cervical LAD. No thyroid masses felt.  CV - RRR, no m/r   Chest - CTAB, no wheezing or rhonchi  Abd - S/NT/ND/+BS.   Ext - 2+BDP and " radial pulses. No LE edema.   Neuro - 5/5 BUE and BLE strength. Sensation to light touch intact throughout. 2+ DTRs. Normal gait.   MSK - No spinal tenderness to palpation. Normal gait.   Skin - No rash.    LABS     Previous labs reviewed.    ASSESSMENT/PLAN     Graham Caceres is a 59 y.o. male with  Hsian was seen today for annual exam.    Diagnoses and all orders for this visit:    Annual physical exam  -     Hemoglobin A1c; Future; Expected date: 07/25/2019  -     Lipid panel; Future; Expected date: 07/25/2019  -     Comprehensive metabolic panel; Future; Expected date: 07/25/2019    Uncontrolled type 2 diabetes mellitus with hyperglycemia - on trulicity 0.75mg weekly x 3-4 weeks. Repeat a1c in 2 weeks to see how he's doing. H/o noncompliance. Pharmacy assistance is trying to help him get Januvia as he'll be going to [facility] in Nov and no fridge.   -     Hemoglobin A1c; Future; Expected date: 07/25/2019  -     Comprehensive metabolic panel; Future; Expected date: 07/25/2019    Hyperlipidemia associated with type 2 diabetes mellitus - increased from prava 20 to 40mg daily. Lipids in 2 weeks. Likely will have to go up to 80mg daily. HLD due to dietary indiscretion.   -     Lipid panel; Future; Expected date: 07/25/2019  -     Comprehensive metabolic panel; Future; Expected date: 07/25/2019    Hypertension associated with diabetes - Stable and controlled. Continue current medications.  -     Comprehensive metabolic panel; Future; Expected date: 07/25/2019    Hepatic cirrhosis due to chronic hepatitis C infection - f/u w/ hepatology.     Pt to make appt to f/u w/ Lizeth Mcdonald NP in Nov.  RTC in 6 months, sooner if needed and depending on labs.    Jennifer Manzanares MD  Department of Internal Medicine - [facility]  10:11 AM

## 2019-07-16 ENCOUNTER — PES CALL (OUTPATIENT)
Dept: ADMINISTRATIVE | Facility: CLINIC | Age: 59
End: 2019-07-16

## 2019-07-26 ENCOUNTER — LAB VISIT (OUTPATIENT)
Dept: LAB | Facility: HOSPITAL | Age: 59
End: 2019-07-26
Attending: INTERNAL MEDICINE
Payer: MEDICARE

## 2019-07-26 DIAGNOSIS — E78.5 HYPERLIPIDEMIA ASSOCIATED WITH TYPE 2 DIABETES MELLITUS: ICD-10-CM

## 2019-07-26 DIAGNOSIS — E11.65 UNCONTROLLED TYPE 2 DIABETES MELLITUS WITH HYPERGLYCEMIA: ICD-10-CM

## 2019-07-26 DIAGNOSIS — Z00.00 ANNUAL PHYSICAL EXAM: ICD-10-CM

## 2019-07-26 DIAGNOSIS — E11.59 HYPERTENSION ASSOCIATED WITH DIABETES: ICD-10-CM

## 2019-07-26 DIAGNOSIS — E11.69 HYPERLIPIDEMIA ASSOCIATED WITH TYPE 2 DIABETES MELLITUS: ICD-10-CM

## 2019-07-26 DIAGNOSIS — I15.2 HYPERTENSION ASSOCIATED WITH DIABETES: ICD-10-CM

## 2019-07-26 LAB
ALBUMIN SERPL BCP-MCNC: 4.2 G/DL (ref 3.5–5.2)
ALP SERPL-CCNC: 57 U/L (ref 55–135)
ALT SERPL W/O P-5'-P-CCNC: 53 U/L (ref 10–44)
ANION GAP SERPL CALC-SCNC: 8 MMOL/L (ref 8–16)
AST SERPL-CCNC: 31 U/L (ref 10–40)
BILIRUB SERPL-MCNC: 0.8 MG/DL (ref 0.1–1)
BUN SERPL-MCNC: 9 MG/DL (ref 6–20)
CALCIUM SERPL-MCNC: 9.9 MG/DL (ref 8.7–10.5)
CHLORIDE SERPL-SCNC: 104 MMOL/L (ref 95–110)
CHOLEST SERPL-MCNC: 210 MG/DL (ref 120–199)
CHOLEST/HDLC SERPL: 5.3 {RATIO} (ref 2–5)
CO2 SERPL-SCNC: 28 MMOL/L (ref 23–29)
CREAT SERPL-MCNC: 0.8 MG/DL (ref 0.5–1.4)
EST. GFR  (AFRICAN AMERICAN): >60 ML/MIN/1.73 M^2
EST. GFR  (NON AFRICAN AMERICAN): >60 ML/MIN/1.73 M^2
ESTIMATED AVG GLUCOSE: 160 MG/DL (ref 68–131)
GLUCOSE SERPL-MCNC: 138 MG/DL (ref 70–110)
HBA1C MFR BLD HPLC: 7.2 % (ref 4–5.6)
HDLC SERPL-MCNC: 40 MG/DL (ref 40–75)
HDLC SERPL: 19 % (ref 20–50)
LDLC SERPL CALC-MCNC: 110.8 MG/DL (ref 63–159)
NONHDLC SERPL-MCNC: 170 MG/DL
POTASSIUM SERPL-SCNC: 4.3 MMOL/L (ref 3.5–5.1)
PROT SERPL-MCNC: 7.6 G/DL (ref 6–8.4)
SODIUM SERPL-SCNC: 140 MMOL/L (ref 136–145)
TRIGL SERPL-MCNC: 296 MG/DL (ref 30–150)

## 2019-07-26 PROCEDURE — 36415 COLL VENOUS BLD VENIPUNCTURE: CPT | Mod: HCNC

## 2019-07-26 PROCEDURE — 83036 HEMOGLOBIN GLYCOSYLATED A1C: CPT | Mod: HCNC

## 2019-07-26 PROCEDURE — 80053 COMPREHEN METABOLIC PANEL: CPT | Mod: HCNC

## 2019-07-26 PROCEDURE — 80061 LIPID PANEL: CPT | Mod: HCNC

## 2019-07-30 ENCOUNTER — TELEPHONE (OUTPATIENT)
Dept: INTERNAL MEDICINE | Facility: CLINIC | Age: 59
End: 2019-07-30

## 2019-07-30 RX ORDER — PRAVASTATIN SODIUM 40 MG/1
60 TABLET ORAL DAILY
Qty: 135 TABLET | Refills: 3 | Status: SHIPPED | OUTPATIENT
Start: 2019-07-30 | End: 2020-01-14

## 2019-07-30 NOTE — TELEPHONE ENCOUNTER
Please call and notify pt:    His a1c is better but still not quite at goal of <7. I recommend increasing the trulicity to 0.75mg weekly to 1.5mg weekly. In addition, his cholesterol is better but still high. I recommend increasing pravastatin 40mg to 1.5 pills (60mg) daily. I'll send in both scripts to the pharmacy. Follow up with the liver doctor as scheduled. Kidney function is normal.

## 2019-07-31 NOTE — TELEPHONE ENCOUNTER
Spoke with pt, notified of results. Advised of medication increase, pt repeated back and verbalized understanding

## 2019-08-02 ENCOUNTER — TELEPHONE (OUTPATIENT)
Dept: ENDOCRINOLOGY | Facility: CLINIC | Age: 59
End: 2019-08-02

## 2019-08-19 ENCOUNTER — TELEPHONE (OUTPATIENT)
Dept: OPTOMETRY | Facility: CLINIC | Age: 59
End: 2019-08-19

## 2019-09-25 NOTE — DISCHARGE SUMMARY
[facility] Hospital Medicine  Discharge Summary      Patient Name: Graham Caceres  MRN: [id number]  Admission Date: 8/11/2018  Hospital Length of Stay: 3 days  Discharge Date and Time:  08/14/2018 2:23 PM  Attending Physician: No att. providers found   Discharging Provider: Fredy Blum MD  Primary Care Provider: Jennifer Manzanares MD  [facility] Medicine Team: [facility]P MED 3 Fredy Blum MD    HPI:   57 yo M with PMH Hep C Cirrhosis (s/p Harvoni treatment), Fatty Liver Disease, DM2, HLD, HTN presents on 8/11 to the [facility] ER after presenting at urgent care with fevers at home and generalized achiness x 2 days.  Patient states he first started feeling feverish in the afternoon in 8/10.  He took two tylenol (?500 mg each) but his fever did not resolve.  He took another two tylenol in the evening and went to bed.  In the morning he awoke still feeling feverish.  He took his temp with an oral thermometer and got a read of 39.3 Celsius.  He took one more tylenol.  He is aware of his daily 2 gram limit of tylenol given his liver history.  He then presented to urgent care who advised him to go to the ER where his Tmax was 103.3.  The ER reported he had abdominal pain and reportedly performed a bedside US which revealed no ascites to tap.  They gave him a dose of Rocephin 2 grams after collecting blood cultures.  Upon my interview, he initially denied abdominal pain but then stated he had mild upper abdominal pain the previous day but not the day of admission.  He denies headache, neck pain/stiffness, sore throat, chest pain, SOB, nausea, vomiting, diarrhea, dysuria, skin changes/rashes, leg swelling.  He states he has been coughing for the past several weeks.  He attributed the cough to the lisinopril he was recently prescribed about 3 months ago by his PCP.  The cough has not worsened in the recent days.  He denies recent travel, recent surgery, recent trauma.  He says he spent time with a friend a week prior who was  Quality 111:Pneumonia Vaccination Status For Older Adults: Pneumococcal Vaccination Previously Received Detail Level: Detailed coughing.  He does not know how he got Hep C but is from [facility].  He denies previous IVDA.  He has had one lifetime sexual partner.  Per hepatology outpatient notes, he is to undergo q 6 monthly screening for HCC and liver monitoring.  As of 6/25, quantitative HCV RNA was undetectably low.      In the ER, Tmax was 103.3 but improved to 99.6 without intervention.  He was tachycardic to the 130s.  WBC was 10.7, lactate 1.3, procal 0.21, UA significant only for rare bacteria, CXR negative for consolidations.        * No surgery found *      Hospital Course:   Mr. Caceres presented with fever 103F, abdominal pain, diarrhea for few days. Was put on empirical abx with vanc/zosyn. Blood culture, urine culture, and stool cultures are NGTD, stool negative for ova or parasites. C. Diff negative. CXR was negative for pneumonia, and did not show any sign of acute exacerbation of his latent TB. No source of infection was found. Rheumatological etiology was suspected, follow-up labs showed elevated CRP, ESR, and RF. Regarding patient's h/o Hep C cirrhosis, he was treated with Harvoni, and is being followed outpatient. LFT on admission was normal. Preliminary diagnosis was viral gastroenteritis. He has been afebrile for 48 hours, and is stable for discharge with PCP outpatient follow-up.     Vitals:    08/14/18 1203   BP: 134/85   Pulse: 79   Resp: 20   Temp: 97.8 °F (36.6 °C)     Physical Exam   Constitutional: No distress.   HENT:   Head: Normocephalic and atraumatic.   Eyes: Conjunctivae are normal. No scleral icterus.   Neck: Neck supple.   Cardiovascular: Normal rate, regular rhythm and normal heart sounds.   Pulmonary/Chest: Effort normal and breath sounds normal. No stridor. No respiratory distress.   Abdominal: Soft. Bowel sounds are normal. He exhibits no distension. There is no tenderness. There is no rebound and no guarding.   Musculoskeletal: He exhibits no edema or tenderness.   Neurological: He is alert. No cranial  Quality 474: Zoster Vaccination Status: Shingrix Vaccination Administered or Previously Received nerve deficit.   Skin: Skin is warm and dry. No rash noted. He is not diaphoretic. No erythema.   Psychiatric: He has a normal mood and affect. His behavior is normal.        Consults:     No new Assessment & Plan notes have been filed under this hospital service since the last note was generated.  Service: Hospital Medicine    Final Active Diagnoses:    Diagnosis Date Noted POA    PRINCIPAL PROBLEM:  SIRS (systemic inflammatory response syndrome) [R65.10] 08/11/2018 Yes    Watery diarrhea [R19.7] 08/12/2018 Yes    Fever [R50.9] 08/11/2018 Yes    Respiratory alkalosis [E87.3] 08/11/2018 Yes    Hyperlipidemia [E78.5] 08/11/2018 Yes    Hepatic cirrhosis due to chronic hepatitis C infection [B18.2, K74.60] 11/02/2016 Yes    Type 2 diabetes mellitus without retinopathy [E11.9] 05/24/2016 Yes    HTN (hypertension) [I10] 04/26/2016 Yes      Problems Resolved During this Admission:       Discharged Condition: stable    Disposition: Home or Self Care    Follow Up:  Follow-up Information     Jennifer Manzanares MD In 1 week.    Specialty:  Internal Medicine  Contact information:  [address]  [place] [address]  [phone]                 Patient Instructions:      Ambulatory consult to Physical Therapy   Referral Priority: Routine Referral Type: Physical Medicine   Referral Reason: Specialty Services Required   Requested Specialty: Physical Therapy   Number of Visits Requested: 1       Significant Diagnostic Studies: Labs:   CMP   Recent Labs   Lab  08/13/18   0611  08/14/18   0339   NA  136  138   K  3.9  3.7   CL  102  103   CO2  24  24   GLU  173*  148*   BUN  7  11   CREATININE  0.8  0.8   CALCIUM  9.2  9.5   PROT  7.6  7.5   ALBUMIN  3.5  3.5   BILITOT  1.0  0.8   ALKPHOS  67  74   AST  50*  66*   ALT  56*  91*   ANIONGAP  10  11   ESTGFRAFRICA  >60.0  >60.0   EGFRNONAA  >60.0  >60.0    and CBC   Recent Labs   Lab  08/13/18   0611  08/14/18   0340   WBC  7.42  5.12   HGB  14.2  14.1   HCT  41.6  41.1   PLT  164   178     Microbiology:   Blood Culture   Lab Results   Component Value Date    LABBLOO No Growth to date 08/11/2018    LABBLOO No Growth to date 08/11/2018    LABBLOO No Growth to date 08/11/2018     Radiology: X-Ray: CXR: No acute cardiopulmonary process, hypoventilatory exam.  No large focal consolidation.    Pending Diagnostic Studies:     Procedure Component Value Units Date/Time    HCV RNA Qualitative [[id number]] Collected:  08/12/18 0126    Order Status:  Sent Lab Status:  In process Updated:  08/12/18 0157    Specimen:  Blood          Medications:  Reconciled Home Medications:      Medication List      CHANGE how you take these medications    blood sugar diagnostic Strp  DAILY  What changed:  additional instructions     lancets Misc  Use daily.  What changed:  additional instructions     pravastatin 20 MG tablet  Commonly known as:  PRAVACHOL  Take 2 tablets (40 mg total) by mouth once daily.  What changed:  how much to take        CONTINUE taking these medications    lisinopril 20 MG tablet  Commonly known as:  PRINIVIL,ZESTRIL  Take 1 tablet (20 mg total) by mouth once daily.     SITagliptin 25 MG Tab  Commonly known as:  JANUVIA  Take 1 tablet (25 mg total) by mouth once daily.            Indwelling Lines/Drains at time of discharge:   Lines/Drains/Airways          None          Time spent on the discharge of patient: 60 minutes  Patient was seen and examined on the date of discharge and determined to be suitable for discharge.         Fredy Blum MD  Department of Hospital Medicine  [facility]   Quality 110: Preventive Care And Screening: Influenza Immunization: Influenza Immunization previously received during influenza season

## 2019-10-25 ENCOUNTER — PES CALL (OUTPATIENT)
Dept: ADMINISTRATIVE | Facility: CLINIC | Age: 59
End: 2019-10-25

## 2019-10-31 ENCOUNTER — OFFICE VISIT (OUTPATIENT)
Dept: FAMILY MEDICINE | Facility: CLINIC | Age: 59
End: 2019-10-31
Payer: MEDICARE

## 2019-10-31 VITALS
DIASTOLIC BLOOD PRESSURE: 88 MMHG | WEIGHT: 179.44 LBS | OXYGEN SATURATION: 97 % | SYSTOLIC BLOOD PRESSURE: 130 MMHG | TEMPERATURE: 98 F | HEART RATE: 92 BPM | HEIGHT: 67 IN | BODY MASS INDEX: 28.16 KG/M2

## 2019-10-31 DIAGNOSIS — K76.0 NAFLD (NONALCOHOLIC FATTY LIVER DISEASE): ICD-10-CM

## 2019-10-31 DIAGNOSIS — E11.69 HYPERLIPIDEMIA ASSOCIATED WITH TYPE 2 DIABETES MELLITUS: ICD-10-CM

## 2019-10-31 DIAGNOSIS — E11.65 TYPE 2 DIABETES MELLITUS WITH HYPERGLYCEMIA, WITHOUT LONG-TERM CURRENT USE OF INSULIN: ICD-10-CM

## 2019-10-31 DIAGNOSIS — K74.60 HEPATIC CIRRHOSIS DUE TO CHRONIC HEPATITIS C INFECTION: ICD-10-CM

## 2019-10-31 DIAGNOSIS — E66.3 OVERWEIGHT (BMI 25.0-29.9): ICD-10-CM

## 2019-10-31 DIAGNOSIS — I15.2 HYPERTENSION ASSOCIATED WITH DIABETES: ICD-10-CM

## 2019-10-31 DIAGNOSIS — B18.2 HEPATIC CIRRHOSIS DUE TO CHRONIC HEPATITIS C INFECTION: ICD-10-CM

## 2019-10-31 DIAGNOSIS — E11.59 HYPERTENSION ASSOCIATED WITH DIABETES: ICD-10-CM

## 2019-10-31 DIAGNOSIS — E78.5 HYPERLIPIDEMIA ASSOCIATED WITH TYPE 2 DIABETES MELLITUS: ICD-10-CM

## 2019-10-31 DIAGNOSIS — Z00.00 ENCOUNTER FOR PREVENTIVE HEALTH EXAMINATION: Primary | ICD-10-CM

## 2019-10-31 PROBLEM — R65.10 SIRS (SYSTEMIC INFLAMMATORY RESPONSE SYNDROME): Status: RESOLVED | Noted: 2018-08-11 | Resolved: 2019-10-31

## 2019-10-31 PROCEDURE — 3079F PR MOST RECENT DIASTOLIC BLOOD PRESSURE 80-89 MM HG: ICD-10-PCS | Mod: HCNC,CPTII,S$GLB, | Performed by: NURSE PRACTITIONER

## 2019-10-31 PROCEDURE — G0439 PR MEDICARE ANNUAL WELLNESS SUBSEQUENT VISIT: ICD-10-PCS | Mod: HCNC,S$GLB,, | Performed by: NURSE PRACTITIONER

## 2019-10-31 PROCEDURE — 99999 PR PBB SHADOW E&M-EST. PATIENT-LVL V: CPT | Mod: PBBFAC,HCNC,, | Performed by: NURSE PRACTITIONER

## 2019-10-31 PROCEDURE — G0439 PPPS, SUBSEQ VISIT: HCPCS | Mod: HCNC,S$GLB,, | Performed by: NURSE PRACTITIONER

## 2019-10-31 PROCEDURE — 3075F PR MOST RECENT SYSTOLIC BLOOD PRESS GE 130-139MM HG: ICD-10-PCS | Mod: HCNC,CPTII,S$GLB, | Performed by: NURSE PRACTITIONER

## 2019-10-31 PROCEDURE — 99999 PR PBB SHADOW E&M-EST. PATIENT-LVL V: ICD-10-PCS | Mod: PBBFAC,HCNC,, | Performed by: NURSE PRACTITIONER

## 2019-10-31 PROCEDURE — 3079F DIAST BP 80-89 MM HG: CPT | Mod: HCNC,CPTII,S$GLB, | Performed by: NURSE PRACTITIONER

## 2019-10-31 PROCEDURE — 3075F SYST BP GE 130 - 139MM HG: CPT | Mod: HCNC,CPTII,S$GLB, | Performed by: NURSE PRACTITIONER

## 2019-10-31 NOTE — PROGRESS NOTES
"Graham Caceres presented for a  Medicare AWV and comprehensive Health Risk Assessment today. The following components were reviewed and updated:    · Medical history  · Family History  · Social history  · Allergies and Current Medications  · Health Risk Assessment  · Health Maintenance  · Care Team     ** See Completed Assessments for Annual Wellness Visit within the encounter summary.**       The following assessments were completed:  · Living Situation  · CAGE  · Depression Screening  · Timed Get Up and Go  · Whisper Test  · Cognitive Function Screening  · Nutrition Screening  · ADL Screening  · PAQ Screening    Vitals:    10/31/19 0832   BP: 130/88   BP Location: Right arm   Patient Position: Sitting   BP Method: Large (Manual)   Pulse: 92   Temp: 97.9 °F (36.6 °C)   TempSrc: Oral   SpO2: 97%   Weight: 81.4 kg (179 lb 7.3 oz)   Height: 5' 7" (1.702 m)     Body mass index is 28.11 kg/m².  Physical Exam   Constitutional: He is oriented to person, place, and time. He appears well-developed and well-nourished. No distress.   HENT:   Head: Normocephalic.   Eyes: Pupils are equal, round, and reactive to light. Conjunctivae are normal.   Neck: Normal range of motion. Neck supple.   Cardiovascular: Normal rate and regular rhythm.   No murmur heard.  Pulmonary/Chest: Effort normal and breath sounds normal.   Abdominal: Soft. Bowel sounds are normal. There is no tenderness.   Musculoskeletal: Normal range of motion.   Lymphadenopathy:     He has no cervical adenopathy.   Neurological: He is alert and oriented to person, place, and time.   Skin: Skin is warm and dry.   Psychiatric: He has a normal mood and affect.         Diagnoses and health risks identified today and associated recommendations/orders:    1. Encounter for preventive health examination    2. Hypertension associated with diabetes  - Chronic, stable, continue current medication therapy  - Followed by PCP    3. Type 2 diabetes mellitus with hyperglycemia, without " long-term current use of insulin  - Stable, patient noncompliant with diabetic diet recommendations  - Ambulatory consult to Diabetic Education; Future  - Followed by endocrinology and PCP    4. Hyperlipidemia associated with type 2 diabetes mellitus  - Uncontrolled, patient noncompliant with diet recommendations  - Reinforced better glucose control and low cholesterol diet  - Followed by PCP    5. Hepatic cirrhosis due to chronic hepatitis C infection  - Stable, followed by hepatology    6. NAFLD (nonalcoholic fatty liver disease)  - Stable, followed by hepatology    7. Overweight (BMI 25.0-29.9)  - Recommend healthy diet and exercise  - Followed by PCP      Provided Hsian with a 5-10 year written screening schedule and personal prevention plan. Recommendations were developed using the USPSTF age appropriate recommendations. Education, counseling, and referrals were provided as needed. After Visit Summary printed and given to patient which includes a list of additional screenings\tests needed.    Follow up for PCP visit as scheduled and Annual Medicare Wellness Exam 1 year.    SHANNAN Morales  I offered to discuss end of life issues, including information on how to make advance directives that the patient could use to name someone who would make medical decisions on their behalf if they became too ill to make themselves.    _X_Patient declined  ___Patient is interested, I provided paper work and offered to discuss.

## 2019-10-31 NOTE — PATIENT INSTRUCTIONS
Long-Term Complications of Diabetes    Diabetes can cause health problems over time. These are called complications. They are more likely to happen if your blood sugar is often too high. Over time, high blood sugar can damage blood vessels in your body. It is important to keep your blood sugar in your target range. This can help prevent or delay complications from diabetes.  Possible complications  Complications of diabetes include:  · Eye problems, including damage to the blood vessels in the eyes (retinopathy), pressure in the eye (glaucoma), and clouding of the eyes lens (a cataract). Eye problems can eventually lead to irreversible blindness.   · Tooth and gum problems (periodontal disease), causing loss of teeth and bone  · Blood vessel (vascular) disease leading to circulation problems, heart attack or stroke, or a need for amputation of a limb   · Problems with sexual function leading to erectile dysfunction in men and sexual discomfort in women   · Kidney disease (nephropathy) can eventually lead to kidney failure, which may require dialysis or kidney transplant   · Nerve problems (neuropathy), causing pain or loss of feeling in your feet and other parts of your body, potentially leading to an amputation of a limb   · High blood pressure (hypertension), putting strain on your heart and blood vessels  · Serious infections, possibly leading to loss of toes, feet, or limbs  How to avoid complications  The serious consequences of these complications may be avoidable for most people with diabetes by managing your blood glucose, blood pressure, and cholesterol levels. This can help you feel better and stay healthy. You can manage diabetes by tracking your blood sugar. You can also eat healthy and exercise to avoid gaining weight. And you should take medicine if directed by your healthcare provider.  Date Last Reviewed: 5/1/2016  © 2741-9888 [employer]. [address] [place] [address], [place] [address].  All rights reserved. This information is not intended as a substitute for professional medical care. Always follow your healthcare professional's instructions.          Healthy Meals for Diabetes     A healthcare provider will help you develop a meal plan that fits your needs.   Ask your healthcare team to help you make a meal plan that fits your needs. Your meal plan tells you when to eat your meals and snacks, what kinds of foods to eat, and how much of each food to eat. You dont have to give up all the foods you like. But you do need to follow some guidelines.  Choose healthy carbohydrates  Starches, sugars, and fiber are all types of carbohydrates. Fiber can help lower your cholesterol and triglycerides. Fiber is also healthy for your heart. You should have 20 to 35 grams of total fiber each day. Fiber-rich foods include:  · Whole-grain breads and cereals  · Bulgur wheat  · Brown rice     · Whole-wheat pasta  · Fruits and vegetables  · Dry beans, and peas   Keep track of the amount of carbohydrates you eat. This can help you keep the right balance of physical activity and medicine. The amount of carbohydrates needed will vary for each person. It depends on many things such as your health, the medicines you take, and how active you are. Your healthcare team will help you figure out the right amount of carbohydrates for you. You may start with around 45 to 60 grams of carbohydrates per meal, depending on your situation.   Here are some examples of foods containing about 15 grams of carbohydrates (1 serving of carbohydrates):  · 1/2 cup of canned or frozen fruit  · A small piece of fresh fruit (4 ounces)  · 1 slice of bread  · 1/2 cup of oatmeal  · 1/3 cup of rice  · 4 to 6 crackers  · 1/2 English muffin  · 1/2 cup of black beans  · 1/4 of a large baked potato (3 ounces)  · 2/3 cup of plain fat-free yogurt  · 1 cup of soup  · 1/2 cup of casserole  · 6 chicken nuggets  · 2-inch-square brownie or cake without  frosting  · 2 small cookies  · 1/2 cup of ice cream or sherbet  Choose healthy protein foods  Eating protein that is low in fat can help you control your weight. It also helps keep your heart healthy. Low-fat protein foods include:  · Fish  · Plant proteins, such as dry beans and peas, nuts, and soy products like tofu and soymilk  · Lean meat with all visible fat removed  · Poultry with the skin removed  · Low-fat or nonfat milk, cheese, and yogurt  Limit unhealthy fats and sugar  Saturated and trans fats are unhealthy for your heart. They raise LDL (bad) cholesterol. Fat is also high in calories, so it can make you gain weight. To cut down on unhealthy fats and sugar, limit these foods:  · Butter or margarine  · Palm and palm kernel oils and coconut oil  · Cream  · Cheese  · Ellison  · Lunch meats     · Ice cream  · Sweet bakery goods such as pies, muffins, and donuts  · Jams and jellies  · Candy bars  · Regular sodas   How much to eat  The amount of food you eat affects your blood sugar. It also affects your weight. Your healthcare team will tell you how much of each type of food you should eat.  · Use measuring cups and spoons and a food scale to measure serving sizes.  · Learn what a correct serving size looks like on your plate. This will help when you are away from home and cant measure your servings.  · Eat only the number of servings given on your meal plan for each food. Dont take seconds.  · Learn to read food labels. Be sure to look at serving size, total carbohydrates, fiber, calories, sugar, and saturated and trans fats. Look for healthier alternatives to foods that have added sugar.  · Plan ahead for parties so you can still have a good time without going overboard with unhealthy food choices. Set a good example yourself by bringing a healthy dish to pot lucks.   Choose healthy snacks  When it comes to snacks, we usually think about foods with added sugar and fats. But there are many other options for  healthier snack choices. Here are a few snack ideas to choose from:  Snacks with less than 5 grams of carbohydrates  · 1 piece of string cheese  · 3 celery sticks plus 1 tablespoon of peanut butter  · 5 cherry tomatoes plus 1 tablespoon of ranch dressing  · 1 hard-boiled egg  · 1/4 cup of fresh blueberries  ·  5 baby carrots  · 1 cup of light popcorn  · 1/2 cup of sugar-free gelatin  · 15 almonds  Snacks with about 10 to 20 grams of carbohydrates  · 1/3 cup of hummus plus 1 cup of fresh cut nonstarchy vegetables (carrots, green peppers, broccoli, celery, or a combination)  · 1/2 cup of fresh or canned fruit plus 1/4 cup of cottage cheese  · 1/2 cup of tuna salad with 4 crackers  · 2 rice cakes and a tablespoon of peanut butter  · 1 small apple or orange  · 3 cups light popcorn  · 1/2 of a turkey sandwich (1 slice of whole-wheat bread, 2 ounces of turkey, and mustard)  Portion sizes are important to controlling your blood sugar and staying at a healthy weight. Stock up on healthy snack items so you always have them on hand.  When to eat  Your meal plan will likely include breakfast, lunch, dinner, and some snacks.  · Try to eat your meals and snacks at about the same times each day.  · Eat all your meals and snacks. Skipping a meal or snack can make your blood sugar drop too low. It can also cause you to eat too much at the next meal or snack. Then your blood sugar could get too high.  Date Last Reviewed: 7/1/2016  © 2570-7245 [employer]. [address] [place] [address]. All rights reserved. This information is not intended as a substitute for professional medical care. Always follow your healthcare professional's instructions.        Counseling and Referral of Other Preventative  (Italic type indicates deductible and co-insurance are waived)    Patient Name: Graham aCceres  Today's Date: 10/31/2019    Health Maintenance       Date Due Completion Date    Influenza Vaccine (1) 09/01/2019 ---     Hemoglobin A1c 10/26/2019 7/26/2019    Shingles Vaccine (1 of 2) 07/11/2020 (Originally 1/18/2011) ---    Foot Exam 05/27/2020 5/27/2019    Override on 5/25/2018: Done    Override on 5/18/2016: Done    Eye Exam 05/27/2020 5/27/2019    Lipid Panel 07/26/2020 7/26/2019    TETANUS VACCINE 05/18/2026 5/18/2016    Colonoscopy 05/19/2026 5/19/2016        No orders of the defined types were placed in this encounter.    The following information is provided to all patients.  This information is to help you find resources for any of the problems found today that may be affecting your health:                Living healthy guide: www.[facility].[place].gov      Understanding Diabetes: www.diabetes.org      Eating healthy: www.cdc.gov/healthyweight      [facility] home safety checklist: www.cdc.gov/steadi/patient.html      Agency on Aging: www.goea.[place].gov      Alcoholics anonymous (AA): www.aa.org      Physical Activity: www.luc.nih.gov/xu7axnk      Tobacco use: www.quitwithusla.org

## 2019-12-01 ENCOUNTER — PATIENT OUTREACH (OUTPATIENT)
Dept: ADMINISTRATIVE | Facility: OTHER | Age: 59
End: 2019-12-01

## 2019-12-06 DIAGNOSIS — E11.9 TYPE 2 DIABETES MELLITUS WITHOUT COMPLICATION: ICD-10-CM

## 2019-12-17 ENCOUNTER — OFFICE VISIT (OUTPATIENT)
Dept: URGENT CARE | Facility: CLINIC | Age: 59
End: 2019-12-17
Payer: MEDICARE

## 2019-12-17 VITALS
RESPIRATION RATE: 18 BRPM | HEIGHT: 67 IN | BODY MASS INDEX: 28.25 KG/M2 | TEMPERATURE: 97 F | HEART RATE: 108 BPM | WEIGHT: 180 LBS | OXYGEN SATURATION: 98 %

## 2019-12-17 DIAGNOSIS — M54.32 SCIATICA OF LEFT SIDE: ICD-10-CM

## 2019-12-17 DIAGNOSIS — S39.012A STRAIN OF LUMBAR PARASPINOUS MUSCLE, INITIAL ENCOUNTER: ICD-10-CM

## 2019-12-17 DIAGNOSIS — M54.9 BACK PAIN, UNSPECIFIED BACK LOCATION, UNSPECIFIED BACK PAIN LATERALITY, UNSPECIFIED CHRONICITY: Primary | ICD-10-CM
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
## 2019-12-17 PROCEDURE — 72100 X-RAY EXAM L-S SPINE 2/3 VWS: CPT | Mod: FY,S$GLB,, | Performed by: RADIOLOGY

## 2019-12-17 PROCEDURE — 96372 THER/PROPH/DIAG INJ SC/IM: CPT | Mod: S$GLB,,, | Performed by: NURSE PRACTITIONER

## 2019-12-17 PROCEDURE — 99214 PR OFFICE/OUTPT VISIT, EST, LEVL IV, 30-39 MIN: ICD-10-PCS | Mod: 25,S$GLB,, | Performed by: NURSE PRACTITIONER

## 2019-12-17 PROCEDURE — 96372 PR INJECTION,THERAP/PROPH/DIAG2ST, IM OR SUBCUT: ICD-10-PCS | Mod: S$GLB,,, | Performed by: NURSE PRACTITIONER

## 2019-12-17 PROCEDURE — 72100 XR LUMBAR SPINE 2 OR 3 VIEWS: ICD-10-PCS | Mod: FY,S$GLB,, | Performed by: RADIOLOGY

## 2019-12-17 PROCEDURE — 99214 OFFICE O/P EST MOD 30 MIN: CPT | Mod: 25,S$GLB,, | Performed by: NURSE PRACTITIONER

## 2019-12-17 RX ORDER — CYCLOBENZAPRINE HCL 10 MG
10 TABLET ORAL 3 TIMES DAILY PRN
Qty: 20 TABLET | Refills: 0 | Status: SHIPPED | OUTPATIENT
Start: 2019-12-17 | End: 2019-12-24

## 2019-12-17 RX ORDER — KETOROLAC TROMETHAMINE 30 MG/ML
30 INJECTION, SOLUTION INTRAMUSCULAR; INTRAVENOUS
Status: DISCONTINUED | OUTPATIENT
Start: 2019-12-17 | End: 2019-12-17

## 2019-12-17 RX ORDER — ETODOLAC 300 MG/1
300 CAPSULE ORAL 2 TIMES DAILY
Qty: 30 CAPSULE | Refills: 0 | Status: SHIPPED | OUTPATIENT
Start: 2019-12-17 | End: 2020-01-01

## 2019-12-17 RX ORDER — KETOROLAC TROMETHAMINE 30 MG/ML
30 INJECTION, SOLUTION INTRAMUSCULAR; INTRAVENOUS
Status: COMPLETED | OUTPATIENT
Start: 2019-12-17 | End: 2019-12-17

## 2019-12-17 RX ADMIN — KETOROLAC TROMETHAMINE 30 MG: 30 INJECTION, SOLUTION INTRAMUSCULAR; INTRAVENOUS at 04:12
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
## 2019-12-17 NOTE — PATIENT INSTRUCTIONS
Back Pain (Acute or Chronic)    Back pain is one of the most common problems. The good news is that most people feel better in 1 to 2 weeks, and most of the rest in 1 to 2 months. Most people can remain active.  People experience and describe pain differently; not everyone is the same.  · The pain can be sharp, stabbing, shooting, aching, cramping or burning.  · Movement, standing, bending, lifting, sitting, or walking may worsen pain.  · It can be localized to one spot or area, or it can be more generalized.  · It can spread or radiate upwards, to the front, or go down your arms or legs (sciatica).  · It can cause muscle spasm.  Most of the time, mechanical problems with the muscles or spine cause the pain. Mechanical problems are usually caused by an injury to the muscles or ligaments. While illness can cause back pain, it is usually not caused by a serious illness. Mechanical problems include:   · Physical activity such as sports, exercise, work, or normal activity  · Overexertion, lifting, pushing, pulling incorrectly or too aggressively  · Sudden twisting, bending, or stretching from an accident, or accidental movement  · Poor posture  · Stretching or moving wrong, without noticing pain at the time  · Poor coordination, lack of regular exercise (check with your doctor about this)  · Spinal disc disease or arthritis  · Stress  Pain can also be related to pregnancy, or illness like appendicitis, bladder or kidney infections, pelvic infections, and many other things.  Acute back pain usually gets better in 1 to 2 weeks. Back pain related to disk disease, arthritis in the spinal joints or spinal stenosis (narrowing of the spinal canal) can become chronic and last for months or years.  Unless you had a physical injury (for example, a car accident or fall) X-rays are usually not needed for the initial evaluation of back pain. If pain continues and does not respond to medical treatment, X-rays and other tests may be  needed.  Home care  Try these home care recommendations:  · When in bed, try to find a position of comfort. A firm mattress is best. Try lying flat on your back with pillows under your knees. You can also try lying on your side with your knees bent up towards your chest and a pillow between your knees.  · At first, do not try to stretch out the sore spots. If there is a strain, it is not like the good soreness you get after exercising without an injury. In this case, stretching may make it worse.  · Avoid prolong sitting, long car rides, or travel. This puts more stress on the lower back than standing or walking.  · During the first 24 to 72 hours after an acute injury or flare up of chronic back pain, apply an ice pack to the painful area for 20 minutes and then remove it for 20 minutes. Do this over a period of 60 to 90 minutes or several times a day. This will reduce swelling and pain. Wrap the ice pack in a thin towel or plastic to protect your skin.  · You can start with ice, then switch to heat. Heat (hot shower, hot bath, or heating pad) reduces pain and works well for muscle spasms. Heat can be applied to the painful area for 20 minutes then remove it for 20 minutes. Do this over a period of 60 to 90 minutes or several times a day. Do not sleep on a heating pad. It can lead to skin burns or tissue damage.  · You can alternate ice and heat therapy. Talk with your doctor about the best treatment for your back pain.  · Therapeutic massage can help relax the back muscles without stretching them.  · Be aware of safe lifting methods and do not lift anything without stretching first.  Medicines  Talk to your doctor before using medicine, especially if you have other medical problems or are taking other medicines.  · You may use over-the-counter medicine as directed on the bottle to control pain, unless another pain medicine was prescribed. If you have chronic conditions like diabetes, liver or kidney disease,  stomach ulcers, or gastrointestinal bleeding, or are taking blood thinners, talk to your doctor before taking any medicine.  · Be careful if you are given a prescription medicines, narcotics, or medicine for muscle spasms. They can cause drowsiness, affect your coordination, reflexes, and judgement. Do not drive or operate heavy machinery.  Follow-up care  Follow up with your healthcare provider, or as advised.   A radiologist will review any X-rays that were taken. Your provide will notify you of any new findings that may affect your care.  Call 911  Call emergency services if any of the following occur:  · Trouble breathing  · Confusion  · Very drowsy or trouble awakening  · Fainting or loss of consciousness  · Rapid or very slow heart rate  · Loss of bowel or bladder control  When to seek medical advice  Call your healthcare provider right away if any of these occur:   · Pain becomes worse or spreads to your legs  · Weakness or numbness in one or both legs  · Numbness in the groin or genital area  Date Last Reviewed: 7/1/2016  © 0578-5259 [employer]. [address] [place] [address]. All rights reserved. This information is not intended as a substitute for professional medical care. Always follow your healthcare professional's instructions.      If you were prescribed a muscle relaxant medication, do not drive or operate heavy equipment or machinery while taking these medications.

## 2020-01-13 ENCOUNTER — PES CALL (OUTPATIENT)
Dept: ADMINISTRATIVE | Facility: CLINIC | Age: 60
End: 2020-01-13

## 2020-01-13 ENCOUNTER — LAB VISIT (OUTPATIENT)
Dept: LAB | Facility: HOSPITAL | Age: 60
End: 2020-01-13
Attending: PHYSICIAN ASSISTANT
Payer: MEDICARE

## 2020-01-13 DIAGNOSIS — E11.9 TYPE 2 DIABETES MELLITUS WITHOUT COMPLICATION: ICD-10-CM

## 2020-01-13 LAB
ALBUMIN/CREAT UR: 14.2 UG/MG (ref 0–30)
CREAT UR-MCNC: 148 MG/DL (ref 23–375)
MICROALBUMIN UR DL<=1MG/L-MCNC: 21 UG/ML

## 2020-01-13 PROCEDURE — 82043 UR ALBUMIN QUANTITATIVE: CPT | Mod: HCNC

## 2020-01-14 ENCOUNTER — TELEPHONE (OUTPATIENT)
Dept: INTERNAL MEDICINE | Facility: CLINIC | Age: 60
End: 2020-01-14

## 2020-01-14 ENCOUNTER — LAB VISIT (OUTPATIENT)
Dept: LAB | Facility: HOSPITAL | Age: 60
End: 2020-01-14
Attending: INTERNAL MEDICINE
Payer: MEDICARE

## 2020-01-14 ENCOUNTER — TELEPHONE (OUTPATIENT)
Dept: HEPATOLOGY | Facility: CLINIC | Age: 60
End: 2020-01-14

## 2020-01-14 ENCOUNTER — OFFICE VISIT (OUTPATIENT)
Dept: INTERNAL MEDICINE | Facility: CLINIC | Age: 60
End: 2020-01-14
Payer: MEDICARE

## 2020-01-14 VITALS
SYSTOLIC BLOOD PRESSURE: 130 MMHG | OXYGEN SATURATION: 97 % | WEIGHT: 175 LBS | TEMPERATURE: 98 F | BODY MASS INDEX: 27.47 KG/M2 | RESPIRATION RATE: 16 BRPM | HEIGHT: 67 IN | HEART RATE: 90 BPM | DIASTOLIC BLOOD PRESSURE: 84 MMHG

## 2020-01-14 DIAGNOSIS — I15.2 HYPERTENSION ASSOCIATED WITH DIABETES: ICD-10-CM

## 2020-01-14 DIAGNOSIS — E11.9 TYPE 2 DIABETES MELLITUS WITHOUT RETINOPATHY: ICD-10-CM

## 2020-01-14 DIAGNOSIS — E11.9 TYPE 2 DIABETES MELLITUS WITHOUT RETINOPATHY: Primary | ICD-10-CM

## 2020-01-14 DIAGNOSIS — E78.5 HYPERLIPIDEMIA ASSOCIATED WITH TYPE 2 DIABETES MELLITUS: ICD-10-CM

## 2020-01-14 DIAGNOSIS — E11.69 HYPERLIPIDEMIA ASSOCIATED WITH TYPE 2 DIABETES MELLITUS: ICD-10-CM

## 2020-01-14 DIAGNOSIS — E11.59 HYPERTENSION ASSOCIATED WITH DIABETES: ICD-10-CM

## 2020-01-14 DIAGNOSIS — L30.9 DERMATITIS: ICD-10-CM

## 2020-01-14 LAB
ALBUMIN SERPL BCP-MCNC: 4.4 G/DL (ref 3.5–5.2)
ALP SERPL-CCNC: 59 U/L (ref 55–135)
ALT SERPL W/O P-5'-P-CCNC: 44 U/L (ref 10–44)
ANION GAP SERPL CALC-SCNC: 10 MMOL/L (ref 8–16)
AST SERPL-CCNC: 29 U/L (ref 10–40)
BASOPHILS # BLD AUTO: 0.04 K/UL (ref 0–0.2)
BASOPHILS NFR BLD: 0.8 % (ref 0–1.9)
BILIRUB SERPL-MCNC: 0.7 MG/DL (ref 0.1–1)
BUN SERPL-MCNC: 15 MG/DL (ref 6–20)
CALCIUM SERPL-MCNC: 9.8 MG/DL (ref 8.7–10.5)
CHLORIDE SERPL-SCNC: 101 MMOL/L (ref 95–110)
CHOLEST SERPL-MCNC: 257 MG/DL (ref 120–199)
CHOLEST/HDLC SERPL: 6.1 {RATIO} (ref 2–5)
CO2 SERPL-SCNC: 28 MMOL/L (ref 23–29)
CREAT SERPL-MCNC: 0.8 MG/DL (ref 0.5–1.4)
DIFFERENTIAL METHOD: ABNORMAL
EOSINOPHIL # BLD AUTO: 0.1 K/UL (ref 0–0.5)
EOSINOPHIL NFR BLD: 2.7 % (ref 0–8)
ERYTHROCYTE [DISTWIDTH] IN BLOOD BY AUTOMATED COUNT: 12.7 % (ref 11.5–14.5)
EST. GFR  (AFRICAN AMERICAN): >60 ML/MIN/1.73 M^2
EST. GFR  (NON AFRICAN AMERICAN): >60 ML/MIN/1.73 M^2
ESTIMATED AVG GLUCOSE: 143 MG/DL (ref 68–131)
GLUCOSE SERPL-MCNC: 192 MG/DL (ref 70–110)
HBA1C MFR BLD HPLC: 6.6 % (ref 4–5.6)
HCT VFR BLD AUTO: 46.7 % (ref 40–54)
HDLC SERPL-MCNC: 42 MG/DL (ref 40–75)
HDLC SERPL: 16.3 % (ref 20–50)
HGB BLD-MCNC: 16.3 G/DL (ref 14–18)
LDLC SERPL CALC-MCNC: 140.6 MG/DL (ref 63–159)
LYMPHOCYTES # BLD AUTO: 2.1 K/UL (ref 1–4.8)
LYMPHOCYTES NFR BLD: 39.6 % (ref 18–48)
MCH RBC QN AUTO: 31.8 PG (ref 27–31)
MCHC RBC AUTO-ENTMCNC: 34.9 G/DL (ref 32–36)
MCV RBC AUTO: 91 FL (ref 82–98)
MONOCYTES # BLD AUTO: 0.4 K/UL (ref 0.3–1)
MONOCYTES NFR BLD: 7.3 % (ref 4–15)
NEUTROPHILS # BLD AUTO: 2.6 K/UL (ref 1.8–7.7)
NEUTROPHILS NFR BLD: 49.6 % (ref 38–73)
NONHDLC SERPL-MCNC: 215 MG/DL
PLATELET # BLD AUTO: 214 K/UL (ref 150–350)
PMV BLD AUTO: 10.5 FL (ref 9.2–12.9)
POTASSIUM SERPL-SCNC: 4.6 MMOL/L (ref 3.5–5.1)
PROT SERPL-MCNC: 8.2 G/DL (ref 6–8.4)
RBC # BLD AUTO: 5.13 M/UL (ref 4.6–6.2)
SODIUM SERPL-SCNC: 139 MMOL/L (ref 136–145)
TRIGL SERPL-MCNC: 372 MG/DL (ref 30–150)
WBC # BLD AUTO: 5.18 K/UL (ref 3.9–12.7)

## 2020-01-14 PROCEDURE — 3044F PR MOST RECENT HEMOGLOBIN A1C LEVEL <7.0%: ICD-10-PCS | Mod: HCNC,CPTII,S$GLB, | Performed by: INTERNAL MEDICINE

## 2020-01-14 PROCEDURE — 83036 HEMOGLOBIN GLYCOSYLATED A1C: CPT | Mod: HCNC

## 2020-01-14 PROCEDURE — 99499 RISK ADDL DX/OHS AUDIT: ICD-10-PCS | Mod: S$GLB,,, | Performed by: INTERNAL MEDICINE

## 2020-01-14 PROCEDURE — 3044F HG A1C LEVEL LT 7.0%: CPT | Mod: HCNC,CPTII,S$GLB, | Performed by: INTERNAL MEDICINE

## 2020-01-14 PROCEDURE — 82105 ALPHA-FETOPROTEIN SERUM: CPT | Mod: HCNC

## 2020-01-14 PROCEDURE — 3079F DIAST BP 80-89 MM HG: CPT | Mod: HCNC,CPTII,S$GLB, | Performed by: INTERNAL MEDICINE

## 2020-01-14 PROCEDURE — 80053 COMPREHEN METABOLIC PANEL: CPT | Mod: HCNC

## 2020-01-14 PROCEDURE — 85025 COMPLETE CBC W/AUTO DIFF WBC: CPT | Mod: HCNC

## 2020-01-14 PROCEDURE — 3079F PR MOST RECENT DIASTOLIC BLOOD PRESSURE 80-89 MM HG: ICD-10-PCS | Mod: HCNC,CPTII,S$GLB, | Performed by: INTERNAL MEDICINE

## 2020-01-14 PROCEDURE — 99999 PR PBB SHADOW E&M-EST. PATIENT-LVL III: ICD-10-PCS | Mod: PBBFAC,HCNC,, | Performed by: INTERNAL MEDICINE

## 2020-01-14 PROCEDURE — 80061 LIPID PANEL: CPT | Mod: HCNC

## 2020-01-14 PROCEDURE — 36415 COLL VENOUS BLD VENIPUNCTURE: CPT | Mod: HCNC

## 2020-01-14 PROCEDURE — 3075F SYST BP GE 130 - 139MM HG: CPT | Mod: HCNC,CPTII,S$GLB, | Performed by: INTERNAL MEDICINE

## 2020-01-14 PROCEDURE — 99214 OFFICE O/P EST MOD 30 MIN: CPT | Mod: HCNC,S$GLB,, | Performed by: INTERNAL MEDICINE

## 2020-01-14 PROCEDURE — 99999 PR PBB SHADOW E&M-EST. PATIENT-LVL III: CPT | Mod: PBBFAC,HCNC,, | Performed by: INTERNAL MEDICINE

## 2020-01-14 PROCEDURE — 99499 UNLISTED E&M SERVICE: CPT | Mod: S$GLB,,, | Performed by: INTERNAL MEDICINE

## 2020-01-14 PROCEDURE — 3008F PR BODY MASS INDEX (BMI) DOCUMENTED: ICD-10-PCS | Mod: HCNC,CPTII,S$GLB, | Performed by: INTERNAL MEDICINE

## 2020-01-14 PROCEDURE — 3008F BODY MASS INDEX DOCD: CPT | Mod: HCNC,CPTII,S$GLB, | Performed by: INTERNAL MEDICINE

## 2020-01-14 PROCEDURE — 3075F PR MOST RECENT SYSTOLIC BLOOD PRESS GE 130-139MM HG: ICD-10-PCS | Mod: HCNC,CPTII,S$GLB, | Performed by: INTERNAL MEDICINE

## 2020-01-14 PROCEDURE — 99214 PR OFFICE/OUTPT VISIT, EST, LEVL IV, 30-39 MIN: ICD-10-PCS | Mod: HCNC,S$GLB,, | Performed by: INTERNAL MEDICINE

## 2020-01-14 RX ORDER — CLOTRIMAZOLE AND BETAMETHASONE DIPROPIONATE 10; .64 MG/G; MG/G
CREAM TOPICAL 2 TIMES DAILY
Qty: 30 G | Refills: 0 | Status: SHIPPED | OUTPATIENT
Start: 2020-01-14 | End: 2021-07-27

## 2020-01-14 RX ORDER — PRAVASTATIN SODIUM 80 MG/1
80 TABLET ORAL DAILY
Qty: 90 TABLET | Refills: 3 | Status: SHIPPED | OUTPATIENT
Start: 2020-01-14 | End: 2020-09-28

## 2020-01-14 NOTE — TELEPHONE ENCOUNTER
Please call and notify pt:    His diabetes is controlled. Cholesterol is very uncontrolled though. Will increase pravastatin to 80mg daily. Make sure he takes the medicine!  Blood count, liver and kidney functions ok.

## 2020-01-14 NOTE — PROGRESS NOTES
"Subjective:       Patient ID: Graham Caceres is a 59 y.o. male.    Chief Complaint: f/u for DM    HPI   DM2 - Trulicity 1.5mg weekly. Reports price went up to to $190s. Stopped trulicity about a mo ago. Had leftover januvia 25mg and so was taking 2. However sugars were still in the 170s. Sometimes 200s.   Checking sugars 2x/day.  MAC yesterday neg.  Eye - Dr. Freed 5/27/19  Foot 5/27/19  a1c 7/26/19    HTN - losartan  HLD - pravastatin. Last LDL 7/2019.    Review of Systems  Comprehensive review of systems otherwise negative. See history/subjective section for more details.    Objective:      Physical Exam    /84 (BP Location: Left arm, Patient Position: Sitting)   Pulse 90   Temp 98 °F (36.7 °C) (Oral)   Resp 16   Ht 5' 7" (1.702 m)   Wt 79.4 kg (175 lb)   SpO2 97%   BMI 27.41 kg/m²     GEN - A+OX4, NAD   HEENT - PERRL, EOMI, OP clear. MMM.   Neck - No thyromegaly or cervical LAD. No thyroid masses felt.  CV - RRR, no m/r   Chest - CTAB, no wheezing or rhonchi  Abd - S/NT/ND/+BS.   Ext - 2+BDP and radial pulses. No C/C/E.  Foot - no open lesions. Normal sensation to monofilament.  Skin - Dry skin patch on top of the head.     Labs reviewed.    Assessment/Plan     Diagnoses and all orders for this visit:    Type 2 diabetes mellitus without retinopathy - trulicity is expensive.   -     Comprehensive metabolic panel; Future  -     Hemoglobin A1c; Future  -     SITagliptin (JANUVIA) 100 MG Tab; Take 1 tablet (100 mg total) by mouth once daily.  -     Ambulatory Referral to Pharmacy Assistance    Hypertension associated with diabetes  -     Comprehensive metabolic panel; Future    Hyperlipidemia associated with type 2 diabetes mellitus  -     Comprehensive metabolic panel; Future  -     Lipid panel; Future    BMI 27.0-27.9,adult  -     CBC auto differential; Future    Dermatitis  -     clotrimazole-betamethasone 1-0.05% (LOTRISONE) cream; Apply topically 2 (two) times daily.  -     Ambulatory Referral to " Pharmacy Assistance      Follow up in about 3 months (around 4/14/2020).      Jennifer Manzanares MD  Department of Internal Medicine - [facility]  7:11 AM

## 2020-01-15 LAB — AFP SERPL-MCNC: 3.3 NG/ML (ref 0–8.4)

## 2020-01-15 NOTE — TELEPHONE ENCOUNTER
Attempt made to reach patient.  Msg from PA Scheuermann left on his VM and mailed to him.  I stressed that he contact us to schedule US.  I spoke with lab.  AFP added to 1/14/20 draw.

## 2020-01-15 NOTE — TELEPHONE ENCOUNTER
Labs reviewed:  1/2020 CBC, CMP, INR stable  Still needs u/s and AFP    Saw PCP today, note reviewed    pls call pt  Schedule u/s and AFP (perhaps AFP can be added to labs from 1/14/20??)  We can review by phone    thanks

## 2020-01-17 DIAGNOSIS — E11.59 HYPERTENSION ASSOCIATED WITH DIABETES: ICD-10-CM

## 2020-01-17 DIAGNOSIS — I15.2 HYPERTENSION ASSOCIATED WITH DIABETES: ICD-10-CM

## 2020-01-17 RX ORDER — LOSARTAN POTASSIUM 50 MG/1
TABLET ORAL
Qty: 90 TABLET | Refills: 3 | Status: SHIPPED | OUTPATIENT
Start: 2020-01-17 | End: 2020-01-30 | Stop reason: SDUPTHER

## 2020-01-21 ENCOUNTER — PES CALL (OUTPATIENT)
Dept: ADMINISTRATIVE | Facility: CLINIC | Age: 60
End: 2020-01-21

## 2020-01-30 ENCOUNTER — TELEPHONE (OUTPATIENT)
Dept: INTERNAL MEDICINE | Facility: CLINIC | Age: 60
End: 2020-01-30

## 2020-01-30 DIAGNOSIS — I15.2 HYPERTENSION ASSOCIATED WITH DIABETES: ICD-10-CM

## 2020-01-30 DIAGNOSIS — E11.59 HYPERTENSION ASSOCIATED WITH DIABETES: ICD-10-CM

## 2020-01-30 RX ORDER — LOSARTAN POTASSIUM 50 MG/1
50 TABLET ORAL DAILY
Qty: 90 TABLET | Refills: 3 | Status: SHIPPED | OUTPATIENT
Start: 2020-01-30 | End: 2020-12-20

## 2020-01-30 NOTE — TELEPHONE ENCOUNTER
----- Message from Caron Waterman RN sent at 1/29/2020  3:42 PM CST -----  Patient phoned.  He needs refills on Losartan 50 mg 1 tab daily.  He has 1 tablet left.  Needs 90 day supply with refills.  He wants Rx sent to [facility].  Preferred pharmacy verified.

## 2020-02-04 ENCOUNTER — HOSPITAL ENCOUNTER (OUTPATIENT)
Dept: RADIOLOGY | Facility: HOSPITAL | Age: 60
Discharge: HOME | End: 2020-02-04
Attending: PHYSICIAN ASSISTANT
Payer: MEDICARE

## 2020-02-04 ENCOUNTER — TELEPHONE (OUTPATIENT)
Dept: PULMONOLOGY | Facility: CLINIC | Age: 60
End: 2020-02-04

## 2020-02-04 DIAGNOSIS — K74.60 HEPATIC CIRRHOSIS, UNSPECIFIED HEPATIC CIRRHOSIS TYPE, UNSPECIFIED WHETHER ASCITES PRESENT: ICD-10-CM

## 2020-02-04 PROCEDURE — 76705 US ABDOMEN LIMITED: ICD-10-PCS | Mod: 26,HCNC,, | Performed by: RADIOLOGY

## 2020-02-04 PROCEDURE — 76705 ECHO EXAM OF ABDOMEN: CPT | Mod: 26,HCNC,, | Performed by: RADIOLOGY

## 2020-02-04 PROCEDURE — 76705 ECHO EXAM OF ABDOMEN: CPT | Mod: TC,HCNC

## 2020-02-07 ENCOUNTER — TELEPHONE (OUTPATIENT)
Dept: HEPATOLOGY | Facility: CLINIC | Age: 60
End: 2020-02-07

## 2020-02-07 DIAGNOSIS — K74.60 HEPATIC CIRRHOSIS, UNSPECIFIED HEPATIC CIRRHOSIS TYPE, UNSPECIFIED WHETHER ASCITES PRESENT: Primary | ICD-10-CM

## 2020-02-07 DIAGNOSIS — K76.9 LIVER LESION: ICD-10-CM

## 2020-02-07 NOTE — TELEPHONE ENCOUNTER
"2/4/20 u/s reviewed  "Indeterminate hypoechoic area measuring 1.4 x 2.2 x 2.4 cm about the gallbladder fossa favored to represent focal fatty sparing, however this is indeterminate and further evaluation with MRI is recommended.    Additional area of focal fatty sparing about the gallbladder fossa."    pls call pt  Tell him that u/s showed a small spot on his liver that we need to get a closer picture of  Schedule MRI abdomen    thanks  "

## 2020-02-10 NOTE — TELEPHONE ENCOUNTER
I spoke with patient and msg from PA Scheuermann relayed.  MRI scheduled 2/17; appt notice mailed.

## 2020-02-17 ENCOUNTER — HOSPITAL ENCOUNTER (OUTPATIENT)
Dept: RADIOLOGY | Facility: HOSPITAL | Age: 60
Discharge: HOME | End: 2020-02-17
Attending: PHYSICIAN ASSISTANT
Payer: MEDICARE

## 2020-02-17 DIAGNOSIS — K76.9 LIVER LESION: ICD-10-CM

## 2020-02-17 DIAGNOSIS — K74.60 HEPATIC CIRRHOSIS, UNSPECIFIED HEPATIC CIRRHOSIS TYPE, UNSPECIFIED WHETHER ASCITES PRESENT: ICD-10-CM

## 2020-02-17 PROCEDURE — 25500020 PHARM REV CODE 255: Mod: HCNC | Performed by: PHYSICIAN ASSISTANT

## 2020-02-17 PROCEDURE — 74183 MRI ABDOMEN W WO CONTRAST: ICD-10-PCS | Mod: 26,HCNC,, | Performed by: RADIOLOGY

## 2020-02-17 PROCEDURE — A9585 GADOBUTROL INJECTION: HCPCS | Mod: HCNC | Performed by: PHYSICIAN ASSISTANT

## 2020-02-17 PROCEDURE — 74183 MRI ABD W/O CNTR FLWD CNTR: CPT | Mod: TC,HCNC

## 2020-02-17 PROCEDURE — 74183 MRI ABD W/O CNTR FLWD CNTR: CPT | Mod: 26,HCNC,, | Performed by: RADIOLOGY

## 2020-02-17 RX ORDER — GADOBUTROL 604.72 MG/ML
10 INJECTION INTRAVENOUS
Status: COMPLETED | OUTPATIENT
Start: 2020-02-17 | End: 2020-02-17

## 2020-02-17 RX ADMIN — GADOBUTROL 10 ML: 604.72 INJECTION INTRAVENOUS at 08:02

## 2020-02-21 ENCOUNTER — TELEPHONE (OUTPATIENT)
Dept: HEPATOLOGY | Facility: CLINIC | Age: 60
End: 2020-02-21

## 2020-02-21 DIAGNOSIS — K74.60 HEPATIC CIRRHOSIS, UNSPECIFIED HEPATIC CIRRHOSIS TYPE, UNSPECIFIED WHETHER ASCITES PRESENT: Primary | ICD-10-CM

## 2020-02-21 NOTE — TELEPHONE ENCOUNTER
Msbeka from [facility] Scheuermann left on his VM and mailed to him.  F/u with testing scheduled 8/17/20; appt notice mailed.

## 2020-02-21 NOTE — TELEPHONE ENCOUNTER
2/17/20 MRI and 1/2020 reviewed    Please notify patient:  I have reviewed the recent labs and MRI.  Liver is working well.  Liver cancer screening looked fine. There are no tumors in the liver. Abnormal area from u/s appears to be abnormal area from fat settling in liver  Next liver monitoring is due in 6 months.    Please schedule: CBC, CMP, INR, AFP, U/S, VISIT in 8/2020    Thanks

## 2020-04-08 ENCOUNTER — PATIENT MESSAGE (OUTPATIENT)
Dept: INTERNAL MEDICINE | Facility: CLINIC | Age: 60
End: 2020-04-08

## 2020-04-09 ENCOUNTER — TELEPHONE (OUTPATIENT)
Dept: INTERNAL MEDICINE | Facility: CLINIC | Age: 60
End: 2020-04-09

## 2020-04-09 NOTE — TELEPHONE ENCOUNTER
Soila,  Can you please schedule pt for 6/10/20 at 11:30 in person for ep with Dr. Manzanares?  Carli cantrell.    Thank you!

## 2020-04-24 RX ORDER — LANCETS
EACH MISCELLANEOUS
Qty: 50 EACH | Refills: 11 | Status: SHIPPED | OUTPATIENT
Start: 2020-04-24 | End: 2020-09-28 | Stop reason: SDUPTHER

## 2020-04-24 RX ORDER — INSULIN PUMP SYRINGE, 3 ML
EACH MISCELLANEOUS
Qty: 1 EACH | Refills: 0 | Status: SHIPPED | OUTPATIENT
Start: 2020-04-24 | End: 2020-09-10 | Stop reason: SDUPTHER

## 2020-04-24 NOTE — TELEPHONE ENCOUNTER
----- Message from Esperanza Arrieta sent at 4/24/2020 11:15 AM CDT -----  Contact: [facility] [phone] (Anna)  Anna called requesting new diabetic testing supplies for patient. Please call and advise.

## 2020-06-01 DIAGNOSIS — E11.9 TYPE 2 DIABETES MELLITUS WITHOUT COMPLICATION, UNSPECIFIED WHETHER LONG TERM INSULIN USE: ICD-10-CM

## 2020-07-31 ENCOUNTER — TELEPHONE (OUTPATIENT)
Dept: HEPATOLOGY | Facility: CLINIC | Age: 60
End: 2020-07-31

## 2020-07-31 DIAGNOSIS — E11.9 TYPE 2 DIABETES MELLITUS WITHOUT RETINOPATHY: Primary | ICD-10-CM

## 2020-07-31 DIAGNOSIS — E78.5 HYPERLIPIDEMIA, UNSPECIFIED HYPERLIPIDEMIA TYPE: ICD-10-CM

## 2020-07-31 NOTE — TELEPHONE ENCOUNTER
Ok, got it! [place], can you just link my labs w/ his upcoming liver labs and let him know? Make sure he fast for his labs. I'll also send his test strips to [facility]. Thanks!

## 2020-07-31 NOTE — TELEPHONE ENCOUNTER
Message sent to wrong provider.  I spoke with patient.  He wants to schedule blood work needed for Dr. Manzanares and get Rx sent to [facility] for his diabetic test strips.  Patient scheduled to have blood work done for hepatology on 8/17/20.

## 2020-07-31 NOTE — TELEPHONE ENCOUNTER
----- Message from Shauna Blanco sent at 7/31/2020  2:30 PM CDT -----  Patient called to speak with the doctor concerning additional orders.    He would like a callback at [phone]    Thanks  KB

## 2020-08-11 ENCOUNTER — PES CALL (OUTPATIENT)
Dept: ADMINISTRATIVE | Facility: CLINIC | Age: 60
End: 2020-08-11

## 2020-08-14 ENCOUNTER — PATIENT OUTREACH (OUTPATIENT)
Dept: ADMINISTRATIVE | Facility: OTHER | Age: 60
End: 2020-08-14

## 2020-08-15 NOTE — PROGRESS NOTES
Requested updates from Care Everywhere.  Reviewed chart for overdue GRACIELA topics.  Updated Health Maintenance.   Reconciled immunizations in LINKS.

## 2020-08-17 ENCOUNTER — TELEPHONE (OUTPATIENT)
Dept: HEPATOLOGY | Facility: CLINIC | Age: 60
End: 2020-08-17

## 2020-08-17 ENCOUNTER — HOSPITAL ENCOUNTER (OUTPATIENT)
Dept: RADIOLOGY | Facility: HOSPITAL | Age: 60
Discharge: HOME | End: 2020-08-17
Attending: PHYSICIAN ASSISTANT
Payer: MEDICARE

## 2020-08-17 ENCOUNTER — OFFICE VISIT (OUTPATIENT)
Dept: HEPATOLOGY | Facility: CLINIC | Age: 60
End: 2020-08-17
Payer: MEDICARE

## 2020-08-17 VITALS
SYSTOLIC BLOOD PRESSURE: 133 MMHG | RESPIRATION RATE: 16 BRPM | DIASTOLIC BLOOD PRESSURE: 80 MMHG | HEIGHT: 67 IN | HEART RATE: 99 BPM | WEIGHT: 175.5 LBS | BODY MASS INDEX: 27.55 KG/M2

## 2020-08-17 DIAGNOSIS — Z86.19 HISTORY OF HEPATITIS C: ICD-10-CM

## 2020-08-17 DIAGNOSIS — K74.60 HEPATIC CIRRHOSIS, UNSPECIFIED HEPATIC CIRRHOSIS TYPE, UNSPECIFIED WHETHER ASCITES PRESENT: Primary | ICD-10-CM

## 2020-08-17 DIAGNOSIS — K74.60 HEPATIC CIRRHOSIS, UNSPECIFIED HEPATIC CIRRHOSIS TYPE, UNSPECIFIED WHETHER ASCITES PRESENT: ICD-10-CM

## 2020-08-17 DIAGNOSIS — K76.0 FATTY LIVER: ICD-10-CM

## 2020-08-17 PROCEDURE — 99213 OFFICE O/P EST LOW 20 MIN: CPT | Mod: HCNC,S$GLB,, | Performed by: PHYSICIAN ASSISTANT

## 2020-08-17 PROCEDURE — 3075F SYST BP GE 130 - 139MM HG: CPT | Mod: HCNC,CPTII,S$GLB, | Performed by: PHYSICIAN ASSISTANT

## 2020-08-17 PROCEDURE — 76705 ECHO EXAM OF ABDOMEN: CPT | Mod: TC,HCNC

## 2020-08-17 PROCEDURE — 99213 PR OFFICE/OUTPT VISIT, EST, LEVL III, 20-29 MIN: ICD-10-PCS | Mod: HCNC,S$GLB,, | Performed by: PHYSICIAN ASSISTANT

## 2020-08-17 PROCEDURE — 3079F PR MOST RECENT DIASTOLIC BLOOD PRESSURE 80-89 MM HG: ICD-10-PCS | Mod: HCNC,CPTII,S$GLB, | Performed by: PHYSICIAN ASSISTANT

## 2020-08-17 PROCEDURE — 3079F DIAST BP 80-89 MM HG: CPT | Mod: HCNC,CPTII,S$GLB, | Performed by: PHYSICIAN ASSISTANT

## 2020-08-17 PROCEDURE — 3075F PR MOST RECENT SYSTOLIC BLOOD PRESS GE 130-139MM HG: ICD-10-PCS | Mod: HCNC,CPTII,S$GLB, | Performed by: PHYSICIAN ASSISTANT

## 2020-08-17 PROCEDURE — 99999 PR PBB SHADOW E&M-EST. PATIENT-LVL III: ICD-10-PCS | Mod: PBBFAC,HCNC,, | Performed by: PHYSICIAN ASSISTANT

## 2020-08-17 PROCEDURE — 3008F PR BODY MASS INDEX (BMI) DOCUMENTED: ICD-10-PCS | Mod: HCNC,CPTII,S$GLB, | Performed by: PHYSICIAN ASSISTANT

## 2020-08-17 PROCEDURE — 76705 US ABDOMEN LIMITED: ICD-10-PCS | Mod: 26,HCNC,, | Performed by: RADIOLOGY

## 2020-08-17 PROCEDURE — 76705 ECHO EXAM OF ABDOMEN: CPT | Mod: 26,HCNC,, | Performed by: RADIOLOGY

## 2020-08-17 PROCEDURE — 99499 RISK ADDL DX/OHS AUDIT: ICD-10-PCS | Mod: HCNC,S$GLB,, | Performed by: PHYSICIAN ASSISTANT

## 2020-08-17 PROCEDURE — 3008F BODY MASS INDEX DOCD: CPT | Mod: HCNC,CPTII,S$GLB, | Performed by: PHYSICIAN ASSISTANT

## 2020-08-17 PROCEDURE — 99999 PR PBB SHADOW E&M-EST. PATIENT-LVL III: CPT | Mod: PBBFAC,HCNC,, | Performed by: PHYSICIAN ASSISTANT

## 2020-08-17 PROCEDURE — 99499 UNLISTED E&M SERVICE: CPT | Mod: HCNC,S$GLB,, | Performed by: PHYSICIAN ASSISTANT

## 2020-08-17 NOTE — PROGRESS NOTES
HEPATOLOGY CLINIC VISIT NOTE - HCV clinic    CHIEF COMPLAINT:  HCV Cirrhosis    HISTORY: This is a 60 y.o.   male w/ well compensated HCV cirrhosis (s/p successful antiviral therapy) as well as fatty liver disease, DM and Hyperlipidemia, here for f/u.     Has hx of medication non adherence in past  Last HbA1c shows improvement (2019) 8.1 --> (1/2020) 6.6  Lipid profile 1/2020 not at goal but improved compared to 2019 as well    Liver enzymes from 1/2020 showed some improvement w/ AST 29, ALT 44 (previously in 30s, 50s) - coinciding w/ improvement in cholesterol and DM control.    Feels well  Denies jaundice, dark urine, hematemesis, melena, slowed mentation, abdominal distention.   Knows he has appt w/ Dr Manzanares in Sept    Cirrhosis history:  FibroScan 4/28/16 - kPA 17.3, F4  Labs / imaging / pretreatment APRI / FIB-4 were consistent w/ advanced fibrosis:  Well compensated w/ no evidence of portal HTN  · No ascites, jaundice, HE  · Normal albumin, platelets    MELD-Na score: 6 at 1/14/2020  7:40 AM  MELD score: 6 at 1/14/2020  7:40 AM  Calculated from:  Serum Creatinine: 0.8 mg/dL (Rounded to 1 mg/dL) at 1/14/2020  7:40 AM  Serum Sodium: 139 mmol/L (Rounded to 137 mmol/L) at 1/14/2020  7:40 AM  Total Bilirubin: 0.7 mg/dL (Rounded to 1 mg/dL) at 1/14/2020  7:40 AM  INR(ratio): 0.9 (Rounded to 1) at 1/13/2020 10:00 AM  Age: 59 years 11 months    Cirrhosis maintenance:   HCC screening - U/S 8/17/20 w/ no lesions,  AFP 8/17/20 pending   Varices screening - EGD 5/2016 Dr Soto - no varices   HAV immunity - documented (+) HAVAB 4/2016   HBV immunity - prior resolved HBV documented 4/2016    Fatty Liver:  - steatosis on imaging  - (+) DM and Hyperlipidemia w/ hx of medication non adherence but reports he's trying to do better  - BMI 27    HCV history:  Completed 24 weeks harvoni w/ SVR12 as of 2/2017  - genotype 1B  - Prior HCV tx w/ Dr Laurent: PegIFN + RBV (10-15 yrs ago) for few months - nonresponse                    Past Medical History:   Diagnosis Date    Cirrhosis     Diverticulosis     History of hepatitis C, s/p successful Rx w/ SVR24 - 6/2017 4/7/2016    S/p harvoni w/ SVR    Hyperlipidemia 04/2016    Hypertension     Internal hemorrhoid     New onset type 2 diabetes mellitus 4/2016    Positive QuantiFERON-TB Gold test 4/2016    Sebopsoriasis     SIRS (systemic inflammatory response syndrome) 8/11/2018     Past Surgical History:   Procedure Laterality Date    COLONOSCOPY  05/19/2016    repeat in 10 yrs    COLONOSCOPY Left 5/19/2016    Procedure: COLONOSCOPY - colon cancer screening;  Surgeon: David Soto MD;  Location: [address]);  Service: Endoscopy;  Laterality: Left;       FAMILY HISTORY: Negative for liver disease    MEDS & ALLERGIES:  Reviewed in epic      SOCIAL HISTORY:   Moved from [facility] during teenage years  Resides in [place], not . 1 daughter from prior marriage  Not working, on disability. Worked at [employer] many years ago  Alcohol - none  Tobacco - none  Drugs - none      ROS:   No fever, chills, weight loss.  No chest pain, palpitations, dyspnea, cough  No abdominal pain, nausea, vomiting  No skin rashes   No headaches  No lower extremity edema  No depression or anxiety      PHYSICAL EXAM:  Friendly  male, in no acute distress; alert and oriented to person, place and time  VITALS: reviewed.   HEENT: Sclerae anicteric.   LUNGS: Normal respiratory effort. Clear to auscultation bilaterally w/ good air exchange   CVS: Regular rate and rhythm w/ no murmurs  ABDOMEN: Nondistended. Soft. Nontender.   EXTREMITIES: No edema.   SKIN: No jaundice or spider telangectasias. No rashes on exposed skin  NEURO/PSYCH: Normal gait. Memory intact. Thought and speech patterns appropriate. No obvious depression or anxiety.       RECENT LABS:  Lab Results   Component Value Date    WBC 5.18 01/14/2020    HGB 16.3 01/14/2020     01/14/2020     Lab Results   Component  Value Date    INR 0.9 01/13/2020     Lab Results   Component Value Date    AST 29 01/14/2020    ALT 44 01/14/2020    BILITOT 0.7 01/14/2020    ALBUMIN 4.4 01/14/2020    ALKPHOS 59 01/14/2020    CREATININE 0.8 01/14/2020    BUN 15 01/14/2020     01/14/2020    K 4.6 01/14/2020    AFP 3.3 01/15/2020         RECENT IMAGING:  Results for orders placed during the hospital encounter of 08/17/20   US Abdomen Limited    Narrative EXAMINATION:  US ABDOMEN LIMITED    CLINICAL HISTORY:  Unspecified cirrhosis of liver    TECHNIQUE:  Limited ultrasound of the right upper quadrant of the abdomen (including pancreas, liver, gallbladder, common bile duct, and spleen) was performed.    COMPARISON:  MRI abdomen with/without contrast 02/17/2020; multiple prior U/S abdomen limited, most recently 02/04/2020    FINDINGS:  Liver: Normal in size, measuring 16.5 cm. Homogeneously increased parenchymal echogenicity consistent with diffuse fatty infiltration.  Stable region of decreased echogenicity near the gallbladder fossa measuring 2.4 x 1.3 x 3.1 cm (previously 2.2 x 1.4 x 2.4 cm), favored to represent focal fatty infiltration.  The HR index is elevated and measures 1.68.    Gallbladder: 3 mm nonmobile echogenic focus along the gallbladder wall.  There is no gallbladder wall thickening or pericholecystic fluid.  No sonographic Mason's sign.    Biliary system: The common duct is not dilated, measuring 3 mm.  No intrahepatic ductal dilatation.    Spleen: Normal in size and echotexture, measuring 10.6 x 4.0 cm.    Miscellaneous: No upper abdominal ascites.      Impression Hepatic steatosis with focal fatty sparing near the gallbladder fossa.    3 mm gallbladder polyp versus adherent stone.    Electronically signed by resident: Kory Wheat  Date:    08/17/2020  Time:    09:08    Electronically signed by: Yovany Rincon MD  Date:    08/17/2020  Time:    09:27         ASSESSMENT  60 y.o.   male with:  1. WELL COMPENSATED  CIRRHOSIS   -- MELD score today - pending  -- HCC screening - AFP pending. U/S today w/ no liver lesions  -- EGD 5/2016 varices screening - no varices    2. HISTORY OF CHRONIC HEPATITIS C, GENOTYPE 1B - S/P successful Rx w/ SVR12 2/2017  -- completed 24weeks of Harvoni  -- Prior HCV treatment - PegIFN + RBV for few months many years ago - nonresponse  -- (+) Immunity to HAV   -- prior resolved HBV    3. NAFLD  -- serologic eval for other causes has been unyielding    4. HYPERLIPIDEMIA  -- on pravastatin    5. DM  -- followed by PCP and has seen endocrine.        EDUCATION:  Cirrhosis will continue to exist. Needs lifelong HCC screening and liver monitoring every 6 months indefinitely    Again discussed importance of optimal lipid and glucose mngmt as well as health weight, exercise, diet for fatty liver. Discussed potential for fatty liver to result in progression of cirrhosis. Discussed risk of untreated DM and hyperlipidemia to result in MI, CVA. Commended recent success and encouraged offered.       PLAN:  1. Await pending labs.  2. F/U visit w/ CBC, CMP, INR, AFP, U/S abdomen due 2/2021    Per AASLD guidelines, can hold off on varices screen for now: kPa 17, normal plt, no findings of portal HTN on u/s.

## 2020-08-18 NOTE — TELEPHONE ENCOUNTER
8/17/20 labs and u/s reviewed - stable    Please notify patient:  I have reviewed all of his labs and ultrasound.  Liver cancer screening looked fine. There are no tumors in the liver. Liver cancer blood test was normal.  Next liver monitoring is due in 6 months.    Please schedule: CBC, CMP, INR, AFP, U/S, VISIT in 2/2021    Thanks

## 2020-09-09 ENCOUNTER — TELEPHONE (OUTPATIENT)
Dept: INTERNAL MEDICINE | Facility: CLINIC | Age: 60
End: 2020-09-09

## 2020-09-09 NOTE — TELEPHONE ENCOUNTER
----- Message from Elba Ward sent at 9/9/2020  4:20 PM CDT -----  Regarding: medication  Pt states [facility] needs a new prescription for diabetes test strip and machine. Please advise.    Thank you

## 2020-09-10 RX ORDER — INSULIN PUMP SYRINGE, 3 ML
EACH MISCELLANEOUS
Qty: 1 EACH | Refills: 0 | Status: SHIPPED | OUTPATIENT
Start: 2020-09-10

## 2020-09-28 ENCOUNTER — PATIENT OUTREACH (OUTPATIENT)
Dept: ADMINISTRATIVE | Facility: OTHER | Age: 60
End: 2020-09-28

## 2020-09-28 ENCOUNTER — OFFICE VISIT (OUTPATIENT)
Dept: INTERNAL MEDICINE | Facility: CLINIC | Age: 60
End: 2020-09-28
Payer: MEDICARE

## 2020-09-28 ENCOUNTER — OFFICE VISIT (OUTPATIENT)
Dept: DERMATOLOGY | Facility: CLINIC | Age: 60
End: 2020-09-28
Payer: MEDICARE

## 2020-09-28 ENCOUNTER — TELEPHONE (OUTPATIENT)
Dept: PHARMACY | Facility: CLINIC | Age: 60
End: 2020-09-28

## 2020-09-28 VITALS
SYSTOLIC BLOOD PRESSURE: 120 MMHG | DIASTOLIC BLOOD PRESSURE: 82 MMHG | HEART RATE: 96 BPM | OXYGEN SATURATION: 97 % | WEIGHT: 173.06 LBS | HEIGHT: 67 IN | BODY MASS INDEX: 27.16 KG/M2

## 2020-09-28 DIAGNOSIS — K74.60 HEPATIC CIRRHOSIS DUE TO CHRONIC HEPATITIS C INFECTION: ICD-10-CM

## 2020-09-28 DIAGNOSIS — L82.1 SEBORRHEIC KERATOSES: Primary | ICD-10-CM

## 2020-09-28 DIAGNOSIS — E11.9 CONTROLLED TYPE 2 DIABETES MELLITUS WITHOUT COMPLICATION, WITHOUT LONG-TERM CURRENT USE OF INSULIN: Primary | ICD-10-CM

## 2020-09-28 DIAGNOSIS — J31.0 CHRONIC RHINITIS: ICD-10-CM

## 2020-09-28 DIAGNOSIS — Z12.83 SKIN CANCER SCREENING: ICD-10-CM

## 2020-09-28 DIAGNOSIS — E11.69 HYPERLIPIDEMIA ASSOCIATED WITH TYPE 2 DIABETES MELLITUS: ICD-10-CM

## 2020-09-28 DIAGNOSIS — E11.59 HYPERTENSION ASSOCIATED WITH DIABETES: ICD-10-CM

## 2020-09-28 DIAGNOSIS — E78.5 HYPERLIPIDEMIA ASSOCIATED WITH TYPE 2 DIABETES MELLITUS: ICD-10-CM

## 2020-09-28 DIAGNOSIS — I15.2 HYPERTENSION ASSOCIATED WITH DIABETES: ICD-10-CM

## 2020-09-28 DIAGNOSIS — L85.3 XEROSIS OF SKIN: ICD-10-CM

## 2020-09-28 DIAGNOSIS — B18.2 HEPATIC CIRRHOSIS DUE TO CHRONIC HEPATITIS C INFECTION: ICD-10-CM

## 2020-09-28 PROCEDURE — 3044F HG A1C LEVEL LT 7.0%: CPT | Mod: HCNC,CPTII,S$GLB, | Performed by: INTERNAL MEDICINE

## 2020-09-28 PROCEDURE — 99999 PR PBB SHADOW E&M-EST. PATIENT-LVL V: ICD-10-PCS | Mod: PBBFAC,HCNC,, | Performed by: INTERNAL MEDICINE

## 2020-09-28 PROCEDURE — 99214 OFFICE O/P EST MOD 30 MIN: CPT | Mod: HCNC,S$GLB,, | Performed by: INTERNAL MEDICINE

## 2020-09-28 PROCEDURE — 99202 PR OFFICE/OUTPT VISIT, NEW, LEVL II, 15-29 MIN: ICD-10-PCS | Mod: HCNC,S$GLB,, | Performed by: DERMATOLOGY

## 2020-09-28 PROCEDURE — 3074F SYST BP LT 130 MM HG: CPT | Mod: HCNC,CPTII,S$GLB, | Performed by: DERMATOLOGY

## 2020-09-28 PROCEDURE — 3078F DIAST BP <80 MM HG: CPT | Mod: HCNC,CPTII,S$GLB, | Performed by: DERMATOLOGY

## 2020-09-28 PROCEDURE — 99214 PR OFFICE/OUTPT VISIT, EST, LEVL IV, 30-39 MIN: ICD-10-PCS | Mod: HCNC,S$GLB,, | Performed by: INTERNAL MEDICINE

## 2020-09-28 PROCEDURE — 3079F DIAST BP 80-89 MM HG: CPT | Mod: HCNC,CPTII,S$GLB, | Performed by: INTERNAL MEDICINE

## 2020-09-28 PROCEDURE — 99999 PR PBB SHADOW E&M-EST. PATIENT-LVL V: CPT | Mod: PBBFAC,HCNC,, | Performed by: INTERNAL MEDICINE

## 2020-09-28 PROCEDURE — 99999 PR PBB SHADOW E&M-EST. PATIENT-LVL III: CPT | Mod: PBBFAC,HCNC,, | Performed by: DERMATOLOGY

## 2020-09-28 PROCEDURE — 3044F PR MOST RECENT HEMOGLOBIN A1C LEVEL <7.0%: ICD-10-PCS | Mod: HCNC,CPTII,S$GLB, | Performed by: INTERNAL MEDICINE

## 2020-09-28 PROCEDURE — 3074F PR MOST RECENT SYSTOLIC BLOOD PRESSURE < 130 MM HG: ICD-10-PCS | Mod: HCNC,CPTII,S$GLB, | Performed by: DERMATOLOGY

## 2020-09-28 PROCEDURE — 3074F PR MOST RECENT SYSTOLIC BLOOD PRESSURE < 130 MM HG: ICD-10-PCS | Mod: HCNC,CPTII,S$GLB, | Performed by: INTERNAL MEDICINE

## 2020-09-28 PROCEDURE — 3008F BODY MASS INDEX DOCD: CPT | Mod: HCNC,CPTII,S$GLB, | Performed by: INTERNAL MEDICINE

## 2020-09-28 PROCEDURE — 99499 RISK ADDL DX/OHS AUDIT: ICD-10-PCS | Mod: HCNC,S$GLB,, | Performed by: INTERNAL MEDICINE

## 2020-09-28 PROCEDURE — 99202 OFFICE O/P NEW SF 15 MIN: CPT | Mod: HCNC,S$GLB,, | Performed by: DERMATOLOGY

## 2020-09-28 PROCEDURE — 99499 UNLISTED E&M SERVICE: CPT | Mod: HCNC,S$GLB,, | Performed by: INTERNAL MEDICINE

## 2020-09-28 PROCEDURE — 99999 PR PBB SHADOW E&M-EST. PATIENT-LVL III: ICD-10-PCS | Mod: PBBFAC,HCNC,, | Performed by: DERMATOLOGY

## 2020-09-28 PROCEDURE — 3008F PR BODY MASS INDEX (BMI) DOCUMENTED: ICD-10-PCS | Mod: HCNC,CPTII,S$GLB, | Performed by: INTERNAL MEDICINE

## 2020-09-28 PROCEDURE — 3078F PR MOST RECENT DIASTOLIC BLOOD PRESSURE < 80 MM HG: ICD-10-PCS | Mod: HCNC,CPTII,S$GLB, | Performed by: DERMATOLOGY

## 2020-09-28 PROCEDURE — 3079F PR MOST RECENT DIASTOLIC BLOOD PRESSURE 80-89 MM HG: ICD-10-PCS | Mod: HCNC,CPTII,S$GLB, | Performed by: INTERNAL MEDICINE

## 2020-09-28 PROCEDURE — 3074F SYST BP LT 130 MM HG: CPT | Mod: HCNC,CPTII,S$GLB, | Performed by: INTERNAL MEDICINE

## 2020-09-28 RX ORDER — LANCETS
EACH MISCELLANEOUS
Qty: 200 EACH | Refills: 3 | Status: SHIPPED | OUTPATIENT
Start: 2020-09-28

## 2020-09-28 RX ORDER — FLUTICASONE PROPIONATE 50 MCG
1 SPRAY, SUSPENSION (ML) NASAL NIGHTLY
Qty: 48 G | Refills: 1 | Status: SHIPPED | OUTPATIENT
Start: 2020-09-28 | End: 2021-07-27

## 2020-09-28 RX ORDER — ROSUVASTATIN CALCIUM 40 MG/1
40 TABLET, COATED ORAL NIGHTLY
Qty: 90 TABLET | Refills: 3 | Status: SHIPPED | OUTPATIENT
Start: 2020-09-28 | End: 2021-11-28

## 2020-09-28 NOTE — LETTER
September 28, 2020      Jennifer Manzanares MD  [address]  [facility] [address]           [facility] - Dermatology [address]  [address]  [facility] [address]  Phone: [phone]  Fax: [phone]          Patient: Graham Caceres   MR Number: [id number]   YOB: 1960   Date of Visit: 9/28/2020       Dear Dr. Jennifer Manzanares:    Thank you for referring Graham Caceres to me for evaluation. Attached you will find relevant portions of my assessment and plan of care.    If you have questions, please do not hesitate to call me. I look forward to following Graham Caceres along with you.    Sincerely,    Jasen Minaya MD    Enclosure  CC:  No Recipients    If you would like to receive this communication electronically, please contact [email or web address] or [phone] to request more information on [facility] Link access.    For providers and/or their staff who would like to refer a patient to [facility], please contact us through our one-stop-shop provider referral line, [facility], at [phone].    If you feel you have received this communication in error or would no longer like to receive these types of communications, please e-mail [email or web address]

## 2020-09-28 NOTE — TELEPHONE ENCOUNTER
Patient needed to sigh the attestation section of the application he received .  He is going to sign it an mail it to  me.

## 2020-09-28 NOTE — PROGRESS NOTES
Subjective:       Patient ID:  Graham Caceres is a 60 y.o. male who presents for   Chief Complaint   Patient presents with    Spot     left shin, X2yrs, non healing,TX neosporin      HPI    Review of Systems   Constitutional: Negative.    HENT: Negative.    Respiratory: Negative.    Skin: Positive for dry skin.   Hematologic/Lymphatic: Negative.         Objective:    Physical Exam   Constitutional: He appears well-developed and well-nourished.   Eyes: No conjunctival no injection.   Cardiovascular: There is no local extremity swelling and no dependent edema.     Neurological: He is alert and oriented to person, place, and time.   Psychiatric: He has a normal mood and affect.   Skin:   Areas Examined (abnormalities noted in diagram):   RUE Inspected  LUE Inspection Performed  RLE Inspected  LLE Inspection Performed              Diagram Legend     Erythematous scaling macule/papule c/w actinic keratosis       Vascular papule c/w angioma      Pigmented verrucoid papule/plaque c/w seborrheic keratosis      Yellow umbilicated papule c/w sebaceous hyperplasia      Irregularly shaped tan macule c/w lentigo     1-2 mm smooth white papules consistent with Milia      Movable subcutaneous cyst with punctum c/w epidermal inclusion cyst      Subcutaneous movable cyst c/w pilar cyst      Firm pink to brown papule c/w dermatofibroma      Pedunculated fleshy papule(s) c/w skin tag(s)      Evenly pigmented macule c/w junctional nevus     Mildly variegated pigmented, slightly irregular-bordered macule c/w mildly atypical nevus      Flesh colored to evenly pigmented papule c/w intradermal nevus       Pink pearly papule/plaque c/w basal cell carcinoma      Erythematous hyperkeratotic cursted plaque c/w SCC      Surgical scar with no sign of skin cancer recurrence      Open and closed comedones      Inflammatory papules and pustules      Verrucoid papule consistent consistent with wart     Erythematous eczematous patches and plaques      Dystrophic onycholytic nail with subungual debris c/w onychomycosis     Umbilicated papule    Erythematous-base heme-crusted tan verrucoid plaque consistent with inflamed seborrheic keratosis     Erythematous Silvery Scaling Plaque c/w Psoriasis     See annotation      Assessment / Plan:        Seborrheic keratoses  Discussed with patient the benign nature of these lesions and that no treatment is indicated.    Instructed patient to use petroleum jelly at least daily on affected areas.  Chronic nature of this condition discussed with patient.    Skin cancer screening  -     Ambulatory referral/consult to Dermatology  None noted today.    Xerosis of skin  Good skin care regimen discussed including limiting to one bath or shower/day, using lukewarm water with mild soap and moisturizing cream to skin 1 - 2x/day. Brochure was provided and reviewed with patient.  Reviewed with patient different treatment options and associated risks.  Discussed with patient the etiology and pathogenesis of the disease or skin lesion(s) and possible treatments and aggravators.               Follow up if symptoms worsen or fail to improve.

## 2020-09-28 NOTE — PATIENT INSTRUCTIONS
XEROSIS (DRY SKIN)        1. Definition    Xerosis is the term for dry skin.  We all have a natural oil coating over our skin produced by the skin oil glands.  If this oil is removed, the skin becomes dry which can lead to cracking, which can lead to inflammation.  Xerosis is usually a long-term problem that recurs often, especially in the winter.    2. Cause     Long hot baths or showers can remove our natural oil and lead to xerosis.  One should never take more than one bath or shower a day and for no longer than ten minutes.   Use of harsh soaps such as Zest, Dial, and Ivory can worsen and cause xerosis.   Cold winter weather worsens xerosis because the amount of moisture contained in cold air is much less than the amount of moisture in warm air.    3. Treatment     Treatment is intended to restore the natural oil to your skin.  Keep the skin lubricated.     Do not take more than one bath or shower a day.  Use lukewarm water, not hot.  Hot water dries out the skin.    Use a gentle moisturizing soap like Dove.       When toweling dry, dont rub.  Blot the skin so there is still some water left on the skin.  You should apply a moisturizing cream to all of the skin such as Cerave cream, Cetaphil cream, Restoraderm or Eucerin Original Formula cream.   Alpha hydroxyacid lotions, i.e., AmLactin, also work very well for preventing dry skin, but may burn when used on inflamed or reddened skin.     If you like to swim during the winter months, you should not use soap when getting out of the pool.  When you have finished swimming, rinse off the chlorine with cool to warm water.  If this will be the only shower of the day, then you may use Dove to cleanse your skin.  After the shower, apply a moisturizing organic coconut oil to all of the skin as above.        [address] [facility] [place] [address]/ [phone] FAX/ [email or web address]

## 2020-09-28 NOTE — PROGRESS NOTES
"Subjective:       Patient ID: Graham Caceres is a 60 y.o. male.    Chief Complaint: Follow-up    HPI   DM2 - januvia 100mg daily.  Not exercising much. Trying to decrease sugar and carb intake. occ ice cream or chocolate.   A1C 8/17/20 - 6.9.   Foot exam 1/14/20.  Due for eye exam.  MAC - 1/2020.     Has a lesion on his L shin that's dry. Not itchy. Has been there for years. Grown in size. Not painful. Doesn't bleed. No trauma.     R thigh w/ knot that's dark. Slightly painful. Remembered occurred after insulin injection yrs ago.     Rhinorrhea. Postnasal drip. No fevers/chills/cough/SOB. No myalgia.    Review of Systems  Comprehensive review of systems otherwise negative. See history/subjective section for more details.    Objective:      Physical Exam    /82 (BP Location: Left arm, Patient Position: Sitting, BP Method: Medium (Manual))   Pulse 96   Ht 5' 7" (1.702 m)   Wt 78.5 kg (173 lb 1 oz)   SpO2 97%   BMI 27.11 kg/m²     GEN - A+OX4, NAD   HEENT - PERRL, EOMI, OP clear. MMM.   Neck - No thyromegaly or cervical LAD. No thyroid masses felt.  CV - RRR, no m/r   Chest - CTAB, no wheezing or rhonchi  Abd - S/NT/ND/+BS.   Ext - 2+BDP and radial pulses. No C/C/E.  MSK - no spinal tenderness to palpation. 5/5 BUE and BLE m strength. 2+ DTRs.   Skin - dry patch of skin on the L shin. R thigh w/ a hyperpigmented nodule.     Previous labs reviewed.     Assessment/Plan     Graham was seen today for follow-up.    Diagnoses and all orders for this visit:    Controlled type 2 diabetes mellitus without complication, without long-term current use of insulin  -     Ambulatory referral/consult to Pharmacy Assistance; Future  -     Ambulatory referral/consult to Optometry; Future  -     lancets Misc; To check BG 2 times daily, to use with insurance preferred meter    Hyperlipidemia associated with type 2 diabetes mellitus  -     Ambulatory referral/consult to Pharmacy Assistance; Future  -     rosuvastatin (CRESTOR) 40 " MG Tab; Take 1 tablet (40 mg total) by mouth every evening.    Hypertension associated with diabetes - Stable and controlled. Continue current medications.    Hepatic cirrhosis due to chronic hepatitis C infection - f/u w/ hepatology.     Skin cancer screening  -     Ambulatory referral/consult to Dermatology; Future    Chronic rhinitis  -     fluticasone propionate (FLONASE) 50 mcg/actuation nasal spray; 1 spray (50 mcg total) by Each Nostril route every evening.      Follow up in about 4 months (around 1/28/2021).      Jennifer Manzanares MD  Department of Internal Medicine - [facility]  1:15 PM

## 2020-09-28 NOTE — PROGRESS NOTES
Chart reviewed.   Immunizations: updated  Orders placed: n/a  Upcoming appts to satisfy GRACIELA topics: Optometry 11/05

## 2020-09-29 ENCOUNTER — PATIENT MESSAGE (OUTPATIENT)
Dept: OTHER | Facility: OTHER | Age: 60
End: 2020-09-29

## 2020-10-30 NOTE — TELEPHONE ENCOUNTER
----- Message from Mackenzie Garcia sent at 6/9/2017  8:24 AM CDT -----  Self     Pt is concern about his sugar level.  Said it been running around 170  
Left message for patient to call back  
done

## 2020-11-04 ENCOUNTER — PATIENT OUTREACH (OUTPATIENT)
Dept: ADMINISTRATIVE | Facility: OTHER | Age: 60
End: 2020-11-04

## 2020-11-05 NOTE — PROGRESS NOTES
Chart reviewed.   Immunizations: updated  Orders placed: n/a  Upcoming appts to satisfy GRACIELA topics: Optometry 11/05/20

## 2020-11-16 ENCOUNTER — TELEPHONE (OUTPATIENT)
Dept: INTERNAL MEDICINE | Facility: CLINIC | Age: 60
End: 2020-11-16

## 2020-11-16 ENCOUNTER — LAB VISIT (OUTPATIENT)
Dept: LAB | Facility: HOSPITAL | Age: 60
End: 2020-11-16
Attending: INTERNAL MEDICINE
Payer: MEDICARE

## 2020-11-16 DIAGNOSIS — E11.65 TYPE 2 DIABETES MELLITUS WITH HYPERGLYCEMIA, WITHOUT LONG-TERM CURRENT USE OF INSULIN: ICD-10-CM

## 2020-11-16 DIAGNOSIS — E11.65 TYPE 2 DIABETES MELLITUS WITH HYPERGLYCEMIA, WITHOUT LONG-TERM CURRENT USE OF INSULIN: Primary | ICD-10-CM

## 2020-11-16 DIAGNOSIS — N39.0 URINARY TRACT INFECTION WITHOUT HEMATURIA, SITE UNSPECIFIED: ICD-10-CM

## 2020-11-16 LAB
ALBUMIN SERPL BCP-MCNC: 4.8 G/DL (ref 3.5–5.2)
ALP SERPL-CCNC: 62 U/L (ref 55–135)
ALT SERPL W/O P-5'-P-CCNC: 53 U/L (ref 10–44)
ANION GAP SERPL CALC-SCNC: 9 MMOL/L (ref 8–16)
AST SERPL-CCNC: 31 U/L (ref 10–40)
BILIRUB SERPL-MCNC: 0.8 MG/DL (ref 0.1–1)
BUN SERPL-MCNC: 10 MG/DL (ref 6–20)
CALCIUM SERPL-MCNC: 9.9 MG/DL (ref 8.7–10.5)
CHLORIDE SERPL-SCNC: 102 MMOL/L (ref 95–110)
CO2 SERPL-SCNC: 28 MMOL/L (ref 23–29)
CREAT SERPL-MCNC: 0.8 MG/DL (ref 0.5–1.4)
EST. GFR  (AFRICAN AMERICAN): >60 ML/MIN/1.73 M^2
EST. GFR  (NON AFRICAN AMERICAN): >60 ML/MIN/1.73 M^2
GLUCOSE SERPL-MCNC: 148 MG/DL (ref 70–110)
POTASSIUM SERPL-SCNC: 4.4 MMOL/L (ref 3.5–5.1)
PROT SERPL-MCNC: 8.5 G/DL (ref 6–8.4)
SODIUM SERPL-SCNC: 139 MMOL/L (ref 136–145)

## 2020-11-16 PROCEDURE — 36415 COLL VENOUS BLD VENIPUNCTURE: CPT | Mod: HCNC

## 2020-11-16 PROCEDURE — 83036 HEMOGLOBIN GLYCOSYLATED A1C: CPT | Mod: HCNC

## 2020-11-16 PROCEDURE — 80053 COMPREHEN METABOLIC PANEL: CPT | Mod: HCNC

## 2020-11-16 NOTE — TELEPHONE ENCOUNTER
Spoke to pt. For a couple weeks he has been noticing his blood sugar rising. His normal use to be 120's but now its 168. When he eats his sugar goes to 200-220 sometimes 300. Eating habits has not changed. He has been sleepy more often, frequent urination and drinking more water. He takes Januiva daily.   He is concerned about the changes in his blood sugars.

## 2020-11-16 NOTE — TELEPHONE ENCOUNTER
Can you call pt to schedule him an a1c and CMP now along w/ a urine? Is he having any signs of infection - cough, fevers, chills, diarrhea, rash, urinary issues, etc.?

## 2020-11-16 NOTE — TELEPHONE ENCOUNTER
----- Message from Aubrey Eubanks sent at 11/16/2020 10:48 AM CST -----  Pt would like to be called back asap regarding questions concerning his diabetes medication and current sugar level of 168    Pt can be reached at [phone].    Thanks

## 2020-11-17 LAB
ESTIMATED AVG GLUCOSE: 146 MG/DL (ref 68–131)
HBA1C MFR BLD HPLC: 6.7 % (ref 4–5.6)

## 2020-12-11 ENCOUNTER — PATIENT MESSAGE (OUTPATIENT)
Dept: OTHER | Facility: OTHER | Age: 60
End: 2020-12-11

## 2020-12-31 ENCOUNTER — TELEPHONE (OUTPATIENT)
Dept: INTERNAL MEDICINE | Facility: CLINIC | Age: 60
End: 2020-12-31

## 2020-12-31 DIAGNOSIS — E11.9 TYPE 2 DIABETES MELLITUS WITHOUT RETINOPATHY: Primary | ICD-10-CM

## 2020-12-31 NOTE — TELEPHONE ENCOUNTER
----- Message from Frannie Cardona sent at 12/31/2020  2:07 PM CST -----  Regarding: Pt self Mobile/Home [phone]  Patient is returning a phone call.  Who left a message for the patient: Steph Fernandez LPN   Does patient know what this is regarding:  A message from Steph Fernandez LPN

## 2020-12-31 NOTE — TELEPHONE ENCOUNTER
----- Message from Chelita Castro sent at 12/31/2020 10:19 AM CST -----  Regarding: Requesting some medication cheaper than Januvia  Pt called to request to speak with the nurse concerning his medication for diabetes.     Pt can be reached at [phone].    Pt say the medication SITagliptin (JANUVIA) 100 MG Tab is out of meds and the price is $300.00 pt requesting something cheaper please.      Thank you!

## 2020-12-31 NOTE — TELEPHONE ENCOUNTER
Spoke to pt. I explained the message from Roger. He would like to know what other medications can he take?

## 2021-01-04 RX ORDER — GLIPIZIDE 5 MG/1
5 TABLET ORAL
Qty: 90 TABLET | Refills: 3 | Status: SHIPPED | OUTPATIENT
Start: 2021-01-04 | End: 2021-04-28

## 2021-01-04 NOTE — TELEPHONE ENCOUNTER
Will send in glipizide 5mg daily with breakfast in place of januvia. Do not take the medicine if he does not eat.

## 2021-01-05 ENCOUNTER — TELEPHONE (OUTPATIENT)
Dept: INTERNAL MEDICINE | Facility: CLINIC | Age: 61
End: 2021-01-05

## 2021-02-05 ENCOUNTER — TELEPHONE (OUTPATIENT)
Dept: INTERNAL MEDICINE | Facility: CLINIC | Age: 61
End: 2021-02-05

## 2021-02-07 ENCOUNTER — PATIENT OUTREACH (OUTPATIENT)
Dept: ADMINISTRATIVE | Facility: OTHER | Age: 61
End: 2021-02-07

## 2021-02-08 ENCOUNTER — HOSPITAL ENCOUNTER (OUTPATIENT)
Dept: RADIOLOGY | Facility: HOSPITAL | Age: 61
Discharge: HOME | End: 2021-02-08
Attending: PHYSICIAN ASSISTANT
Payer: MEDICARE

## 2021-02-08 ENCOUNTER — OFFICE VISIT (OUTPATIENT)
Dept: HEPATOLOGY | Facility: CLINIC | Age: 61
End: 2021-02-08
Payer: MEDICARE

## 2021-02-08 VITALS
TEMPERATURE: 97 F | HEART RATE: 79 BPM | OXYGEN SATURATION: 97 % | WEIGHT: 179 LBS | DIASTOLIC BLOOD PRESSURE: 86 MMHG | BODY MASS INDEX: 28.09 KG/M2 | RESPIRATION RATE: 18 BRPM | SYSTOLIC BLOOD PRESSURE: 143 MMHG | HEIGHT: 67 IN

## 2021-02-08 DIAGNOSIS — K74.60 HEPATIC CIRRHOSIS, UNSPECIFIED HEPATIC CIRRHOSIS TYPE, UNSPECIFIED WHETHER ASCITES PRESENT: ICD-10-CM

## 2021-02-08 DIAGNOSIS — Z86.19 HEPATITIS C VIRUS INFECTION CURED AFTER ANTIVIRAL DRUG THERAPY: ICD-10-CM

## 2021-02-08 DIAGNOSIS — K74.60 HEPATIC CIRRHOSIS, UNSPECIFIED HEPATIC CIRRHOSIS TYPE, UNSPECIFIED WHETHER ASCITES PRESENT: Primary | ICD-10-CM

## 2021-02-08 PROCEDURE — 3079F DIAST BP 80-89 MM HG: CPT | Mod: CPTII,S$GLB,, | Performed by: PHYSICIAN ASSISTANT

## 2021-02-08 PROCEDURE — 1126F PR PAIN SEVERITY QUANTIFIED, NO PAIN PRESENT: ICD-10-PCS | Mod: S$GLB,,, | Performed by: PHYSICIAN ASSISTANT

## 2021-02-08 PROCEDURE — 99999 PR PBB SHADOW E&M-EST. PATIENT-LVL IV: ICD-10-PCS | Mod: PBBFAC,,, | Performed by: PHYSICIAN ASSISTANT

## 2021-02-08 PROCEDURE — 76705 US ABDOMEN LIMITED: ICD-10-PCS | Mod: 26,,, | Performed by: RADIOLOGY

## 2021-02-08 PROCEDURE — 99999 PR PBB SHADOW E&M-EST. PATIENT-LVL IV: CPT | Mod: PBBFAC,,, | Performed by: PHYSICIAN ASSISTANT

## 2021-02-08 PROCEDURE — 99213 PR OFFICE/OUTPT VISIT, EST, LEVL III, 20-29 MIN: ICD-10-PCS | Mod: S$GLB,,, | Performed by: PHYSICIAN ASSISTANT

## 2021-02-08 PROCEDURE — 99213 OFFICE O/P EST LOW 20 MIN: CPT | Mod: S$GLB,,, | Performed by: PHYSICIAN ASSISTANT

## 2021-02-08 PROCEDURE — 76705 ECHO EXAM OF ABDOMEN: CPT | Mod: 26,,, | Performed by: RADIOLOGY

## 2021-02-08 PROCEDURE — 3077F PR MOST RECENT SYSTOLIC BLOOD PRESSURE >= 140 MM HG: ICD-10-PCS | Mod: CPTII,S$GLB,, | Performed by: PHYSICIAN ASSISTANT

## 2021-02-08 PROCEDURE — 1126F AMNT PAIN NOTED NONE PRSNT: CPT | Mod: S$GLB,,, | Performed by: PHYSICIAN ASSISTANT

## 2021-02-08 PROCEDURE — 76705 ECHO EXAM OF ABDOMEN: CPT | Mod: TC

## 2021-02-08 PROCEDURE — 99499 RISK ADDL DX/OHS AUDIT: ICD-10-PCS | Mod: S$GLB,,, | Performed by: PHYSICIAN ASSISTANT

## 2021-02-08 PROCEDURE — 3079F PR MOST RECENT DIASTOLIC BLOOD PRESSURE 80-89 MM HG: ICD-10-PCS | Mod: CPTII,S$GLB,, | Performed by: PHYSICIAN ASSISTANT

## 2021-02-08 PROCEDURE — 3008F BODY MASS INDEX DOCD: CPT | Mod: CPTII,S$GLB,, | Performed by: PHYSICIAN ASSISTANT

## 2021-02-08 PROCEDURE — 3077F SYST BP >= 140 MM HG: CPT | Mod: CPTII,S$GLB,, | Performed by: PHYSICIAN ASSISTANT

## 2021-02-08 PROCEDURE — 3008F PR BODY MASS INDEX (BMI) DOCUMENTED: ICD-10-PCS | Mod: CPTII,S$GLB,, | Performed by: PHYSICIAN ASSISTANT

## 2021-02-08 PROCEDURE — 99499 UNLISTED E&M SERVICE: CPT | Mod: S$GLB,,, | Performed by: PHYSICIAN ASSISTANT

## 2021-02-09 ENCOUNTER — TELEPHONE (OUTPATIENT)
Dept: HEPATOLOGY | Facility: CLINIC | Age: 61
End: 2021-02-09

## 2021-03-03 DIAGNOSIS — E11.9 TYPE 2 DIABETES MELLITUS WITHOUT COMPLICATION: ICD-10-CM

## 2021-04-05 ENCOUNTER — PATIENT MESSAGE (OUTPATIENT)
Dept: ADMINISTRATIVE | Facility: HOSPITAL | Age: 61
End: 2021-04-05

## 2021-04-23 ENCOUNTER — TELEPHONE (OUTPATIENT)
Dept: INTERNAL MEDICINE | Facility: CLINIC | Age: 61
End: 2021-04-23

## 2021-04-23 DIAGNOSIS — E78.5 HYPERLIPIDEMIA, UNSPECIFIED HYPERLIPIDEMIA TYPE: ICD-10-CM

## 2021-04-23 DIAGNOSIS — E66.3 OVERWEIGHT (BMI 25.0-29.9): Primary | ICD-10-CM

## 2021-04-23 DIAGNOSIS — E11.9 TYPE 2 DIABETES MELLITUS WITHOUT RETINOPATHY: ICD-10-CM

## 2021-04-27 ENCOUNTER — OFFICE VISIT (OUTPATIENT)
Dept: INTERNAL MEDICINE | Facility: CLINIC | Age: 61
End: 2021-04-27
Payer: MEDICARE

## 2021-04-27 ENCOUNTER — LAB VISIT (OUTPATIENT)
Dept: LAB | Facility: HOSPITAL | Age: 61
End: 2021-04-27
Attending: NURSE PRACTITIONER
Payer: MEDICARE

## 2021-04-27 VITALS
HEIGHT: 67 IN | OXYGEN SATURATION: 99 % | SYSTOLIC BLOOD PRESSURE: 104 MMHG | DIASTOLIC BLOOD PRESSURE: 90 MMHG | WEIGHT: 174.81 LBS | HEART RATE: 66 BPM | BODY MASS INDEX: 27.44 KG/M2

## 2021-04-27 DIAGNOSIS — E11.9 TYPE 2 DIABETES MELLITUS WITHOUT RETINOPATHY: ICD-10-CM

## 2021-04-27 DIAGNOSIS — I10 ESSENTIAL HYPERTENSION: ICD-10-CM

## 2021-04-27 DIAGNOSIS — E78.5 HYPERLIPIDEMIA, UNSPECIFIED HYPERLIPIDEMIA TYPE: ICD-10-CM

## 2021-04-27 DIAGNOSIS — E11.9 TYPE 2 DIABETES MELLITUS WITHOUT RETINOPATHY: Primary | ICD-10-CM

## 2021-04-27 LAB
ESTIMATED AVG GLUCOSE: 166 MG/DL (ref 68–131)
HBA1C MFR BLD: 7.4 % (ref 4–5.6)

## 2021-04-27 PROCEDURE — 1126F PR PAIN SEVERITY QUANTIFIED, NO PAIN PRESENT: ICD-10-PCS | Mod: S$GLB,,, | Performed by: NURSE PRACTITIONER

## 2021-04-27 PROCEDURE — 3008F PR BODY MASS INDEX (BMI) DOCUMENTED: ICD-10-PCS | Mod: CPTII,S$GLB,, | Performed by: NURSE PRACTITIONER

## 2021-04-27 PROCEDURE — 83036 HEMOGLOBIN GLYCOSYLATED A1C: CPT | Performed by: NURSE PRACTITIONER

## 2021-04-27 PROCEDURE — 99999 PR PBB SHADOW E&M-EST. PATIENT-LVL IV: CPT | Mod: PBBFAC,,, | Performed by: NURSE PRACTITIONER

## 2021-04-27 PROCEDURE — 99499 RISK ADDL DX/OHS AUDIT: ICD-10-PCS | Mod: S$GLB,,, | Performed by: NURSE PRACTITIONER

## 2021-04-27 PROCEDURE — 3074F SYST BP LT 130 MM HG: CPT | Mod: CPTII,S$GLB,, | Performed by: NURSE PRACTITIONER

## 2021-04-27 PROCEDURE — 99499 UNLISTED E&M SERVICE: CPT | Mod: S$GLB,,, | Performed by: NURSE PRACTITIONER

## 2021-04-27 PROCEDURE — 36415 COLL VENOUS BLD VENIPUNCTURE: CPT | Performed by: NURSE PRACTITIONER

## 2021-04-27 PROCEDURE — 3008F BODY MASS INDEX DOCD: CPT | Mod: CPTII,S$GLB,, | Performed by: NURSE PRACTITIONER

## 2021-04-27 PROCEDURE — 99214 OFFICE O/P EST MOD 30 MIN: CPT | Mod: S$GLB,,, | Performed by: NURSE PRACTITIONER

## 2021-04-27 PROCEDURE — 1126F AMNT PAIN NOTED NONE PRSNT: CPT | Mod: S$GLB,,, | Performed by: NURSE PRACTITIONER

## 2021-04-27 PROCEDURE — 99214 PR OFFICE/OUTPT VISIT, EST, LEVL IV, 30-39 MIN: ICD-10-PCS | Mod: S$GLB,,, | Performed by: NURSE PRACTITIONER

## 2021-04-27 PROCEDURE — 3080F DIAST BP >= 90 MM HG: CPT | Mod: CPTII,S$GLB,, | Performed by: NURSE PRACTITIONER

## 2021-04-27 PROCEDURE — 3074F PR MOST RECENT SYSTOLIC BLOOD PRESSURE < 130 MM HG: ICD-10-PCS | Mod: CPTII,S$GLB,, | Performed by: NURSE PRACTITIONER

## 2021-04-27 PROCEDURE — 99999 PR PBB SHADOW E&M-EST. PATIENT-LVL IV: ICD-10-PCS | Mod: PBBFAC,,, | Performed by: NURSE PRACTITIONER

## 2021-04-27 PROCEDURE — 80048 BASIC METABOLIC PNL TOTAL CA: CPT | Performed by: NURSE PRACTITIONER

## 2021-04-27 PROCEDURE — 3080F PR MOST RECENT DIASTOLIC BLOOD PRESSURE >= 90 MM HG: ICD-10-PCS | Mod: CPTII,S$GLB,, | Performed by: NURSE PRACTITIONER

## 2021-04-28 ENCOUNTER — TELEPHONE (OUTPATIENT)
Dept: INTERNAL MEDICINE | Facility: CLINIC | Age: 61
End: 2021-04-28

## 2021-04-28 DIAGNOSIS — E11.9 TYPE 2 DIABETES MELLITUS WITHOUT RETINOPATHY: ICD-10-CM

## 2021-04-28 LAB
ANION GAP SERPL CALC-SCNC: 14 MMOL/L (ref 8–16)
BUN SERPL-MCNC: 14 MG/DL (ref 6–20)
CALCIUM SERPL-MCNC: 9.8 MG/DL (ref 8.7–10.5)
CHLORIDE SERPL-SCNC: 101 MMOL/L (ref 95–110)
CO2 SERPL-SCNC: 23 MMOL/L (ref 23–29)
CREAT SERPL-MCNC: 0.7 MG/DL (ref 0.5–1.4)
EST. GFR  (AFRICAN AMERICAN): >60 ML/MIN/1.73 M^2
EST. GFR  (NON AFRICAN AMERICAN): >60 ML/MIN/1.73 M^2
GLUCOSE SERPL-MCNC: 102 MG/DL (ref 70–110)
POTASSIUM SERPL-SCNC: 4.5 MMOL/L (ref 3.5–5.1)
SODIUM SERPL-SCNC: 138 MMOL/L (ref 136–145)

## 2021-04-28 RX ORDER — GLIPIZIDE 5 MG/1
10 TABLET ORAL
Qty: 90 TABLET | Refills: 3 | Status: SHIPPED | OUTPATIENT
Start: 2021-04-28 | End: 2021-05-31

## 2021-05-08 NOTE — ASSESSMENT & PLAN NOTE
--takes lisinopril 20 mg at home and was started on this for renoprotection for T2DM 3 months ago, however patient was made aware this class of drugs can make him cough.  He states his PCP has been out on maternity leave and has been unable to ask for a medication change due to the coughing.  He would like to be on something else.  --given he meets SIRS criteria, will hold off on an antihypertensive for now.  Consider changing home antihypertensive regimen on discharge with outpatient follow up     ACP Fellow Fellow Fellow Resident/Fellow Fellow Resident Fellow Fellow Fellow Fellow Fellow Fellow Fellow Resident/Fellow Fellow

## 2021-05-11 DIAGNOSIS — E11.9 TYPE 2 DIABETES MELLITUS WITHOUT RETINOPATHY: ICD-10-CM

## 2021-05-31 ENCOUNTER — TELEPHONE (OUTPATIENT)
Dept: INTERNAL MEDICINE | Facility: CLINIC | Age: 61
End: 2021-05-31

## 2021-05-31 RX ORDER — GLIPIZIDE 10 MG/1
10 TABLET ORAL
Qty: 90 TABLET | Refills: 3 | Status: SHIPPED | OUTPATIENT
Start: 2021-05-31 | End: 2021-08-10

## 2021-06-08 ENCOUNTER — PATIENT OUTREACH (OUTPATIENT)
Dept: ADMINISTRATIVE | Facility: OTHER | Age: 61
End: 2021-06-08

## 2021-06-09 ENCOUNTER — OFFICE VISIT (OUTPATIENT)
Dept: OPTOMETRY | Facility: CLINIC | Age: 61
End: 2021-06-09
Payer: COMMERCIAL

## 2021-06-09 DIAGNOSIS — H52.203 MYOPIA WITH ASTIGMATISM AND PRESBYOPIA, BILATERAL: Primary | ICD-10-CM

## 2021-06-09 DIAGNOSIS — H52.4 MYOPIA WITH ASTIGMATISM AND PRESBYOPIA, BILATERAL: Primary | ICD-10-CM

## 2021-06-09 DIAGNOSIS — H52.13 MYOPIA WITH ASTIGMATISM AND PRESBYOPIA, BILATERAL: Primary | ICD-10-CM

## 2021-06-09 DIAGNOSIS — E11.9 TYPE 2 DIABETES MELLITUS WITHOUT RETINOPATHY: ICD-10-CM

## 2021-06-09 DIAGNOSIS — H25.13 SENILE NUCLEAR SCLEROSIS, BILATERAL: ICD-10-CM

## 2021-06-09 PROCEDURE — 92014 COMPRE OPH EXAM EST PT 1/>: CPT | Mod: S$GLB,,, | Performed by: OPTOMETRIST

## 2021-06-09 PROCEDURE — 99499 RISK ADDL DX/OHS AUDIT: ICD-10-PCS | Mod: S$GLB,,, | Performed by: OPTOMETRIST

## 2021-06-09 PROCEDURE — 92014 PR EYE EXAM, EST PATIENT,COMPREHESV: ICD-10-PCS | Mod: S$GLB,,, | Performed by: OPTOMETRIST

## 2021-06-09 PROCEDURE — 92015 PR REFRACTION: ICD-10-PCS | Mod: S$GLB,,, | Performed by: OPTOMETRIST

## 2021-06-09 PROCEDURE — 99999 PR PBB SHADOW E&M-EST. PATIENT-LVL III: CPT | Mod: PBBFAC,,, | Performed by: OPTOMETRIST

## 2021-06-09 PROCEDURE — 92015 DETERMINE REFRACTIVE STATE: CPT | Mod: S$GLB,,, | Performed by: OPTOMETRIST

## 2021-06-09 PROCEDURE — 99499 UNLISTED E&M SERVICE: CPT | Mod: S$GLB,,, | Performed by: OPTOMETRIST

## 2021-06-09 PROCEDURE — 99999 PR PBB SHADOW E&M-EST. PATIENT-LVL III: ICD-10-PCS | Mod: PBBFAC,,, | Performed by: OPTOMETRIST

## 2021-06-30 ENCOUNTER — PES CALL (OUTPATIENT)
Dept: ADMINISTRATIVE | Facility: CLINIC | Age: 61
End: 2021-06-30

## 2021-07-07 ENCOUNTER — PATIENT MESSAGE (OUTPATIENT)
Dept: ADMINISTRATIVE | Facility: HOSPITAL | Age: 61
End: 2021-07-07

## 2021-07-27 ENCOUNTER — LAB VISIT (OUTPATIENT)
Dept: LAB | Facility: HOSPITAL | Age: 61
End: 2021-07-27
Payer: MEDICARE

## 2021-07-27 ENCOUNTER — OFFICE VISIT (OUTPATIENT)
Dept: INTERNAL MEDICINE | Facility: CLINIC | Age: 61
End: 2021-07-27
Payer: MEDICARE

## 2021-07-27 VITALS
HEIGHT: 67 IN | BODY MASS INDEX: 27.64 KG/M2 | DIASTOLIC BLOOD PRESSURE: 78 MMHG | OXYGEN SATURATION: 96 % | SYSTOLIC BLOOD PRESSURE: 110 MMHG | HEART RATE: 91 BPM | WEIGHT: 176.13 LBS

## 2021-07-27 DIAGNOSIS — I10 ESSENTIAL HYPERTENSION: ICD-10-CM

## 2021-07-27 DIAGNOSIS — Z09 FOLLOW UP: ICD-10-CM

## 2021-07-27 DIAGNOSIS — E11.9 TYPE 2 DIABETES MELLITUS WITHOUT RETINOPATHY: ICD-10-CM

## 2021-07-27 DIAGNOSIS — E11.9 TYPE 2 DIABETES MELLITUS WITHOUT RETINOPATHY: Primary | ICD-10-CM

## 2021-07-27 PROCEDURE — 99999 PR PBB SHADOW E&M-EST. PATIENT-LVL III: CPT | Mod: PBBFAC,,, | Performed by: NURSE PRACTITIONER

## 2021-07-27 PROCEDURE — 99999 PR PBB SHADOW E&M-EST. PATIENT-LVL III: ICD-10-PCS | Mod: PBBFAC,,, | Performed by: NURSE PRACTITIONER

## 2021-07-27 PROCEDURE — 3074F PR MOST RECENT SYSTOLIC BLOOD PRESSURE < 130 MM HG: ICD-10-PCS | Mod: CPTII,S$GLB,, | Performed by: NURSE PRACTITIONER

## 2021-07-27 PROCEDURE — 3051F PR MOST RECENT HEMOGLOBIN A1C LEVEL 7.0 - < 8.0%: ICD-10-PCS | Mod: CPTII,S$GLB,, | Performed by: NURSE PRACTITIONER

## 2021-07-27 PROCEDURE — 99499 RISK ADDL DX/OHS AUDIT: ICD-10-PCS | Mod: S$GLB,,, | Performed by: NURSE PRACTITIONER

## 2021-07-27 PROCEDURE — 3074F SYST BP LT 130 MM HG: CPT | Mod: CPTII,S$GLB,, | Performed by: NURSE PRACTITIONER

## 2021-07-27 PROCEDURE — 1160F PR REVIEW ALL MEDS BY PRESCRIBER/CLIN PHARMACIST DOCUMENTED: ICD-10-PCS | Mod: CPTII,S$GLB,, | Performed by: NURSE PRACTITIONER

## 2021-07-27 PROCEDURE — 3051F HG A1C>EQUAL 7.0%<8.0%: CPT | Mod: CPTII,S$GLB,, | Performed by: NURSE PRACTITIONER

## 2021-07-27 PROCEDURE — 3078F DIAST BP <80 MM HG: CPT | Mod: CPTII,S$GLB,, | Performed by: NURSE PRACTITIONER

## 2021-07-27 PROCEDURE — 1159F MED LIST DOCD IN RCRD: CPT | Mod: CPTII,S$GLB,, | Performed by: NURSE PRACTITIONER

## 2021-07-27 PROCEDURE — 1126F AMNT PAIN NOTED NONE PRSNT: CPT | Mod: CPTII,S$GLB,, | Performed by: NURSE PRACTITIONER

## 2021-07-27 PROCEDURE — 99499 UNLISTED E&M SERVICE: CPT | Mod: S$GLB,,, | Performed by: NURSE PRACTITIONER

## 2021-07-27 PROCEDURE — 1126F PR PAIN SEVERITY QUANTIFIED, NO PAIN PRESENT: ICD-10-PCS | Mod: CPTII,S$GLB,, | Performed by: NURSE PRACTITIONER

## 2021-07-27 PROCEDURE — 83036 HEMOGLOBIN GLYCOSYLATED A1C: CPT | Performed by: NURSE PRACTITIONER

## 2021-07-27 PROCEDURE — 99214 OFFICE O/P EST MOD 30 MIN: CPT | Mod: S$GLB,,, | Performed by: NURSE PRACTITIONER

## 2021-07-27 PROCEDURE — 3008F BODY MASS INDEX DOCD: CPT | Mod: CPTII,S$GLB,, | Performed by: NURSE PRACTITIONER

## 2021-07-27 PROCEDURE — 1160F RVW MEDS BY RX/DR IN RCRD: CPT | Mod: CPTII,S$GLB,, | Performed by: NURSE PRACTITIONER

## 2021-07-27 PROCEDURE — 36415 COLL VENOUS BLD VENIPUNCTURE: CPT | Performed by: NURSE PRACTITIONER

## 2021-07-27 PROCEDURE — 1159F PR MEDICATION LIST DOCUMENTED IN MEDICAL RECORD: ICD-10-PCS | Mod: CPTII,S$GLB,, | Performed by: NURSE PRACTITIONER

## 2021-07-27 PROCEDURE — 99214 PR OFFICE/OUTPT VISIT, EST, LEVL IV, 30-39 MIN: ICD-10-PCS | Mod: S$GLB,,, | Performed by: NURSE PRACTITIONER

## 2021-07-27 PROCEDURE — 3008F PR BODY MASS INDEX (BMI) DOCUMENTED: ICD-10-PCS | Mod: CPTII,S$GLB,, | Performed by: NURSE PRACTITIONER

## 2021-07-27 PROCEDURE — 3078F PR MOST RECENT DIASTOLIC BLOOD PRESSURE < 80 MM HG: ICD-10-PCS | Mod: CPTII,S$GLB,, | Performed by: NURSE PRACTITIONER

## 2021-07-28 ENCOUNTER — TELEPHONE (OUTPATIENT)
Dept: INTERNAL MEDICINE | Facility: CLINIC | Age: 61
End: 2021-07-28

## 2021-07-28 LAB
ESTIMATED AVG GLUCOSE: 180 MG/DL (ref 68–131)
HBA1C MFR BLD: 7.9 % (ref 4–5.6)

## 2021-08-11 ENCOUNTER — TELEPHONE (OUTPATIENT)
Dept: HEPATOLOGY | Facility: CLINIC | Age: 61
End: 2021-08-11

## 2021-10-05 ENCOUNTER — TELEPHONE (OUTPATIENT)
Dept: HEPATOLOGY | Facility: CLINIC | Age: 61
End: 2021-10-05

## 2021-11-27 DIAGNOSIS — E11.69 HYPERLIPIDEMIA ASSOCIATED WITH TYPE 2 DIABETES MELLITUS: ICD-10-CM

## 2021-11-27 DIAGNOSIS — E78.5 HYPERLIPIDEMIA ASSOCIATED WITH TYPE 2 DIABETES MELLITUS: ICD-10-CM

## 2021-11-28 RX ORDER — ROSUVASTATIN CALCIUM 40 MG/1
TABLET, COATED ORAL
Qty: 90 TABLET | Refills: 3 | Status: SHIPPED | OUTPATIENT
Start: 2021-11-28 | End: 2023-04-27

## 2022-01-21 ENCOUNTER — TELEPHONE (OUTPATIENT)
Dept: HEPATOLOGY | Facility: CLINIC | Age: 62
End: 2022-01-21
Payer: MEDICARE

## 2022-01-21 NOTE — TELEPHONE ENCOUNTER
----- Message from Sorin Gutierrez sent at 1/21/2022  1:24 PM CST -----  Contact: [phone]  Request Appt    Who Called: Graham  Appt Type:Hepatology/ Liver check  Call Back Number:[phone]  Additional Information: Pt would like to schedule an appt to check his liver

## 2022-01-23 NOTE — PROGRESS NOTES
ANNUAL VISIT NOTE     PRESENTING HISTORY     Reason for Visit:  Annual visit.    No chief complaint on file.      History of Present Illness & ROS: Mr. Graham Caceres is a 62 y.o. male.  Annual.   No longer has a PCP. Very pleasant man.   Seen by me in 7/2021 for diabetes follow up on behalf of Dr. Manzanares.     Fasting for labs today.   Have several other issues he wants to address today:   ` Hearing issue to ears  ` stuck tree wood into right thumb last week, while cutting down a tree, red around nail and some pus came out last week; no pain at present or fevers  ` scaly to scalp and around hairline  ` diabetes    He will meet with  today to arrange follow up with a new primary for in 2-3 months.     Review of Systems:  Eyes: denies visual changes at this time denies floaters   ENT: no nasal congestion or sore throat  Respiratory: no cough or shorness of breath  Cardiovascular: no chest pain or palpitations  Gastrointestinal: no nausea or vomiting, no abdominal pain or change in bowel habits  Genitourinary: no hematuria or dysuria; denies frequency  Hematologic/Lymphatic: no easy bruising or lymphadenopathy  Musculoskeletal: no arthralgias or myalgias  Neurological: no seizures or tremors  Endocrine: no heat or cold intolerance    PAST HISTORY:     Past Medical History:   Diagnosis Date    Cirrhosis     Diverticulosis     History of hepatitis C, s/p successful Rx w/ SVR24 - 6/2017 4/7/2016    S/p harvoni w/ SVR    Hyperlipidemia 04/2016    Hypertension     Internal hemorrhoid     New onset type 2 diabetes mellitus 4/2016    Positive QuantiFERON-TB Gold test 4/2016    Sebopsoriasis     SIRS (systemic inflammatory response syndrome) 8/11/2018       Past Surgical History:   Procedure Laterality Date    COLONOSCOPY  05/19/2016    repeat in 10 yrs    COLONOSCOPY Left 5/19/2016    Procedure: COLONOSCOPY - colon cancer screening;  Surgeon: David Soto MD;  Location: [facility] ([address] [place] [address]);  Service:  Endoscopy;  Laterality: Left;       Family History   Problem Relation Age of Onset    Diabetes type II Mother     Melanoma Neg Hx        Social History     Socioeconomic History    Marital status: Single   Occupational History    Occupation: disabled   Tobacco Use    Smoking status: Never Smoker    Smokeless tobacco: Never Used   Substance and Sexual Activity    Alcohol use: Not Currently     Alcohol/week: 0.0 standard drinks    Drug use: No    Sexual activity: Yes     Partners: Male       MEDICATIONS & ALLERGIES:     Current Outpatient Medications on File Prior to Visit   Medication Sig Dispense Refill    blood sugar diagnostic Strp To check BG 2 times daily, to use with insurance preferred meter 200 each 3    blood-glucose meter kit To check BG 2 times daily, to use with insurance preferred meter 1 each 0    glipiZIDE (GLUCOTROL) 10 MG tablet Take 1 tablet (10 mg total) by mouth daily with breakfast. 90 tablet 3    hydrocortisone 1 % cream Apply topically 2 (two) times daily. 30 g 0    ketoconazole (NIZORAL) 2 % cream Apply topically once daily. (Patient not taking: Reported on 7/27/2021) 1 Tube 2    lancets Misc To check BG 2 times daily, to use with insurance preferred meter 200 each 3    losartan (COZAAR) 50 MG tablet TAKE 1 TABLET EVERY DAY 90 tablet 3    rosuvastatin (CRESTOR) 40 MG Tab TAKE 1 TABLET EVERY EVENING 90 tablet 3     No current facility-administered medications on file prior to visit.        Review of patient's allergies indicates:  No Known Allergies    Medications Reconciliation:   I have reconciled the patient's home medications and discharge medications with the patient/family. I have updated all changes.  Refer to After-Visit Medication List.    OBJECTIVE:     Vital Signs:  There were no vitals filed for this visit.  Wt Readings from Last 3 Encounters:   07/27/21 0858 79.9 kg (176 lb 2.4 oz)   04/27/21 1401 79.3 kg (174 lb 13.2 oz)   02/08/21 0937 81.2 kg (179 lb 0.2 oz)      There is no height or weight on file to calculate BMI.   Wt Readings from Last 3 Encounters:   01/26/22 81.7 kg (180 lb 1.9 oz)   07/27/21 79.9 kg (176 lb 2.4 oz)   04/27/21 79.3 kg (174 lb 13.2 oz)     Temp Readings from Last 3 Encounters:   02/08/21 97 °F (36.1 °C) (Oral)   01/14/20 98 °F (36.7 °C) (Oral)   12/17/19 97 °F (36.1 °C)     BP Readings from Last 3 Encounters:   01/26/22 106/78   07/27/21 110/78   04/27/21 (!) 104/90     Pulse Readings from Last 3 Encounters:   01/26/22 91   07/27/21 91   04/27/21 66       Physical Exam:  General: Well developed, well nourished. No distress.  HEENT: Head is normocephalic, atraumatic  Right Ear: + cerumen impaction; no erythema, fluid or bulge  Left Ear: retraction, ? Rupture?   Eyes: Clear conjunctiva.  Neck: Supple, symmetrical neck; trachea midline.  Lungs: Clear to auscultation bilaterally and normal respiratory effort.  Cardiovascular: Heart with regular rate and rhythm. No murmurs, gallops or rubs  Extremities: No LE edema. Pulses 2+ and symmetric.   Abdomen: Abdomen is soft, non-tender non-distended with normal bowel sounds.  Skin: Skin color, texture, turgor normal. No rashes.  + surrounding erythema to right thumb's nail matrix, no active discharge or drainage  Musculoskeletal: Normal gait.   Lymph Nodes: No cervical or supraclavicular adenopathy.  Neurologic: Normal strength and tone. No focal numbness or weakness.   Psychiatric: Not depressed.      Laboratory  Lab Results   Component Value Date    WBC 5.37 02/08/2021    HGB 15.9 02/08/2021    HCT 47.9 02/08/2021     02/08/2021    CHOL 221 (H) 08/17/2020    TRIG 200 (H) 08/17/2020    HDL 45 08/17/2020    ALT 88 (H) 02/08/2021    AST 43 (H) 02/08/2021     04/27/2021    K 4.5 04/27/2021     04/27/2021    CREATININE 0.7 04/27/2021    BUN 14 04/27/2021    CO2 23 04/27/2021    INR 1.0 02/08/2021    HGBA1C 7.9 (H) 07/27/2021         ASSESSMENT & PLAN:     Preventative health care  -      Comprehensive Metabolic Panel; Future; Expected date: 01/26/2022  -     CBC Auto Differential; Future; Expected date: 01/26/2022  -     Lipid Panel; Future; Expected date: 01/26/2022  -     Hemoglobin A1C; Future; Expected date: 01/26/2022  -     TSH; Future; Expected date: 01/26/2022    Injury to fingernail of right hand, initial encounter  Paronychia Right Thumb  -     X-Ray Finger 2 or More Views right; Future; Expected date: 01/26/2022  (to ascertain no slivers of wood or tree bark that is embedded under nail)  -     doxycycline (VIBRA-TABS) 100 MG tablet; Take 1 tablet (100 mg total) by mouth 2 (two) times daily. for 10 days  Dispense: 20 tablet; Refill: 0    Type 2 diabetes mellitus without retinopathy  Continue Glucotrol   -     Hemoglobin A1C; Future; Expected date: 01/26/2022    Diverticulosis      Hyperlipidemia, unspecified hyperlipidemia type  Continue Crestor   -     Lipid Panel; Future; Expected date: 01/26/2022    Dermatitis, seborrheic  -     ketoconazole (NIZORAL) 2 % shampoo; Apply topically twice a week.  Dispense: 120 mL; Refill: 1  -     ketoconazole (NIZORAL) 2 % cream; Apply topically once daily.  Dispense: 1 each; Refill: 2    Hypertension:   Today:    ` continue Losartan     Cerumen Impaction, Right Ear:   ENT on Friday as scheduled    Retraction and Hearing loss / Pain to Left Ear:   ? Ruptured TM  Will need an audiogram; defer to ENT specialist whom he is scheduled to see on 1/28/2022      *Informed that will perform Annual PE today and will need to follow up with one of our  Physician Providers to be considered est'd in medical care with practice site.     Future Appointments   Date Time Provider Department Center   1/28/2022 10:00 AM Kerri Vasquez DNP, FNP-C [facility]   2/4/2022  8:45 AM [facility] OI-US1 MASTER [facility]   2/4/2022  9:50 AM LAB, APPOINTMENT [facility] PCW   2/11/2022  1:00 PM Jennifer B. Scheuermann, PA [facility]         Medication List          Accurate as of January 26, 2022  9:54 AM. If you have any questions, ask your nurse or doctor.            START taking these medications    doxycycline 100 MG tablet  Commonly known as: VIBRA-TABS  Take 1 tablet (100 mg total) by mouth 2 (two) times daily. for 10 days  Started by: SHANNAN Florence        CHANGE how you take these medications    * ketoconazole 2 % cream  Commonly known as: NIZORAL  Apply topically once daily.  What changed: Another medication with the same name was added. Make sure you understand how and when to take each.  Changed by: SHANNAN Florence     * ketoconazole 2 % shampoo  Commonly known as: NIZORAL  Apply topically twice a week.  Start taking on: January 27, 2022  What changed: You were already taking a medication with the same name, and this prescription was added. Make sure you understand how and when to take each.  Changed by: SHANNAN Florence         * This list has 2 medication(s) that are the same as other medications prescribed for you. Read the directions carefully, and ask your doctor or other care provider to review them with you.            CONTINUE taking these medications    blood sugar diagnostic Strp  To check BG 2 times daily, to use with insurance preferred meter     blood-glucose meter kit  To check BG 2 times daily, to use with insurance preferred meter     glipiZIDE 10 MG tablet  Commonly known as: GLUCOTROL  Take 1 tablet (10 mg total) by mouth daily with breakfast.     hydrocortisone 1 % cream  Apply topically 2 (two) times daily.     lancets Misc  To check BG 2 times daily, to use with insurance preferred meter     losartan 50 MG tablet  Commonly known as: COZAAR  TAKE 1 TABLET EVERY DAY     rosuvastatin 40 MG Tab  Commonly known as: CRESTOR  TAKE 1 TABLET EVERY EVENING           Where to Get Your Medications      These medications were sent to [facility] [address] [place] [address], [address]   [address] [place] [address]    Phone: [phone]   · doxycycline 100 MG tablet  · ketoconazole 2 % cream  · ketoconazole 2 % shampoo         Signing Physician:  SHANNAN Florence

## 2022-01-26 ENCOUNTER — OFFICE VISIT (OUTPATIENT)
Dept: INTERNAL MEDICINE | Facility: CLINIC | Age: 62
End: 2022-01-26
Payer: MEDICARE

## 2022-01-26 ENCOUNTER — TELEPHONE (OUTPATIENT)
Dept: INTERNAL MEDICINE | Facility: CLINIC | Age: 62
End: 2022-01-26

## 2022-01-26 ENCOUNTER — HOSPITAL ENCOUNTER (OUTPATIENT)
Dept: RADIOLOGY | Facility: HOSPITAL | Age: 62
Discharge: HOME | End: 2022-01-26
Attending: NURSE PRACTITIONER
Payer: MEDICARE

## 2022-01-26 VITALS
HEART RATE: 91 BPM | BODY MASS INDEX: 28.27 KG/M2 | SYSTOLIC BLOOD PRESSURE: 106 MMHG | WEIGHT: 180.13 LBS | OXYGEN SATURATION: 98 % | HEIGHT: 67 IN | DIASTOLIC BLOOD PRESSURE: 78 MMHG

## 2022-01-26 DIAGNOSIS — L03.011 PARONYCHIA OF RIGHT THUMB: ICD-10-CM

## 2022-01-26 DIAGNOSIS — H61.21 IMPACTED CERUMEN OF RIGHT EAR: ICD-10-CM

## 2022-01-26 DIAGNOSIS — E78.5 HYPERLIPIDEMIA, UNSPECIFIED HYPERLIPIDEMIA TYPE: ICD-10-CM

## 2022-01-26 DIAGNOSIS — I10 PRIMARY HYPERTENSION: ICD-10-CM

## 2022-01-26 DIAGNOSIS — E11.9 TYPE 2 DIABETES MELLITUS WITHOUT RETINOPATHY: ICD-10-CM

## 2022-01-26 DIAGNOSIS — S69.92XA INJURY TO FINGERNAIL OF LEFT HAND, INITIAL ENCOUNTER: ICD-10-CM

## 2022-01-26 DIAGNOSIS — S62.639A CLOSED DISPLACED FRACTURE OF DISTAL PHALANX OF FINGER OF RIGHT HAND: Primary | ICD-10-CM

## 2022-01-26 DIAGNOSIS — R93.89 ABNORMAL X-RAY: ICD-10-CM

## 2022-01-26 DIAGNOSIS — S69.91XA INJURY TO FINGERNAIL OF RIGHT HAND, INITIAL ENCOUNTER: ICD-10-CM

## 2022-01-26 DIAGNOSIS — L21.9 DERMATITIS, SEBORRHEIC: ICD-10-CM

## 2022-01-26 DIAGNOSIS — Z00.00 PREVENTATIVE HEALTH CARE: Primary | ICD-10-CM

## 2022-01-26 DIAGNOSIS — Z00.00 PREVENTATIVE HEALTH CARE: ICD-10-CM

## 2022-01-26 DIAGNOSIS — K57.90 DIVERTICULOSIS: ICD-10-CM

## 2022-01-26 PROCEDURE — 99499 RISK ADDL DX/OHS AUDIT: ICD-10-PCS | Mod: HCNC,S$GLB,, | Performed by: NURSE PRACTITIONER

## 2022-01-26 PROCEDURE — 3078F PR MOST RECENT DIASTOLIC BLOOD PRESSURE < 80 MM HG: ICD-10-PCS | Mod: HCNC,CPTII,S$GLB, | Performed by: NURSE PRACTITIONER

## 2022-01-26 PROCEDURE — 3072F PR LOW RISK FOR RETINOPATHY: ICD-10-PCS | Mod: HCNC,CPTII,S$GLB, | Performed by: NURSE PRACTITIONER

## 2022-01-26 PROCEDURE — 3078F DIAST BP <80 MM HG: CPT | Mod: HCNC,CPTII,S$GLB, | Performed by: NURSE PRACTITIONER

## 2022-01-26 PROCEDURE — 99999 PR PBB SHADOW E&M-EST. PATIENT-LVL III: CPT | Mod: PBBFAC,HCNC,, | Performed by: NURSE PRACTITIONER

## 2022-01-26 PROCEDURE — 99396 PREV VISIT EST AGE 40-64: CPT | Mod: HCNC,S$GLB,, | Performed by: NURSE PRACTITIONER

## 2022-01-26 PROCEDURE — 3074F PR MOST RECENT SYSTOLIC BLOOD PRESSURE < 130 MM HG: ICD-10-PCS | Mod: HCNC,CPTII,S$GLB, | Performed by: NURSE PRACTITIONER

## 2022-01-26 PROCEDURE — 3074F SYST BP LT 130 MM HG: CPT | Mod: HCNC,CPTII,S$GLB, | Performed by: NURSE PRACTITIONER

## 2022-01-26 PROCEDURE — 3051F HG A1C>EQUAL 7.0%<8.0%: CPT | Mod: HCNC,CPTII,S$GLB, | Performed by: NURSE PRACTITIONER

## 2022-01-26 PROCEDURE — 3008F BODY MASS INDEX DOCD: CPT | Mod: HCNC,CPTII,S$GLB, | Performed by: NURSE PRACTITIONER

## 2022-01-26 PROCEDURE — 99999 PR PBB SHADOW E&M-EST. PATIENT-LVL III: ICD-10-PCS | Mod: PBBFAC,HCNC,, | Performed by: NURSE PRACTITIONER

## 2022-01-26 PROCEDURE — 73140 XR FINGER 2 OR MORE VIEWS RIGHT: ICD-10-PCS | Mod: 26,HCNC,RT, | Performed by: RADIOLOGY

## 2022-01-26 PROCEDURE — 3072F LOW RISK FOR RETINOPATHY: CPT | Mod: HCNC,CPTII,S$GLB, | Performed by: NURSE PRACTITIONER

## 2022-01-26 PROCEDURE — 3051F PR MOST RECENT HEMOGLOBIN A1C LEVEL 7.0 - < 8.0%: ICD-10-PCS | Mod: HCNC,CPTII,S$GLB, | Performed by: NURSE PRACTITIONER

## 2022-01-26 PROCEDURE — 3008F PR BODY MASS INDEX (BMI) DOCUMENTED: ICD-10-PCS | Mod: HCNC,CPTII,S$GLB, | Performed by: NURSE PRACTITIONER

## 2022-01-26 PROCEDURE — 99396 PR PREVENTIVE VISIT,EST,40-64: ICD-10-PCS | Mod: HCNC,S$GLB,, | Performed by: NURSE PRACTITIONER

## 2022-01-26 PROCEDURE — 99499 UNLISTED E&M SERVICE: CPT | Mod: HCNC,S$GLB,, | Performed by: NURSE PRACTITIONER

## 2022-01-26 PROCEDURE — 73140 X-RAY EXAM OF FINGER(S): CPT | Mod: TC,HCNC,RT

## 2022-01-26 PROCEDURE — 73140 X-RAY EXAM OF FINGER(S): CPT | Mod: 26,HCNC,RT, | Performed by: RADIOLOGY

## 2022-01-26 RX ORDER — KETOCONAZOLE 20 MG/G
CREAM TOPICAL DAILY
Qty: 1 EACH | Refills: 2 | Status: SHIPPED | OUTPATIENT
Start: 2022-01-26

## 2022-01-26 RX ORDER — DOXYCYCLINE HYCLATE 100 MG
100 TABLET ORAL 2 TIMES DAILY
Qty: 20 TABLET | Refills: 0 | Status: SHIPPED | OUTPATIENT
Start: 2022-01-26 | End: 2022-02-05

## 2022-01-26 RX ORDER — KETOCONAZOLE 20 MG/ML
SHAMPOO, SUSPENSION TOPICAL
Qty: 120 ML | Refills: 1 | Status: SHIPPED | OUTPATIENT
Start: 2022-01-27 | End: 2024-04-03 | Stop reason: SDUPTHER

## 2022-01-26 NOTE — TELEPHONE ENCOUNTER
"----- Message from SHANNAN Lopez sent at 1/26/2022 10:32 AM CST -----  Saw Mr Morris today on behalf of Dr. Manzanares.  Injury to finger last week while cutting tree and xray with questionable "fracture, needs to see Ortho hand, referral placed. Can you please reach out to him and help with getting an appt? Will need repeat xrays in 1 week per radiologist.     Xray results:   Three views fingers right: There is baseline DJD.  There is a possible nondisplaced fracture of the distal phalanx of the 1st digit.  Follow-up views recommended in 7 days.    Thanks so much!  "

## 2022-01-26 NOTE — TELEPHONE ENCOUNTER
Attempted to call pt back to schedule an appt with ortho hand clinic but no response at this time. Left VM to call office when available.

## 2022-01-28 ENCOUNTER — OFFICE VISIT (OUTPATIENT)
Dept: OTOLARYNGOLOGY | Facility: CLINIC | Age: 62
End: 2022-01-28
Payer: MEDICARE

## 2022-01-28 DIAGNOSIS — B36.9 EXTERNAL OTITIS OF LEFT EAR DUE TO FUNGUS: Primary | ICD-10-CM

## 2022-01-28 DIAGNOSIS — J31.0 CHRONIC RHINITIS: ICD-10-CM

## 2022-01-28 DIAGNOSIS — H61.23 BILATERAL IMPACTED CERUMEN: ICD-10-CM

## 2022-01-28 DIAGNOSIS — H62.42 EXTERNAL OTITIS OF LEFT EAR DUE TO FUNGUS: Primary | ICD-10-CM

## 2022-01-28 PROCEDURE — 99999 PR PBB SHADOW E&M-EST. PATIENT-LVL III: ICD-10-PCS | Mod: PBBFAC,HCNC,, | Performed by: NURSE PRACTITIONER

## 2022-01-28 PROCEDURE — 99203 OFFICE O/P NEW LOW 30 MIN: CPT | Mod: 25,HCNC,S$GLB, | Performed by: NURSE PRACTITIONER

## 2022-01-28 PROCEDURE — 1159F PR MEDICATION LIST DOCUMENTED IN MEDICAL RECORD: ICD-10-PCS | Mod: HCNC,CPTII,S$GLB, | Performed by: NURSE PRACTITIONER

## 2022-01-28 PROCEDURE — 99999 PR PBB SHADOW E&M-EST. PATIENT-LVL III: CPT | Mod: PBBFAC,HCNC,, | Performed by: NURSE PRACTITIONER

## 2022-01-28 PROCEDURE — 3052F HG A1C>EQUAL 8.0%<EQUAL 9.0%: CPT | Mod: HCNC,CPTII,S$GLB, | Performed by: NURSE PRACTITIONER

## 2022-01-28 PROCEDURE — 69210 EAR CERUMEN REMOVAL: ICD-10-PCS | Mod: HCNC,S$GLB,, | Performed by: NURSE PRACTITIONER

## 2022-01-28 PROCEDURE — 3072F PR LOW RISK FOR RETINOPATHY: ICD-10-PCS | Mod: HCNC,CPTII,S$GLB, | Performed by: NURSE PRACTITIONER

## 2022-01-28 PROCEDURE — 3072F LOW RISK FOR RETINOPATHY: CPT | Mod: HCNC,CPTII,S$GLB, | Performed by: NURSE PRACTITIONER

## 2022-01-28 PROCEDURE — 69210 REMOVE IMPACTED EAR WAX UNI: CPT | Mod: HCNC,S$GLB,, | Performed by: NURSE PRACTITIONER

## 2022-01-28 PROCEDURE — 1159F MED LIST DOCD IN RCRD: CPT | Mod: HCNC,CPTII,S$GLB, | Performed by: NURSE PRACTITIONER

## 2022-01-28 PROCEDURE — 99203 PR OFFICE/OUTPT VISIT, NEW, LEVL III, 30-44 MIN: ICD-10-PCS | Mod: 25,HCNC,S$GLB, | Performed by: NURSE PRACTITIONER

## 2022-01-28 PROCEDURE — 3052F PR MOST RECENT HEMOGLOBIN A1C LEVEL 8.0 - < 9.0%: ICD-10-PCS | Mod: HCNC,CPTII,S$GLB, | Performed by: NURSE PRACTITIONER

## 2022-01-28 RX ORDER — CLOTRIMAZOLE 1 G/ML
SOLUTION TOPICAL
Qty: 10 ML | Refills: 1 | Status: SHIPPED | OUTPATIENT
Start: 2022-01-28

## 2022-01-28 RX ORDER — LORATADINE 10 MG/1
10 TABLET ORAL DAILY
Qty: 30 TABLET | Refills: 11 | Status: SHIPPED | OUTPATIENT
Start: 2022-01-28 | End: 2022-02-11

## 2022-01-28 NOTE — PROGRESS NOTES
Subjective:      Graham Caceres is a 62 y.o. male who was self-referred for ear pain.    ,r/ Morris reports left ear drainage with associated ear pain and fullness for the past 2-3 weeks. He denies fever, chills, or shortness of breath. He has not tried anything for his ear symptoms. He also reports having frequent sneezing that is worse in the morning and when eating. He has intermittent runny nose and post-nasal drip. He denies sinus pressure or pain. He has tried fluticasone with limited benefit.      Past Medical History  He has a past medical history of Cirrhosis, Diverticulosis, History of hepatitis C, s/p successful Rx w/ SVR24 - 6/2017, Hyperlipidemia, Hypertension, Internal hemorrhoid, New onset type 2 diabetes mellitus, Positive QuantiFERON-TB Gold test, Sebopsoriasis, and SIRS (systemic inflammatory response syndrome).    Past Surgical History  He has a past surgical history that includes Colonoscopy (05/19/2016) and Colonoscopy (Left, 5/19/2016).    Family History  His family history includes Diabetes type II in his mother.    Social History  He reports that he has never smoked. He has never used smokeless tobacco. He reports previous alcohol use. He reports that he does not use drugs.    Allergies  He has No Known Allergies.    Medications  He has a current medication list which includes the following prescription(s): blood sugar diagnostic, blood-glucose meter, doxycycline, glipizide, hydrocortisone, ketoconazole, ketoconazole, lancets, losartan, rosuvastatin, clotrimazole, and loratadine.    Review of Systems   Constitutional: Negative for chills, fever and unexpected weight change.   HENT: Positive for ear discharge, ear pain, hearing loss, postnasal drip, rhinorrhea and sneezing. Negative for sore throat, tinnitus and trouble swallowing.    Eyes: Negative for pain and visual disturbance.   Respiratory: Negative for apnea and shortness of breath.    Cardiovascular: Negative for chest pain and  palpitations.   Gastrointestinal: Negative for abdominal pain and nausea.   Endocrine: Negative for cold intolerance and heat intolerance.   Skin: Positive for rash. Negative for color change.   Neurological: Negative for dizziness, facial asymmetry and headaches.   Hematological: Negative for adenopathy. Does not bruise/bleed easily.   Psychiatric/Behavioral: Negative for agitation and sleep disturbance. The patient is not nervous/anxious.           Objective:     There were no vitals taken for this visit.     Constitutional:   Vital signs are normal. He appears well-developed and well-nourished.     Head:  Normocephalic and atraumatic.     Ears:    Right Ear: No lacerations. No drainage, swelling or tenderness. No foreign bodies. No mastoid tenderness. Tympanic membrane is not injected, not scarred, not perforated, not erythematous, not retracted and not bulging. Tympanic membrane mobility is normal. No middle ear effusion. No hemotympanum.   Left Ear: No lacerations. There is drainage, swelling and tenderness. No foreign bodies. No mastoid tenderness. Tympanic membrane is not injected, not scarred, not perforated, not erythematous, not retracted and not bulging. Tympanic membrane mobility is normal.  No middle ear effusion. No hemotympanum.   Ears:      Nose:  Nose normal including turbinates, nasal mucosa, sinuses and nasal septum.     Mouth/Throat  Lips, teeth, and gums normal and oropharynx normal.     Neck:  Neck normal without thyromegaly masses, asymmetry, normal tracheal structure, crepitus, and tenderness and no adenopathy.     Psychiatric:   He has a normal mood and affect.       Procedure    Cerumen removal performed.  See procedure note.      Data Reviewed    WBC (K/uL)   Date Value   01/26/2022 4.91     Platelets (K/uL)   Date Value   01/26/2022 219      Creatinine (mg/dL)   Date Value   01/26/2022 0.7     TSH (uIU/mL)   Date Value   01/26/2022 1.704     Glucose (mg/dL)   Date Value   01/26/2022 236  (H)     Hemoglobin A1C (%)   Date Value   01/26/2022 8.4 (H)       Assessment:     1. External otitis of left ear due to fungus    2. Bilateral impacted cerumen    3. Chronic rhinitis         Plan:     Graham was seen today for otalgia.    Diagnoses and all orders for this visit:    External otitis of left ear due to fungus  -     clotrimazole (LOTRIMIN) 1 % Soln; Apply 4 drops to left ear 2 times daily for 14 days.    Bilateral impacted cerumen  -     Ear Cerumen Removal    Chronic rhinitis  -     loratadine (CLARITIN) 10 mg tablet; Take 1 tablet (10 mg total) by mouth once daily.          Follow up in about 2 weeks (around 2/11/2022).

## 2022-01-28 NOTE — PATIENT INSTRUCTIONS
External otitis of left ear due to fungus  -     clotrimazole (LOTRIMIN) 1 % Soln; Apply 4 drops to left ear 2 times daily for 14 days.    Bilateral impacted cerumen  -     Ear Cerumen Removal    Chronic rhinitis  -     loratadine (CLARITIN) 10 mg tablet; Take 1 tablet (10 mg total) by mouth once daily.          Follow up in 2 weeks

## 2022-02-01 ENCOUNTER — TELEPHONE (OUTPATIENT)
Dept: ORTHOPEDICS | Facility: CLINIC | Age: 62
End: 2022-02-01
Payer: MEDICARE

## 2022-02-01 ENCOUNTER — PATIENT OUTREACH (OUTPATIENT)
Dept: ADMINISTRATIVE | Facility: OTHER | Age: 62
End: 2022-02-01
Payer: MEDICARE

## 2022-02-01 ENCOUNTER — TELEPHONE (OUTPATIENT)
Dept: INTERNAL MEDICINE | Facility: CLINIC | Age: 62
End: 2022-02-01
Payer: MEDICARE

## 2022-02-01 ENCOUNTER — TELEPHONE (OUTPATIENT)
Dept: OTOLARYNGOLOGY | Facility: CLINIC | Age: 62
End: 2022-02-01
Payer: MEDICARE

## 2022-02-01 NOTE — TELEPHONE ENCOUNTER
Pt went to  prescription for ears drops from [facility] pharmacy and was told that the prescription was not called in.Can someone follow up and resend if possible. Thanks.

## 2022-02-01 NOTE — TELEPHONE ENCOUNTER
Spoke with pt and reminded him of his appointment and x-ray. Pt voiced understanding and call ended.

## 2022-02-04 ENCOUNTER — HOSPITAL ENCOUNTER (OUTPATIENT)
Dept: RADIOLOGY | Facility: HOSPITAL | Age: 62
Discharge: HOME | End: 2022-02-04
Attending: PHYSICIAN ASSISTANT
Payer: MEDICARE

## 2022-02-04 DIAGNOSIS — K74.60 HEPATIC CIRRHOSIS, UNSPECIFIED HEPATIC CIRRHOSIS TYPE, UNSPECIFIED WHETHER ASCITES PRESENT: ICD-10-CM

## 2022-02-04 PROCEDURE — 76705 ECHO EXAM OF ABDOMEN: CPT | Mod: TC,HCNC

## 2022-02-04 PROCEDURE — 76705 ECHO EXAM OF ABDOMEN: CPT | Mod: 26,HCNC,, | Performed by: RADIOLOGY

## 2022-02-04 PROCEDURE — 76705 US ABDOMEN LIMITED: ICD-10-PCS | Mod: 26,HCNC,, | Performed by: RADIOLOGY

## 2022-02-11 ENCOUNTER — OFFICE VISIT (OUTPATIENT)
Dept: HEPATOLOGY | Facility: CLINIC | Age: 62
End: 2022-02-11
Payer: MEDICARE

## 2022-02-11 VITALS
TEMPERATURE: 98 F | WEIGHT: 178.81 LBS | BODY MASS INDEX: 28.07 KG/M2 | HEIGHT: 67 IN | OXYGEN SATURATION: 98 % | HEART RATE: 93 BPM | DIASTOLIC BLOOD PRESSURE: 80 MMHG | SYSTOLIC BLOOD PRESSURE: 118 MMHG | RESPIRATION RATE: 18 BRPM

## 2022-02-11 DIAGNOSIS — Z86.19 HEPATITIS C VIRUS INFECTION CURED AFTER ANTIVIRAL DRUG THERAPY: ICD-10-CM

## 2022-02-11 DIAGNOSIS — K76.0 FATTY LIVER: ICD-10-CM

## 2022-02-11 DIAGNOSIS — K74.60 HEPATIC CIRRHOSIS, UNSPECIFIED HEPATIC CIRRHOSIS TYPE, UNSPECIFIED WHETHER ASCITES PRESENT: Primary | ICD-10-CM

## 2022-02-11 PROCEDURE — 1159F MED LIST DOCD IN RCRD: CPT | Mod: HCNC,CPTII,S$GLB, | Performed by: PHYSICIAN ASSISTANT

## 2022-02-11 PROCEDURE — 3074F PR MOST RECENT SYSTOLIC BLOOD PRESSURE < 130 MM HG: ICD-10-PCS | Mod: HCNC,CPTII,S$GLB, | Performed by: PHYSICIAN ASSISTANT

## 2022-02-11 PROCEDURE — 1160F PR REVIEW ALL MEDS BY PRESCRIBER/CLIN PHARMACIST DOCUMENTED: ICD-10-PCS | Mod: HCNC,CPTII,S$GLB, | Performed by: PHYSICIAN ASSISTANT

## 2022-02-11 PROCEDURE — 99999 PR PBB SHADOW E&M-EST. PATIENT-LVL V: CPT | Mod: PBBFAC,HCNC,, | Performed by: PHYSICIAN ASSISTANT

## 2022-02-11 PROCEDURE — 99499 UNLISTED E&M SERVICE: CPT | Mod: HCNC,S$GLB,, | Performed by: PHYSICIAN ASSISTANT

## 2022-02-11 PROCEDURE — 3079F PR MOST RECENT DIASTOLIC BLOOD PRESSURE 80-89 MM HG: ICD-10-PCS | Mod: HCNC,CPTII,S$GLB, | Performed by: PHYSICIAN ASSISTANT

## 2022-02-11 PROCEDURE — 3072F PR LOW RISK FOR RETINOPATHY: ICD-10-PCS | Mod: HCNC,CPTII,S$GLB, | Performed by: PHYSICIAN ASSISTANT

## 2022-02-11 PROCEDURE — 3052F HG A1C>EQUAL 8.0%<EQUAL 9.0%: CPT | Mod: HCNC,CPTII,S$GLB, | Performed by: PHYSICIAN ASSISTANT

## 2022-02-11 PROCEDURE — 1160F RVW MEDS BY RX/DR IN RCRD: CPT | Mod: HCNC,CPTII,S$GLB, | Performed by: PHYSICIAN ASSISTANT

## 2022-02-11 PROCEDURE — 3008F BODY MASS INDEX DOCD: CPT | Mod: HCNC,CPTII,S$GLB, | Performed by: PHYSICIAN ASSISTANT

## 2022-02-11 PROCEDURE — 3008F PR BODY MASS INDEX (BMI) DOCUMENTED: ICD-10-PCS | Mod: HCNC,CPTII,S$GLB, | Performed by: PHYSICIAN ASSISTANT

## 2022-02-11 PROCEDURE — 3074F SYST BP LT 130 MM HG: CPT | Mod: HCNC,CPTII,S$GLB, | Performed by: PHYSICIAN ASSISTANT

## 2022-02-11 PROCEDURE — 99499 RISK ADDL DX/OHS AUDIT: ICD-10-PCS | Mod: HCNC,S$GLB,, | Performed by: PHYSICIAN ASSISTANT

## 2022-02-11 PROCEDURE — 1159F PR MEDICATION LIST DOCUMENTED IN MEDICAL RECORD: ICD-10-PCS | Mod: HCNC,CPTII,S$GLB, | Performed by: PHYSICIAN ASSISTANT

## 2022-02-11 PROCEDURE — 99214 PR OFFICE/OUTPT VISIT, EST, LEVL IV, 30-39 MIN: ICD-10-PCS | Mod: HCNC,S$GLB,, | Performed by: PHYSICIAN ASSISTANT

## 2022-02-11 PROCEDURE — 3079F DIAST BP 80-89 MM HG: CPT | Mod: HCNC,CPTII,S$GLB, | Performed by: PHYSICIAN ASSISTANT

## 2022-02-11 PROCEDURE — 99999 PR PBB SHADOW E&M-EST. PATIENT-LVL V: ICD-10-PCS | Mod: PBBFAC,HCNC,, | Performed by: PHYSICIAN ASSISTANT

## 2022-02-11 PROCEDURE — 3052F PR MOST RECENT HEMOGLOBIN A1C LEVEL 8.0 - < 9.0%: ICD-10-PCS | Mod: HCNC,CPTII,S$GLB, | Performed by: PHYSICIAN ASSISTANT

## 2022-02-11 PROCEDURE — 99214 OFFICE O/P EST MOD 30 MIN: CPT | Mod: HCNC,S$GLB,, | Performed by: PHYSICIAN ASSISTANT

## 2022-02-11 PROCEDURE — 3072F LOW RISK FOR RETINOPATHY: CPT | Mod: HCNC,CPTII,S$GLB, | Performed by: PHYSICIAN ASSISTANT

## 2022-02-11 NOTE — PROGRESS NOTES
HEPATOLOGY CLINIC VISIT NOTE - HCV clinic  CHIEF COMPLAINT: Cirrhosis      HISTORY     This is a 62 y.o.   male w/ well compensated HCV cirrhosis (treated / cured) as well as fatty liver disease, DM and Hyperlipidemia, here for f/u.     Interval history:  Returns today w/ routine cirrhosis labs / u/s:  · U/S 2/4/22 - hepatic steatosis, no liver lesions. Spleen 11.8cm. no ascites  · Labs 2/4/22 - CBC, CMP, INR stable. AFP normal    Feels well  Current symptoms of hepatic decompensation:  · Ascites:              none  · TBili elevation:   none  · HE:                    none  · EV bleed:           none      Cirrhosis history:  FibroScan 4/28/16 - kPA 17.3, F4   (Labs / imaging / pretreatment APRI / FIB-4 all consistent w/ advanced fibrosis)  Well compensated w/ no evidence of portal HTN  MELD 2/2022 - 6     Cirrhosis maintenance:  · HCC screening - Up to date 2/2022  · Varices screening - EGD 5/2016 Dr Soto - no varices     Fatty Liver:  - steatosis on imaging  - (+) DM and Hyperlipidemia w/ hx of medication non adherence but sounds as if he's more engaged now. Asking about returning to endocrinology (last seen there few years ago)  1/2022 labs: lipid panel reveals some improvement in total chol but HbA1c up from 7.9 -> 8.4  - BMI 28     HCV history:  Completed 24 weeks harvoni w/ SVR12 as of 2/2017 (treated / cured)  - genotype 1B  - Prior HCV tx w/ Dr Laurent: PegIFN + RBV (10-15 yrs ago) for few months - nonresponse       PMH, PSH, PROBLEM LIST, SOCIAL HX, FAMILY HX, MEDS, ALLERGIES   Reviewed in Epic  FAMILY HX: neg for liver diease  SOCIAL HISTORY: Moved from [facility] [place] [facility] during teenage years. Resides in [place]. . 1 daughter from prior marriage  Not working, on disability. Worked at [employer] many years ago  Alcohol - none  Tobacco - none  Drugs - none      ROS:   Review of Systems   Constitutional: Negative for fever and malaise/fatigue.   Cardiovascular: Negative for chest pain and leg  swelling.   Gastrointestinal: Negative for abdominal pain and melena.   Skin: Negative for rash.   Psychiatric/Behavioral: Negative for memory loss.       PHYSICAL EXAM:  Friendly   male, in no acute distress; alert and oriented to person, place and time  VITALS: reviewed  HEENT: Sclerae anicteric.   LUNGS: Normal respiratory effort. Clear bilaterally  ABDOMEN: Flat, soft, nontender.   SKIN: Warm and dry. No jaundice, No obvious rashes.   EXTREMITIES: No lower extremity edema  NEURO/PSYCH: Normal gate. Memory intact. Thought and speech pattern appropriate. Behavior normal. No depression or anxiety noted.    PERTINENT DIAGNOSTIC RESULTS     Lab Results   Component Value Date    WBC 4.03 02/04/2022    HGB 16.4 02/04/2022     02/04/2022     Lab Results   Component Value Date    INR 1.0 02/04/2022     Lab Results   Component Value Date    AST 34 02/04/2022    ALT 56 (H) 02/04/2022    BILITOT 0.5 02/04/2022    ALBUMIN 4.2 02/04/2022    ALKPHOS 80 02/04/2022    CREATININE 0.6 02/04/2022    BUN 10 02/04/2022     (L) 02/04/2022    K 4.2 02/04/2022    AFP 3.2 02/04/2022       US Abdomen Limited - 2/4/22  Narrative  CLINICAL HISTORY:Unspecified cirrhosis of liver  TECHNIQUE:Limited ultrasound of the right upper quadrant of the abdomen (including pancreas, liver, gallbladder, common bile duct, and right kidney) was performed.  COMPARISON:Ultrasound abdomen limited 02/08/2021, MRI abdomen 02/17/2020    FINDINGS:  The liver measures 17.8 cm and demonstrates heterogeneous parenchymal echogenicity and a mildly elevated HRI of 1.47, suggestive of hepatic steatosis.  Small ill-defined area of decreased echogenicity adjacent to the gallbladder fossa, consistent with focal fatty sparing.  No focal masslike hepatic lesions.    There is no intrahepatic or extrahepatic bile duct dilatation.  The common duct measures 3 mm.    Tiny echogenic foci with comet tail artifact associated with the gallbladder fundal wall,  suggestive of adenomyomatosis.  No cholelithiasis, wall thickening, pericholecystic fluid, or sonographic Mason's sign.    The visualized portions of the pancreas are unremarkable.    The spleen measures 11.8 x 4.2 cm and is unremarkable.    Visualized IVC is unremarkable.    There is no free fluid within the visualized abdomen.    Impression  Hepatic steatosis.  No liver masses.  Gallbladder adenomyomatosis.        ASSESSMENT     62 y.o.   male  1. WELL COMPENSATED CIRRHOSIS   -- MELD score - 6  -- HCC screening - up to date 2/2022  -- EGD 5/2016 varices screening - no varices     2. HISTORY OF HEPATITIS C, GENOTYPE 1B - (treated / cured SVR12 2/2017)  -- completed 24weeks of Harvoni  -- (+) Immunity to HAV   -- prior resolved HBV     3. MILD TRANSAMINASE ELEVATION, likely due to NAFLD   -- serologic eval for other causes of elevated transaminases has been unyielding     4. HYPERLIPIDEMIA  5. DM  6. BMI 28      PLAN     Discussed transferring care to general hepatology in light of fatty liver w/ continued transaminase elevation and possibility of clinical trial participation for fatty liver    1. F/u visit w/ [facility] in general hepatology in 8/2022 w/ CBC, CMP, INR, AFP, U/S.   - Should come fasting in case provider wants FibroScan same day.  - Updated kPa may be needed to assess whether EGD for varices screening needed & for liver fat assessment      __________________________________________________________________    Duration of encounter: 37 min  This includes face-to-face time and non face-to-face time preparing to see the patient (eg, review of tests), obtaining and/or reviewing separately obtained history, documenting clinical information in the electronic or other health record, independently interpreting resultsand communicating results to the patient/family/caregiver, or care coordination.

## 2022-02-11 NOTE — PATIENT INSTRUCTIONS
1. Blood work and ultrasound in 8/2022  2. Visit in general liver clinic. Come fasting in case additional scan is needed that day - 8/2022.

## 2022-02-14 ENCOUNTER — OFFICE VISIT (OUTPATIENT)
Dept: ENDOCRINOLOGY | Facility: CLINIC | Age: 62
End: 2022-02-14
Payer: MEDICARE

## 2022-02-14 VITALS
BODY MASS INDEX: 28.04 KG/M2 | DIASTOLIC BLOOD PRESSURE: 86 MMHG | HEIGHT: 67 IN | WEIGHT: 178.69 LBS | SYSTOLIC BLOOD PRESSURE: 130 MMHG

## 2022-02-14 DIAGNOSIS — I10 HYPERTENSION, ESSENTIAL: ICD-10-CM

## 2022-02-14 DIAGNOSIS — E78.5 HYPERLIPIDEMIA, UNSPECIFIED HYPERLIPIDEMIA TYPE: ICD-10-CM

## 2022-02-14 DIAGNOSIS — E11.65 UNCONTROLLED TYPE 2 DIABETES MELLITUS WITH HYPERGLYCEMIA: Primary | ICD-10-CM

## 2022-02-14 PROCEDURE — 1159F MED LIST DOCD IN RCRD: CPT | Mod: HCNC,CPTII,S$GLB, | Performed by: GENERAL ACUTE CARE HOSPITAL

## 2022-02-14 PROCEDURE — 99214 PR OFFICE/OUTPT VISIT, EST, LEVL IV, 30-39 MIN: ICD-10-PCS | Mod: HCNC,S$GLB,, | Performed by: GENERAL ACUTE CARE HOSPITAL

## 2022-02-14 PROCEDURE — 3052F PR MOST RECENT HEMOGLOBIN A1C LEVEL 8.0 - < 9.0%: ICD-10-PCS | Mod: HCNC,CPTII,S$GLB, | Performed by: GENERAL ACUTE CARE HOSPITAL

## 2022-02-14 PROCEDURE — 3008F BODY MASS INDEX DOCD: CPT | Mod: HCNC,CPTII,S$GLB, | Performed by: GENERAL ACUTE CARE HOSPITAL

## 2022-02-14 PROCEDURE — 3072F PR LOW RISK FOR RETINOPATHY: ICD-10-PCS | Mod: HCNC,CPTII,S$GLB, | Performed by: GENERAL ACUTE CARE HOSPITAL

## 2022-02-14 PROCEDURE — 3052F HG A1C>EQUAL 8.0%<EQUAL 9.0%: CPT | Mod: HCNC,CPTII,S$GLB, | Performed by: GENERAL ACUTE CARE HOSPITAL

## 2022-02-14 PROCEDURE — 1160F PR REVIEW ALL MEDS BY PRESCRIBER/CLIN PHARMACIST DOCUMENTED: ICD-10-PCS | Mod: HCNC,CPTII,S$GLB, | Performed by: GENERAL ACUTE CARE HOSPITAL

## 2022-02-14 PROCEDURE — 3079F PR MOST RECENT DIASTOLIC BLOOD PRESSURE 80-89 MM HG: ICD-10-PCS | Mod: HCNC,CPTII,S$GLB, | Performed by: GENERAL ACUTE CARE HOSPITAL

## 2022-02-14 PROCEDURE — 3079F DIAST BP 80-89 MM HG: CPT | Mod: HCNC,CPTII,S$GLB, | Performed by: GENERAL ACUTE CARE HOSPITAL

## 2022-02-14 PROCEDURE — 99214 OFFICE O/P EST MOD 30 MIN: CPT | Mod: HCNC,S$GLB,, | Performed by: GENERAL ACUTE CARE HOSPITAL

## 2022-02-14 PROCEDURE — 99999 PR PBB SHADOW E&M-EST. PATIENT-LVL IV: ICD-10-PCS | Mod: PBBFAC,HCNC,, | Performed by: GENERAL ACUTE CARE HOSPITAL

## 2022-02-14 PROCEDURE — 1160F RVW MEDS BY RX/DR IN RCRD: CPT | Mod: HCNC,CPTII,S$GLB, | Performed by: GENERAL ACUTE CARE HOSPITAL

## 2022-02-14 PROCEDURE — 99999 PR PBB SHADOW E&M-EST. PATIENT-LVL IV: CPT | Mod: PBBFAC,HCNC,, | Performed by: GENERAL ACUTE CARE HOSPITAL

## 2022-02-14 PROCEDURE — 3072F LOW RISK FOR RETINOPATHY: CPT | Mod: HCNC,CPTII,S$GLB, | Performed by: GENERAL ACUTE CARE HOSPITAL

## 2022-02-14 PROCEDURE — 99499 RISK ADDL DX/OHS AUDIT: ICD-10-PCS | Mod: HCNC,S$GLB,, | Performed by: GENERAL ACUTE CARE HOSPITAL

## 2022-02-14 PROCEDURE — 3075F PR MOST RECENT SYSTOLIC BLOOD PRESS GE 130-139MM HG: ICD-10-PCS | Mod: HCNC,CPTII,S$GLB, | Performed by: GENERAL ACUTE CARE HOSPITAL

## 2022-02-14 PROCEDURE — 99499 UNLISTED E&M SERVICE: CPT | Mod: HCNC,S$GLB,, | Performed by: GENERAL ACUTE CARE HOSPITAL

## 2022-02-14 PROCEDURE — 3075F SYST BP GE 130 - 139MM HG: CPT | Mod: HCNC,CPTII,S$GLB, | Performed by: GENERAL ACUTE CARE HOSPITAL

## 2022-02-14 PROCEDURE — 3008F PR BODY MASS INDEX (BMI) DOCUMENTED: ICD-10-PCS | Mod: HCNC,CPTII,S$GLB, | Performed by: GENERAL ACUTE CARE HOSPITAL

## 2022-02-14 PROCEDURE — 1159F PR MEDICATION LIST DOCUMENTED IN MEDICAL RECORD: ICD-10-PCS | Mod: HCNC,CPTII,S$GLB, | Performed by: GENERAL ACUTE CARE HOSPITAL

## 2022-02-14 RX ORDER — METFORMIN HYDROCHLORIDE 500 MG/1
1000 TABLET, EXTENDED RELEASE ORAL 2 TIMES DAILY WITH MEALS
Qty: 360 TABLET | Refills: 3 | Status: CANCELLED | OUTPATIENT
Start: 2022-02-14 | End: 2023-02-14

## 2022-02-14 RX ORDER — PIOGLITAZONEHYDROCHLORIDE 15 MG/1
15 TABLET ORAL DAILY
Qty: 90 TABLET | Refills: 3 | Status: SHIPPED | OUTPATIENT
Start: 2022-02-14 | End: 2022-06-20

## 2022-02-14 NOTE — PATIENT INSTRUCTIONS
Due to your history of diabetes which seems to be slightly above goal.  I will recommend the following.     We will start Pioglitazone (actos) 15mg pill once a day in the morning.  This medication will help with diabetes and help improve your fatty liver disease.      Continue with Glipizide 10mg daily before breakfast.  Once you take the glipizide you need to eat within 10 minutes to avoid low blood sugars.     150 grams of carbohydrates daily MAX,  (50 grams or less per meal)     Avoid sugary drinks (Sodas, Sweet Tea, Juices with added sugar, Soft drinks)     Check your sugars 3-4 times daily (Before meals and at bedtime)     Sugar goals before breakfast (70 - 130)  Sugars 2 hours after meals  (less than 180)     Keep sugar log for review at next visit.     Try walking or doing some sort of physical activity at least 30 minutes 3 times a week to increase your sensitivity to insulin, improve sugar levels and hopefully this will help in trying to reduce the doses of your medications in the future.     If having low blood sugar < 70 please correct with 16 grams of carbohydrates or with 4 glucose tablets and re-check your sugars every 15 minutes until symptoms resolve and sugar is above 100.      We will check your A1C and labs prior to next visit.     Ophthalmology appointment once a year.     Diabetes education appointment once a year.     If any questions feel free to contact me.     Have a nice day.     Sincerely,       Blayne Pugh MD   Endocrinology   2/14/2022 4:14 PM                   Eating the Right Number of Calories (9536-1602 Guidelines)    Calories are a measure of the energy you get from food. If you eat more calories than you use, you will gain weight. If you eat fewer calories than you use, you will lose weight. Below are tables that give the number of calories needed each day. Look for your gender, age, and activity level. If you stick to this number, you should neither gain nor lose weight. Note  that this is an estimated number of calories.* Your exact number may differ.    Women  Age in years Low activity level (calories/day) Moderate activity level (calories/day) High activity level (calories/day)   19 to 30 1,800-2,000 2,000-2,200 2,400   31 to 50 1,800 2,000 2,200   51 and older 1,600 1,800 2,000-2,200      Men  Age in years Low activity level  (calories/day) Moderate activity level (calories/day) High activity level (calories/day)   19 to 30 2,400-2,600 2,600-2,800 3,000   31 to 50 2,200-2,400 2,400-2,600 2,800-3,000   51 and older 2,000-2,200 2,200-2,400 2,400-2,800     Activity levels defined  · Low. Only light physical activity such as that done during typical daily life.  · Moderate. Light physical activity done during typical daily life AND physical activity equal to walking about 1.5 to 3 miles a day at 3 to 4 miles per hour.  · High. Light physical activity done during typical daily life AND physical activity equal to walking more than 3 miles a day at 3 to 4 miles per hour.  *From Dietary Guidelines for Americans, 9724-7035, [place] Department of Health and Human Services.    Eat less fat  A gram of fat has almost 2.5 times the calories of a gram of protein or carbohydrates. Try to balance your food choices so that only 20% to 35% of your calories comes from total fat. This means an average of 2½ to 3½ grams of fat for each 100 calories you eat.    Eat more fiber  High-fiber foods are digested more slowly than low-fiber foods, so you feel full longer. Try to get at least 25 grams of fiber each day for a 2000 calorie diet. Foods high in fiber include:  · Vegetables and fruits  · Whole-grain or bran breads, pastas, and cereals  · Legumes (beans) and peas  As you begin to eat more fiber, be sure to drink plenty of water to keep your digestive system working smoothly.    Tips  Do's and don'ts include:   · Dont skip meals. This often leads to overeating later on. Its best to spread your eating  throughout the day.  · Eat a variety of foods, not just a few favorites.  · If you find yourself eating when youre not hungry, ask yourself why. Many of us eat when were bored, stressed, or just to be polite. Listen to your body. If youre not hungry, get busy doing something else instead of eating.  · Eat slower, shooting for 20 to 30 minutes for each meal. It takes 20 minutes for your stomach to tell your brain that its full. Slow eaters tend to eat less and are still satisfied, while fast eaters may tend to be overeaters.   · Pay attention to what you eat. Dont read or watch TV during your meal.     ---------------------------------------------------------------------------------------------------------------------------------------------------    Losing Weight for Heart Health  Excess weight is a major risk factor for heart disease. Losing weight has many benefits including lowering your blood pressure, improving your cholesterol level, and decreasing your risk for diseases such as diabetes and heart disease. It may help keep your arteries open so that your heart can get the oxygen-rich blood it needs. All in all, losing weight makes you healthier.       Exercise with a friend. When activity is fun, you're more likely to stick with it.     Calories and weight loss  · Calories are the fuel your body burns for energy. You get the calories you need from the food you eat. For healthy weight loss, women should eat at least 1,200 calories a day, men at least 1,500.  · When you eat more calories than you need, your body stores the extra calories as fat. One pound of fat equals 3,500 calories.  · To lose weight, try to reduce your total calorie intake by 500 calories. To do this, eat 250 calories less each day. Add activity to burn the other 250 calories. Walking 2.5 miles burns about 250 calories. Other more intense activities can burn more calories in the time you spend doing them, such as swimming and running. It  is important to understand that reducing calorie intake is much more effective at weight loss than is exercise.  · Eat a variety of healthy foods to get the nutrients you need.    Tips for losing weight  · Drink 8 to 10 glasses of water a day.  · Dont skip meals. Instead, eat smaller portions.  · Eat your meals earlier in the day.  · Cut out sugary drinks such as soda and fruit juices.  · Make your later meals lighter than your earlier meals.     What can exercise help?  · Blood sugar. Regular exercise improves blood sugar control by helping your body use insulin.  · Mental and emotional health. Physical activity relieves stress and helps you sleep better.  · Heart health. With regular exercise, you can reduce your risk of heart disease and high blood pressure. You can also improve your cholesterol and triglyceride levels.  · Weight. Exercise helps you lose fat, gain muscle, and control your weight.  · Health of blood vessels and nerves. Activity helps lower blood sugar. This helps prevent damage to blood vessels and nerves that can cause problems with your brain, eyes, feet, and legs.  · Finances. If you manage your blood sugar, you may spend less on medical care.    2 types of exercise  Two types of exercise help your body use blood sugar. Experts advise both types of exercise for people with diabetes:  · Aerobic exercise. This is a rhythmic, repeated, continued movement of large muscle groups for at least 10 minutes at a time. You should do this about 30 minutes a day on most days of the week. Examples include walking, bicycling, jogging, swimming, water aerobics, and many sports.  · Resistance exercise (strength training). This type of exercise uses muscles to move weight or work against resistance. You can do it with free weights, machines, resistance tubing, or your own body weight. Adults with diabetes should aim for 2 to 3 sessions of resistance exercise each week. Its best to skip a day in between.    A  goal to shoot for  Your main goal is to become more active. Even a little bit helps. Choose an activity that you like. Walking is one great form of exercise that everyone can do. Talk to your healthcare provider about any limits you may have before starting with an exercise program. Then aim for 150 minutes a week of physical activity. Dont let more than 2 days go by without exercise. When you are sitting for long periods of time, get up for short sessions of light activity every 30 minutes.    Getting activity into your day  Being more active doesnt have to be hard work. Try these to get more activity into your day:  · Take the stairs instead of the elevator  · Garden, do housework, and yard work  · Choose a parking space farther from the store  · Walk to talk to a co-worker instead of calling  · Take a 10-minute walk around the block at lunch  · Walk to a bus stop a little farther from your home or office  · Walk the dog after dinner     ---------------------------------------------------------------------------------------------------------------------------------------------------    Reading Food Labels  Look for the Nutrition Facts label on packaged foods. Reading labels is a big step toward eating healthier. The tips below help you know what to look for.    1. Serving size. Read this closely because the package, jar, or can may contain more than 1 serving. This is how to measure 1 serving of the food in the package. If you eat more than 1 serving, you get more of everything on the label -- including fat, cholesterol, and calories.  2. Total fat. This tells you how many grams (g) of fat are in 1 serving. Fat is high in calories. A healthy goal is to have less than 25% of your daily calories come from fat.  3. Saturated fat. This tells you how much saturated fat is in 1 serving. Saturated fat raises your cholesterol the most. Look for foods that have little or no saturated fat.  4. Trans fat. This tells you  how much trans fat is in 1 serving. Even a small amount of trans fat can harm your health. Choose foods that have no trans fat.  5. Cholesterol. This tells you how much cholesterol is in 1 serving. For many years, it had been recommended to eat less than 300 milligrams (mg) of cholesterol a day. New guidelines have removed this limitation as cholesterol has been recently shown to not raise blood cholesterol levels as significantly as previously thought. However, many foods high in cholesterol are also high in saturated fat. It is recommended to limit saturated fat in your diet.  6. Calories from fat. This number tells you how many calories from fat are in 1 serving (there are 9 calories per gram of fat). Look for foods with few calories from fat.  7. % Daily value. The higher the number, the more 1 serving has of that nutrient. Look for foods that have low numbers for total fat, saturated fat, cholesterol, and sodium.  8. Sodium. This tells you how much sodium (salt) is in 1 serving. Choose foods with low numbers for sodium.  9. Dietary fiber. This number tells you how much fiber is in 1 serving. Foods that are high in fiber can help you feel full. They can also be good for your heart and digestion. The recommended daily amount of fiber is 25 grams for women and 38 grams for men. After age 50, your daily fiber needs drop to 21 grams for women and 30 grams for men.       ---------------------------------------------------------------------------------------------------------------------------------------------------    Understanding Carbohydrates, Fats, and Protein  Food is a source of fuel and nourishment for your body. Its also a source of pleasure. Having diabetes doesnt mean you have to eat special foods or give up desserts. Instead, your dietitian can show you how to plan meals to suit your body. To start, learn how different foods affect blood sugar.    Carbohydrates  Carbohydrates are the main source of fuel  for the body. Carbohydrates raise blood sugar. Many people think carbohydrates are only found in pasta or bread. But carbohydrates are actually in many kinds of foods:  · Sugars occur naturally in foods such as fruit, milk, honey, and molasses. Sugars can also be added to many foods, from cereals and yogurt to candy and desserts. Sugars raise blood sugar.  · Starches are found in bread, cereals, pasta, and dried beans. Theyre also found in corn, peas, potatoes, yam, acorn squash, and butternut squash. Starches also raise blood sugar.   · Fiber is found in foods such as vegetables, fruits, beans, and whole grains. Unlike other carbs, fiber isnt digested or absorbed. So it doesnt raise blood sugar. In fact, fiber can help keep blood sugar from rising too fast. It also helps keep blood cholesterol at a healthy level.  Did you know?  Even though carbohydrates raise blood sugar, its best to have some in every meal. They are an important part of a healthy diet.     Fat  Fat is an energy source that can be stored until needed. Fat does not raise blood sugar. However, it can raise blood cholesterol, increasing the risk of heart disease. Fat is also high in calories, which can cause weight gain. Not all types of fat are the same.    More Healthy:  · Monounsaturated fats are mostly found in vegetable oils, such as olive, canola, and peanut oils. They are also found in avocados and some nuts. Monounsaturated fats are healthy for your heart. Thats because they lower LDL (unhealthy) cholesterol.  · Polyunsaturated fats are mostly found in vegetable oils, such as corn, safflower, and soybean oils. They are also found in some seeds, nuts, and fish. Polyunsaturated fats lower LDL (unhealthy) cholesterol. So, choosing them instead of saturated fats is healthy for your heart. Certain unsaturated fats can help lower triglycerides.     Less Healthy:  · Saturated fats are found in animal products, such as meat, poultry, whole milk,  lard, and butter. Saturated fats raise LDL cholesterol and are not healthy for your heart.  · Hydrogenated oils and trans fats are formed when vegetable oils are processed into solid fats. They are found in many processed foods. Hydrogenated oils and trans fats raise LDL cholesterol and lower HDL (healthy) cholesterol. They are not healthy for your heart.    Protein  Protein helps the body build and repair muscle and other tissue. Protein has little or no effect on blood sugar. However, many foods that contain protein also contain saturated fat. By choosing low-fat protein sources, you can get the benefits of protein without the extra fat:  · Plant protein is found in dry beans and peas, nuts, and soy products, such as tofu and soymilk. These sources tend to be cholesterol-free and low in saturated fat.  · Animal protein is found in fish, poultry, meat, cheese, milk, and eggs. These contain cholesterol and can be high in saturated fat. Aim for lean, lower-fat choices.    ---------------------------------------------------------------------------------------------------------------------------------------------------    Understanding Carbohydrates    A car needs the right type of fuel to run. And you need the right kind of food to function. To keep your energy level up, your body needs food that has carbohydrates. But carbohydrates raise blood sugar levels higher and faster than other kinds of food. Your dietitian will work with you to figure out the amount of carbohydrates you need.    Starches  Starches are found in grains, some vegetables, and beans. Grain products include bread, pasta, cereal, and tortillas. Starchy vegetables include potatoes, peas, corn, lima beans, yams, and squash. Kidney beans, adames beans, black beans, garbanzo beans, and lentils also contain starches.    Sugars  Sugars are found naturally in many foods. Or sugar can be added. Foods that contain natural sugar include fruits and fruit  juices, dairy products, honey, and molasses. Added sugars are found in most desserts, processed foods, candy, regular soda, and fruit drinks. These are very helpful for treating low blood sugar, or hypoglycemia. They provide sugar quickly. Try to keep at least 15 to 20 grams of these simple sugars with you at all times. Eat this if you begin to have low blood sugar symptoms.    Fiber  Fiber comes from plant foods. Most fiber isnt digested by the body. Instead of raising blood sugar levels like other carbohydrates, it actually stops blood sugar from rising too fast. Fiber is found in fruits, vegetables, whole grains, beans, peas, and many nuts.    Carb counting  Keep track of the amount of carbohydrates you eat. This can help you keep the right balance of physical activity and medicine. The amount of carbohydrates needed will vary for each person. It depends on many things such as your health, the medicines you take, and how active you are. Your healthcare team will help you figure out the right amount of carbohydrates for you. You may start with around 45 to 60 grams of carbohydrate per meal, depending on your situation. Carb counting is a system that helps you keep track of the carbohydrates you eat at each meal.  Carbohydrates come from a variety of foods. These include grains, starchy vegetables, fruit, milk, beans, and snack foods. You can either count carbohydrate grams or carbohydrate servings. When you count carbohydrate servings, 1 carbohydrate serving = 15 grams of carbohydrates.  Here are some examples of foods containing about 15 grams of carbohydrates (1 serving of carbohydrates):  · 1/2 cup of canned or frozen fruit  · A small piece of fresh fruit (4 ounces)  · 1 slice of bread  · 1/2 cup of oatmeal  · 1/3 cup of rice  · 4 to 6 crackers  · 1/2 English muffin  · 1/2 cup of black beans  · 1/4 of a large baked potato (3 ounces)  · 2/3 cup of plain fat-free yogurt  · 1 cup of soup  · 1/2 cup of  casserole  · 6 chicken nuggets  · 2-inch-square brownie or cake without frosting  · 2 small cookies  · 1/2 cup of ice cream or sherbet    Carb counting is easier when food labels are available. Look at the label to see how many grams of total carbohydrates the food contains. Then you can figure out how much you should eat.  Two very important lines to look at on the label are the serving size and the total carbohydrate amount. Here are some tips for using food labels to count your carbohydrate intake:  · Check the serving size. The information on the label is based on that serving size. If you eat more than the listed serving size, you may have to double or triple the other information on the label.   · Check the total grams of carbohydrates. Total carbohydrate from the label includes sugar, starch, and fiber. Be sure to use the total carbohydrate number and not sugar alone.  · Know how many grams of carbohydrates you can have.  Be familiar with the matching portion sizes.  · Compare labels. Compare the labels of different products, looking at serving sizes and total carbohydrates to find the products that work best for you.   · Don't forget protein and fat. With all the focus on carb counting, it might be easy to forget protein and fat in your meals. Don't forget to include sources of protein and healthy fat to balance your meals.  Its also important to be consistent with the amount and time you eat when taking a fixed dose of diabetes medicine. Work with your healthcare provider or dietitian if you need additional help. He or she can help you keep track of your carbohydrate intake. He or she can also help you figure out how many grams of carbohydrates you should have.      ---------------------------------------------------------------------------------------------------------------------------------------------------    Diabetes: Learning About Serving and Portion Sizes     A good rule of thumb: Devote half your  plate to vegetables and green salad. Split the other half between protein and starchy carbohydrates. Fruit makes a good dessert.     Servings and portions. Whats the difference? These terms can be very confusing. But learning to measure serving sizes can help you figure out how many carbohydrates (carbs) and other foods you eat each day. They are also powerful tools for managing your weight.    Servings and portions  Many different words are used to describe amounts of food. If your health care provider uses a term youre not sure of, dont be afraid to ask. It helps to know the difference between servings and portions:  · A serving size is a fixed size. Food producers use this term to describe their products. For example, the label on a cereal box could say that 1 cup of dry cereal = 1 serving.  · A portion (also called a helping) is how much you eat or how much you put on your plate at a meal. For example, you might eat 2 cups of cereal at breakfast.    Using serving information  The portion you choose to eat (such as 2 cups of cereal) may be more than one serving as listed on the food label (such as 1 cup of cereal). Thats why it helps to measure or weigh the food you eat. Because the food label values are based on servings, youll need to know how many servings you eat at one sitting.     Ounces: 2 to 3 ounces is about the size of your palm.       1 Cup: 1 cup (or a medium-sized piece) is about the size of your fist.       1/2 Cup: 1/2 cup is about the size of your cupped hand.      One tablespoon is about the size of your thumb.  One teaspoon is about the size of the tip of your thumb.    Keeping track of serving sizes  When youre planning for a snack or a meal, keep servings in mind. If you dont have measuring cups or a scale handy, there are ways to eyeball serving sizes, such as comparing your food to the size of your hand (see pictures above).    Managing portion sizes  If your weight is a  "concern, reducing your portions can help. You can eat more than one serving of a food at once. But to keep from eating too much at one meal, learn how to manage your portions. A portion is the amount of each type of food on your plate. See the plate diagram for an example of balanced portions.    ---------------------------------------------------------------------------------------------------------------------------------------------------    Diabetes: Shopping for and Preparing Meals    Having diabetes doesnt mean you have to shop in a special aisle or look for special foods. But you will need to make choices. By comparing items and reading food labels, you can find the healthiest foods for you and your family.  Comparing items  When you shop, compare items to find the best ones for your needs. Keep these facts in mind:  · No sugar added does not mean a product is sugar-free.  · "Sugar-free" means less than 1/2 gram (g) of sugar per serving.  · Fat free means less than 1/2 g of fat per serving. This does not necessarily mean the product is low in calories.  · Low fat means 3 g fat or less per serving. Reduced fat or less fat means 25% less fat than the regular version. Some of this fat may be saturated or trans fat. And calories per serving may be similar to the regular version.    Making small changes  Dont try to change all of your eating habits at once. Here are some ideas to start with:  · Try fat-free or low-fat cheese, milk, and yogurt. Also try leaner cuts of meat. This will help you cut down on saturated fat.  · Try whole-grain breads, brown rice, and whole-wheat pasta.  · Load up on fresh or frozen vegetables. If you buy canned, choose low-sodium vegetables.  · Avoid processed foods as much as possible. They tend to be low in fiber and high in trans fats and sodium.  · Try tofu, soymilk, or meat substitutes.?They can help you cut cholesterol and saturated fat out of your diet.    Learning to " read food labels  To find healthy foods that help you control blood sugar, learn how to read food labels. Look for the Nutrition Facts label on packaged foods. It will tell you how much carbohydrate, sugar, fat, and fiber is in each serving. Then, you can decide whether or not the food fits into your meal plan.    Using the food label  So, once you have the food label, what do you do with it? The food label helps in many ways. Use it to:  · Compare items and decide which is the best for your health needs.  · Track the number of carbohydrates in your portions.  · Figure out how many servings of a food you can have and still stay within the number of carbohydrates for that meal.    Planning meals  For good blood sugar control, plan what and when youll eat. Start by creating a meal plan that includes all the food groups. Then, time your meals to help keep your blood sugar level steady. You may need to adjust your plan for special situations.    Eat from all the food groups  The basis of a healthy meal plan is variety (eating many different types of foods). Look for lean meats, fresh fruits and vegetables, whole grains, and low-fat or non-fat dairy products. Eating a wide variety of foods provides the nutrients your body needs. It can also keep you from getting bored with your meal plan.    Reduce liquid sugars  Extra calories from sodas, sports drinks, and fruit drinks make it hard to keep blood sugar in range. Cut as many liquid sugars from your meal plan as you can. This includes most fruit juices, which are often high in natural or added sugar. Instead, drink plenty of water and other sugar-free beverages.    Eat less fat  If you need to lose some weight, try to reduce the amount of fat in your diet. This can also help lower your cholesterol level to keep blood vessels healthier. Cut fat by using only small amounts of liquid oil for cooking. Read food labels carefully to avoid foods with unhealthy trans  fats.    Timing your meals  When it comes to blood sugar control, when you eat is as important as what you eat. You may need to eat several small meals spaced evenly throughout the day to stay in your target range. So dont skip breakfast or wait until late in the day to get most of your calories. Doing so can cause your blood sugar to rise too high or fall too low.    Cooking wisely  · Broil, steam, bake, or grill meats and vegetables, instead of frying.  · Instead of cream-based sauces or sugary glazes, flavor foods with vegetable purée, lemon or lime juice, or herb seasonings.  · Remove skin from chicken and turkey before serving.  · Look in cookbooks for easy, low-fat, low-sugar recipes. When making your usual recipes, cut sugar by 1/2 and fat by 1/3.      ---------------------------------------------------------------------------------------------------------------------------------------------------    Healthy Meals for Diabetes    Ask your healthcare team to help you make a meal plan that fits your needs. Your meal plan tells you when to eat your meals and snacks, what kinds of foods to eat, and how much of each food to eat. You dont have to give up all the foods you like. But you do need to follow some guidelines.  Choose healthy carbohydrates  Starches, sugars, and fiber are all types of carbohydrates. Fiber can help lower your cholesterol and triglycerides. Fiber is also healthy for your heart. You should have 20 to 35 grams of total fiber each day. Fiber-rich foods include:  · Whole-grain breads and cereals  · Bulgur wheat  · Brown rice     · Whole-wheat pasta  · Fruits and vegetables  · Dry beans, and peas   Keep track of the amount of carbohydrates you eat. This can help you keep the right balance of physical activity and medicine. The amount of carbohydrates needed will vary for each person. It depends on many things such as your health, the medicines you take, and how active you are. Your healthcare  team will help you figure out the right amount of carbohydrates for you. You may start with around 45 to 60 grams of carbohydrates per meal, depending on your situation.   Here are some examples of foods containing about 15 grams of carbohydrates (1 serving of carbohydrates):  · 1/2 cup of canned or frozen fruit  · A small piece of fresh fruit (4 ounces)  · 1 slice of bread  · 1/2 cup of oatmeal  · 1/3 cup of rice  · 4 to 6 crackers  · 1/2 English muffin  · 1/2 cup of black beans  · 1/4 of a large baked potato (3 ounces)  · 2/3 cup of plain fat-free yogurt  · 1 cup of soup  · 1/2 cup of casserole  · 6 chicken nuggets  · 2-inch-square brownie or cake without frosting  · 2 small cookies  · 1/2 cup of ice cream or sherbet    Choose healthy protein foods  Eating protein that is low in fat can help you control your weight. It also helps keep your heart healthy. Low-fat protein foods include:  · Fish  · Plant proteins, such as dry beans and peas, nuts, and soy products like tofu and soymilk  · Lean meat with all visible fat removed  · Poultry with the skin removed  · Low-fat or nonfat milk, cheese, and yogurt    Limit unhealthy fats and sugar  Saturated and trans fats are unhealthy for your heart. They raise LDL (bad) cholesterol. Fat is also high in calories, so it can make you gain weight. To cut down on unhealthy fats and sugar, limit these foods:  · Butter or margarine  · Palm and palm kernel oils and coconut oil  · Cream  · Cheese  · Ellison  · Lunch meats     · Ice cream  · Sweet bakery goods such as pies, muffins, and donuts  · Jams and jellies  · Candy bars  · Regular sodas     How much to eat  The amount of food you eat affects your blood sugar. It also affects your weight. Your healthcare team will tell you how much of each type of food you should eat.  · Use measuring cups and spoons and a food scale to measure serving sizes.  · Learn what a correct serving size looks like on your plate. This will help when you  are away from home and cant measure your servings.  · Eat only the number of servings given on your meal plan for each food. Dont take seconds.  · Learn to read food labels. Be sure to look at serving size, total carbohydrates, fiber, calories, sugar, and saturated and trans fats. Look for healthier alternatives to foods that have added sugar.  · Plan ahead for parties so you can still have a good time without going overboard with unhealthy food choices. Set a good example yourself by bringing a healthy dish to pot lucks.     Choose healthy snacks  When it comes to snacks, we usually think about foods with added sugar and fats. But there are many other options for healthier snack choices. Here are a few snack ideas to choose from:  Snacks with less than 5 grams of carbohydrates  · 1 piece of string cheese  · 3 celery sticks plus 1 tablespoon of peanut butter  · 5 cherry tomatoes plus 1 tablespoon of ranch dressing  · 1 hard-boiled egg  · 1/4 cup of fresh blueberries  ·  5 baby carrots  · 1 cup of light popcorn  · 1/2 cup of sugar-free gelatin  · 15 almonds  Snacks with about 10 to 20 grams of carbohydrates  · 1/3 cup of hummus plus 1 cup of fresh cut nonstarchy vegetables (carrots, green peppers, broccoli, celery, or a combination)  · 1/2 cup of fresh or canned fruit plus 1/4 cup of cottage cheese  · 1/2 cup of tuna salad with 4 crackers  · 2 rice cakes and a tablespoon of peanut butter  · 1 small apple or orange  · 3 cups light popcorn  · 1/2 of a turkey sandwich (1 slice of whole-wheat bread, 2 ounces of turkey, and mustard)  Portion sizes are important to controlling your blood sugar and staying at a healthy weight. Stock up on healthy snack items so you always have them on hand.    When to eat  Your meal plan will likely include breakfast, lunch, dinner, and some snacks.  · Try to eat your meals and snacks at about the same times each day.  · Eat all your meals and snacks. Skipping a meal or snack can make  your blood sugar drop too low. It can also cause you to eat too much at the next meal or snack. Then your blood sugar could get too high.    ---------------------------------------------------------------------------------------------------------------------------------------------------    Eating Out When You Have Diabetes  Eating right is an important part of keeping your blood sugar in your target range. You just need to make healthy choices.    Be creative when eating out. Many places dont make you stick strictly to the menu. Instead of ordering a large entree, choose an appetizer or two and a bowl of soup. A mix of side orders can also make a good meal. Read the descriptions of other entrees and specials. If another entree comes with baby carrots, ask for a side order of them even if they dont come with your entree. Ask how food is prepared or if it can be made differently.  Tips for making the most of your meal out  · Ask to have high-fat, high-calorie extras, such as French fries and potato chips, left off your plate so you wont be tempted.   · Ask what substitutions are available. Instead of French fries, you may be able to have a side of salad with low-calorie dressing.  · Order low-fat milk instead of cream for your coffee.  · Ask for vegetables and main courses to be served without sauces, butter, margarine, or oil.  · Be aware of portion size. You dont have to clean your plate. Take half your meal home in a doggie bag to eat the next day.  · Look for heart-healthy or low-fat entrees that include whole grains, vegetables, or fruits.  · Choose foods that are grilled, broiled, or steamed.  · Avoid dishes that are described on the menu as fried, breaded, smothered, rich, or creamy.    Tips for restaurant meals  When you eat away from home try these tips:  · Try to schedule your dining-out meal at your normal meal time. Make a reservation if possible, so you don't have to wait to eat. If you can't make a  reservation, try to arrive at the restaurant at a less-busy time to cut down your wait time. Eat a small fruit or starch snack at your regular mealtime if your restaurant meal is going to be later than usual.   · Call ahead to see if the restaurant can meet your dietary needs if you've never been there before. Or you can go online to see the menu ahead of time.  · Carry some crackers with you in case the restaurant needs you to wait until you can be served.  · Ask how foods are prepared before you order.  · Instead of fried, sautéed, or breaded foods, choose ones that are broiled, steamed, grilled, or baked.  · Ask for sauces, gravies, and dressings on the side.  · Only eat an amount that fits your meal plan. Remember: You can take home the leftovers.  · Save dessert for special occasions. Then choose a small dessert or share one with a friend or family member.    Make healthy choices  Fast food  · Garden salad with light dressing on the side  · Baked potato with vegetables or herbs  · Broiled, roasted, or grilled chicken sandwich  · Sliced turkey or lean roast beef sandwich    Mexican  · Chicken enchilada, without cheese or sour cream   · Small burrito with whole beans and chicken  · Whole beans (not refried) and rice  · Chicken or fish fajitas    Steakhouse  · Grilled or broiled lean cuts of beef  · Baked potato with vegetables or herbs  · Broiled or baked chicken. Dont eat the skin.  · Steamed vegetables    Asian  · Steamed dumplings or potstickers  · Broiled, boiled, or steamed meats or fish  · Sushi or sashimi  · Steamed rice or boiled noodles. One serving is equal to 1/3 cup.      © 1215-3751 [employer]. [address] [place] [address], [place] [address]. All rights reserved. This information is not intended as a substitute for professional medical care. Always follow your healthcare professional's instructions.

## 2022-02-14 NOTE — PROGRESS NOTES
Subjective:      Patient ID: Graham Caceres is a 62 y.o. male.    Chief Complaint:  DM2; Initial visit     History of Present Illness  63 YO Male w/ dx of DM2, HTN, HLD compensated cirrhosis and now w/ fatty liver disease that presents to the endocrine clinic to establish care.   Pt today reports feels well but endorses that he has had prior issues with cost of diabetes medication and due to his chronic liver disease he is limited in the DM medications he can use.  Pt reports that he has been having persistent hyperglycemia.  Denies  Polyuria, polydipsia, fatigue or weight loss.        With regards to Diabetes Mellitus:   -Type 2    -Duration:  Dx   6- 8 yrs ago   -Microvascular complications:DENIES    -Macrovascular complications:  DENIES   -DKA?  DENIES   -HHS?  DENIES  -DM Medications:   Glipizide 10mg XR daily  -Previously tried medications:  Metformin (Stopped due to liver disease)   Trulicity (high copay) Januvia (high copay)   -Compliance w/ Meds:  Yes   -Hyperglycemia symptoms: DENIES   -Hypoglycemia symptoms:  DENIES   -Know how to correct hypoglycemias:  No, will do teaching today   -Glucose monitoring:  AM (140 - 200)  HS ( 140- 220)   -Diet: High carb meals, rice, noodles   -Activity:  Sedentary   -Pancreatitis?  DENIES   -CHF?  DENIES   -UTIs?  DENIES   -Thyroid CA?  DENIES   -ETOH Abuse?  DENIES     Diabetes Management Status    Statin: Taking  ACE/ARB: Taking    Screening or Prevention Patient's value Goal Complete/Controlled?   HgA1C Testing and Control   Lab Results   Component Value Date    HGBA1C 8.4 (H) 01/26/2022      Annually/Less than 8% No   Lipid profile : 01/26/2022 Annually Yes   LDL control Lab Results   Component Value Date    LDLCALC 101.8 01/26/2022    Annually/Less than 100 mg/dl  No   Nephropathy screening Lab Results   Component Value Date    LABMICR 21.0 07/27/2021     Lab Results   Component Value Date    PROTEINUA Negative 11/16/2020    Annually Yes   Blood pressure BP Readings  from Last 1 Encounters:   02/11/22 118/80    Less than 140/90 Yes   Dilated retinal exam : 06/09/2021 Annually Yes   Foot exam   : 01/14/2020 Annually No        ROS:   As above    Objective:     There were no vitals taken for this visit.  BP Readings from Last 3 Encounters:   02/11/22 118/80   01/26/22 106/78   07/27/21 110/78     Wt Readings from Last 1 Encounters:   02/11/22 1307 81.1 kg (178 lb 12.7 oz)     There is no height or weight on file to calculate BMI.      Physical Exam  Vitals reviewed.   Constitutional:       General: He is not in acute distress.     Appearance: Normal appearance. He is well-developed. He is not ill-appearing.   HENT:      Nose: Nose normal. No rhinorrhea.      Mouth/Throat:      Mouth: Mucous membranes are moist.   Eyes:      Extraocular Movements: Extraocular movements intact.      Pupils: Pupils are equal, round, and reactive to light.      Comments: No proptosis, No lid lag, No conjunctival erythema    Neck:      Thyroid: No thyromegaly.      Trachea: No tracheal deviation.      Comments: No thyromegaly or Thyroid nodules palpated on exam   Cardiovascular:      Rate and Rhythm: Normal rate and regular rhythm.      Pulses: Normal pulses.   Pulmonary:      Effort: Pulmonary effort is normal.      Breath sounds: Normal breath sounds.   Abdominal:      Palpations: Abdomen is soft. There is no mass.      Tenderness: There is no abdominal tenderness.      Hernia: No hernia is present.      Comments: No scar tissue, No Violaceous striae    Musculoskeletal:         General: No swelling.      Cervical back: Neck supple. No tenderness.      Right lower leg: No edema.      Left lower leg: No edema.   Skin:     General: Skin is warm.      Findings: No rash.   Neurological:      General: No focal deficit present.      Mental Status: He is alert and oriented to person, place, and time.   Psychiatric:         Mood and Affect: Mood normal.         Judgment: Judgment normal.                Lab  Review:   Lab Results   Component Value Date    HGBA1C 8.4 (H) 01/26/2022     Lab Results   Component Value Date    CHOL 188 01/26/2022    HDL 44 01/26/2022    LDLCALC 101.8 01/26/2022    TRIG 211 (H) 01/26/2022    CHOLHDL 23.4 01/26/2022     Lab Results   Component Value Date     (L) 02/04/2022    K 4.2 02/04/2022     02/04/2022    CO2 25 02/04/2022     (H) 02/04/2022    BUN 10 02/04/2022    CREATININE 0.6 02/04/2022    CALCIUM 9.3 02/04/2022    PROT 7.7 02/04/2022    ALBUMIN 4.2 02/04/2022    BILITOT 0.5 02/04/2022    ALKPHOS 80 02/04/2022    AST 34 02/04/2022    ALT 56 (H) 02/04/2022    ANIONGAP 9 02/04/2022    ESTGFRAFRICA >60.0 02/04/2022    EGFRNONAA >60.0 02/04/2022    TSH 1.704 01/26/2022     No results found for: [id number]    Assessment and Plan     Uncontrolled type 2 diabetes mellitus with hyperglycemia    Due to patient having uncontrolled DM, issues with medication cost and history of fatty liver disease. I will recommend the following.     Start Actos 15mg daily  (Good evidence for improvement of fatty liver disease on Actos) NO Contraindications for Actos (LFTs WNL, No CHF,  No edema, No history of Bladder CA)     C/w Glipizide 10mg w/ breakfast     Keep glucose log for review in 2 weeks     Diet, exercise, lifestyle modifications and hypoglycemia action plan discussed.     Will monitor       Hyperlipidemia, unspecified hyperlipidemia type  LDL at goal On Statin   Low Fat diet     Hypertension, essential  BP controlled on current BP meds   On ARB for renal protection   Low Na diet

## 2022-02-15 ENCOUNTER — OFFICE VISIT (OUTPATIENT)
Dept: OTOLARYNGOLOGY | Facility: CLINIC | Age: 62
End: 2022-02-15
Payer: MEDICARE

## 2022-02-15 DIAGNOSIS — B36.9 EXTERNAL OTITIS OF LEFT EAR DUE TO FUNGUS: Primary | ICD-10-CM

## 2022-02-15 DIAGNOSIS — H62.42 EXTERNAL OTITIS OF LEFT EAR DUE TO FUNGUS: Primary | ICD-10-CM

## 2022-02-15 PROCEDURE — 1160F PR REVIEW ALL MEDS BY PRESCRIBER/CLIN PHARMACIST DOCUMENTED: ICD-10-PCS | Mod: HCNC,CPTII,S$GLB, | Performed by: NURSE PRACTITIONER

## 2022-02-15 PROCEDURE — 3052F PR MOST RECENT HEMOGLOBIN A1C LEVEL 8.0 - < 9.0%: ICD-10-PCS | Mod: HCNC,CPTII,S$GLB, | Performed by: NURSE PRACTITIONER

## 2022-02-15 PROCEDURE — 99213 OFFICE O/P EST LOW 20 MIN: CPT | Mod: HCNC,S$GLB,, | Performed by: NURSE PRACTITIONER

## 2022-02-15 PROCEDURE — 99213 PR OFFICE/OUTPT VISIT, EST, LEVL III, 20-29 MIN: ICD-10-PCS | Mod: HCNC,S$GLB,, | Performed by: NURSE PRACTITIONER

## 2022-02-15 PROCEDURE — 1159F MED LIST DOCD IN RCRD: CPT | Mod: HCNC,CPTII,S$GLB, | Performed by: NURSE PRACTITIONER

## 2022-02-15 PROCEDURE — 1160F RVW MEDS BY RX/DR IN RCRD: CPT | Mod: HCNC,CPTII,S$GLB, | Performed by: NURSE PRACTITIONER

## 2022-02-15 PROCEDURE — 99999 PR PBB SHADOW E&M-EST. PATIENT-LVL III: ICD-10-PCS | Mod: PBBFAC,HCNC,, | Performed by: NURSE PRACTITIONER

## 2022-02-15 PROCEDURE — 1159F PR MEDICATION LIST DOCUMENTED IN MEDICAL RECORD: ICD-10-PCS | Mod: HCNC,CPTII,S$GLB, | Performed by: NURSE PRACTITIONER

## 2022-02-15 PROCEDURE — 99999 PR PBB SHADOW E&M-EST. PATIENT-LVL III: CPT | Mod: PBBFAC,HCNC,, | Performed by: NURSE PRACTITIONER

## 2022-02-15 PROCEDURE — 3072F PR LOW RISK FOR RETINOPATHY: ICD-10-PCS | Mod: HCNC,CPTII,S$GLB, | Performed by: NURSE PRACTITIONER

## 2022-02-15 PROCEDURE — 3052F HG A1C>EQUAL 8.0%<EQUAL 9.0%: CPT | Mod: HCNC,CPTII,S$GLB, | Performed by: NURSE PRACTITIONER

## 2022-02-15 PROCEDURE — 3072F LOW RISK FOR RETINOPATHY: CPT | Mod: HCNC,CPTII,S$GLB, | Performed by: NURSE PRACTITIONER

## 2022-02-15 NOTE — PROGRESS NOTES
Subjective:      Graham Caceres is a 62 y.o. male who presents for follow up for left fungus OE. He was last seen by me on 1/28/21. He has been using clotrimazole ear drops with benefit. He no longer has ear pain or itching. He denies fever or chills. No other concerns today.     HPI:  Mr. Caceres reports left ear drainage with associated ear pain and fullness for the past 2-3 weeks. He denies fever, chills, or shortness of breath. He has not tried anything for his ear symptoms. He also reports having frequent sneezing that is worse in the morning and when eating. He has intermittent runny nose and post-nasal drip. He denies sinus pressure or pain. He has tried fluticasone with limited benefit.      The patient's medications, allergies, past medical, surgical, social and family histories were reviewed and updated as appropriate.    A detailed review of systems was obtained with pertinent positives as per the above HPI, and otherwise negative.       Objective:     There were no vitals taken for this visit.     Constitutional:   Vital signs are normal. He appears well-developed and well-nourished.     Head:  Normocephalic and atraumatic.     Ears:    Right Ear: No lacerations. No drainage, swelling or tenderness. No foreign bodies. No mastoid tenderness. Tympanic membrane is not injected, not scarred, not perforated, not erythematous, not retracted and not bulging. Tympanic membrane mobility is normal. No middle ear effusion. No hemotympanum.   Left Ear: No lacerations. There is drainage. No swelling or tenderness. No foreign bodies. No mastoid tenderness. Tympanic membrane is not injected, not scarred, not perforated, not erythematous, not retracted and not bulging. Tympanic membrane mobility is normal.  No middle ear effusion. No hemotympanum.   Ears:      Nose:  Nose normal including turbinates, nasal mucosa, sinuses and nasal septum.     Mouth/Throat  Lips, teeth, and gums normal and oropharynx normal.      Neck:  Neck normal without thyromegaly masses, asymmetry, normal tracheal structure, crepitus, and tenderness and no adenopathy.     Psychiatric:   He has a normal mood and affect.       Procedure    None      Data Reviewed    WBC (K/uL)   Date Value   02/04/2022 4.03     Platelets (K/uL)   Date Value   02/04/2022 222      Creatinine (mg/dL)   Date Value   02/04/2022 0.6     TSH (uIU/mL)   Date Value   01/26/2022 1.704     Glucose (mg/dL)   Date Value   02/04/2022 264 (H)     Hemoglobin A1C (%)   Date Value   01/26/2022 8.4 (H)       Assessment:     1. External otitis of left ear due to fungus         Plan:     Continue clotrimazole for 2 additional weeks as directed.    RTC as needed.

## 2022-03-23 ENCOUNTER — TELEPHONE (OUTPATIENT)
Dept: OPHTHALMOLOGY | Facility: CLINIC | Age: 62
End: 2022-03-23
Payer: MEDICARE

## 2022-03-25 ENCOUNTER — OFFICE VISIT (OUTPATIENT)
Dept: OPTOMETRY | Facility: CLINIC | Age: 62
End: 2022-03-25
Payer: MEDICARE

## 2022-03-25 DIAGNOSIS — E11.9 TYPE 2 DIABETES MELLITUS WITHOUT RETINOPATHY: ICD-10-CM

## 2022-03-25 DIAGNOSIS — H43.812 VITREOUS DETACHMENT, LEFT: Primary | ICD-10-CM

## 2022-03-25 PROCEDURE — 92014 COMPRE OPH EXAM EST PT 1/>: CPT | Mod: S$GLB,,, | Performed by: OPTOMETRIST

## 2022-03-25 PROCEDURE — 2023F PR DILATED RETINAL EXAM W/O EVID OF RETINOPATHY: ICD-10-PCS | Mod: CPTII,S$GLB,, | Performed by: OPTOMETRIST

## 2022-03-25 PROCEDURE — 1160F RVW MEDS BY RX/DR IN RCRD: CPT | Mod: CPTII,S$GLB,, | Performed by: OPTOMETRIST

## 2022-03-25 PROCEDURE — 99999 PR PBB SHADOW E&M-EST. PATIENT-LVL III: CPT | Mod: PBBFAC,,, | Performed by: OPTOMETRIST

## 2022-03-25 PROCEDURE — 1159F PR MEDICATION LIST DOCUMENTED IN MEDICAL RECORD: ICD-10-PCS | Mod: CPTII,S$GLB,, | Performed by: OPTOMETRIST

## 2022-03-25 PROCEDURE — 92014 PR EYE EXAM, EST PATIENT,COMPREHESV: ICD-10-PCS | Mod: S$GLB,,, | Performed by: OPTOMETRIST

## 2022-03-25 PROCEDURE — 1160F PR REVIEW ALL MEDS BY PRESCRIBER/CLIN PHARMACIST DOCUMENTED: ICD-10-PCS | Mod: CPTII,S$GLB,, | Performed by: OPTOMETRIST

## 2022-03-25 PROCEDURE — 3052F PR MOST RECENT HEMOGLOBIN A1C LEVEL 8.0 - < 9.0%: ICD-10-PCS | Mod: CPTII,S$GLB,, | Performed by: OPTOMETRIST

## 2022-03-25 PROCEDURE — 3052F HG A1C>EQUAL 8.0%<EQUAL 9.0%: CPT | Mod: CPTII,S$GLB,, | Performed by: OPTOMETRIST

## 2022-03-25 PROCEDURE — 2023F DILAT RTA XM W/O RTNOPTHY: CPT | Mod: CPTII,S$GLB,, | Performed by: OPTOMETRIST

## 2022-03-25 PROCEDURE — 99999 PR PBB SHADOW E&M-EST. PATIENT-LVL III: ICD-10-PCS | Mod: PBBFAC,,, | Performed by: OPTOMETRIST

## 2022-03-25 PROCEDURE — 1159F MED LIST DOCD IN RCRD: CPT | Mod: CPTII,S$GLB,, | Performed by: OPTOMETRIST

## 2022-03-25 NOTE — PROGRESS NOTES
HPI     ZEENAT: 6/9/2021 - Dr. Markham    Presents today for floater OS x 1 months and occ flashes.  Pt report slight vision changes.  Pt denies eye pain.    Last edited by Lew Lujan, OD on 3/25/2022 10:40 AM. (History)        ROS     Positive for: Endocrine (DM)    Negative for: Constitutional, Gastrointestinal, Neurological, Skin,   Genitourinary, Musculoskeletal, HENT, Cardiovascular, Eyes, Respiratory,   Psychiatric, Allergic/Imm, Heme/Lymph    Last edited by Lew Lujan, OD on 3/25/2022 10:37 AM. (History)        Assessment /Plan     For exam results, see Encounter Report.    Vitreous detachment, left    Type 2 diabetes mellitus without retinopathy      1. Vitreous floater OS (jamie ring?)--discussed Signs/Symptoms of Retinal Detachment.  2. Small cats OU  3. DM- WITHOUT RETINOPATHY.  Advised yearly DFE    PLAN:    rtc as sched this summer for full exam, or  Pt to rtc immediately if increased Floaters/Flashes occur

## 2022-06-10 ENCOUNTER — OFFICE VISIT (OUTPATIENT)
Dept: INTERNAL MEDICINE | Facility: CLINIC | Age: 62
End: 2022-06-10
Payer: MEDICARE

## 2022-06-10 ENCOUNTER — LAB VISIT (OUTPATIENT)
Dept: LAB | Facility: HOSPITAL | Age: 62
End: 2022-06-10
Attending: INTERNAL MEDICINE
Payer: MEDICARE

## 2022-06-10 VITALS
RESPIRATION RATE: 18 BRPM | DIASTOLIC BLOOD PRESSURE: 80 MMHG | WEIGHT: 179.69 LBS | HEIGHT: 67 IN | SYSTOLIC BLOOD PRESSURE: 118 MMHG | HEART RATE: 90 BPM | OXYGEN SATURATION: 95 % | BODY MASS INDEX: 28.2 KG/M2

## 2022-06-10 DIAGNOSIS — I10 PRIMARY HYPERTENSION: ICD-10-CM

## 2022-06-10 DIAGNOSIS — B18.2 HEPATIC CIRRHOSIS DUE TO CHRONIC HEPATITIS C INFECTION: ICD-10-CM

## 2022-06-10 DIAGNOSIS — R76.12 POSITIVE QUANTIFERON-TB GOLD TEST: ICD-10-CM

## 2022-06-10 DIAGNOSIS — L40.8 SEBOPSORIASIS: ICD-10-CM

## 2022-06-10 DIAGNOSIS — Z76.89 ENCOUNTER TO ESTABLISH CARE: Primary | ICD-10-CM

## 2022-06-10 DIAGNOSIS — E11.65 TYPE 2 DIABETES MELLITUS WITH HYPERGLYCEMIA, WITHOUT LONG-TERM CURRENT USE OF INSULIN: ICD-10-CM

## 2022-06-10 DIAGNOSIS — K74.60 HEPATIC CIRRHOSIS DUE TO CHRONIC HEPATITIS C INFECTION: ICD-10-CM

## 2022-06-10 DIAGNOSIS — Z86.19 HISTORY OF HEPATITIS C: ICD-10-CM

## 2022-06-10 DIAGNOSIS — Z76.89 ENCOUNTER TO ESTABLISH CARE: ICD-10-CM

## 2022-06-10 DIAGNOSIS — E78.2 MIXED HYPERLIPIDEMIA: ICD-10-CM

## 2022-06-10 LAB
ESTIMATED AVG GLUCOSE: 186 MG/DL (ref 68–131)
HBA1C MFR BLD: 8.1 % (ref 4–5.6)

## 2022-06-10 PROCEDURE — 99214 PR OFFICE/OUTPT VISIT, EST, LEVL IV, 30-39 MIN: ICD-10-PCS | Mod: S$GLB,,, | Performed by: INTERNAL MEDICINE

## 2022-06-10 PROCEDURE — 83036 HEMOGLOBIN GLYCOSYLATED A1C: CPT | Performed by: INTERNAL MEDICINE

## 2022-06-10 PROCEDURE — 99214 OFFICE O/P EST MOD 30 MIN: CPT | Mod: S$GLB,,, | Performed by: INTERNAL MEDICINE

## 2022-06-10 PROCEDURE — 3079F DIAST BP 80-89 MM HG: CPT | Mod: CPTII,S$GLB,, | Performed by: INTERNAL MEDICINE

## 2022-06-10 PROCEDURE — 36415 COLL VENOUS BLD VENIPUNCTURE: CPT | Performed by: INTERNAL MEDICINE

## 2022-06-10 PROCEDURE — 3074F SYST BP LT 130 MM HG: CPT | Mod: CPTII,S$GLB,, | Performed by: INTERNAL MEDICINE

## 2022-06-10 PROCEDURE — 1159F PR MEDICATION LIST DOCUMENTED IN MEDICAL RECORD: ICD-10-PCS | Mod: CPTII,S$GLB,, | Performed by: INTERNAL MEDICINE

## 2022-06-10 PROCEDURE — 3052F PR MOST RECENT HEMOGLOBIN A1C LEVEL 8.0 - < 9.0%: ICD-10-PCS | Mod: CPTII,S$GLB,, | Performed by: INTERNAL MEDICINE

## 2022-06-10 PROCEDURE — 3074F PR MOST RECENT SYSTOLIC BLOOD PRESSURE < 130 MM HG: ICD-10-PCS | Mod: CPTII,S$GLB,, | Performed by: INTERNAL MEDICINE

## 2022-06-10 PROCEDURE — 3008F BODY MASS INDEX DOCD: CPT | Mod: CPTII,S$GLB,, | Performed by: INTERNAL MEDICINE

## 2022-06-10 PROCEDURE — 3079F PR MOST RECENT DIASTOLIC BLOOD PRESSURE 80-89 MM HG: ICD-10-PCS | Mod: CPTII,S$GLB,, | Performed by: INTERNAL MEDICINE

## 2022-06-10 PROCEDURE — 1160F RVW MEDS BY RX/DR IN RCRD: CPT | Mod: CPTII,S$GLB,, | Performed by: INTERNAL MEDICINE

## 2022-06-10 PROCEDURE — 4010F ACE/ARB THERAPY RXD/TAKEN: CPT | Mod: CPTII,S$GLB,, | Performed by: INTERNAL MEDICINE

## 2022-06-10 PROCEDURE — 3072F PR LOW RISK FOR RETINOPATHY: ICD-10-PCS | Mod: CPTII,S$GLB,, | Performed by: INTERNAL MEDICINE

## 2022-06-10 PROCEDURE — 4010F PR ACE/ARB THEARPY RXD/TAKEN: ICD-10-PCS | Mod: CPTII,S$GLB,, | Performed by: INTERNAL MEDICINE

## 2022-06-10 PROCEDURE — 1159F MED LIST DOCD IN RCRD: CPT | Mod: CPTII,S$GLB,, | Performed by: INTERNAL MEDICINE

## 2022-06-10 PROCEDURE — 3008F PR BODY MASS INDEX (BMI) DOCUMENTED: ICD-10-PCS | Mod: CPTII,S$GLB,, | Performed by: INTERNAL MEDICINE

## 2022-06-10 PROCEDURE — 99999 PR PBB SHADOW E&M-EST. PATIENT-LVL IV: ICD-10-PCS | Mod: PBBFAC,,, | Performed by: INTERNAL MEDICINE

## 2022-06-10 PROCEDURE — 99999 PR PBB SHADOW E&M-EST. PATIENT-LVL IV: CPT | Mod: PBBFAC,,, | Performed by: INTERNAL MEDICINE

## 2022-06-10 PROCEDURE — 3072F LOW RISK FOR RETINOPATHY: CPT | Mod: CPTII,S$GLB,, | Performed by: INTERNAL MEDICINE

## 2022-06-10 PROCEDURE — 1160F PR REVIEW ALL MEDS BY PRESCRIBER/CLIN PHARMACIST DOCUMENTED: ICD-10-PCS | Mod: CPTII,S$GLB,, | Performed by: INTERNAL MEDICINE

## 2022-06-10 PROCEDURE — 3052F HG A1C>EQUAL 8.0%<EQUAL 9.0%: CPT | Mod: CPTII,S$GLB,, | Performed by: INTERNAL MEDICINE

## 2022-06-10 RX ORDER — HYDROCORTISONE 1 %
CREAM (GRAM) TOPICAL 2 TIMES DAILY
Qty: 30 G | Refills: 0 | Status: SHIPPED | OUTPATIENT
Start: 2022-06-10 | End: 2023-04-27

## 2022-06-10 NOTE — PROGRESS NOTES
Subjective:       Patient ID: Graham Caceres is a 62 y.o. male.    Chief Complaint: Establish Care      HPI  Graham Caceres is a 62 y.o. year old male with HTN, HLD, hep c s/p harvoni (failed interferon), cirrhosis, hx of positive (+) tb gold presents for establishment of care. Has been compliant with medications, less compliant with diet.     Review of Systems   Constitutional: Negative for activity change, appetite change, chills, fatigue, fever and unexpected weight change.   HENT: Negative for congestion, rhinorrhea and sore throat.    Eyes: Negative for visual disturbance.   Respiratory: Negative for shortness of breath.    Cardiovascular: Negative for chest pain.   Gastrointestinal: Negative for abdominal pain, diarrhea, nausea and vomiting.   Genitourinary: Negative for difficulty urinating and dysuria.   Musculoskeletal: Negative for arthralgias, back pain and myalgias.   Skin: Negative for color change and rash.   Neurological: Negative for dizziness, weakness and headaches.         Past Medical History:   Diagnosis Date    Cirrhosis     Diverticulosis     History of hepatitis C, s/p successful Rx w/ SVR24 - 6/2017 4/7/2016    S/p harvoni w/ SVR    Hyperlipidemia 04/2016    Hypertension     Internal hemorrhoid     New onset type 2 diabetes mellitus 4/2016    Positive QuantiFERON-TB Gold test 4/2016    Sebopsoriasis     SIRS (systemic inflammatory response syndrome) 8/11/2018        Prior to Admission medications    Medication Sig Start Date End Date Taking? Authorizing Provider   blood sugar diagnostic Strp To check BG 2 times daily, to use with insurance preferred meter 9/10/20  Yes Jennifer Manzanares MD   blood-glucose meter kit To check BG 2 times daily, to use with insurance preferred meter 9/10/20  Yes Jennifer Manzanares MD   clotrimazole (LOTRIMIN) 1 % Soln Apply 4 drops to left ear 2 times daily for 14 days. 1/28/22  Yes Kerri Vasquez DNP, FNP-C   glipiZIDE (GLUCOTROL) 10 MG tablet Take 1 tablet (10 mg  "total) by mouth daily with breakfast. 8/10/21  Yes Jennifer Manzanares MD   ketoconazole (NIZORAL) 2 % cream Apply topically once daily. 1/26/22  Yes SHANNAN Lopez   ketoconazole (NIZORAL) 2 % shampoo Apply topically twice a week. 1/27/22  Yes SHANNAN Lopez   lancets Misc To check BG 2 times daily, to use with insurance preferred meter 9/28/20  Yes Jennifer Manzanares MD   losartan (COZAAR) 50 MG tablet TAKE 1 TABLET EVERY DAY 10/14/21  Yes Jennifer Manzanares MD   pioglitazone (ACTOS) 15 MG tablet Take 1 tablet (15 mg total) by mouth once daily. 2/14/22 2/14/23 Yes Blayne Allen MD   rosuvastatin (CRESTOR) 40 MG Tab TAKE 1 TABLET EVERY EVENING 11/28/21  Yes Jennifer Manzanares MD   hydrocortisone 1 % cream Apply topically 2 (two) times daily. 8/10/21 6/10/22 Yes Jennifer Manzanares MD   hydrocortisone 1 % cream Apply topically 2 (two) times daily. 6/10/22   Ko Franz MD        Past medical history, surgical history, and family medical history reviewed and updated as appropriate.    Medications and allergies reviewed.     Objective:          Vitals:    06/10/22 0859   BP: 118/80   BP Location: Right arm   Patient Position: Sitting   BP Method: Large (Manual)   Pulse: 90   Resp: 18   SpO2: 95%   Weight: 81.5 kg (179 lb 10.8 oz)   Height: 5' 7" (1.702 m)     Body mass index is 28.14 kg/m².  Physical Exam  Constitutional:       General: He is not in acute distress.     Appearance: He is well-developed.   HENT:      Head: Normocephalic and atraumatic.      Nose: Nose normal.   Eyes:      General: No scleral icterus.     Extraocular Movements: Extraocular movements intact.   Neck:      Thyroid: No thyromegaly.      Vascular: No JVD.      Trachea: No tracheal deviation.   Cardiovascular:      Rate and Rhythm: Normal rate and regular rhythm.      Heart sounds: Normal heart sounds. No murmur heard.    No friction rub. No gallop.   Pulmonary:      Effort: Pulmonary effort is normal. No respiratory distress.      Breath sounds: Normal breath " sounds. No wheezing or rales.   Abdominal:      General: Bowel sounds are normal. There is no distension.      Palpations: Abdomen is soft. There is no mass.      Tenderness: There is no abdominal tenderness.   Musculoskeletal:         General: No tenderness. Normal range of motion.      Cervical back: Normal range of motion and neck supple.   Lymphadenopathy:      Cervical: No cervical adenopathy.   Skin:     General: Skin is warm and dry.      Findings: No rash.   Neurological:      Mental Status: He is alert and oriented to person, place, and time.      Cranial Nerves: No cranial nerve deficit.      Deep Tendon Reflexes: Reflexes normal.   Psychiatric:         Behavior: Behavior normal.         Lab Results   Component Value Date    WBC 4.03 02/04/2022    HGB 16.4 02/04/2022    HCT 48.0 02/04/2022     02/04/2022    CHOL 188 01/26/2022    TRIG 211 (H) 01/26/2022    HDL 44 01/26/2022    ALT 56 (H) 02/04/2022    AST 34 02/04/2022     (L) 02/04/2022    K 4.2 02/04/2022     02/04/2022    CREATININE 0.6 02/04/2022    BUN 10 02/04/2022    CO2 25 02/04/2022    TSH 1.704 01/26/2022    INR 1.0 02/04/2022    HGBA1C 8.4 (H) 01/26/2022       Assessment:       1. Encounter to establish care    2. Type 2 diabetes mellitus with hyperglycemia, without long-term current use of insulin    3. Primary hypertension    4. Mixed hyperlipidemia    5. Hepatic cirrhosis due to chronic hepatitis C infection    6. History of hepatitis C, s/p successful Rx w/ SVR24 - 6/2017    7. Sebopsoriasis    8. Positive QuantiFERON-TB Gold test          Plan:     Graham was seen today for establish care.    Diagnoses and all orders for this visit:    Encounter to establish care  -     Hemoglobin A1C; Future    Type 2 diabetes mellitus with hyperglycemia, without long-term current use of insulin    Primary hypertension  Comments:  controlled, no changes to current management    Mixed hyperlipidemia  Comments:  rechecked lipid panel, no  changes to current management    Hepatic cirrhosis due to chronic hepatitis C infection  Comments:  well compensated, followed by hepatology    History of hepatitis C, s/p successful Rx w/ SVR24 - 6/2017    Sebopsoriasis  Comments:  reordered hydrocortisone prn  Orders:  -     hydrocortisone 1 % cream; Apply topically 2 (two) times daily.    Positive QuantiFERON-TB Gold test  Comments:  per last ID note, treated previously in his 30s with multi-month course of INH    Benign physical examination, no issues identified. Will obtain routine labwork and age appropriate health screenings.     Health maintenance reviewed with patient. Due for a1c, shingrix, covid booster.    Follow up in about 4 months (around 10/10/2022).    Ko Franz MD  Internal Medicine / Primary Care  [facility]  6/10/2022

## 2022-06-20 ENCOUNTER — TELEPHONE (OUTPATIENT)
Dept: INTERNAL MEDICINE | Facility: CLINIC | Age: 62
End: 2022-06-20
Payer: MEDICARE

## 2022-06-20 DIAGNOSIS — E11.65 UNCONTROLLED TYPE 2 DIABETES MELLITUS WITH HYPERGLYCEMIA: ICD-10-CM

## 2022-06-20 RX ORDER — PIOGLITAZONEHYDROCHLORIDE 30 MG/1
30 TABLET ORAL DAILY
Qty: 90 TABLET | Refills: 3 | Status: SHIPPED | OUTPATIENT
Start: 2022-06-20 | End: 2022-10-13 | Stop reason: SDUPTHER

## 2022-06-20 NOTE — TELEPHONE ENCOUNTER
----- Message from Ko Franz MD sent at 6/20/2022  8:05 AM CDT -----  Hemoglobin a1c 8.1, improved from previous 8.4; need to cut down on rice and noodles as we had discussed in clinic. Increase actos to 30 mg daily. Follow up as scheduled. Labwork in 3 months to check a1c again.

## 2022-07-16 ENCOUNTER — OFFICE VISIT (OUTPATIENT)
Dept: URGENT CARE | Facility: CLINIC | Age: 62
End: 2022-07-16
Payer: MEDICARE

## 2022-07-16 VITALS
SYSTOLIC BLOOD PRESSURE: 150 MMHG | RESPIRATION RATE: 18 BRPM | HEART RATE: 127 BPM | DIASTOLIC BLOOD PRESSURE: 101 MMHG | WEIGHT: 179 LBS | HEIGHT: 67 IN | OXYGEN SATURATION: 95 % | TEMPERATURE: 101 F | BODY MASS INDEX: 28.09 KG/M2

## 2022-07-16 DIAGNOSIS — R50.9 FEVER, UNSPECIFIED FEVER CAUSE: ICD-10-CM

## 2022-07-16 DIAGNOSIS — U07.1 COVID-19: Primary | ICD-10-CM

## 2022-07-16 DIAGNOSIS — U07.1 COVID-19 VIRUS DETECTED: ICD-10-CM

## 2022-07-16 LAB
CTP QC/QA: YES
SARS-COV-2 RDRP RESP QL NAA+PROBE: POSITIVE

## 2022-07-16 PROCEDURE — 4010F ACE/ARB THERAPY RXD/TAKEN: CPT | Mod: CPTII,S$GLB,, | Performed by: PHYSICIAN ASSISTANT

## 2022-07-16 PROCEDURE — U0002 COVID-19 LAB TEST NON-CDC: HCPCS | Mod: QW,S$GLB,, | Performed by: PHYSICIAN ASSISTANT

## 2022-07-16 PROCEDURE — 4010F PR ACE/ARB THEARPY RXD/TAKEN: ICD-10-PCS | Mod: CPTII,S$GLB,, | Performed by: PHYSICIAN ASSISTANT

## 2022-07-16 PROCEDURE — 1159F PR MEDICATION LIST DOCUMENTED IN MEDICAL RECORD: ICD-10-PCS | Mod: CPTII,S$GLB,, | Performed by: PHYSICIAN ASSISTANT

## 2022-07-16 PROCEDURE — 1160F PR REVIEW ALL MEDS BY PRESCRIBER/CLIN PHARMACIST DOCUMENTED: ICD-10-PCS | Mod: CPTII,S$GLB,, | Performed by: PHYSICIAN ASSISTANT

## 2022-07-16 PROCEDURE — 1159F MED LIST DOCD IN RCRD: CPT | Mod: CPTII,S$GLB,, | Performed by: PHYSICIAN ASSISTANT

## 2022-07-16 PROCEDURE — 3052F PR MOST RECENT HEMOGLOBIN A1C LEVEL 8.0 - < 9.0%: ICD-10-PCS | Mod: CPTII,S$GLB,, | Performed by: PHYSICIAN ASSISTANT

## 2022-07-16 PROCEDURE — 3080F PR MOST RECENT DIASTOLIC BLOOD PRESSURE >= 90 MM HG: ICD-10-PCS | Mod: CPTII,S$GLB,, | Performed by: PHYSICIAN ASSISTANT

## 2022-07-16 PROCEDURE — 3008F BODY MASS INDEX DOCD: CPT | Mod: CPTII,S$GLB,, | Performed by: PHYSICIAN ASSISTANT

## 2022-07-16 PROCEDURE — 3072F LOW RISK FOR RETINOPATHY: CPT | Mod: CPTII,S$GLB,, | Performed by: PHYSICIAN ASSISTANT

## 2022-07-16 PROCEDURE — 3072F PR LOW RISK FOR RETINOPATHY: ICD-10-PCS | Mod: CPTII,S$GLB,, | Performed by: PHYSICIAN ASSISTANT

## 2022-07-16 PROCEDURE — 3080F DIAST BP >= 90 MM HG: CPT | Mod: CPTII,S$GLB,, | Performed by: PHYSICIAN ASSISTANT

## 2022-07-16 PROCEDURE — 3008F PR BODY MASS INDEX (BMI) DOCUMENTED: ICD-10-PCS | Mod: CPTII,S$GLB,, | Performed by: PHYSICIAN ASSISTANT

## 2022-07-16 PROCEDURE — 3077F SYST BP >= 140 MM HG: CPT | Mod: CPTII,S$GLB,, | Performed by: PHYSICIAN ASSISTANT

## 2022-07-16 PROCEDURE — 1160F RVW MEDS BY RX/DR IN RCRD: CPT | Mod: CPTII,S$GLB,, | Performed by: PHYSICIAN ASSISTANT

## 2022-07-16 PROCEDURE — 3052F HG A1C>EQUAL 8.0%<EQUAL 9.0%: CPT | Mod: CPTII,S$GLB,, | Performed by: PHYSICIAN ASSISTANT

## 2022-07-16 PROCEDURE — 99214 OFFICE O/P EST MOD 30 MIN: CPT | Mod: S$GLB,,, | Performed by: PHYSICIAN ASSISTANT

## 2022-07-16 PROCEDURE — 99214 PR OFFICE/OUTPT VISIT, EST, LEVL IV, 30-39 MIN: ICD-10-PCS | Mod: S$GLB,,, | Performed by: PHYSICIAN ASSISTANT

## 2022-07-16 PROCEDURE — 3077F PR MOST RECENT SYSTOLIC BLOOD PRESSURE >= 140 MM HG: ICD-10-PCS | Mod: CPTII,S$GLB,, | Performed by: PHYSICIAN ASSISTANT

## 2022-07-16 PROCEDURE — U0002: ICD-10-PCS | Mod: QW,S$GLB,, | Performed by: PHYSICIAN ASSISTANT

## 2022-07-16 RX ORDER — IBUPROFEN 200 MG
800 TABLET ORAL
Status: COMPLETED | OUTPATIENT
Start: 2022-07-16 | End: 2022-07-16

## 2022-07-16 RX ORDER — ACETAMINOPHEN 500 MG
1000 TABLET ORAL
Status: DISCONTINUED | OUTPATIENT
Start: 2022-07-16 | End: 2022-07-16

## 2022-07-16 RX ADMIN — Medication 800 MG: at 05:07

## 2022-07-16 NOTE — PROGRESS NOTES
"Subjective:       Patient ID: Graham Caceres is a 62 y.o. male.    Vitals:  height is 5' 7" (1.702 m) and weight is 81.2 kg (179 lb). His temperature is 101.1 °F (38.4 °C) (abnormal). His blood pressure is 150/101 (abnormal) and his pulse is 127 (abnormal). His respiration is 18 and oxygen saturation is 95%.     Chief Complaint: Fever    Pt stated that he has been feeling bad since yesterday , Pt took tylenol at 4 pm . Pts pharmacy updated .     Patient provider note starts here:  Patient presents with complaints of fever, body aches, generalized malaise, nasal congestion since yesterday.  He last took Tylenol around 1600 today.  Denies associated chest pain or shortness of breath.  No known ill contacts.  He has been vaccinated against COVID-19.    Fever   This is a new problem. The current episode started yesterday. The problem occurs constantly. The problem has been unchanged. The maximum temperature noted was 101 to 101.9 F. The temperature was taken using an oral thermometer. Associated symptoms include congestion, coughing, headaches and muscle aches. Pertinent negatives include no abdominal pain, chest pain, diarrhea, ear pain, nausea, rash, sleepiness, sore throat, urinary pain, vomiting or wheezing. He has tried acetaminophen for the symptoms. The treatment provided no relief.       Constitution: Positive for fever. Negative for chills.   HENT: Positive for congestion. Negative for ear pain and sore throat.    Neck: Negative for neck pain and neck stiffness.   Cardiovascular: Negative for chest pain.   Respiratory: Positive for cough. Negative for chest tightness, sputum production, shortness of breath and wheezing.    Gastrointestinal: Negative for abdominal pain, nausea, vomiting and diarrhea.   Genitourinary: Negative for dysuria.   Musculoskeletal: Positive for muscle ache. Negative for pain.   Skin: Negative for rash and wound.   Allergic/Immunologic: Negative for itching.   Neurological: Positive for " headaches. Negative for numbness and tingling.       Objective:      Physical Exam   Constitutional: He is oriented to person, place, and time. He appears well-developed. He is cooperative.  Non-toxic appearance. He does not appear ill. No distress.   HENT:   Head: Normocephalic and atraumatic.   Ears:   Right Ear: Hearing and external ear normal.   Left Ear: Hearing and external ear normal.      Comments: Ears were not examined in attempt to limit exposure.  The patient agreed to forego this portion of the exam.  Nose: Congestion present. No mucosal edema, rhinorrhea or nasal deformity. No epistaxis. Right sinus exhibits no maxillary sinus tenderness and no frontal sinus tenderness. Left sinus exhibits no maxillary sinus tenderness and no frontal sinus tenderness.   Mouth/Throat: Uvula is midline, oropharynx is clear and moist and mucous membranes are normal. No trismus in the jaw. Normal dentition. No uvula swelling. No oropharyngeal exudate, posterior oropharyngeal edema or posterior oropharyngeal erythema.   Eyes: Conjunctivae and lids are normal. No scleral icterus.   Neck: Trachea normal and phonation normal. Neck supple. No edema present. No erythema present. No neck rigidity present.   Cardiovascular: Normal rate, regular rhythm, normal heart sounds and normal pulses.   Pulmonary/Chest: Effort normal and breath sounds normal. No respiratory distress. He has no decreased breath sounds. He has no wheezes. He has no rhonchi.   Abdominal: Normal appearance.   Musculoskeletal: Normal range of motion.         General: No deformity. Normal range of motion.   Neurological: He is alert and oriented to person, place, and time. He exhibits normal muscle tone. Coordination normal.   Skin: Skin is warm, dry, intact, not diaphoretic and not pale.   Psychiatric: His speech is normal and behavior is normal. Judgment and thought content normal.   Nursing note and vitals reviewed.        Assessment:       1. COVID-19    2.  Fever, unspecified fever cause        Results for orders placed or performed in visit on 07/16/22   POCT COVID-19 Rapid Screening   Result Value Ref Range    POC Rapid COVID Positive (A) Negative     Acceptable Yes        Plan:         COVID-19  -     nirmatrelvir-ritonavir 300 mg (150 mg x 2)-100 mg copackaged tablets (EUA); Take 3 tablets by mouth 2 (two) times daily for 5 days. Each dose contains 2 nirmatrelvir (pink tablets) and 1 ritonavir (white tablet). Take all 3 tablets together  Dispense: 30 tablet; Refill: 0    Fever, unspecified fever cause  -     POCT COVID-19 Rapid Screening  -     Discontinue: acetaminophen tablet 1,000 mg  -     ibuprofen tablet 800 mg           Medical Decision Making:   History:   Old Medical Records: I decided to obtain old medical records.  Differential Diagnosis:   Differential diagnosis includes but is not limited to: viral vs bacterial URI, pharyngitis, otitis, COVID 19, influenza, pneumonia.    Clinical Tests:   Lab Tests: Ordered and Reviewed       <> Summary of Lab: COVID+  Urgent Care Management:  Patient's COVID risk score: 4  Patient presents with complaints of URI like symptoms with associated fever since yesterday.  On exam, he is febrile without toxic appearing.  Lungs are clear to auscultation bilaterally.  He has tested positive for COVID-19 and has a COVID risk score of 4. I checked all of his reported medications online through the [employer] COVID interaction  and have advised him on the need to hold his Crestor for a total of 8 days while taking the prescribed Paxlovid. Also educated on the risks and side effects of the medications and the possibility of rebound COVID. Isolation requirements, symptomatic treatment, ED precautions discussed. He verbalized understanding and agreed with plan.         Patient Instructions   You need to stop taking your Rosuvastatin/Crestor for 8 days in order to take the Paxlovid medication which was prescribed  to you today for COVID.   Alternate between Tylenol and Motrin as needed for fever, headaches and body aches.   If not allergic,take tylenol (acetominophen) for fever control, chills, or body aches every 4 hours. Do not exceed 4000 mg/ day.If not allergic, take Motrin (Ibuprofen) every 4 hours for fever, chills, pain or inflammation. Do not exceed 2400 mg/day. You can alternate taking tylenol and motrin.     You have tested positive for COVID-19 today.      ISOLATION  If you tested positive and do not have symptoms, you must isolate for 5 days starting on the day of the positive test. I    If you tested positive and have symptoms, you must isolate for 5 days starting on the day of the first symptoms,  not the day of the positive test.     This is the most important part, both the CDC and the [facility] emphasize that you do not test out of isolation.     After 5 days, if your symptoms have improved and you have not had fever on day 5, you can return to the community on day 6- NO TESTING REQUIRED!      In fact, we do not retest if you were positive in the last 90 days.    After your 5 days of isolation are completed, the CDC recommends strict mask use for the first 5 days that you come out of isolation.       Your test was POSITIVE for COVID-19 (coronavirus).       Please isolate yourself at home.  You may leave home and/or return to work once the following conditions are met:    If you were not hospitalized and are not moderately to severely immunocompromised:    More than 5 days since symptoms first appeared AND   More than 24 hours fever free without medications AND   Symptoms are improving   Continue to wear a mask around others for 5 additional days.    If you were hospitalized OR are moderately to severely immunocompromised:   More than 20 days since symptoms first appeared   More than 24 hours fever free without medications   Symptoms have improved    If you had no symptoms but tested positive:   More than 5  days since the date of the first positive test (20 days if moderately to severely immunocompromised). If you develop symptoms, then use the guidelines above.   Continue to wear a mask around others for 5 additional days.      Contact Tracing    As one of the next steps, you will receive a call or text from the [place] Department of Health ([facility]) COVID-19 Tracing Team. See the contact information below so you know not to ignore the health departments call. It is important that you contact them back immediately so they can help.      Contact Tracer Number:  [phone]  Caller ID for most carriers: [facility]     What is contact tracing?  · Contact tracing is a process that helps identify everyone who has been in close contact with an infected person. Contact tracers let those people know they may have been exposed and guide them on next steps. Confidentiality is important for everyone; no one will be told who may have exposed them to the virus.  · Your involvement is important. The more we know about where and how this virus is spreading, the better chance we have at stopping it from spreading further.  What does exposure mean?  · Exposure means you have been within 6 feet for more than 15 minutes with a person who has or had COVID-19.  What kind of questions do the contact tracers ask?  · A contact tracer will confirm your basic contact information including name, address, phone number, and next of kin, as well as asking about any symptoms you may have had. Theyll also ask you how you think you may have gotten sick, such as places where you may have been exposed to the virus, and people you were with. Those names will never be shared with anyone outside of that call, and will only be used to help trace and stop the spread of the virus.   I have privacy concerns. How will the state use my information?  · Your privacy about your health is important. All calls are completed using call centers that use the  appropriate health privacy protection measures (HIPAA compliance), meaning that your patient information is safe. No one will ever ask you any questions related to immigration status. Your health comes first.   Do I have to participate?  · You do not have to participate, but we strongly encourage you to. Contact tracing can help us catch and control new outbreaks as theyre developing to keep your friends and family safe.   What if I dont hear from anyone?  · If you dont receive a call within 24 hours, you can call the number above right away to inquire about your status. That line is open from 8:00 am - 8:00 p.m., 7 days a week.  Contact tracing saves lives! Together, we have the power to beat this virus and keep our loved ones and neighbors safe.    For more information see CDC link below.      https://www.cdc.gov/coronavirus/2019-ncov/hcp/guidance-prevent-spread.html#precautions        Sources:  [facility], [facility] and [facility]           Sincerely,     Carola Crocker PA-C    Please follow up with your Primary care provider within 2-5 days if your signs and symptoms have not resolved or worsen.  The usual course of cold symptoms are 10-14 days.      If your condition worsens or fails to improve we recommend that you receive another evaluation at the emergency room immediately or contact your primary medical clinic to discuss your concerns.      You must understand that you have received an Urgent Care treatment only and that you may be released before all of your medical problems are known or treated.   You, the patient, will arrange for follow up care as instructed.      Tylenol or Ibuprofen can also be used as directed for pain/fever unless you have an allergy to them or medical condition such as stomach ulcers, kidney or liver disease or blood thinners etc for which you should not be taking these type of medications.      Take over the counter cough medication as directed as needed for  "cough.  You should avoid medications with pseudoephedrine or phenylephrine (any medication with "D") if you have high blood pressure as this can cause an elevation in your blood pressure. Instead consider Corcidin HBP as needed to prevent an elevated blood pressure.      Natural remedies of symptoms (as needed) include humidification, saline nasal sprays, and/or steamy showers.  Increase fluids, warm tea with honey, cough drops as needed.  You may also use salt water gargles for sore throat.     IF you received a oral steroid today - As discussed, this can elevate your blood pressure, elevate your blood sugar, water weight gain, nervous energy, redness to the face and dimpling of the skin at the injection site.              "

## 2022-07-16 NOTE — PATIENT INSTRUCTIONS
You need to stop taking your Rosuvastatin/Crestor for 8 days in order to take the Paxlovid medication which was prescribed to you today for COVID.   Alternate between Tylenol and Motrin as needed for fever, headaches and body aches.   If not allergic,take tylenol (acetominophen) for fever control, chills, or body aches every 4 hours. Do not exceed 4000 mg/ day.If not allergic, take Motrin (Ibuprofen) every 4 hours for fever, chills, pain or inflammation. Do not exceed 2400 mg/day. You can alternate taking tylenol and motrin.     You have tested positive for COVID-19 today.      ISOLATION  If you tested positive and do not have symptoms, you must isolate for 5 days starting on the day of the positive test. I    If you tested positive and have symptoms, you must isolate for 5 days starting on the day of the first symptoms,  not the day of the positive test.     This is the most important part, both the CDC and the [facility] emphasize that you do not test out of isolation.     After 5 days, if your symptoms have improved and you have not had fever on day 5, you can return to the community on day 6- NO TESTING REQUIRED!      In fact, we do not retest if you were positive in the last 90 days.    After your 5 days of isolation are completed, the CDC recommends strict mask use for the first 5 days that you come out of isolation.      Your test was POSITIVE for COVID-19 (coronavirus).       Please isolate yourself at home.  You may leave home and/or return to work once the following conditions are met:    If you were not hospitalized and are not moderately to severely immunocompromised:   More than 5 days since symptoms first appeared AND  More than 24 hours fever free without medications AND  Symptoms are improving  Continue to wear a mask around others for 5 additional days.    If you were hospitalized OR are moderately to severely immunocompromised:  More than 20 days since symptoms first appeared  More than 24 hours fever  free without medications  Symptoms have improved    If you had no symptoms but tested positive:  More than 5 days since the date of the first positive test (20 days if moderately to severely immunocompromised). If you develop symptoms, then use the guidelines above.  Continue to wear a mask around others for 5 additional days.      Contact Tracing    As one of the next steps, you will receive a call or text from the [place] Department of Health ([facility]) COVID-19 Tracing Team. See the contact information below so you know not to ignore the health departments call. It is important that you contact them back immediately so they can help.      Contact Tracer Number:  [phone]  Caller ID for most carriers: [place] Dept Health     What is contact tracing?  Contact tracing is a process that helps identify everyone who has been in close contact with an infected person. Contact tracers let those people know they may have been exposed and guide them on next steps. Confidentiality is important for everyone; no one will be told who may have exposed them to the virus.  Your involvement is important. The more we know about where and how this virus is spreading, the better chance we have at stopping it from spreading further.  What does exposure mean?  Exposure means you have been within 6 feet for more than 15 minutes with a person who has or had COVID-19.  What kind of questions do the contact tracers ask?  A contact tracer will confirm your basic contact information including name, address, phone number, and next of kin, as well as asking about any symptoms you may have had. Theyll also ask you how you think you may have gotten sick, such as places where you may have been exposed to the virus, and people you were with. Those names will never be shared with anyone outside of that call, and will only be used to help trace and stop the spread of the virus.   I have privacy concerns. How will the state use my information?  Your  privacy about your health is important. All calls are completed using call centers that use the appropriate health privacy protection measures (HIPAA compliance), meaning that your patient information is safe. No one will ever ask you any questions related to immigration status. Your health comes first.   Do I have to participate?  You do not have to participate, but we strongly encourage you to. Contact tracing can help us catch and control new outbreaks as theyre developing to keep your friends and family safe.   What if I dont hear from anyone?  If you dont receive a call within 24 hours, you can call the number above right away to inquire about your status. That line is open from 8:00 am - 8:00 p.m., 7 days a week.  Contact tracing saves lives! Together, we have the power to beat this virus and keep our loved ones and neighbors safe.    For more information see CDC link below.      https://www.cdc.gov/coronavirus/2019-ncov/hcp/guidance-prevent-spread.html#precautions        Sources:  [facility], [facility] and [facility]           Sincerely,     Carola Crocker PA-C    Please follow up with your Primary care provider within 2-5 days if your signs and symptoms have not resolved or worsen.  The usual course of cold symptoms are 10-14 days.      If your condition worsens or fails to improve we recommend that you receive another evaluation at the emergency room immediately or contact your primary medical clinic to discuss your concerns.      You must understand that you have received an Urgent Care treatment only and that you may be released before all of your medical problems are known or treated.   You, the patient, will arrange for follow up care as instructed.      Tylenol or Ibuprofen can also be used as directed for pain/fever unless you have an allergy to them or medical condition such as stomach ulcers, kidney or liver disease or blood thinners etc for which you should not be taking these  "type of medications.      Take over the counter cough medication as directed as needed for cough.  You should avoid medications with pseudoephedrine or phenylephrine (any medication with "D") if you have high blood pressure as this can cause an elevation in your blood pressure. Instead consider Corcidin HBP as needed to prevent an elevated blood pressure.      Natural remedies of symptoms (as needed) include humidification, saline nasal sprays, and/or steamy showers.  Increase fluids, warm tea with honey, cough drops as needed.  You may also use salt water gargles for sore throat.     IF you received a oral steroid today - As discussed, this can elevate your blood pressure, elevate your blood sugar, water weight gain, nervous energy, redness to the face and dimpling of the skin at the injection site.        "

## 2022-08-04 ENCOUNTER — HOSPITAL ENCOUNTER (OUTPATIENT)
Dept: RADIOLOGY | Facility: HOSPITAL | Age: 62
Discharge: HOME | End: 2022-08-04
Attending: PHYSICIAN ASSISTANT
Payer: MEDICARE

## 2022-08-04 DIAGNOSIS — K74.60 HEPATIC CIRRHOSIS, UNSPECIFIED HEPATIC CIRRHOSIS TYPE, UNSPECIFIED WHETHER ASCITES PRESENT: ICD-10-CM

## 2022-08-04 PROCEDURE — 76705 US ABDOMEN LIMITED: ICD-10-PCS | Mod: 26,,, | Performed by: RADIOLOGY

## 2022-08-04 PROCEDURE — 76705 ECHO EXAM OF ABDOMEN: CPT | Mod: TC

## 2022-08-04 PROCEDURE — 76705 ECHO EXAM OF ABDOMEN: CPT | Mod: 26,,, | Performed by: RADIOLOGY

## 2022-08-16 ENCOUNTER — OFFICE VISIT (OUTPATIENT)
Dept: HEPATOLOGY | Facility: CLINIC | Age: 62
End: 2022-08-16
Payer: MEDICARE

## 2022-08-16 VITALS
OXYGEN SATURATION: 94 % | DIASTOLIC BLOOD PRESSURE: 82 MMHG | TEMPERATURE: 98 F | WEIGHT: 179 LBS | HEART RATE: 92 BPM | SYSTOLIC BLOOD PRESSURE: 136 MMHG | BODY MASS INDEX: 28.09 KG/M2 | HEIGHT: 67 IN | RESPIRATION RATE: 18 BRPM

## 2022-08-16 DIAGNOSIS — R76.8 HEPATITIS B CORE ANTIBODY POSITIVE: ICD-10-CM

## 2022-08-16 DIAGNOSIS — K74.60 HEPATIC CIRRHOSIS DUE TO CHRONIC HEPATITIS C INFECTION: Primary | ICD-10-CM

## 2022-08-16 DIAGNOSIS — Z86.19 HISTORY OF HEPATITIS C: ICD-10-CM

## 2022-08-16 DIAGNOSIS — K76.0 NAFLD (NONALCOHOLIC FATTY LIVER DISEASE): ICD-10-CM

## 2022-08-16 DIAGNOSIS — B18.2 HEPATIC CIRRHOSIS DUE TO CHRONIC HEPATITIS C INFECTION: Primary | ICD-10-CM

## 2022-08-16 DIAGNOSIS — E78.2 MIXED HYPERLIPIDEMIA: ICD-10-CM

## 2022-08-16 DIAGNOSIS — R74.8 ELEVATED LIVER ENZYMES: ICD-10-CM

## 2022-08-16 DIAGNOSIS — D13.5 ADENOMYOMATOSIS OF GALLBLADDER: ICD-10-CM

## 2022-08-16 DIAGNOSIS — E11.9 TYPE 2 DIABETES MELLITUS WITHOUT RETINOPATHY: ICD-10-CM

## 2022-08-16 DIAGNOSIS — E66.3 OVERWEIGHT (BMI 25.0-29.9): ICD-10-CM

## 2022-08-16 DIAGNOSIS — I10 PRIMARY HYPERTENSION: ICD-10-CM

## 2022-08-16 DIAGNOSIS — K82.4 GALLBLADDER POLYP: ICD-10-CM

## 2022-08-16 PROCEDURE — 3079F PR MOST RECENT DIASTOLIC BLOOD PRESSURE 80-89 MM HG: ICD-10-PCS | Mod: CPTII,S$GLB,, | Performed by: NURSE PRACTITIONER

## 2022-08-16 PROCEDURE — 4010F PR ACE/ARB THEARPY RXD/TAKEN: ICD-10-PCS | Mod: CPTII,S$GLB,, | Performed by: NURSE PRACTITIONER

## 2022-08-16 PROCEDURE — 1160F PR REVIEW ALL MEDS BY PRESCRIBER/CLIN PHARMACIST DOCUMENTED: ICD-10-PCS | Mod: CPTII,S$GLB,, | Performed by: NURSE PRACTITIONER

## 2022-08-16 PROCEDURE — 3008F PR BODY MASS INDEX (BMI) DOCUMENTED: ICD-10-PCS | Mod: CPTII,S$GLB,, | Performed by: NURSE PRACTITIONER

## 2022-08-16 PROCEDURE — 99214 OFFICE O/P EST MOD 30 MIN: CPT | Mod: S$GLB,,, | Performed by: NURSE PRACTITIONER

## 2022-08-16 PROCEDURE — 1160F RVW MEDS BY RX/DR IN RCRD: CPT | Mod: CPTII,S$GLB,, | Performed by: NURSE PRACTITIONER

## 2022-08-16 PROCEDURE — 3079F DIAST BP 80-89 MM HG: CPT | Mod: CPTII,S$GLB,, | Performed by: NURSE PRACTITIONER

## 2022-08-16 PROCEDURE — 3008F BODY MASS INDEX DOCD: CPT | Mod: CPTII,S$GLB,, | Performed by: NURSE PRACTITIONER

## 2022-08-16 PROCEDURE — 1159F MED LIST DOCD IN RCRD: CPT | Mod: CPTII,S$GLB,, | Performed by: NURSE PRACTITIONER

## 2022-08-16 PROCEDURE — 99999 PR PBB SHADOW E&M-EST. PATIENT-LVL V: CPT | Mod: PBBFAC,,, | Performed by: NURSE PRACTITIONER

## 2022-08-16 PROCEDURE — 1159F PR MEDICATION LIST DOCUMENTED IN MEDICAL RECORD: ICD-10-PCS | Mod: CPTII,S$GLB,, | Performed by: NURSE PRACTITIONER

## 2022-08-16 PROCEDURE — 3052F HG A1C>EQUAL 8.0%<EQUAL 9.0%: CPT | Mod: CPTII,S$GLB,, | Performed by: NURSE PRACTITIONER

## 2022-08-16 PROCEDURE — 3075F PR MOST RECENT SYSTOLIC BLOOD PRESS GE 130-139MM HG: ICD-10-PCS | Mod: CPTII,S$GLB,, | Performed by: NURSE PRACTITIONER

## 2022-08-16 PROCEDURE — 4010F ACE/ARB THERAPY RXD/TAKEN: CPT | Mod: CPTII,S$GLB,, | Performed by: NURSE PRACTITIONER

## 2022-08-16 PROCEDURE — 3072F PR LOW RISK FOR RETINOPATHY: ICD-10-PCS | Mod: CPTII,S$GLB,, | Performed by: NURSE PRACTITIONER

## 2022-08-16 PROCEDURE — 3075F SYST BP GE 130 - 139MM HG: CPT | Mod: CPTII,S$GLB,, | Performed by: NURSE PRACTITIONER

## 2022-08-16 PROCEDURE — 99999 PR PBB SHADOW E&M-EST. PATIENT-LVL V: ICD-10-PCS | Mod: PBBFAC,,, | Performed by: NURSE PRACTITIONER

## 2022-08-16 PROCEDURE — 3052F PR MOST RECENT HEMOGLOBIN A1C LEVEL 8.0 - < 9.0%: ICD-10-PCS | Mod: CPTII,S$GLB,, | Performed by: NURSE PRACTITIONER

## 2022-08-16 PROCEDURE — 3072F LOW RISK FOR RETINOPATHY: CPT | Mod: CPTII,S$GLB,, | Performed by: NURSE PRACTITIONER

## 2022-08-16 PROCEDURE — 99214 PR OFFICE/OUTPT VISIT, EST, LEVL IV, 30-39 MIN: ICD-10-PCS | Mod: S$GLB,,, | Performed by: NURSE PRACTITIONER

## 2022-08-16 NOTE — PATIENT INSTRUCTIONS
Because you have cirrhosis, it is important to attend clinic visits every 6 months with an Ultrasound and blood tests every 6 months to screen for liver cancer (you are at risk of developing liver cancer due to scar tissue in the liver)    Signs and symptoms of worsening liver disease include jaundice, fluid in the belly (ascites), and confusion/disorientation/slowed thought processes due to hepatic encephalopathy (toxins building up because of liver problems).   You should seek medical attention if any of these things occur.    Also, possible bleeding from esophageal varices (blood vessels in the stomach and foodpipe can burst and cause fatal bleeding).  Therefore, if you have symptoms of vomiting blood, blood in your stool, dark or black stools or vomiting coffee ground vomit, YOU SHOULD GO TO THE EMERGENCY ROOM IMMEDIATELY.     Cirrhosis can increase the risk of liver cancer, liver failure, and death. However, we will watch your liver function score (MELD score) closely with each clinic visit. A normal MELD score is 6, highest is 40. Your last one was an 6. We will check this with every clinic visit. A MELD 15 or higher is when we start to consider transplant because MELD 15 or higher indicates that the liver is not functioning as well     Cirrhosis Counseling  - NO alcohol use (includes beer, wine, and/or liquor)  - avoid non-steroidal anti-inflammatory drugs (NSAIDs) such as ibuprofen, Motrin, naprosyn, Alleve due to the risk of kidney damage  - can take acetaminophen (Tylenol), no more than 2000 mg per day  - low sodium (salt) 2 gram per day diet  - high protein diet: 80 grams per day to prevent muscle mass loss. Drink at least 1 protein shake daily (Premier Protein is best option because it is very high protein and low sugar). Ok to use this as nighttime snack to fit it in   - resistance exercises for muscle strength  - avoid raw seafoods due to the risk of fatal Vibrio vulnificus infection  - ultrasound of  the liver every 6 months for liver cancer screening (you are at risk of developing liver cancer due to scar tissue in the liver)  - Upper endoscopy every 1-2 years to screen for varices in the stomach and foodpipe which can burst and cause fatal bleeding

## 2022-08-16 NOTE — PROGRESS NOTES
HEPATOLOGY CLINIC VISIT NOTE  CHIEF COMPLAINT: Cirrhosis    HISTORY     This is a 62 y.o.   male with a history of well compensated HCV cirrhosis (treated / cured) as well as fatty liver disease, DM and Hyperlipidemia, here for follow up.    Interval history:  Returns today w/ routine cirrhosis labs / u/s:  · U/S shows no liver lesions or ascites with a 2 mm gallbladder polyp versus stone and gallbladder adenomyomatosis.  · LFT's and AFP remain normal. ALT mildly elevated (46), due to NAFLD/SANDOVAL.    Feels well  Current symptoms of hepatic decompensation:  · Ascites:              none  · TBili elevation:     none  · HE:                    none  · EV bleed:           none    Cirrhosis history:  FibroScan 4/28/16 - kPA 17.3, F4   (Labs / imaging / pretreatment APRI / FIB-4 all consistent w/ advanced fibrosis)  Well compensated w/ no evidence of portal HTN    MELD-Na score: 6 at 8/4/2022  8:51 AM  MELD score: 6 at 8/4/2022  8:51 AM  Calculated from:  Serum Creatinine: 0.8 mg/dL (Using min of 1 mg/dL) at 8/4/2022  8:51 AM  Serum Sodium: 143 mmol/L (Using max of 137 mmol/L) at 8/4/2022  8:51 AM  Total Bilirubin: 0.9 mg/dL (Using min of 1 mg/dL) at 8/4/2022  8:51 AM  INR(ratio): 1.0 at 8/4/2022  8:51 AM  Age: 62 years       Cirrhosis maintenance:  · HCC screening - Up to date, next due 2/2023.  · Varices screening - EGD 5/2016 Dr Soto - no varices     Fatty Liver:  - steatosis on imaging  - (+) uncontrolled DM and Hyperlipidemia w/ hx of medication non adherence but states that he is now taking medications as prescribed. Followed by Endocrinology.  - Last HgbA1c was 8.1% in June. BMI 28     HCV history:  Completed 24 weeks harvoni w/ SVR12 as of 2/2017 (treated / cured)  - genotype 1B  - Prior HCV tx w/ Dr Laurent: PegIFN + RBV (10-15 yrs ago) for few months - nonresponse    PMH, PSH, PROBLEM LIST, SOCIAL HX, FAMILY HX, MEDS, ALLERGIES   Reviewed in Epic  FAMILY HX: neg for liver diease  SOCIAL HISTORY: Moved from  [place] during teenage years. Resides in [place]. . 1 daughter from prior marriage  Not working, on disability. Worked at [employer] many years ago  Alcohol - none  Tobacco - none  Drugs - none    ROS:   Review of Systems   Constitutional: Negative for fever and malaise/fatigue.   Cardiovascular: Negative for chest pain and leg swelling.   Gastrointestinal: Negative for abdominal pain and melena.   Skin: Negative for rash.   Psychiatric/Behavioral: Negative for memory loss.       PHYSICAL EXAM:  Friendly   male, in no acute distress; alert and oriented to person, place and time  VITALS: /82 (BP Location: Right arm, Patient Position: Sitting, BP Method: Medium (Automatic))   Pulse 92   Temp 98.4 °F (36.9 °C) (Oral)   Resp 18   Ht 5' 7" (1.702 m)   Wt 81.2 kg (179 lb 0.2 oz)   SpO2 (!) 94%   BMI 28.04 kg/m²   HEENT: Sclerae anicteric.   LUNGS: Normal respiratory effort.  ABDOMEN: Flat, soft, nontender.   SKIN: Warm and dry. No jaundice, No obvious rashes.   EXTREMITIES: No lower extremity edema.  NEURO/PSYCH: Normal gate. Memory intact. Thought and speech pattern appropriate. Behavior normal. No depression or anxiety noted.    PERTINENT DIAGNOSTIC RESULTS     Lab Results   Component Value Date    WBC 4.89 08/04/2022    HGB 15.9 08/04/2022     08/04/2022     Lab Results   Component Value Date    INR 1.0 08/04/2022     Lab Results   Component Value Date    AST 27 08/04/2022    ALT 46 (H) 08/04/2022    BILITOT 0.9 08/04/2022    ALBUMIN 4.4 08/04/2022    ALKPHOS 65 08/04/2022    CREATININE 0.8 08/04/2022    BUN 13 08/04/2022     08/04/2022    K 4.2 08/04/2022    AFP 3.3 08/04/2022     US Abdomen Limited - 2/4/2022:    FINDINGS:  The liver measures 17.8 cm and demonstrates heterogeneous parenchymal echogenicity and a mildly elevated HRI of 1.47, suggestive of hepatic steatosis.  Small ill-defined area of decreased echogenicity adjacent to the gallbladder fossa, consistent with " focal fatty sparing.  No focal masslike hepatic lesions.    There is no intrahepatic or extrahepatic bile duct dilatation.  The common duct measures 3 mm.    Tiny echogenic foci with comet tail artifact associated with the gallbladder fundal wall, suggestive of adenomyomatosis.  No cholelithiasis, wall thickening, pericholecystic fluid, or sonographic Mason's sign.    The visualized portions of the pancreas are unremarkable.    The spleen measures 11.8 x 4.2 cm and is unremarkable.    Visualized IVC is unremarkable.    There is no free fluid within the visualized abdomen.    Impression  Hepatic steatosis.  No liver masses.  Gallbladder adenomyomatosis.    US Abdomen Limited  Narrative: EXAMINATION:  US ABDOMEN LIMITED    CLINICAL HISTORY:  Unspecified cirrhosis of liver    TECHNIQUE:  Limited ultrasound of the right upper quadrant of the abdomen (including pancreas, liver, gallbladder, common bile duct, and spleen) was performed.    COMPARISON:  02/04/2022    FINDINGS:  Liver: Normal in size, measuring 15.9 cm. Homogeneously hyperechoic echotexture.  There is a area of fatty sparing near the gallbladder, as before.    Gallbladder: A 2 mm adherent stone versus polyp is present, not definitely seen on 02/04/2022.  No wall thickening or pericholecystic fluid.  No sonographic Mason's sign.  A hyperechoic focus with comet tail artifact is again seen in the gallbladder wall.    Biliary system: The common duct is not dilated, measuring 3 mm.  No intrahepatic ductal dilatation.    Spleen: Normal in size and echotexture, measuring 11.2 x 4.2 cm.    Miscellaneous: No upper abdominal ascites.  Impression: Hepatic steatosis.    2 mm gallbladder polyp versus adherent stone.  A follow-up ultrasound in 1 year is recommended.    Gallbladder adenomyomatosis, as before.    Electronically signed by: Rona Urias  Date:    08/04/2022  Time:    09:41    ASSESSMENT     62 y.o.   male  1. WELL COMPENSATED CIRRHOSIS   -- MELD  score - 6  -- HCC screening - up to date, next due 2/2023  -- EGD 5/2016 varices screening - no varices     2. HISTORY OF HEPATITIS C, GENOTYPE 1B - (treated / cured SVR12 2/2017)  -- completed 24weeks of Harvoni  -- (+) Immunity to HAV   -- prior resolved HBV     3. MILD TRANSAMINASE ELEVATION, likely due to NAFLD   -- serologic eval for other causes of elevated transaminases has been unyielding     4. HYPERLIPIDEMIA  5. Uncontrolled DM  6. BMI 28    PLAN     - Repeat Fibroscan to restage liver disease in 6 months.  - Recommend MELD labs every 6 months to monitor liver function, next due 2/2023.  - Recommend abdominal ultrasound with AFP measurement every 6 months for HCC surveillance, next due 2/2023..  - Recommend weight loss of 15-20 pounds, through diet and exercise.  - Continue close follow up with PCP and Endocrinology.  - Avoid alcohol and herbal supplements/alternative remedies.  - Return to clinic in 6 months.  _____________________________________________________________    Duration of encounter: 30 min  This includes face-to-face time and non face-to-face time preparing to see the patient (eg, review of tests), obtaining and/or reviewing separately obtained history, documenting clinical information in the electronic or other health record, independently interpreting resultsand communicating results to the patient/family/caregiver, or care coordination.       Hepatology Nurse Practitioner  [facility]  8/16/2022 @ 0950

## 2022-08-18 ENCOUNTER — PATIENT OUTREACH (OUTPATIENT)
Dept: ADMINISTRATIVE | Facility: HOSPITAL | Age: 62
End: 2022-08-18
Payer: MEDICARE

## 2022-08-18 NOTE — PROGRESS NOTES
Health Maintenance Due   Topic Date Due    Shingles Vaccine (1 of 2) Never done    Pneumococcal Vaccines (Age 0-64) (2 - PCV) 05/18/2017    COVID-19 Vaccine (3 - Booster for Colin series) 04/04/2022    Diabetes Urine Screening  07/27/2022     Triggered links updated care everywhere,  and immunizations. Chart review

## 2022-10-05 DIAGNOSIS — E11.9 TYPE 2 DIABETES MELLITUS WITHOUT COMPLICATION: ICD-10-CM

## 2022-10-11 ENCOUNTER — PATIENT MESSAGE (OUTPATIENT)
Dept: ADMINISTRATIVE | Facility: HOSPITAL | Age: 62
End: 2022-10-11
Payer: MEDICARE

## 2022-10-13 ENCOUNTER — OFFICE VISIT (OUTPATIENT)
Dept: ENDOCRINOLOGY | Facility: CLINIC | Age: 62
End: 2022-10-13
Payer: MEDICARE

## 2022-10-13 VITALS
SYSTOLIC BLOOD PRESSURE: 130 MMHG | HEIGHT: 67 IN | DIASTOLIC BLOOD PRESSURE: 60 MMHG | WEIGHT: 180.31 LBS | BODY MASS INDEX: 28.3 KG/M2 | HEART RATE: 98 BPM | OXYGEN SATURATION: 99 %

## 2022-10-13 DIAGNOSIS — I10 HYPERTENSION, ESSENTIAL: ICD-10-CM

## 2022-10-13 DIAGNOSIS — E11.65 UNCONTROLLED TYPE 2 DIABETES MELLITUS WITH HYPERGLYCEMIA: Primary | ICD-10-CM

## 2022-10-13 DIAGNOSIS — E78.5 HYPERLIPIDEMIA, UNSPECIFIED HYPERLIPIDEMIA TYPE: ICD-10-CM

## 2022-10-13 PROCEDURE — 1159F MED LIST DOCD IN RCRD: CPT | Mod: CPTII,S$GLB,, | Performed by: GENERAL ACUTE CARE HOSPITAL

## 2022-10-13 PROCEDURE — 3078F DIAST BP <80 MM HG: CPT | Mod: CPTII,S$GLB,, | Performed by: GENERAL ACUTE CARE HOSPITAL

## 2022-10-13 PROCEDURE — 4010F PR ACE/ARB THEARPY RXD/TAKEN: ICD-10-PCS | Mod: CPTII,S$GLB,, | Performed by: GENERAL ACUTE CARE HOSPITAL

## 2022-10-13 PROCEDURE — 99999 PR PBB SHADOW E&M-EST. PATIENT-LVL IV: ICD-10-PCS | Mod: PBBFAC,,, | Performed by: GENERAL ACUTE CARE HOSPITAL

## 2022-10-13 PROCEDURE — 3078F PR MOST RECENT DIASTOLIC BLOOD PRESSURE < 80 MM HG: ICD-10-PCS | Mod: CPTII,S$GLB,, | Performed by: GENERAL ACUTE CARE HOSPITAL

## 2022-10-13 PROCEDURE — 3075F SYST BP GE 130 - 139MM HG: CPT | Mod: CPTII,S$GLB,, | Performed by: GENERAL ACUTE CARE HOSPITAL

## 2022-10-13 PROCEDURE — 3052F PR MOST RECENT HEMOGLOBIN A1C LEVEL 8.0 - < 9.0%: ICD-10-PCS | Mod: CPTII,S$GLB,, | Performed by: GENERAL ACUTE CARE HOSPITAL

## 2022-10-13 PROCEDURE — 99214 OFFICE O/P EST MOD 30 MIN: CPT | Mod: S$GLB,,, | Performed by: GENERAL ACUTE CARE HOSPITAL

## 2022-10-13 PROCEDURE — 3052F HG A1C>EQUAL 8.0%<EQUAL 9.0%: CPT | Mod: CPTII,S$GLB,, | Performed by: GENERAL ACUTE CARE HOSPITAL

## 2022-10-13 PROCEDURE — 1160F PR REVIEW ALL MEDS BY PRESCRIBER/CLIN PHARMACIST DOCUMENTED: ICD-10-PCS | Mod: CPTII,S$GLB,, | Performed by: GENERAL ACUTE CARE HOSPITAL

## 2022-10-13 PROCEDURE — 3072F LOW RISK FOR RETINOPATHY: CPT | Mod: CPTII,S$GLB,, | Performed by: GENERAL ACUTE CARE HOSPITAL

## 2022-10-13 PROCEDURE — 99214 PR OFFICE/OUTPT VISIT, EST, LEVL IV, 30-39 MIN: ICD-10-PCS | Mod: S$GLB,,, | Performed by: GENERAL ACUTE CARE HOSPITAL

## 2022-10-13 PROCEDURE — 1159F PR MEDICATION LIST DOCUMENTED IN MEDICAL RECORD: ICD-10-PCS | Mod: CPTII,S$GLB,, | Performed by: GENERAL ACUTE CARE HOSPITAL

## 2022-10-13 PROCEDURE — 1160F RVW MEDS BY RX/DR IN RCRD: CPT | Mod: CPTII,S$GLB,, | Performed by: GENERAL ACUTE CARE HOSPITAL

## 2022-10-13 PROCEDURE — 99999 PR PBB SHADOW E&M-EST. PATIENT-LVL IV: CPT | Mod: PBBFAC,,, | Performed by: GENERAL ACUTE CARE HOSPITAL

## 2022-10-13 PROCEDURE — 3075F PR MOST RECENT SYSTOLIC BLOOD PRESS GE 130-139MM HG: ICD-10-PCS | Mod: CPTII,S$GLB,, | Performed by: GENERAL ACUTE CARE HOSPITAL

## 2022-10-13 PROCEDURE — 4010F ACE/ARB THERAPY RXD/TAKEN: CPT | Mod: CPTII,S$GLB,, | Performed by: GENERAL ACUTE CARE HOSPITAL

## 2022-10-13 PROCEDURE — 3072F PR LOW RISK FOR RETINOPATHY: ICD-10-PCS | Mod: CPTII,S$GLB,, | Performed by: GENERAL ACUTE CARE HOSPITAL

## 2022-10-13 RX ORDER — PIOGLITAZONEHYDROCHLORIDE 30 MG/1
30 TABLET ORAL DAILY
Qty: 90 TABLET | Refills: 3 | Status: SHIPPED | OUTPATIENT
Start: 2022-10-13 | End: 2023-12-20 | Stop reason: SDUPTHER

## 2022-10-13 RX ORDER — DULAGLUTIDE 0.75 MG/.5ML
0.75 INJECTION, SOLUTION SUBCUTANEOUS
Qty: 4 PEN | Refills: 11 | Status: CANCELLED | OUTPATIENT
Start: 2022-10-13 | End: 2023-10-13

## 2022-10-13 RX ORDER — GLIPIZIDE 10 MG/1
10 TABLET ORAL
Qty: 90 TABLET | Refills: 3 | Status: SHIPPED | OUTPATIENT
Start: 2022-10-13 | End: 2023-11-15

## 2022-10-13 NOTE — PATIENT INSTRUCTIONS
We will increase the Actos to 30 mg once a day.     Continue with Glipizide 10mg with breakfast.     Add Januvia 100mg once a day.  If the payment is too much dont pick it up and give me a call so we can help with the medication assistance program.     Send me sugar numbers in 2 weeks on the mail.        Labs before next visit in 3 months.     Eating the Right Number of Calories (0134-3423 Guidelines)    Calories are a measure of the energy you get from food. If you eat more calories than you use, you will gain weight. If you eat fewer calories than you use, you will lose weight. Below are tables that give the number of calories needed each day. Look for your gender, age, and activity level. If you stick to this number, you should neither gain nor lose weight. Note that this is an estimated number of calories.* Your exact number may differ.    Women  Age in years Low activity level (calories/day) Moderate activity level (calories/day) High activity level (calories/day)   19 to 30 1,800-2,000 2,000-2,200 2,400   31 to 50 1,800 2,000 2,200   51 and older 1,600 1,800 2,000-2,200      Men  Age in years Low activity level  (calories/day) Moderate activity level (calories/day) High activity level (calories/day)   19 to 30 2,400-2,600 2,600-2,800 3,000   31 to 50 2,200-2,400 2,400-2,600 2,800-3,000   51 and older 2,000-2,200 2,200-2,400 2,400-2,800     Activity levels defined  Low. Only light physical activity such as that done during typical daily life.  Moderate. Light physical activity done during typical daily life AND physical activity equal to walking about 1.5 to 3 miles a day at 3 to 4 miles per hour.  High. Light physical activity done during typical daily life AND physical activity equal to walking more than 3 miles a day at 3 to 4 miles per hour.  *From Dietary Guidelines for Americans, 0017-7183, [place] Department of Health and Human Services.    Eat less fat  A gram of fat has almost 2.5 times the calories of a  gram of protein or carbohydrates. Try to balance your food choices so that only 20% to 35% of your calories comes from total fat. This means an average of 2½ to 3½ grams of fat for each 100 calories you eat.    Eat more fiber  High-fiber foods are digested more slowly than low-fiber foods, so you feel full longer. Try to get at least 25 grams of fiber each day for a 2000 calorie diet. Foods high in fiber include:  Vegetables and fruits  Whole-grain or bran breads, pastas, and cereals  Legumes (beans) and peas  As you begin to eat more fiber, be sure to drink plenty of water to keep your digestive system working smoothly.    Tips  Do's and don'ts include:   Dont skip meals. This often leads to overeating later on. Its best to spread your eating throughout the day.  Eat a variety of foods, not just a few favorites.  If you find yourself eating when youre not hungry, ask yourself why. Many of us eat when were bored, stressed, or just to be polite. Listen to your body. If youre not hungry, get busy doing something else instead of eating.  Eat slower, shooting for 20 to 30 minutes for each meal. It takes 20 minutes for your stomach to tell your brain that its full. Slow eaters tend to eat less and are still satisfied, while fast eaters may tend to be overeaters.   Pay attention to what you eat. Dont read or watch TV during your meal.     ---------------------------------------------------------------------------------------------------------------------------------------------------    Losing Weight for Heart Health  Excess weight is a major risk factor for heart disease. Losing weight has many benefits including lowering your blood pressure, improving your cholesterol level, and decreasing your risk for diseases such as diabetes and heart disease. It may help keep your arteries open so that your heart can get the oxygen-rich blood it needs. All in all, losing weight makes you healthier.       Exercise with a  friend. When activity is fun, you're more likely to stick with it.     Calories and weight loss  Calories are the fuel your body burns for energy. You get the calories you need from the food you eat. For healthy weight loss, women should eat at least 1,200 calories a day, men at least 1,500.  When you eat more calories than you need, your body stores the extra calories as fat. One pound of fat equals 3,500 calories.  To lose weight, try to reduce your total calorie intake by 500 calories. To do this, eat 250 calories less each day. Add activity to burn the other 250 calories. Walking 2.5 miles burns about 250 calories. Other more intense activities can burn more calories in the time you spend doing them, such as swimming and running. It is important to understand that reducing calorie intake is much more effective at weight loss than is exercise.  Eat a variety of healthy foods to get the nutrients you need.    Tips for losing weight  Drink 8 to 10 glasses of water a day.  Dont skip meals. Instead, eat smaller portions.  Eat your meals earlier in the day.  Cut out sugary drinks such as soda and fruit juices.  Make your later meals lighter than your earlier meals.     What can exercise help?  Blood sugar. Regular exercise improves blood sugar control by helping your body use insulin.  Mental and emotional health. Physical activity relieves stress and helps you sleep better.  Heart health. With regular exercise, you can reduce your risk of heart disease and high blood pressure. You can also improve your cholesterol and triglyceride levels.  Weight. Exercise helps you lose fat, gain muscle, and control your weight.  Health of blood vessels and nerves. Activity helps lower blood sugar. This helps prevent damage to blood vessels and nerves that can cause problems with your brain, eyes, feet, and legs.  Finances. If you manage your blood sugar, you may spend less on medical care.    2 types of exercise  Two types of  exercise help your body use blood sugar. Experts advise both types of exercise for people with diabetes:  Aerobic exercise. This is a rhythmic, repeated, continued movement of large muscle groups for at least 10 minutes at a time. You should do this about 30 minutes a day on most days of the week. Examples include walking, bicycling, jogging, swimming, water aerobics, and many sports.  Resistance exercise (strength training). This type of exercise uses muscles to move weight or work against resistance. You can do it with free weights, machines, resistance tubing, or your own body weight. Adults with diabetes should aim for 2 to 3 sessions of resistance exercise each week. Its best to skip a day in between.    A goal to shoot for  Your main goal is to become more active. Even a little bit helps. Choose an activity that you like. Walking is one great form of exercise that everyone can do. Talk to your healthcare provider about any limits you may have before starting with an exercise program. Then aim for 150 minutes a week of physical activity. Dont let more than 2 days go by without exercise. When you are sitting for long periods of time, get up for short sessions of light activity every 30 minutes.    Getting activity into your day  Being more active doesnt have to be hard work. Try these to get more activity into your day:  Take the stairs instead of the elevator  Garden, do housework, and yard work  Choose a parking space farther from the store  Walk to talk to a co-worker instead of calling  Take a 10-minute walk around the block at lunch  Walk to a bus stop a little farther from your home or office  Walk the dog after dinner     ---------------------------------------------------------------------------------------------------------------------------------------------------    Reading Food Labels  Look for the Nutrition Facts label on packaged foods. Reading labels is a big step toward eating healthier. The  tips below help you know what to look for.    Serving size. Read this closely because the package, jar, or can may contain more than 1 serving. This is how to measure 1 serving of the food in the package. If you eat more than 1 serving, you get more of everything on the label -- including fat, cholesterol, and calories.  Total fat. This tells you how many grams (g) of fat are in 1 serving. Fat is high in calories. A healthy goal is to have less than 25% of your daily calories come from fat.  Saturated fat. This tells you how much saturated fat is in 1 serving. Saturated fat raises your cholesterol the most. Look for foods that have little or no saturated fat.  Trans fat. This tells you how much trans fat is in 1 serving. Even a small amount of trans fat can harm your health. Choose foods that have no trans fat.  Cholesterol. This tells you how much cholesterol is in 1 serving. For many years, it had been recommended to eat less than 300 milligrams (mg) of cholesterol a day. New guidelines have removed this limitation as cholesterol has been recently shown to not raise blood cholesterol levels as significantly as previously thought. However, many foods high in cholesterol are also high in saturated fat. It is recommended to limit saturated fat in your diet.  Calories from fat. This number tells you how many calories from fat are in 1 serving (there are 9 calories per gram of fat). Look for foods with few calories from fat.  % Daily value. The higher the number, the more 1 serving has of that nutrient. Look for foods that have low numbers for total fat, saturated fat, cholesterol, and sodium.  Sodium. This tells you how much sodium (salt) is in 1 serving. Choose foods with low numbers for sodium.  Dietary fiber. This number tells you how much fiber is in 1 serving. Foods that are high in fiber can help you feel full. They can also be good for your heart and digestion. The recommended daily amount of fiber is 25 grams  for women and 38 grams for men. After age 50, your daily fiber needs drop to 21 grams for women and 30 grams for men.       ---------------------------------------------------------------------------------------------------------------------------------------------------    Understanding Carbohydrates, Fats, and Protein  Food is a source of fuel and nourishment for your body. Its also a source of pleasure. Having diabetes doesnt mean you have to eat special foods or give up desserts. Instead, your dietitian can show you how to plan meals to suit your body. To start, learn how different foods affect blood sugar.    Carbohydrates  Carbohydrates are the main source of fuel for the body. Carbohydrates raise blood sugar. Many people think carbohydrates are only found in pasta or bread. But carbohydrates are actually in many kinds of foods:  Sugars occur naturally in foods such as fruit, milk, honey, and molasses. Sugars can also be added to many foods, from cereals and yogurt to candy and desserts. Sugars raise blood sugar.  Starches are found in bread, cereals, pasta, and dried beans. Theyre also found in corn, peas, potatoes, yam, acorn squash, and butternut squash. Starches also raise blood sugar.   Fiber is found in foods such as vegetables, fruits, beans, and whole grains. Unlike other carbs, fiber isnt digested or absorbed. So it doesnt raise blood sugar. In fact, fiber can help keep blood sugar from rising too fast. It also helps keep blood cholesterol at a healthy level.  Did you know?  Even though carbohydrates raise blood sugar, its best to have some in every meal. They are an important part of a healthy diet.     Fat  Fat is an energy source that can be stored until needed. Fat does not raise blood sugar. However, it can raise blood cholesterol, increasing the risk of heart disease. Fat is also high in calories, which can cause weight gain. Not all types of fat are the same.    More  Healthy:  Monounsaturated fats are mostly found in vegetable oils, such as olive, canola, and peanut oils. They are also found in avocados and some nuts. Monounsaturated fats are healthy for your heart. Thats because they lower LDL (unhealthy) cholesterol.  Polyunsaturated fats are mostly found in vegetable oils, such as corn, safflower, and soybean oils. They are also found in some seeds, nuts, and fish. Polyunsaturated fats lower LDL (unhealthy) cholesterol. So, choosing them instead of saturated fats is healthy for your heart. Certain unsaturated fats can help lower triglycerides.     Less Healthy:  Saturated fats are found in animal products, such as meat, poultry, whole milk, lard, and butter. Saturated fats raise LDL cholesterol and are not healthy for your heart.  Hydrogenated oils and trans fats are formed when vegetable oils are processed into solid fats. They are found in many processed foods. Hydrogenated oils and trans fats raise LDL cholesterol and lower HDL (healthy) cholesterol. They are not healthy for your heart.    Protein  Protein helps the body build and repair muscle and other tissue. Protein has little or no effect on blood sugar. However, many foods that contain protein also contain saturated fat. By choosing low-fat protein sources, you can get the benefits of protein without the extra fat:  Plant protein is found in dry beans and peas, nuts, and soy products, such as tofu and soymilk. These sources tend to be cholesterol-free and low in saturated fat.  Animal protein is found in fish, poultry, meat, cheese, milk, and eggs. These contain cholesterol and can be high in saturated fat. Aim for lean, lower-fat choices.    ---------------------------------------------------------------------------------------------------------------------------------------------------    Understanding Carbohydrates    A car needs the right type of fuel to run. And you need the right kind of food to function. To  keep your energy level up, your body needs food that has carbohydrates. But carbohydrates raise blood sugar levels higher and faster than other kinds of food. Your dietitian will work with you to figure out the amount of carbohydrates you need.    Starches  Starches are found in grains, some vegetables, and beans. Grain products include bread, pasta, cereal, and tortillas. Starchy vegetables include potatoes, peas, corn, lima beans, yams, and squash. Kidney beans, adames beans, black beans, garbanzo beans, and lentils also contain starches.    Sugars  Sugars are found naturally in many foods. Or sugar can be added. Foods that contain natural sugar include fruits and fruit juices, dairy products, honey, and molasses. Added sugars are found in most desserts, processed foods, candy, regular soda, and fruit drinks. These are very helpful for treating low blood sugar, or hypoglycemia. They provide sugar quickly. Try to keep at least 15 to 20 grams of these simple sugars with you at all times. Eat this if you begin to have low blood sugar symptoms.    Fiber  Fiber comes from plant foods. Most fiber isnt digested by the body. Instead of raising blood sugar levels like other carbohydrates, it actually stops blood sugar from rising too fast. Fiber is found in fruits, vegetables, whole grains, beans, peas, and many nuts.    Carb counting  Keep track of the amount of carbohydrates you eat. This can help you keep the right balance of physical activity and medicine. The amount of carbohydrates needed will vary for each person. It depends on many things such as your health, the medicines you take, and how active you are. Your healthcare team will help you figure out the right amount of carbohydrates for you. You may start with around 45 to 60 grams of carbohydrate per meal, depending on your situation. Carb counting is a system that helps you keep track of the carbohydrates you eat at each meal.  Carbohydrates come from a variety  of foods. These include grains, starchy vegetables, fruit, milk, beans, and snack foods. You can either count carbohydrate grams or carbohydrate servings. When you count carbohydrate servings, 1 carbohydrate serving = 15 grams of carbohydrates.  Here are some examples of foods containing about 15 grams of carbohydrates (1 serving of carbohydrates):  1/2 cup of canned or frozen fruit  A small piece of fresh fruit (4 ounces)  1 slice of bread  1/2 cup of oatmeal  1/3 cup of rice  4 to 6 crackers  1/2 English muffin  1/2 cup of black beans  1/4 of a large baked potato (3 ounces)  2/3 cup of plain fat-free yogurt  1 cup of soup  1/2 cup of casserole  6 chicken nuggets  2-inch-square brownie or cake without frosting  2 small cookies  1/2 cup of ice cream or sherbet    Carb counting is easier when food labels are available. Look at the label to see how many grams of total carbohydrates the food contains. Then you can figure out how much you should eat.  Two very important lines to look at on the label are the serving size and the total carbohydrate amount. Here are some tips for using food labels to count your carbohydrate intake:  Check the serving size. The information on the label is based on that serving size. If you eat more than the listed serving size, you may have to double or triple the other information on the label.   Check the total grams of carbohydrates. Total carbohydrate from the label includes sugar, starch, and fiber. Be sure to use the total carbohydrate number and not sugar alone.  Know how many grams of carbohydrates you can have.  Be familiar with the matching portion sizes.  Compare labels. Compare the labels of different products, looking at serving sizes and total carbohydrates to find the products that work best for you.   Don't forget protein and fat. With all the focus on carb counting, it might be easy to forget protein and fat in your meals. Don't forget to include sources of protein and  healthy fat to balance your meals.  Its also important to be consistent with the amount and time you eat when taking a fixed dose of diabetes medicine. Work with your healthcare provider or dietitian if you need additional help. He or she can help you keep track of your carbohydrate intake. He or she can also help you figure out how many grams of carbohydrates you should have.      ---------------------------------------------------------------------------------------------------------------------------------------------------    Diabetes: Learning About Serving and Portion Sizes     A good rule of thumb: Devote half your plate to vegetables and green salad. Split the other half between protein and starchy carbohydrates. Fruit makes a good dessert.     Servings and portions. Whats the difference? These terms can be very confusing. But learning to measure serving sizes can help you figure out how many carbohydrates (carbs) and other foods you eat each day. They are also powerful tools for managing your weight.    Servings and portions  Many different words are used to describe amounts of food. If your health care provider uses a term youre not sure of, dont be afraid to ask. It helps to know the difference between servings and portions:  A serving size is a fixed size. Food producers use this term to describe their products. For example, the label on a cereal box could say that 1 cup of dry cereal = 1 serving.  A portion (also called a helping) is how much you eat or how much you put on your plate at a meal. For example, you might eat 2 cups of cereal at breakfast.    Using serving information  The portion you choose to eat (such as 2 cups of cereal) may be more than one serving as listed on the food label (such as 1 cup of cereal). Thats why it helps to measure or weigh the food you eat. Because the food label values are based on servings, youll need to know how many servings you eat at one sitting.    "  Ounces: 2 to 3 ounces is about the size of your palm.       1 Cup: 1 cup (or a medium-sized piece) is about the size of your fist.       1/2 Cup: 1/2 cup is about the size of your cupped hand.      One tablespoon is about the size of your thumb.  One teaspoon is about the size of the tip of your thumb.    Keeping track of serving sizes  When youre planning for a snack or a meal, keep servings in mind. If you dont have measuring cups or a scale handy, there are ways to eyeball serving sizes, such as comparing your food to the size of your hand (see pictures above).    Managing portion sizes  If your weight is a concern, reducing your portions can help. You can eat more than one serving of a food at once. But to keep from eating too much at one meal, learn how to manage your portions. A portion is the amount of each type of food on your plate. See the plate diagram for an example of balanced portions.    ---------------------------------------------------------------------------------------------------------------------------------------------------    Diabetes: Shopping for and Preparing Meals    Having diabetes doesnt mean you have to shop in a special aisle or look for special foods. But you will need to make choices. By comparing items and reading food labels, you can find the healthiest foods for you and your family.  Comparing items  When you shop, compare items to find the best ones for your needs. Keep these facts in mind:  No sugar added does not mean a product is sugar-free.  "Sugar-free" means less than 1/2 gram (g) of sugar per serving.  Fat free means less than 1/2 g of fat per serving. This does not necessarily mean the product is low in calories.  Low fat means 3 g fat or less per serving. Reduced fat or less fat means 25% less fat than the regular version. Some of this fat may be saturated or trans fat. And calories per serving may be similar to the regular version.    Making small " changes  Dont try to change all of your eating habits at once. Here are some ideas to start with:  Try fat-free or low-fat cheese, milk, and yogurt. Also try leaner cuts of meat. This will help you cut down on saturated fat.  Try whole-grain breads, brown rice, and whole-wheat pasta.  Load up on fresh or frozen vegetables. If you buy canned, choose low-sodium vegetables.  Avoid processed foods as much as possible. They tend to be low in fiber and high in trans fats and sodium.  Try tofu, soymilk, or meat substitutes.?They can help you cut cholesterol and saturated fat out of your diet.    Learning to read food labels  To find healthy foods that help you control blood sugar, learn how to read food labels. Look for the Nutrition Facts label on packaged foods. It will tell you how much carbohydrate, sugar, fat, and fiber is in each serving. Then, you can decide whether or not the food fits into your meal plan.    Using the food label  So, once you have the food label, what do you do with it? The food label helps in many ways. Use it to:  Compare items and decide which is the best for your health needs.  Track the number of carbohydrates in your portions.  Figure out how many servings of a food you can have and still stay within the number of carbohydrates for that meal.    Planning meals  For good blood sugar control, plan what and when youll eat. Start by creating a meal plan that includes all the food groups. Then, time your meals to help keep your blood sugar level steady. You may need to adjust your plan for special situations.    Eat from all the food groups  The basis of a healthy meal plan is variety (eating many different types of foods). Look for lean meats, fresh fruits and vegetables, whole grains, and low-fat or non-fat dairy products. Eating a wide variety of foods provides the nutrients your body needs. It can also keep you from getting bored with your meal plan.    Reduce liquid sugars  Extra calories  from sodas, sports drinks, and fruit drinks make it hard to keep blood sugar in range. Cut as many liquid sugars from your meal plan as you can. This includes most fruit juices, which are often high in natural or added sugar. Instead, drink plenty of water and other sugar-free beverages.    Eat less fat  If you need to lose some weight, try to reduce the amount of fat in your diet. This can also help lower your cholesterol level to keep blood vessels healthier. Cut fat by using only small amounts of liquid oil for cooking. Read food labels carefully to avoid foods with unhealthy trans fats.    Timing your meals  When it comes to blood sugar control, when you eat is as important as what you eat. You may need to eat several small meals spaced evenly throughout the day to stay in your target range. So dont skip breakfast or wait until late in the day to get most of your calories. Doing so can cause your blood sugar to rise too high or fall too low.    Cooking wisely  Broil, steam, bake, or grill meats and vegetables, instead of frying.  Instead of cream-based sauces or sugary glazes, flavor foods with vegetable purée, lemon or lime juice, or herb seasonings.  Remove skin from chicken and turkey before serving.  Look in cookbooks for easy, low-fat, low-sugar recipes. When making your usual recipes, cut sugar by 1/2 and fat by 1/3.      ---------------------------------------------------------------------------------------------------------------------------------------------------    Healthy Meals for Diabetes    Ask your healthcare team to help you make a meal plan that fits your needs. Your meal plan tells you when to eat your meals and snacks, what kinds of foods to eat, and how much of each food to eat. You dont have to give up all the foods you like. But you do need to follow some guidelines.  Choose healthy carbohydrates  Starches, sugars, and fiber are all types of carbohydrates. Fiber can help lower your  cholesterol and triglycerides. Fiber is also healthy for your heart. You should have 20 to 35 grams of total fiber each day. Fiber-rich foods include:  Whole-grain breads and cereals  Bulgur wheat  Brown rice     Whole-wheat pasta  Fruits and vegetables  Dry beans, and peas   Keep track of the amount of carbohydrates you eat. This can help you keep the right balance of physical activity and medicine. The amount of carbohydrates needed will vary for each person. It depends on many things such as your health, the medicines you take, and how active you are. Your healthcare team will help you figure out the right amount of carbohydrates for you. You may start with around 45 to 60 grams of carbohydrates per meal, depending on your situation.   Here are some examples of foods containing about 15 grams of carbohydrates (1 serving of carbohydrates):  1/2 cup of canned or frozen fruit  A small piece of fresh fruit (4 ounces)  1 slice of bread  1/2 cup of oatmeal  1/3 cup of rice  4 to 6 crackers  1/2 English muffin  1/2 cup of black beans  1/4 of a large baked potato (3 ounces)  2/3 cup of plain fat-free yogurt  1 cup of soup  1/2 cup of casserole  6 chicken nuggets  2-inch-square brownie or cake without frosting  2 small cookies  1/2 cup of ice cream or sherbet    Choose healthy protein foods  Eating protein that is low in fat can help you control your weight. It also helps keep your heart healthy. Low-fat protein foods include:  Fish  Plant proteins, such as dry beans and peas, nuts, and soy products like tofu and soymilk  Lean meat with all visible fat removed  Poultry with the skin removed  Low-fat or nonfat milk, cheese, and yogurt    Limit unhealthy fats and sugar  Saturated and trans fats are unhealthy for your heart. They raise LDL (bad) cholesterol. Fat is also high in calories, so it can make you gain weight. To cut down on unhealthy fats and sugar, limit these foods:  Butter or margarine  Palm and palm kernel oils  and coconut oil  Cream  Cheese  Ellison  Lunch meats     Ice cream  Sweet bakery goods such as pies, muffins, and donuts  Jams and jellies  Candy bars  Regular sodas     How much to eat  The amount of food you eat affects your blood sugar. It also affects your weight. Your healthcare team will tell you how much of each type of food you should eat.  Use measuring cups and spoons and a food scale to measure serving sizes.  Learn what a correct serving size looks like on your plate. This will help when you are away from home and cant measure your servings.  Eat only the number of servings given on your meal plan for each food. Dont take seconds.  Learn to read food labels. Be sure to look at serving size, total carbohydrates, fiber, calories, sugar, and saturated and trans fats. Look for healthier alternatives to foods that have added sugar.  Plan ahead for parties so you can still have a good time without going overboard with unhealthy food choices. Set a good example yourself by bringing a healthy dish to pot lucks.     Choose healthy snacks  When it comes to snacks, we usually think about foods with added sugar and fats. But there are many other options for healthier snack choices. Here are a few snack ideas to choose from:  Snacks with less than 5 grams of carbohydrates  1 piece of string cheese  3 celery sticks plus 1 tablespoon of peanut butter  5 cherry tomatoes plus 1 tablespoon of ranch dressing  1 hard-boiled egg  1/4 cup of fresh blueberries   5 baby carrots  1 cup of light popcorn  1/2 cup of sugar-free gelatin  15 almonds  Snacks with about 10 to 20 grams of carbohydrates  1/3 cup of hummus plus 1 cup of fresh cut nonstarchy vegetables (carrots, green peppers, broccoli, celery, or a combination)  1/2 cup of fresh or canned fruit plus 1/4 cup of cottage cheese  1/2 cup of tuna salad with 4 crackers  2 rice cakes and a tablespoon of peanut butter  1 small apple or orange  3 cups light popcorn  1/2 of a  turkey sandwich (1 slice of whole-wheat bread, 2 ounces of turkey, and mustard)  Portion sizes are important to controlling your blood sugar and staying at a healthy weight. Stock up on healthy snack items so you always have them on hand.    When to eat  Your meal plan will likely include breakfast, lunch, dinner, and some snacks.  Try to eat your meals and snacks at about the same times each day.  Eat all your meals and snacks. Skipping a meal or snack can make your blood sugar drop too low. It can also cause you to eat too much at the next meal or snack. Then your blood sugar could get too high.    ---------------------------------------------------------------------------------------------------------------------------------------------------    Eating Out When You Have Diabetes  Eating right is an important part of keeping your blood sugar in your target range. You just need to make healthy choices.    Be creative when eating out. Many places dont make you stick strictly to the menu. Instead of ordering a large entree, choose an appetizer or two and a bowl of soup. A mix of side orders can also make a good meal. Read the descriptions of other entrees and specials. If another entree comes with baby carrots, ask for a side order of them even if they dont come with your entree. Ask how food is prepared or if it can be made differently.  Tips for making the most of your meal out  Ask to have high-fat, high-calorie extras, such as French fries and potato chips, left off your plate so you wont be tempted.   Ask what substitutions are available. Instead of French fries, you may be able to have a side of salad with low-calorie dressing.  Order low-fat milk instead of cream for your coffee.  Ask for vegetables and main courses to be served without sauces, butter, margarine, or oil.  Be aware of portion size. You dont have to clean your plate. Take half your meal home in a doggie bag to eat the next day.  Look for  heart-healthy or low-fat entrees that include whole grains, vegetables, or fruits.  Choose foods that are grilled, broiled, or steamed.  Avoid dishes that are described on the menu as fried, breaded, smothered, rich, or creamy.    Tips for restaurant meals  When you eat away from home try these tips:  Try to schedule your dining-out meal at your normal meal time. Make a reservation if possible, so you don't have to wait to eat. If you can't make a reservation, try to arrive at the restaurant at a less-busy time to cut down your wait time. Eat a small fruit or starch snack at your regular mealtime if your restaurant meal is going to be later than usual.   Call ahead to see if the restaurant can meet your dietary needs if you've never been there before. Or you can go online to see the menu ahead of time.  Carry some crackers with you in case the restaurant needs you to wait until you can be served.  Ask how foods are prepared before you order.  Instead of fried, sautéed, or breaded foods, choose ones that are broiled, steamed, grilled, or baked.  Ask for sauces, gravies, and dressings on the side.  Only eat an amount that fits your meal plan. Remember: You can take home the leftovers.  Save dessert for special occasions. Then choose a small dessert or share one with a friend or family member.    Make healthy choices  Fast food  Garden salad with light dressing on the side  Baked potato with vegetables or herbs  Broiled, roasted, or grilled chicken sandwich  Sliced turkey or lean roast beef sandwich    Mexican  Chicken enchilada, without cheese or sour cream   Small burrito with whole beans and chicken  Whole beans (not refried) and rice  Chicken or fish fajitas    Steakhouse  Grilled or broiled lean cuts of beef  Baked potato with vegetables or herbs  Broiled or baked chicken. Dont eat the skin.  Steamed vegetables    Asian  Steamed dumplings or potstickers  Broiled, boiled, or steamed meats or fish  Sushi or  jossue  Steamed rice or boiled noodles. One serving is equal to 1/3 cup.      © 0244-8343 [employer]. [address] [place] [address], [place] [address]. All rights reserved. This information is not intended as a substitute for professional medical care. Always follow your healthcare professional's instructions.

## 2022-10-13 NOTE — PROGRESS NOTES
Subjective:      Patient ID: Graham Caceres is a 62 y.o. male.    Chief Complaint:  DM2; Follow up visit     History of Present Illness  61 YO Male w/ dx of DM2, HTN, HLD compensated cirrhosis and now w/ fatty liver disease that presents to the endocrine clinic for follow up care of his DM.   Pt today reports feels well but endorses having persistent hyperglycemias in the 200s. Denies hypoglycemias.  Endorses eating high carb/ sugar foods.        Interval history:     Pt was last seen on 2/2022 and plan was:     Start Actos 15mg daily  (Good evidence for improvement of fatty liver disease on Actos) NO Contraindications for Actos (LFTs WNL, No CHF,  No edema, No history of Bladder CA)      C/w Glipizide 10mg w/ breakfast           With regards to Diabetes Mellitus:   -Type 2    -Duration:  Dx   6- 8 yrs ago   -Microvascular complications:DENIES    -Macrovascular complications:  DENIES   -DKA?  DENIES   -HHS?  DENIES  -DM Medications:   Glipizide 10mg XR daily,  Actos 15mg daily   -Previously tried medications:  Metformin (Stopped due to liver disease)   Trulicity (high copay) Januvia (high copay)   -Compliance w/ Meds:  Yes   -Hyperglycemia symptoms: DENIES   -Hypoglycemia symptoms:  DENIES   -Know how to correct hypoglycemias:  No, will do teaching today   -Glucose monitoring:  AM (170- 280)  HS ( 250s)  ABOVE GOAL   -Diet: High carb meals,   B (egg, bread, coffee w/ condense milk) rice, noodles   -Activity:  Sedentary   -Pancreatitis?  DENIES   -CHF?  DENIES   -UTIs?  DENIES   -Thyroid CA?  DENIES   -ETOH Abuse?  DENIES     Diabetes Management Status    Statin: Taking  ACE/ARB: Taking    Screening or Prevention Patient's value Goal Complete/Controlled?   HgA1C Testing and Control   Lab Results   Component Value Date    HGBA1C 8.1 (H) 06/10/2022      Annually/Less than 8% No   Lipid profile : 01/26/2022 Annually Yes   LDL control Lab Results   Component Value Date    LDLCALC 101.8 01/26/2022    Annually/Less than 100  mg/dl  No   Nephropathy screening Lab Results   Component Value Date    LABMICR 21.0 07/27/2021     Lab Results   Component Value Date    PROTEINUA Negative 11/16/2020    Annually Yes   Blood pressure BP Readings from Last 1 Encounters:   08/16/22 136/82    Less than 140/90 Yes   Dilated retinal exam : 03/25/2022 Annually Yes   Foot exam   : 06/10/2022 Annually No        ROS:   As above    Objective:     There were no vitals taken for this visit.  BP Readings from Last 3 Encounters:   08/16/22 136/82   07/16/22 (!) 150/101   06/10/22 118/80     Wt Readings from Last 1 Encounters:   08/16/22 0914 81.2 kg (179 lb 0.2 oz)     There is no height or weight on file to calculate BMI.      Physical Exam  Vitals reviewed.   Constitutional:       General: He is not in acute distress.     Appearance: Normal appearance. He is well-developed. He is not ill-appearing.   HENT:      Nose: Nose normal. No rhinorrhea.      Mouth/Throat:      Mouth: Mucous membranes are moist.   Eyes:      Extraocular Movements: Extraocular movements intact.      Pupils: Pupils are equal, round, and reactive to light.      Comments: No proptosis, No lid lag, No conjunctival erythema    Neck:      Thyroid: No thyromegaly.      Trachea: No tracheal deviation.      Comments: No thyromegaly or Thyroid nodules palpated on exam   Cardiovascular:      Rate and Rhythm: Normal rate and regular rhythm.      Pulses: Normal pulses.   Pulmonary:      Effort: Pulmonary effort is normal.      Breath sounds: Normal breath sounds.   Abdominal:      Palpations: Abdomen is soft. There is no mass.      Tenderness: There is no abdominal tenderness.      Hernia: No hernia is present.      Comments: No scar tissue, No Violaceous striae    Musculoskeletal:         General: No swelling.      Cervical back: Neck supple. No tenderness.      Right lower leg: No edema.      Left lower leg: No edema.   Skin:     General: Skin is warm.      Findings: No rash.   Neurological:       General: No focal deficit present.      Mental Status: He is alert and oriented to person, place, and time.   Psychiatric:         Mood and Affect: Mood normal.         Judgment: Judgment normal.              Lab Review:   Lab Results   Component Value Date    HGBA1C 8.1 (H) 06/10/2022     Lab Results   Component Value Date    CHOL 188 01/26/2022    HDL 44 01/26/2022    LDLCALC 101.8 01/26/2022    TRIG 211 (H) 01/26/2022    CHOLHDL 23.4 01/26/2022     Lab Results   Component Value Date     08/04/2022    K 4.2 08/04/2022     08/04/2022    CO2 30 (H) 08/04/2022     (H) 08/04/2022    BUN 13 08/04/2022    CREATININE 0.8 08/04/2022    CALCIUM 10.3 08/04/2022    PROT 7.9 08/04/2022    ALBUMIN 4.4 08/04/2022    BILITOT 0.9 08/04/2022    ALKPHOS 65 08/04/2022    AST 27 08/04/2022    ALT 46 (H) 08/04/2022    ANIONGAP 11 08/04/2022    ESTGFRAFRICA >60.0 02/04/2022    EGFRNONAA >60.0 02/04/2022    TSH 1.704 01/26/2022     No results found for: [id number]    Assessment and Plan     Uncontrolled type 2 diabetes mellitus with hyperglycemia    Due to patient having uncontrolled DM, issues with medication cost and history of fatty liver disease. I will recommend the following.     Increase Actos to 30mg daily  (Good evidence for improvement of fatty liver disease on Actos) NO Contraindications for Actos (LFTs WNL, No CHF,  No edema, No history of Bladder CA)     C/w Glipizide 10mg w/ breakfast     Start Januvia 100mg daily  (Medication assistance program referral)   Pt not interested in GLP-1 at this time     Keep glucose log for review in 2 weeks     Diet, exercise, lifestyle modifications and hypoglycemia action plan discussed.     Will monitor       Hyperlipidemia, unspecified hyperlipidemia type  LDL at goal On Statin   Low Fat diet     Hypertension, essential  BP controlled on current BP meds   On ARB for renal protection   Low Na diet

## 2022-10-14 ENCOUNTER — OFFICE VISIT (OUTPATIENT)
Dept: INTERNAL MEDICINE | Facility: CLINIC | Age: 62
End: 2022-10-14
Payer: MEDICARE

## 2022-10-14 ENCOUNTER — IMMUNIZATION (OUTPATIENT)
Dept: PRIMARY CARE CLINIC | Facility: CLINIC | Age: 62
End: 2022-10-14
Payer: MEDICARE

## 2022-10-14 VITALS
SYSTOLIC BLOOD PRESSURE: 130 MMHG | WEIGHT: 178.56 LBS | HEART RATE: 74 BPM | BODY MASS INDEX: 28.02 KG/M2 | OXYGEN SATURATION: 96 % | HEIGHT: 67 IN | DIASTOLIC BLOOD PRESSURE: 80 MMHG

## 2022-10-14 DIAGNOSIS — R76.8 HEPATITIS B CORE ANTIBODY POSITIVE: ICD-10-CM

## 2022-10-14 DIAGNOSIS — Z22.7 LTBI (LATENT TUBERCULOSIS INFECTION): ICD-10-CM

## 2022-10-14 DIAGNOSIS — Z86.19 HISTORY OF HEPATITIS C: ICD-10-CM

## 2022-10-14 DIAGNOSIS — I10 PRIMARY HYPERTENSION: ICD-10-CM

## 2022-10-14 DIAGNOSIS — B18.2 HEPATIC CIRRHOSIS DUE TO CHRONIC HEPATITIS C INFECTION: ICD-10-CM

## 2022-10-14 DIAGNOSIS — K74.60 HEPATIC CIRRHOSIS DUE TO CHRONIC HEPATITIS C INFECTION: ICD-10-CM

## 2022-10-14 DIAGNOSIS — L40.8 SEBOPSORIASIS: ICD-10-CM

## 2022-10-14 DIAGNOSIS — Z23 NEED FOR VACCINATION: Primary | ICD-10-CM

## 2022-10-14 DIAGNOSIS — E78.2 MIXED HYPERLIPIDEMIA: ICD-10-CM

## 2022-10-14 DIAGNOSIS — E11.65 UNCONTROLLED TYPE 2 DIABETES MELLITUS WITH HYPERGLYCEMIA: Primary | ICD-10-CM

## 2022-10-14 PROCEDURE — 1159F PR MEDICATION LIST DOCUMENTED IN MEDICAL RECORD: ICD-10-PCS | Mod: CPTII,S$GLB,, | Performed by: INTERNAL MEDICINE

## 2022-10-14 PROCEDURE — 3075F SYST BP GE 130 - 139MM HG: CPT | Mod: CPTII,S$GLB,, | Performed by: INTERNAL MEDICINE

## 2022-10-14 PROCEDURE — 3008F PR BODY MASS INDEX (BMI) DOCUMENTED: ICD-10-PCS | Mod: CPTII,S$GLB,, | Performed by: INTERNAL MEDICINE

## 2022-10-14 PROCEDURE — 99499 UNLISTED E&M SERVICE: CPT | Mod: HCNC,S$GLB,, | Performed by: INTERNAL MEDICINE

## 2022-10-14 PROCEDURE — 0134A COVID-19, MRNA, LNP-S, BIVALENT BOOSTER, PF, 50 MCG/0.5 ML: CPT | Mod: CV19,PBBFAC | Performed by: INTERNAL MEDICINE

## 2022-10-14 PROCEDURE — 3079F DIAST BP 80-89 MM HG: CPT | Mod: CPTII,S$GLB,, | Performed by: INTERNAL MEDICINE

## 2022-10-14 PROCEDURE — 91313 COVID-19, MRNA, LNP-S, BIVALENT BOOSTER, PF, 50 MCG/0.5 ML: CPT | Mod: PBBFAC | Performed by: INTERNAL MEDICINE

## 2022-10-14 PROCEDURE — 3075F PR MOST RECENT SYSTOLIC BLOOD PRESS GE 130-139MM HG: ICD-10-PCS | Mod: CPTII,S$GLB,, | Performed by: INTERNAL MEDICINE

## 2022-10-14 PROCEDURE — 1159F MED LIST DOCD IN RCRD: CPT | Mod: CPTII,S$GLB,, | Performed by: INTERNAL MEDICINE

## 2022-10-14 PROCEDURE — 99214 PR OFFICE/OUTPT VISIT, EST, LEVL IV, 30-39 MIN: ICD-10-PCS | Mod: S$GLB,,, | Performed by: INTERNAL MEDICINE

## 2022-10-14 PROCEDURE — 99214 OFFICE O/P EST MOD 30 MIN: CPT | Mod: S$GLB,,, | Performed by: INTERNAL MEDICINE

## 2022-10-14 PROCEDURE — 99999 PR PBB SHADOW E&M-EST. PATIENT-LVL III: CPT | Mod: PBBFAC,,, | Performed by: INTERNAL MEDICINE

## 2022-10-14 PROCEDURE — 3072F LOW RISK FOR RETINOPATHY: CPT | Mod: CPTII,S$GLB,, | Performed by: INTERNAL MEDICINE

## 2022-10-14 PROCEDURE — 3072F PR LOW RISK FOR RETINOPATHY: ICD-10-PCS | Mod: CPTII,S$GLB,, | Performed by: INTERNAL MEDICINE

## 2022-10-14 PROCEDURE — 99499 RISK ADDL DX/OHS AUDIT: ICD-10-PCS | Mod: HCNC,S$GLB,, | Performed by: INTERNAL MEDICINE

## 2022-10-14 PROCEDURE — 3079F PR MOST RECENT DIASTOLIC BLOOD PRESSURE 80-89 MM HG: ICD-10-PCS | Mod: CPTII,S$GLB,, | Performed by: INTERNAL MEDICINE

## 2022-10-14 PROCEDURE — 3008F BODY MASS INDEX DOCD: CPT | Mod: CPTII,S$GLB,, | Performed by: INTERNAL MEDICINE

## 2022-10-14 PROCEDURE — 99999 PR PBB SHADOW E&M-EST. PATIENT-LVL III: ICD-10-PCS | Mod: PBBFAC,,, | Performed by: INTERNAL MEDICINE

## 2022-10-14 RX ORDER — TIRZEPATIDE 5 MG/.5ML
5 INJECTION, SOLUTION SUBCUTANEOUS
Qty: 4 PEN | Refills: 0 | Status: SHIPPED | OUTPATIENT
Start: 2022-10-14 | End: 2023-02-16

## 2022-10-14 NOTE — PATIENT INSTRUCTIONS
Pneumonia shot today (PCV-20)    Schedule covid-19 booster  Labwork today and urine today    A prescription to [facility] for Mounjaro (once a week injection) was sent for a price check.     Return to clinic in 4 months

## 2022-10-14 NOTE — PROGRESS NOTES
Subjective:       Patient ID: Graham Caceres is a 62 y.o. male.    Chief Complaint: Follow-up      HPI  Graham Caceres is a 62 y.o. year old male established patient presents for follow up. At baseline health, no new complaints.    Review of Systems   Constitutional:  Negative for activity change, appetite change, chills, fatigue, fever and unexpected weight change.   HENT:  Negative for congestion, rhinorrhea and sore throat.    Eyes:  Negative for visual disturbance.   Respiratory:  Negative for shortness of breath.    Cardiovascular:  Negative for chest pain.   Gastrointestinal:  Negative for abdominal pain, diarrhea, nausea and vomiting.   Genitourinary:  Negative for difficulty urinating and dysuria.   Musculoskeletal:  Negative for arthralgias, back pain and myalgias.   Skin:  Negative for color change and rash.   Neurological:  Negative for dizziness, weakness and headaches.       Past Medical History:   Diagnosis Date    Cirrhosis     Diverticulosis     History of hepatitis C, s/p successful Rx w/ SVR24 - 6/2017 4/7/2016    S/p harvoni w/ SVR    Hyperlipidemia 04/2016    Hypertension     Internal hemorrhoid     New onset type 2 diabetes mellitus 4/2016    Positive QuantiFERON-TB Gold test 4/2016    Sebopsoriasis     SIRS (systemic inflammatory response syndrome) 8/11/2018        Prior to Admission medications    Medication Sig Start Date End Date Taking? Authorizing Provider   blood sugar diagnostic Strp To check BG 2 times daily, to use with insurance preferred meter 10/13/22  Yes Blayne Allen MD   blood-glucose meter kit To check BG 2 times daily, to use with insurance preferred meter 9/10/20  Yes Jennifer Manzanares MD   glipiZIDE (GLUCOTROL) 10 MG tablet Take 1 tablet (10 mg total) by mouth daily with breakfast. 10/13/22  Yes Blayne Allen MD   hydrocortisone 1 % cream Apply topically 2 (two) times daily. 6/10/22  Yes Ko Franz MD   ketoconazole (NIZORAL) 2 % cream Apply topically once daily. 1/26/22  Yes Cleo  "SHANNAN Esparza   lancets Misc To check BG 2 times daily, to use with insurance preferred meter 9/28/20  Yes Jennifer Manzanares MD   losartan (COZAAR) 50 MG tablet TAKE 1 TABLET EVERY DAY 8/5/22  Yes Ko Franz MD   pioglitazone (ACTOS) 30 MG tablet Take 1 tablet (30 mg total) by mouth once daily. 10/13/22 10/13/23 Yes Blayne Allen MD   rosuvastatin (CRESTOR) 40 MG Tab TAKE 1 TABLET EVERY EVENING 11/28/21  Yes Jennifer Manzanares MD   SITagliptin (JANUVIA) 100 MG Tab Take 1 tablet (100 mg total) by mouth once daily. 10/13/22 10/13/23 Yes Blayne Allen MD   clotrimazole (LOTRIMIN) 1 % Soln Apply 4 drops to left ear 2 times daily for 14 days.  Patient not taking: No sig reported 1/28/22   Kerri Vasquez DNP, SHANNAN-C   ketoconazole (NIZORAL) 2 % shampoo Apply topically twice a week.  Patient not taking: No sig reported 1/27/22   SHANNAN Lopez   tirzepatide (MOUNJARO) 5 mg/0.5 mL PnIj Inject 5 mg into the skin every 7 days. 10/14/22   Ko Franz MD        Past medical history, surgical history, and family medical history reviewed and updated as appropriate.    Medications and allergies reviewed.     Objective:          Vitals:    10/14/22 0919   BP: 130/80   BP Location: Right arm   Patient Position: Sitting   Pulse: 74   SpO2: 96%   Weight: 81 kg (178 lb 9.2 oz)   Height: 5' 7" (1.702 m)     Body mass index is 27.97 kg/m².  Physical Exam  Constitutional:       General: He is not in acute distress.     Appearance: He is well-developed.   HENT:      Head: Normocephalic and atraumatic.      Nose: Nose normal.   Eyes:      General: No scleral icterus.     Extraocular Movements: Extraocular movements intact.   Neck:      Thyroid: No thyromegaly.      Vascular: No JVD.      Trachea: No tracheal deviation.   Cardiovascular:      Rate and Rhythm: Normal rate and regular rhythm.      Heart sounds: Normal heart sounds. No murmur heard.    No friction rub. No gallop.   Pulmonary:      Effort: Pulmonary effort is " normal. No respiratory distress.      Breath sounds: Normal breath sounds. No wheezing or rales.   Abdominal:      General: Bowel sounds are normal. There is no distension.      Palpations: Abdomen is soft. There is no mass.      Tenderness: There is no abdominal tenderness.   Musculoskeletal:         General: No tenderness. Normal range of motion.      Cervical back: Normal range of motion and neck supple.   Lymphadenopathy:      Cervical: No cervical adenopathy.   Skin:     General: Skin is warm and dry.      Findings: No rash.   Neurological:      Mental Status: He is alert and oriented to person, place, and time.      Cranial Nerves: No cranial nerve deficit.      Deep Tendon Reflexes: Reflexes normal.   Psychiatric:         Behavior: Behavior normal.       Lab Results   Component Value Date    WBC 4.89 08/04/2022    HGB 15.9 08/04/2022    HCT 48.2 08/04/2022     08/04/2022    CHOL 188 01/26/2022    TRIG 211 (H) 01/26/2022    HDL 44 01/26/2022    ALT 46 (H) 08/04/2022    AST 27 08/04/2022     08/04/2022    K 4.2 08/04/2022     08/04/2022    CREATININE 0.8 08/04/2022    BUN 13 08/04/2022    CO2 30 (H) 08/04/2022    TSH 1.704 01/26/2022    INR 1.0 08/04/2022    HGBA1C 8.1 (H) 06/10/2022       Assessment:       1. Uncontrolled type 2 diabetes mellitus with hyperglycemia    2. Mixed hyperlipidemia    3. Hepatic cirrhosis due to chronic hepatitis C infection    4. History of hepatitis C, s/p successful Rx w/ SVR24 - 6/2017    5. Hepatitis B core antibody positive    6. Primary hypertension    7. LTBI (latent tuberculosis infection)    8. Sebopsoriasis          Plan:     Graham was seen today for follow-up.    Diagnoses and all orders for this visit:    Uncontrolled type 2 diabetes mellitus with hyperglycemia  -     tirzepatide (MOUNJARO) 5 mg/0.5 mL PnIj; Inject 5 mg into the skin every 7 days.    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 Will obtain routine labwork and age appropriate health screenings.   t2DM - last a1c 8.1, will see if insurance covers mounjaro  HLD - on statin, no changes to current management  Cirrhosis - follow LFTs intervally, no decompensation.   Hx of hep c s/p treatment with harvoni, stable  Hep b core ab positive - stable  HTN - controlled, no change to current management  Latent TB - q gold (+), stable    Health maintenance reviewed with patient. Patient is due for PCV-20, influenza, covid-19 bivalent booster.    Follow up in about 4 months (around 2/14/2023).    Ko Franz MD  Internal Medicine / Primary Care  [facility]  10/14/2022

## 2022-10-20 ENCOUNTER — TELEPHONE (OUTPATIENT)
Dept: ENDOCRINOLOGY | Facility: CLINIC | Age: 62
End: 2022-10-20
Payer: MEDICARE

## 2022-10-20 ENCOUNTER — TELEPHONE (OUTPATIENT)
Dept: INTERNAL MEDICINE | Facility: CLINIC | Age: 62
End: 2022-10-20
Payer: MEDICARE

## 2022-10-20 NOTE — TELEPHONE ENCOUNTER
----- Message from Ko Franz MD sent at 10/18/2022  7:33 AM CDT -----  Patient with uncontrolled t2dm -- please ask if he would be willing to try mounjaro (once weekly injections)

## 2022-10-21 ENCOUNTER — TELEPHONE (OUTPATIENT)
Dept: PHARMACY | Facility: CLINIC | Age: 62
End: 2022-10-21
Payer: MEDICARE

## 2022-10-21 NOTE — TELEPHONE ENCOUNTER
I have reached out to Graham Caceres to inform him of the  application process and whats required to apply.  Graham Caceres did not answer. I left a voicemail and mailed a letter introducing him to the pharmacy patient assistance program. I will follow up in 5 business days.

## 2023-02-06 ENCOUNTER — LAB VISIT (OUTPATIENT)
Dept: LAB | Facility: HOSPITAL | Age: 63
End: 2023-02-06
Attending: GENERAL ACUTE CARE HOSPITAL
Payer: MEDICARE

## 2023-02-06 DIAGNOSIS — E11.65 UNCONTROLLED TYPE 2 DIABETES MELLITUS WITH HYPERGLYCEMIA: ICD-10-CM

## 2023-02-06 LAB
ESTIMATED AVG GLUCOSE: 160 MG/DL (ref 68–131)
HBA1C MFR BLD: 7.2 % (ref 4–5.6)

## 2023-02-06 PROCEDURE — 83036 HEMOGLOBIN GLYCOSYLATED A1C: CPT | Mod: HCNC | Performed by: GENERAL ACUTE CARE HOSPITAL

## 2023-02-06 PROCEDURE — 36415 COLL VENOUS BLD VENIPUNCTURE: CPT | Mod: HCNC,PO | Performed by: GENERAL ACUTE CARE HOSPITAL

## 2023-02-07 DIAGNOSIS — Z00.00 ENCOUNTER FOR MEDICARE ANNUAL WELLNESS EXAM: ICD-10-CM

## 2023-02-09 ENCOUNTER — HOSPITAL ENCOUNTER (OUTPATIENT)
Dept: RADIOLOGY | Facility: HOSPITAL | Age: 63
Discharge: HOME | End: 2023-02-09
Attending: NURSE PRACTITIONER
Payer: MEDICARE

## 2023-02-09 DIAGNOSIS — K76.0 NAFLD (NONALCOHOLIC FATTY LIVER DISEASE): ICD-10-CM

## 2023-02-09 DIAGNOSIS — Z86.19 HISTORY OF HEPATITIS C: ICD-10-CM

## 2023-02-09 DIAGNOSIS — E78.2 MIXED HYPERLIPIDEMIA: ICD-10-CM

## 2023-02-09 DIAGNOSIS — E66.3 OVERWEIGHT (BMI 25.0-29.9): ICD-10-CM

## 2023-02-09 DIAGNOSIS — R74.8 ELEVATED LIVER ENZYMES: ICD-10-CM

## 2023-02-09 DIAGNOSIS — Z00.00 ENCOUNTER FOR MEDICARE ANNUAL WELLNESS EXAM: ICD-10-CM

## 2023-02-09 DIAGNOSIS — R76.8 HEPATITIS B CORE ANTIBODY POSITIVE: ICD-10-CM

## 2023-02-09 DIAGNOSIS — I10 PRIMARY HYPERTENSION: ICD-10-CM

## 2023-02-09 DIAGNOSIS — B18.2 HEPATIC CIRRHOSIS DUE TO CHRONIC HEPATITIS C INFECTION: ICD-10-CM

## 2023-02-09 DIAGNOSIS — E11.9 TYPE 2 DIABETES MELLITUS WITHOUT RETINOPATHY: ICD-10-CM

## 2023-02-09 DIAGNOSIS — K74.60 HEPATIC CIRRHOSIS DUE TO CHRONIC HEPATITIS C INFECTION: ICD-10-CM

## 2023-02-09 PROCEDURE — 76700 US EXAM ABDOM COMPLETE: CPT | Mod: TC,HCNC

## 2023-02-09 PROCEDURE — 76700 US ABDOMEN COMPLETE: ICD-10-PCS | Mod: 26,HCNC,, | Performed by: RADIOLOGY

## 2023-02-09 PROCEDURE — 76700 US EXAM ABDOM COMPLETE: CPT | Mod: 26,HCNC,, | Performed by: RADIOLOGY

## 2023-02-16 ENCOUNTER — OFFICE VISIT (OUTPATIENT)
Dept: HEPATOLOGY | Facility: CLINIC | Age: 63
End: 2023-02-16
Payer: MEDICARE

## 2023-02-16 ENCOUNTER — PROCEDURE VISIT (OUTPATIENT)
Dept: HEPATOLOGY | Facility: CLINIC | Age: 63
End: 2023-02-16
Payer: MEDICARE

## 2023-02-16 VITALS — BODY MASS INDEX: 28.75 KG/M2 | HEIGHT: 67 IN | WEIGHT: 183.19 LBS

## 2023-02-16 DIAGNOSIS — K74.00 LIVER FIBROSIS: Primary | ICD-10-CM

## 2023-02-16 DIAGNOSIS — E66.3 OVERWEIGHT (BMI 25.0-29.9): ICD-10-CM

## 2023-02-16 DIAGNOSIS — Z86.19 HISTORY OF HEPATITIS C: ICD-10-CM

## 2023-02-16 DIAGNOSIS — E78.2 MIXED HYPERLIPIDEMIA: ICD-10-CM

## 2023-02-16 DIAGNOSIS — I10 PRIMARY HYPERTENSION: ICD-10-CM

## 2023-02-16 DIAGNOSIS — K74.60 HEPATIC CIRRHOSIS DUE TO CHRONIC HEPATITIS C INFECTION: Primary | ICD-10-CM

## 2023-02-16 DIAGNOSIS — E11.9 TYPE 2 DIABETES MELLITUS WITHOUT RETINOPATHY: ICD-10-CM

## 2023-02-16 DIAGNOSIS — K76.0 NAFLD (NONALCOHOLIC FATTY LIVER DISEASE): ICD-10-CM

## 2023-02-16 DIAGNOSIS — R74.8 ELEVATED LIVER ENZYMES: ICD-10-CM

## 2023-02-16 DIAGNOSIS — R76.8 HEPATITIS B CORE ANTIBODY POSITIVE: ICD-10-CM

## 2023-02-16 DIAGNOSIS — B18.2 HEPATIC CIRRHOSIS DUE TO CHRONIC HEPATITIS C INFECTION: Primary | ICD-10-CM

## 2023-02-16 PROCEDURE — 76981 FIBROSCAN NEW ORLEANS: ICD-10-PCS | Mod: HCNC,S$GLB,, | Performed by: NURSE PRACTITIONER

## 2023-02-16 PROCEDURE — 1159F MED LIST DOCD IN RCRD: CPT | Mod: HCNC,CPTII,S$GLB, | Performed by: NURSE PRACTITIONER

## 2023-02-16 PROCEDURE — 99214 PR OFFICE/OUTPT VISIT, EST, LEVL IV, 30-39 MIN: ICD-10-PCS | Mod: HCNC,S$GLB,, | Performed by: NURSE PRACTITIONER

## 2023-02-16 PROCEDURE — 99999 PR PBB SHADOW E&M-EST. PATIENT-LVL III: CPT | Mod: PBBFAC,HCNC,, | Performed by: NURSE PRACTITIONER

## 2023-02-16 PROCEDURE — 3008F PR BODY MASS INDEX (BMI) DOCUMENTED: ICD-10-PCS | Mod: HCNC,CPTII,S$GLB, | Performed by: NURSE PRACTITIONER

## 2023-02-16 PROCEDURE — 99999 PR PBB SHADOW E&M-EST. PATIENT-LVL III: ICD-10-PCS | Mod: PBBFAC,HCNC,, | Performed by: NURSE PRACTITIONER

## 2023-02-16 PROCEDURE — 1160F RVW MEDS BY RX/DR IN RCRD: CPT | Mod: HCNC,CPTII,S$GLB, | Performed by: NURSE PRACTITIONER

## 2023-02-16 PROCEDURE — 76981 USE PARENCHYMA: CPT | Mod: HCNC,S$GLB,, | Performed by: NURSE PRACTITIONER

## 2023-02-16 PROCEDURE — 1160F PR REVIEW ALL MEDS BY PRESCRIBER/CLIN PHARMACIST DOCUMENTED: ICD-10-PCS | Mod: HCNC,CPTII,S$GLB, | Performed by: NURSE PRACTITIONER

## 2023-02-16 PROCEDURE — 3072F LOW RISK FOR RETINOPATHY: CPT | Mod: HCNC,CPTII,S$GLB, | Performed by: NURSE PRACTITIONER

## 2023-02-16 PROCEDURE — 3051F HG A1C>EQUAL 7.0%<8.0%: CPT | Mod: HCNC,CPTII,S$GLB, | Performed by: NURSE PRACTITIONER

## 2023-02-16 PROCEDURE — 99214 OFFICE O/P EST MOD 30 MIN: CPT | Mod: HCNC,S$GLB,, | Performed by: NURSE PRACTITIONER

## 2023-02-16 PROCEDURE — 3008F BODY MASS INDEX DOCD: CPT | Mod: HCNC,CPTII,S$GLB, | Performed by: NURSE PRACTITIONER

## 2023-02-16 PROCEDURE — 3051F PR MOST RECENT HEMOGLOBIN A1C LEVEL 7.0 - < 8.0%: ICD-10-PCS | Mod: HCNC,CPTII,S$GLB, | Performed by: NURSE PRACTITIONER

## 2023-02-16 PROCEDURE — 3072F PR LOW RISK FOR RETINOPATHY: ICD-10-PCS | Mod: HCNC,CPTII,S$GLB, | Performed by: NURSE PRACTITIONER

## 2023-02-16 PROCEDURE — 1159F PR MEDICATION LIST DOCUMENTED IN MEDICAL RECORD: ICD-10-PCS | Mod: HCNC,CPTII,S$GLB, | Performed by: NURSE PRACTITIONER

## 2023-02-16 NOTE — PATIENT INSTRUCTIONS
- Repeat Fibroscan in 1 year to restage liver disease.  - Monitor LFT's every 6-12 months (can be done by PCP and Endocrinology).   - Recommend abdominal ultrasound with AFP measurement annually for HCC surveillance, next due 2/2024.  - Recommend weight loss of 15-20 pounds, through diet and exercise.  - Continue close follow up with PCP and Endocrinology.  - Avoid alcohol and herbal supplements/alternative remedies.  - Return to clinic in 1 year.

## 2023-02-16 NOTE — PROCEDURES
FibroScan [place]    Date/Time: 2/16/2023 9:00 AM  Performed by: Genesis Polanco NP  Authorized by: Genesis Polanco NP     Diagnosis:  NAFLD and HCV    Probe:  M    Universal Protocol: Patient's identity, procedure and site were verified, confirmatory pause was performed.  Discussed procedure including risks and potential complications.  Questions answered.  Patient verbalizes understanding and wishes to proceed with VCTE.     Procedure: After providing explanations of the procedure, patient was placed in the supine position with right arm in maximum abduction to allow optimal exposure of right lateral abdomen.  Patient was briefly assessed, Testing was performed in the mid-axillary location, 50Hz Shear Wave pulses were applied and the resulting Shear Wave and Propagation Speed detected with a 3.5 MHz ultrasonic signal, using the FibroScan probe, Skin to liver capsule distance and liver parenchyma were accessed during the entire examination with the FibroScan probe, Patient was instructed to breathe normally and to abstain from sudden movements during the procedure, allowing for random measurements of liver stiffness. At least 10 Shear Waves were produced, Individual measurements of each Shear Wave were calculated.  Patient tolerated the procedure well with no complications.  Meets discharge criteria as was dismissed.  Rates pain 0 out of 10.  Patient will follow up with ordering provider to review results.    Findings  Median liver stiffness score:  7.6  CAP Reading: dB/m:  350    IQR/med %:  16  Interpretation  Fibrosis interpretation is based on medial liver stiffness - Kilopascal (kPa).    Fibrosis Stage:  F2  Steatosis interpretation is based on controlled attenuation parameter - (dB/m).    Steatosis Grade:  S3

## 2023-02-16 NOTE — Clinical Note
Please contact patient to arrange labs, US and Fibroscan in 1 year with RTC same day to review results. Orders in Epic. Thanks!

## 2023-02-18 ENCOUNTER — TELEPHONE (OUTPATIENT)
Dept: HEPATOLOGY | Facility: CLINIC | Age: 63
End: 2023-02-18
Payer: MEDICARE

## 2023-02-18 NOTE — TELEPHONE ENCOUNTER
----- Message from Genesis Polanco NP sent at 2/16/2023 10:21 AM CST -----  Please contact patient to arrange labs, US and Fibroscan in 1 year with RTC same day to review results. Orders in Epic. Thanks!

## 2023-02-20 ENCOUNTER — OFFICE VISIT (OUTPATIENT)
Dept: ENDOCRINOLOGY | Facility: CLINIC | Age: 63
End: 2023-02-20
Payer: MEDICARE

## 2023-02-20 DIAGNOSIS — E11.65 TYPE 2 DIABETES MELLITUS WITH HYPERGLYCEMIA, WITHOUT LONG-TERM CURRENT USE OF INSULIN: Primary | ICD-10-CM

## 2023-02-20 DIAGNOSIS — E78.5 HYPERLIPIDEMIA, UNSPECIFIED HYPERLIPIDEMIA TYPE: ICD-10-CM

## 2023-02-20 DIAGNOSIS — I10 HYPERTENSION, ESSENTIAL: ICD-10-CM

## 2023-02-20 PROCEDURE — 1160F RVW MEDS BY RX/DR IN RCRD: CPT | Mod: HCNC,CPTII,S$GLB, | Performed by: GENERAL ACUTE CARE HOSPITAL

## 2023-02-20 PROCEDURE — 1160F PR REVIEW ALL MEDS BY PRESCRIBER/CLIN PHARMACIST DOCUMENTED: ICD-10-PCS | Mod: HCNC,CPTII,S$GLB, | Performed by: GENERAL ACUTE CARE HOSPITAL

## 2023-02-20 PROCEDURE — 99999 PR PBB SHADOW E&M-EST. PATIENT-LVL II: ICD-10-PCS | Mod: PBBFAC,HCNC,, | Performed by: GENERAL ACUTE CARE HOSPITAL

## 2023-02-20 PROCEDURE — 99213 PR OFFICE/OUTPT VISIT, EST, LEVL III, 20-29 MIN: ICD-10-PCS | Mod: HCNC,S$GLB,, | Performed by: GENERAL ACUTE CARE HOSPITAL

## 2023-02-20 PROCEDURE — 3072F PR LOW RISK FOR RETINOPATHY: ICD-10-PCS | Mod: HCNC,CPTII,S$GLB, | Performed by: GENERAL ACUTE CARE HOSPITAL

## 2023-02-20 PROCEDURE — 99999 PR PBB SHADOW E&M-EST. PATIENT-LVL II: CPT | Mod: PBBFAC,HCNC,, | Performed by: GENERAL ACUTE CARE HOSPITAL

## 2023-02-20 PROCEDURE — 1159F PR MEDICATION LIST DOCUMENTED IN MEDICAL RECORD: ICD-10-PCS | Mod: HCNC,CPTII,S$GLB, | Performed by: GENERAL ACUTE CARE HOSPITAL

## 2023-02-20 PROCEDURE — 3051F HG A1C>EQUAL 7.0%<8.0%: CPT | Mod: HCNC,CPTII,S$GLB, | Performed by: GENERAL ACUTE CARE HOSPITAL

## 2023-02-20 PROCEDURE — 3051F PR MOST RECENT HEMOGLOBIN A1C LEVEL 7.0 - < 8.0%: ICD-10-PCS | Mod: HCNC,CPTII,S$GLB, | Performed by: GENERAL ACUTE CARE HOSPITAL

## 2023-02-20 PROCEDURE — 99213 OFFICE O/P EST LOW 20 MIN: CPT | Mod: HCNC,S$GLB,, | Performed by: GENERAL ACUTE CARE HOSPITAL

## 2023-02-20 PROCEDURE — 1159F MED LIST DOCD IN RCRD: CPT | Mod: HCNC,CPTII,S$GLB, | Performed by: GENERAL ACUTE CARE HOSPITAL

## 2023-02-20 PROCEDURE — 3072F LOW RISK FOR RETINOPATHY: CPT | Mod: HCNC,CPTII,S$GLB, | Performed by: GENERAL ACUTE CARE HOSPITAL

## 2023-02-20 NOTE — PROGRESS NOTES
Subjective:      Patient ID: Graham Caceres is a 63 y.o. male.    Chief Complaint:  DM2; Follow up visit     History of Present Illness  62 YO Male w/ dx of DM2, HTN, HLD compensated cirrhosis and now w/ fatty liver disease that presents to the endocrine clinic for follow up care of his DM.   Pt today reports feels well and denies any complaints.  Denies hyper or hypoglycemic symptoms.  He stopped Januvia due to cost.            Interval history:     Pt was last seen on 10/2022 and plan was:     Increase Actos to 30mg daily and c/w rest of Meds.  Pt was not interested in GLP-1a due to cost and need for refrigeration      Latest Reference Range & Units 06/10/22 10:18 10/14/22 10:43 02/06/23 09:37   Hemoglobin A1C External 4.0 - 5.6 % 8.1 (H) 8.0 (H) 7.2 (H)   (H): Data is abnormally high      A1C has improved since last visit           With regards to Diabetes Mellitus:   -Type 2    -Duration:  Dx   6- 8 yrs ago   -Microvascular complications:DENIES    -Macrovascular complications:  DENIES   -DKA?  DENIES   -HHS?  DENIES  -DM Medications:   Glipizide 10mg XR daily,  Actos 30mg daily    -Previously tried medications:  Metformin (Stopped due to liver disease)   Trulicity (high copay) Januvia (high copay) ,     -Compliance w/ Meds:  Yes   -Hyperglycemia symptoms: DENIES   -Hypoglycemia symptoms:  DENIES   -Know how to correct hypoglycemias:  Yes, has glucose tabs     -Glucose monitoring:  AM (100 - 180)    Mainly at goal with some         -Diet: High carb meals,   B (egg, bread, coffee w/ condense milk) rice, noodles   -Activity:  Sedentary   -Pancreatitis?  DENIES   -CHF?  DENIES   -UTIs?  DENIES   -Thyroid CA?  DENIES   -ETOH Abuse?  DENIES     Diabetes Management Status    Statin: Taking  ACE/ARB: Taking    Screening or Prevention Patient's value Goal Complete/Controlled?   HgA1C Testing and Control   Lab Results   Component Value Date    HGBA1C 7.2 (H) 02/06/2023      Annually/Less than 8% No   Lipid profile :  01/26/2022 Annually Yes   LDL control Lab Results   Component Value Date    LDLCALC 101.8 01/26/2022    Annually/Less than 100 mg/dl  No   Nephropathy screening Lab Results   Component Value Date    LABMICR 28.0 10/14/2022     Lab Results   Component Value Date    PROTEINUA Negative 11/16/2020    Annually Yes   Blood pressure BP Readings from Last 1 Encounters:   10/14/22 130/80    Less than 140/90 Yes   Dilated retinal exam : 03/25/2022 Annually Yes   Foot exam   : 06/10/2022 Annually No        ROS:   As above    Objective:     There were no vitals taken for this visit.  BP Readings from Last 3 Encounters:   10/14/22 130/80   10/13/22 130/60   08/16/22 136/82     Wt Readings from Last 1 Encounters:   02/16/23 0908 83.1 kg (183 lb 3.2 oz)     There is no height or weight on file to calculate BMI.      Physical Exam  Vitals reviewed.   Constitutional:       General: He is not in acute distress.     Appearance: Normal appearance. He is well-developed. He is not ill-appearing.   HENT:      Nose: Nose normal. No rhinorrhea.      Mouth/Throat:      Mouth: Mucous membranes are moist.   Eyes:      Extraocular Movements: Extraocular movements intact.      Pupils: Pupils are equal, round, and reactive to light.      Comments: No proptosis, No lid lag, No conjunctival erythema    Neck:      Thyroid: No thyromegaly.      Trachea: No tracheal deviation.      Comments: No thyromegaly or Thyroid nodules palpated on exam   Cardiovascular:      Rate and Rhythm: Normal rate and regular rhythm.      Pulses: Normal pulses.   Pulmonary:      Effort: Pulmonary effort is normal.      Breath sounds: Normal breath sounds.   Abdominal:      Palpations: Abdomen is soft. There is no mass.      Tenderness: There is no abdominal tenderness.      Hernia: No hernia is present.      Comments: No scar tissue, No Violaceous striae    Musculoskeletal:         General: No swelling.      Cervical back: Neck supple. No tenderness.      Right lower leg:  No edema.      Left lower leg: No edema.   Skin:     General: Skin is warm.      Findings: No rash.   Neurological:      General: No focal deficit present.      Mental Status: He is alert and oriented to person, place, and time.   Psychiatric:         Mood and Affect: Mood normal.         Judgment: Judgment normal.              Lab Review:   Lab Results   Component Value Date    HGBA1C 7.2 (H) 02/06/2023     Lab Results   Component Value Date    CHOL 188 01/26/2022    HDL 44 01/26/2022    LDLCALC 101.8 01/26/2022    TRIG 211 (H) 01/26/2022    CHOLHDL 23.4 01/26/2022     Lab Results   Component Value Date     08/04/2022    K 4.2 08/04/2022     08/04/2022    CO2 30 (H) 08/04/2022     (H) 08/04/2022    BUN 13 08/04/2022    CREATININE 0.8 08/04/2022    CALCIUM 10.3 08/04/2022    PROT 7.9 08/04/2022    ALBUMIN 4.4 08/04/2022    BILITOT 0.9 08/04/2022    ALKPHOS 65 08/04/2022    AST 27 08/04/2022    ALT 46 (H) 08/04/2022    ANIONGAP 11 08/04/2022    ESTGFRAFRICA >60.0 02/04/2022    EGFRNONAA >60.0 02/04/2022    TSH 1.704 01/26/2022     No results found for: [id number]    Assessment and Plan     Uncontrolled type 2 diabetes mellitus with hyperglycemia    Due to A1C improving and now at 7.2 with glucose log mainly at goal.   I will recommend the following.     No medication changes today.    Will work on diet, exercise and lifestyle modifications     C/w  Glipizide 10mg daily     C/w  Actos 30mg daily     Labs fasting prior to next visit     Hyperlipidemia, unspecified hyperlipidemia type    On Statin     Repeat Lipid panel prior to next visit   Low Fat diet     Hypertension, essential  BP controlled on current BP meds   On ARB for renal protection   Low Na diet

## 2023-02-20 NOTE — PATIENT INSTRUCTIONS
Your diabetes is improving which is good news.  Keep up the good work.     No medication changes today.    Continue with Glipizide 10mg once a day with breakfast.     Continue with Actos 30mg once a day with breakfast.     Please send me sugar numbers in the mail for my review.     I will reach out to Dr. Franz for evaluation of back and leg pain.      Please reach out if running out of medications.      Blood work prior to your next visit in 3-4 months.     Eye doctor referral.     If any questions feel free to contact me.     Have a nice day.     Sincerely,       Blayne Pugh MD   Endocrinology   2/20/2023 9:26 AM

## 2023-04-03 ENCOUNTER — PATIENT MESSAGE (OUTPATIENT)
Dept: ADMINISTRATIVE | Facility: HOSPITAL | Age: 63
End: 2023-04-03
Payer: MEDICARE

## 2023-04-27 DIAGNOSIS — E78.5 HYPERLIPIDEMIA ASSOCIATED WITH TYPE 2 DIABETES MELLITUS: ICD-10-CM

## 2023-04-27 DIAGNOSIS — E11.69 HYPERLIPIDEMIA ASSOCIATED WITH TYPE 2 DIABETES MELLITUS: ICD-10-CM

## 2023-04-27 DIAGNOSIS — L40.8 SEBOPSORIASIS: ICD-10-CM

## 2023-04-27 RX ORDER — HYDROCORTISONE 1 %
CREAM (GRAM) TOPICAL
Qty: 30 G | Refills: 1 | Status: SHIPPED | OUTPATIENT
Start: 2023-04-27 | End: 2023-10-05

## 2023-04-27 RX ORDER — ROSUVASTATIN CALCIUM 40 MG/1
TABLET, COATED ORAL
Qty: 90 TABLET | Refills: 3 | Status: SHIPPED | OUTPATIENT
Start: 2023-04-27

## 2023-04-27 NOTE — TELEPHONE ENCOUNTER
Refill Routing Note   Medication(s) are not appropriate for processing by [facility] for the following reason(s):      Medication outside of protocol    [facility] action(s):  Route              Appointments  past 12m or future 3m with PCP    Date Provider   Last Visit   10/14/2022 Ko Franz MD   Next Visit   Visit date not found Ko Franz MD   ED visits in past 90 days: 0        Note composed:4:11 PM 04/27/2023

## 2023-04-27 NOTE — TELEPHONE ENCOUNTER
No care due was identified.  [facility] Embedded Care Due Messages. Reference number: [id number].   4/27/2023 1:56:38 PM CDT

## 2023-05-12 ENCOUNTER — TELEPHONE (OUTPATIENT)
Dept: FAMILY MEDICINE | Facility: CLINIC | Age: 63
End: 2023-05-12
Payer: MEDICARE

## 2023-06-09 ENCOUNTER — PATIENT MESSAGE (OUTPATIENT)
Dept: RESEARCH | Facility: HOSPITAL | Age: 63
End: 2023-06-09
Payer: MEDICARE

## 2023-07-31 DIAGNOSIS — E11.59 HYPERTENSION ASSOCIATED WITH DIABETES: ICD-10-CM

## 2023-07-31 DIAGNOSIS — I15.2 HYPERTENSION ASSOCIATED WITH DIABETES: ICD-10-CM

## 2023-07-31 RX ORDER — LOSARTAN POTASSIUM 50 MG/1
TABLET ORAL
Qty: 90 TABLET | Refills: 0 | Status: SHIPPED | OUTPATIENT
Start: 2023-07-31 | End: 2023-12-26

## 2023-07-31 NOTE — TELEPHONE ENCOUNTER
Care Due:                  Date            Visit Type   Department     Provider  --------------------------------------------------------------------------------                                EP -                              PRIMARY      [facility] INTERNAL  Last Visit: 10-      CARE ([facility])   MEDICINE       Ko Franz  Next Visit: None Scheduled  None         None Found                                                            Last  Test          Frequency    Reason                     Performed    Due Date  --------------------------------------------------------------------------------    Office Visit  12 months..  losartan, rosuvastatin...  10-   10-    CMP.........  12 months..  losartan, rosuvastatin...  08- 07-    Lipid Panel.  12 months..  rosuvastatin.............  01- 01-    [employer] Embedded Care Due Messages. Reference number: [id number].   7/31/2023 2:37:19 AM CDT

## 2023-07-31 NOTE — TELEPHONE ENCOUNTER
Refill Routing Note   Medication(s) are not appropriate for processing by [facility] for the following reason(s):      Required labs outdated    [facility] action(s):  Defer Care Due:  Appointment due  Labs due              Appointments  past 12m or future 3m with PCP    Date Provider   Last Visit   10/14/2022 Ko Franz MD   Next Visit   Visit date not found Ko Franz MD   ED visits in past 90 days: 0        Note composed:12:10 PM 07/31/2023

## 2023-10-05 ENCOUNTER — LAB VISIT (OUTPATIENT)
Dept: LAB | Facility: HOSPITAL | Age: 63
End: 2023-10-05
Attending: INTERNAL MEDICINE
Payer: MEDICARE

## 2023-10-05 ENCOUNTER — OFFICE VISIT (OUTPATIENT)
Dept: INTERNAL MEDICINE | Facility: CLINIC | Age: 63
End: 2023-10-05
Payer: MEDICARE

## 2023-10-05 VITALS
BODY MASS INDEX: 28.23 KG/M2 | OXYGEN SATURATION: 94 % | WEIGHT: 179.88 LBS | DIASTOLIC BLOOD PRESSURE: 78 MMHG | HEART RATE: 95 BPM | SYSTOLIC BLOOD PRESSURE: 108 MMHG | HEIGHT: 67 IN

## 2023-10-05 DIAGNOSIS — Z86.19 HISTORY OF HEPATITIS C: ICD-10-CM

## 2023-10-05 DIAGNOSIS — B34.9 VIRAL SYNDROME: ICD-10-CM

## 2023-10-05 DIAGNOSIS — R21 SKIN RASH: ICD-10-CM

## 2023-10-05 DIAGNOSIS — K74.69 OTHER CIRRHOSIS OF LIVER: ICD-10-CM

## 2023-10-05 DIAGNOSIS — I10 PRIMARY HYPERTENSION: ICD-10-CM

## 2023-10-05 DIAGNOSIS — Z00.00 ENCOUNTER FOR ANNUAL PHYSICAL EXAM: Primary | ICD-10-CM

## 2023-10-05 DIAGNOSIS — E78.2 MIXED HYPERLIPIDEMIA: ICD-10-CM

## 2023-10-05 DIAGNOSIS — E11.65 TYPE 2 DIABETES MELLITUS WITH HYPERGLYCEMIA, WITHOUT LONG-TERM CURRENT USE OF INSULIN: ICD-10-CM

## 2023-10-05 DIAGNOSIS — B35.1 ONYCHOMYCOSIS: ICD-10-CM

## 2023-10-05 DIAGNOSIS — Z01.00 ROUTINE EYE EXAM: ICD-10-CM

## 2023-10-05 DIAGNOSIS — B35.3 TINEA PEDIS, UNSPECIFIED LATERALITY: ICD-10-CM

## 2023-10-05 DIAGNOSIS — J06.9 UPPER RESPIRATORY INFECTION WITH COUGH AND CONGESTION: ICD-10-CM

## 2023-10-05 LAB
AFP SERPL-MCNC: 3.1 NG/ML (ref 0–8.4)
ALBUMIN SERPL BCP-MCNC: 4.5 G/DL (ref 3.5–5.2)
ALP SERPL-CCNC: 61 U/L (ref 55–135)
ALT SERPL W/O P-5'-P-CCNC: 47 U/L (ref 10–44)
ANION GAP SERPL CALC-SCNC: 11 MMOL/L (ref 8–16)
AST SERPL-CCNC: 30 U/L (ref 10–40)
BASOPHILS # BLD AUTO: 0.06 K/UL (ref 0–0.2)
BASOPHILS NFR BLD: 0.9 % (ref 0–1.9)
BILIRUB SERPL-MCNC: 0.8 MG/DL (ref 0.1–1)
BUN SERPL-MCNC: 12 MG/DL (ref 8–23)
CALCIUM SERPL-MCNC: 10.2 MG/DL (ref 8.7–10.5)
CHLORIDE SERPL-SCNC: 103 MMOL/L (ref 95–110)
CHOLEST SERPL-MCNC: 310 MG/DL (ref 120–199)
CHOLEST/HDLC SERPL: 7 {RATIO} (ref 2–5)
CO2 SERPL-SCNC: 26 MMOL/L (ref 23–29)
CREAT SERPL-MCNC: 0.8 MG/DL (ref 0.5–1.4)
CTP QC/QA: YES
CTP QC/QA: YES
DIFFERENTIAL METHOD: ABNORMAL
EOSINOPHIL # BLD AUTO: 0.2 K/UL (ref 0–0.5)
EOSINOPHIL NFR BLD: 2.4 % (ref 0–8)
ERYTHROCYTE [DISTWIDTH] IN BLOOD BY AUTOMATED COUNT: 12.8 % (ref 11.5–14.5)
EST. GFR  (NO RACE VARIABLE): >60 ML/MIN/1.73 M^2
ESTIMATED AVG GLUCOSE: 163 MG/DL (ref 68–131)
GLUCOSE SERPL-MCNC: 187 MG/DL (ref 70–110)
HBA1C MFR BLD: 7.3 % (ref 4–5.6)
HCT VFR BLD AUTO: 48.4 % (ref 40–54)
HDLC SERPL-MCNC: 44 MG/DL (ref 40–75)
HDLC SERPL: 14.2 % (ref 20–50)
HGB BLD-MCNC: 16 G/DL (ref 14–18)
IMM GRANULOCYTES # BLD AUTO: 0.01 K/UL (ref 0–0.04)
IMM GRANULOCYTES NFR BLD AUTO: 0.1 % (ref 0–0.5)
LDLC SERPL CALC-MCNC: 214.2 MG/DL (ref 63–159)
LYMPHOCYTES # BLD AUTO: 1.9 K/UL (ref 1–4.8)
LYMPHOCYTES NFR BLD: 28 % (ref 18–48)
MCH RBC QN AUTO: 31.8 PG (ref 27–31)
MCHC RBC AUTO-ENTMCNC: 33.1 G/DL (ref 32–36)
MCV RBC AUTO: 96 FL (ref 82–98)
MONOCYTES # BLD AUTO: 0.4 K/UL (ref 0.3–1)
MONOCYTES NFR BLD: 6.1 % (ref 4–15)
NEUTROPHILS # BLD AUTO: 4.2 K/UL (ref 1.8–7.7)
NEUTROPHILS NFR BLD: 62.5 % (ref 38–73)
NONHDLC SERPL-MCNC: 266 MG/DL
NRBC BLD-RTO: 0 /100 WBC
PLATELET # BLD AUTO: 225 K/UL (ref 150–450)
PMV BLD AUTO: 11 FL (ref 9.2–12.9)
POC MOLECULAR INFLUENZA A AGN: NEGATIVE
POC MOLECULAR INFLUENZA B AGN: NEGATIVE
POTASSIUM SERPL-SCNC: 4.5 MMOL/L (ref 3.5–5.1)
PROT SERPL-MCNC: 8.2 G/DL (ref 6–8.4)
RBC # BLD AUTO: 5.03 M/UL (ref 4.6–6.2)
SARS-COV-2 RDRP RESP QL NAA+PROBE: NEGATIVE
SODIUM SERPL-SCNC: 140 MMOL/L (ref 136–145)
TRIGL SERPL-MCNC: 259 MG/DL (ref 30–150)
TSH SERPL DL<=0.005 MIU/L-ACNC: 1.83 UIU/ML (ref 0.4–4)
WBC # BLD AUTO: 6.69 K/UL (ref 3.9–12.7)

## 2023-10-05 PROCEDURE — 1159F PR MEDICATION LIST DOCUMENTED IN MEDICAL RECORD: ICD-10-PCS | Mod: HCNC,CPTII,S$GLB, | Performed by: INTERNAL MEDICINE

## 2023-10-05 PROCEDURE — 3074F SYST BP LT 130 MM HG: CPT | Mod: HCNC,CPTII,S$GLB, | Performed by: INTERNAL MEDICINE

## 2023-10-05 PROCEDURE — 87635: ICD-10-PCS | Mod: QW,HCNC,S$GLB, | Performed by: INTERNAL MEDICINE

## 2023-10-05 PROCEDURE — 99214 OFFICE O/P EST MOD 30 MIN: CPT | Mod: HCNC,S$GLB,, | Performed by: INTERNAL MEDICINE

## 2023-10-05 PROCEDURE — 3078F DIAST BP <80 MM HG: CPT | Mod: HCNC,CPTII,S$GLB, | Performed by: INTERNAL MEDICINE

## 2023-10-05 PROCEDURE — 4010F ACE/ARB THERAPY RXD/TAKEN: CPT | Mod: HCNC,CPTII,S$GLB, | Performed by: INTERNAL MEDICINE

## 2023-10-05 PROCEDURE — 80053 COMPREHEN METABOLIC PANEL: CPT | Mod: HCNC | Performed by: INTERNAL MEDICINE

## 2023-10-05 PROCEDURE — 3008F BODY MASS INDEX DOCD: CPT | Mod: HCNC,CPTII,S$GLB, | Performed by: INTERNAL MEDICINE

## 2023-10-05 PROCEDURE — 3051F PR MOST RECENT HEMOGLOBIN A1C LEVEL 7.0 - < 8.0%: ICD-10-PCS | Mod: HCNC,CPTII,S$GLB, | Performed by: INTERNAL MEDICINE

## 2023-10-05 PROCEDURE — 3008F PR BODY MASS INDEX (BMI) DOCUMENTED: ICD-10-PCS | Mod: HCNC,CPTII,S$GLB, | Performed by: INTERNAL MEDICINE

## 2023-10-05 PROCEDURE — 82105 ALPHA-FETOPROTEIN SERUM: CPT | Mod: HCNC | Performed by: INTERNAL MEDICINE

## 2023-10-05 PROCEDURE — 3072F LOW RISK FOR RETINOPATHY: CPT | Mod: HCNC,CPTII,S$GLB, | Performed by: INTERNAL MEDICINE

## 2023-10-05 PROCEDURE — 3051F HG A1C>EQUAL 7.0%<8.0%: CPT | Mod: HCNC,CPTII,S$GLB, | Performed by: INTERNAL MEDICINE

## 2023-10-05 PROCEDURE — 80061 LIPID PANEL: CPT | Mod: HCNC | Performed by: INTERNAL MEDICINE

## 2023-10-05 PROCEDURE — 1159F MED LIST DOCD IN RCRD: CPT | Mod: HCNC,CPTII,S$GLB, | Performed by: INTERNAL MEDICINE

## 2023-10-05 PROCEDURE — 4010F PR ACE/ARB THEARPY RXD/TAKEN: ICD-10-PCS | Mod: HCNC,CPTII,S$GLB, | Performed by: INTERNAL MEDICINE

## 2023-10-05 PROCEDURE — 3072F PR LOW RISK FOR RETINOPATHY: ICD-10-PCS | Mod: HCNC,CPTII,S$GLB, | Performed by: INTERNAL MEDICINE

## 2023-10-05 PROCEDURE — 87635 SARS-COV-2 COVID-19 AMP PRB: CPT | Mod: QW,HCNC,S$GLB, | Performed by: INTERNAL MEDICINE

## 2023-10-05 PROCEDURE — 85025 COMPLETE CBC W/AUTO DIFF WBC: CPT | Mod: HCNC | Performed by: INTERNAL MEDICINE

## 2023-10-05 PROCEDURE — 87502 POCT INFLUENZA A/B MOLECULAR: ICD-10-PCS | Mod: QW,HCNC,S$GLB, | Performed by: INTERNAL MEDICINE

## 2023-10-05 PROCEDURE — 84443 ASSAY THYROID STIM HORMONE: CPT | Mod: HCNC | Performed by: INTERNAL MEDICINE

## 2023-10-05 PROCEDURE — 99999 PR PBB SHADOW E&M-EST. PATIENT-LVL III: ICD-10-PCS | Mod: PBBFAC,HCNC,, | Performed by: INTERNAL MEDICINE

## 2023-10-05 PROCEDURE — 3078F PR MOST RECENT DIASTOLIC BLOOD PRESSURE < 80 MM HG: ICD-10-PCS | Mod: HCNC,CPTII,S$GLB, | Performed by: INTERNAL MEDICINE

## 2023-10-05 PROCEDURE — 83036 HEMOGLOBIN GLYCOSYLATED A1C: CPT | Mod: HCNC | Performed by: INTERNAL MEDICINE

## 2023-10-05 PROCEDURE — 99999 PR PBB SHADOW E&M-EST. PATIENT-LVL III: CPT | Mod: PBBFAC,HCNC,, | Performed by: INTERNAL MEDICINE

## 2023-10-05 PROCEDURE — 3074F PR MOST RECENT SYSTOLIC BLOOD PRESSURE < 130 MM HG: ICD-10-PCS | Mod: HCNC,CPTII,S$GLB, | Performed by: INTERNAL MEDICINE

## 2023-10-05 PROCEDURE — 36415 COLL VENOUS BLD VENIPUNCTURE: CPT | Mod: HCNC | Performed by: INTERNAL MEDICINE

## 2023-10-05 PROCEDURE — 99214 PR OFFICE/OUTPT VISIT, EST, LEVL IV, 30-39 MIN: ICD-10-PCS | Mod: HCNC,S$GLB,, | Performed by: INTERNAL MEDICINE

## 2023-10-05 PROCEDURE — 87502 INFLUENZA DNA AMP PROBE: CPT | Mod: QW,HCNC,S$GLB, | Performed by: INTERNAL MEDICINE

## 2023-10-05 RX ORDER — COVID-19 ANTIGEN TEST
4 KIT MISCELLANEOUS DAILY PRN
Qty: 4 EACH | Refills: 3 | Status: SHIPPED | OUTPATIENT
Start: 2023-10-05 | End: 2023-12-13

## 2023-10-05 RX ORDER — AZITHROMYCIN 250 MG/1
TABLET, FILM COATED ORAL
Qty: 6 TABLET | Refills: 0 | Status: SHIPPED | OUTPATIENT
Start: 2023-10-05 | End: 2023-10-10

## 2023-10-05 RX ORDER — PRENATAL VIT 91/IRON/FOLIC/DHA 28-975-200
COMBINATION PACKAGE (EA) ORAL 2 TIMES DAILY
Qty: 28.4 G | Refills: 5 | Status: SHIPPED | OUTPATIENT
Start: 2023-10-05 | End: 2023-10-05

## 2023-10-05 RX ORDER — TRIAMCINOLONE ACETONIDE 1 MG/G
CREAM TOPICAL 2 TIMES DAILY
Qty: 45 G | Refills: 1 | Status: SHIPPED | OUTPATIENT
Start: 2023-10-05

## 2023-10-05 RX ORDER — PRENATAL VIT 91/IRON/FOLIC/DHA 28-975-200
COMBINATION PACKAGE (EA) ORAL 2 TIMES DAILY
Qty: 28.4 G | Refills: 5 | Status: SHIPPED | OUTPATIENT
Start: 2023-10-05

## 2023-10-05 NOTE — PATIENT INSTRUCTIONS
Urine today  Labwork today  Schedule optometry appointment.  Schedule dermatology appointment  Azithromycin sent to pharmacy.  Return to clinic 6 months

## 2023-10-05 NOTE — PROGRESS NOTES
Subjective:       Patient ID: Graham Caceres is a 63 y.o. male.    Chief Complaint: Cough (Cough and congestion ) and Sinusitis      HPI  Graham Caceres is a 63 y.o. year old male with HTN, HLD, hep C s/p SVR 6/2017, cirrhosis, t2DM (last A1c 7.2), internal hemorrhoid presents for annual exam. Has complaints of URI symptoms (cough and congestion) starting 3 days ago.     Review of Systems   Constitutional:  Negative for activity change, appetite change, chills, fatigue, fever and unexpected weight change.   HENT:  Negative for congestion, rhinorrhea and sore throat.    Eyes:  Negative for visual disturbance.   Respiratory:  Negative for shortness of breath.    Cardiovascular:  Negative for chest pain.   Gastrointestinal:  Negative for abdominal pain, diarrhea, nausea and vomiting.   Genitourinary:  Negative for difficulty urinating and dysuria.   Musculoskeletal:  Negative for arthralgias, back pain and myalgias.   Skin:  Negative for color change and rash.   Neurological:  Negative for dizziness, weakness and headaches.         Past Medical History:   Diagnosis Date    Cirrhosis     Diverticulosis     History of hepatitis C, s/p successful Rx w/ SVR24 - 6/2017 4/7/2016    S/p harvoni w/ SVR    Hyperlipidemia 04/2016    Hypertension     Internal hemorrhoid     New onset type 2 diabetes mellitus 4/2016    Positive QuantiFERON-TB Gold test 4/2016    Sebopsoriasis     SIRS (systemic inflammatory response syndrome) 8/11/2018        Prior to Admission medications    Medication Sig Start Date End Date Taking? Authorizing Provider   blood sugar diagnostic (TRUE METRIX GLUCOSE TEST STRIP) Strp 3 times a day 11/22/22  Yes Blayne Allen MD   blood-glucose meter kit To check BG 2 times daily, to use with insurance preferred meter 9/10/20  Yes Jennifer Manzanares MD   clotrimazole (LOTRIMIN) 1 % Soln Apply 4 drops to left ear 2 times daily for 14 days. 1/28/22  Yes Kerri Vasquez, EH, FNP-C   glipiZIDE (GLUCOTROL) 10 MG tablet Take  "1 tablet (10 mg total) by mouth daily with breakfast. 10/13/22  Yes Blayne Allen MD   hydrocortisone 1 % cream APPLY TOPICALLY 2 TIMES DAILY. 4/27/23  Yes Ko Franz MD   ketoconazole (NIZORAL) 2 % cream Apply topically once daily. 1/26/22  Yes Cleo Bolaños FNP   ketoconazole (NIZORAL) 2 % shampoo Apply topically twice a week. 1/27/22  Yes Cleo Bolaños FNP   lancets Misc To check BG 2 times daily, to use with insurance preferred meter 9/28/20  Yes Jennifer Manzanares MD   losartan (COZAAR) 50 MG tablet TAKE 1 TABLET EVERY DAY 7/31/23  Yes Laura Aden PA-C   pioglitazone (ACTOS) 30 MG tablet Take 1 tablet (30 mg total) by mouth once daily. 10/13/22 10/13/23 Yes Blayne Allen MD   rosuvastatin (CRESTOR) 40 MG Tab TAKE 1 TABLET EVERY EVENING 4/27/23  Yes Ko Franz MD        Past medical history, surgical history, and family medical history reviewed and updated as appropriate.    Medications and allergies reviewed.     Objective:          Vitals:    10/05/23 1041   BP: 108/78   Pulse: 95   SpO2: (!) 94%   Weight: 81.6 kg (179 lb 14.3 oz)   Height: 5' 7" (1.702 m)     Body mass index is 28.18 kg/m².  Physical Exam  Constitutional:       General: He is not in acute distress.     Appearance: He is well-developed.   HENT:      Head: Normocephalic and atraumatic.      Nose: Nose normal.   Eyes:      General: No scleral icterus.     Extraocular Movements: Extraocular movements intact.   Neck:      Thyroid: No thyromegaly.      Vascular: No JVD.      Trachea: No tracheal deviation.   Cardiovascular:      Rate and Rhythm: Normal rate and regular rhythm.      Heart sounds: Normal heart sounds. No murmur heard.     No friction rub. No gallop.   Pulmonary:      Effort: Pulmonary effort is normal. No respiratory distress.      Breath sounds: Normal breath sounds. No wheezing or rales.   Abdominal:      General: Bowel sounds are normal. There is no distension.      Palpations: Abdomen is soft. " There is no mass.      Tenderness: There is no abdominal tenderness.   Musculoskeletal:         General: No tenderness. Normal range of motion.      Cervical back: Normal range of motion and neck supple.   Lymphadenopathy:      Cervical: No cervical adenopathy.   Skin:     General: Skin is warm and dry.      Findings: No rash.   Neurological:      Mental Status: He is alert and oriented to person, place, and time.      Cranial Nerves: No cranial nerve deficit.      Deep Tendon Reflexes: Reflexes normal.   Psychiatric:         Behavior: Behavior normal.         Protective Sensation (w/ 10 gram monofilament):  Right: Intact  Left: Intact    Visual Inspection:  Onychomycosis -  Bilateral    Pedal Pulses:   Right: Present  Left: Present    Posterior Tibialis Pulses:   Right:Present  Left: Present      Lab Results   Component Value Date    WBC 4.89 08/04/2022    HGB 15.9 08/04/2022    HCT 48.2 08/04/2022     08/04/2022    CHOL 188 01/26/2022    TRIG 211 (H) 01/26/2022    HDL 44 01/26/2022    ALT 46 (H) 08/04/2022    AST 27 08/04/2022     08/04/2022    K 4.2 08/04/2022     08/04/2022    CREATININE 0.8 08/04/2022    BUN 13 08/04/2022    CO2 30 (H) 08/04/2022    TSH 1.704 01/26/2022    INR 1.0 08/04/2022    HGBA1C 7.2 (H) 02/06/2023       Assessment:       1. Encounter for annual physical exam    2. Viral syndrome    3. Type 2 diabetes mellitus with hyperglycemia, without long-term current use of insulin    4. History of hepatitis C    5. Primary hypertension    6. Mixed hyperlipidemia    7. Other cirrhosis of liver    8. Skin rash    9. Routine eye exam    10. Onychomycosis    11. Tinea pedis, unspecified laterality          Plan:     Graham was seen today for cough and sinusitis.    Diagnoses and all orders for this visit:    Encounter for annual physical exam    Viral syndrome  Comments:  cough and congestion, on going x 3 days, productive with scant yellow sputum. slight headache.   Orders:  -     POCT  Influenza A/B Molecular  -     POCT COVID-19 Rapid Screening  -     COVID-19 antigen test (COVID-19 AT-HOME TEST) Kit; 4 each by Misc.(Non-Drug; Combo Route) route daily as needed (covid swab).    Type 2 diabetes mellitus with hyperglycemia, without long-term current use of insulin  Comments:  on glipizide 10, actos 30, last a1c 7.2; recheck a1c, due for eye screening. due for umab/cr.   Orders:  -     Diabetic Eye Screening Photo; Future  -     Hemoglobin A1C; Future  -     Microalbumin/Creatinine Ratio, Urine; Future  -     Ambulatory referral/consult to Optometry; Future    History of hepatitis C  Comments:  s/p SVR 24 6/2017  Orders:  -     AFP TUMOR MARKER; Future    Primary hypertension  Comments:  controlled, on losartan 50 daily, no changes to current management  Orders:  -     CBC Auto Differential; Future  -     Comprehensive Metabolic Panel; Future  -     TSH; Future    Mixed hyperlipidemia  Comments:  on crestor 40, recheck lipid panel  Orders:  -     Lipid Panel; Future    Other cirrhosis of liver  -     AFP TUMOR MARKER; Future    Skin rash  Comments:  persistent rash, psoriatic lesion with lichenfication on right wrist. left anterior tibial lesion  Orders:  -     triamcinolone acetonide 0.1% (KENALOG) 0.1 % cream; Apply topically 2 (two) times daily.    Routine eye exam  -     Ambulatory referral/consult to Optometry; Future    Onychomycosis  -     Discontinue: terbinafine HCL (LAMISIL) 1 % cream; Apply topically 2 (two) times daily.  -     terbinafine HCL (LAMISIL) 1 % cream; Apply topically 2 (two) times daily.    Tinea pedis, unspecified laterality  -     Discontinue: terbinafine HCL (LAMISIL) 1 % cream; Apply topically 2 (two) times daily.  -     terbinafine HCL (LAMISIL) 1 % cream; Apply topically 2 (two) times daily.    Benign physical examination, no issues identified. Will obtain routine labwork and age appropriate health screenings.     Health maintenance reviewed with patient.     Follow up  in about 6 months (around 4/5/2024).    Ko Franz MD  Internal Medicine / Primary Care  [facility]  10/5/2023

## 2023-11-13 ENCOUNTER — TELEPHONE (OUTPATIENT)
Dept: OPTOMETRY | Facility: CLINIC | Age: 63
End: 2023-11-13
Payer: MEDICARE

## 2023-11-13 NOTE — TELEPHONE ENCOUNTER
----- Message from Hilary Aggarwal MA sent at 11/10/2023  5:20 PM CST -----  Contact: [phone]  This pt is scheduled to see Dr. Smith. Can someone schedule him an earlier appt?  ----- Message -----  From: Sharan Davis  Sent: 11/10/2023  11:24 AM CST  To: [facility] Optometry Clinical Support Staff    Pt is calling because his vision has steadily declined, and he is seeing lines in his vision. He was trying to see if there is anyone that can see him earlier because it looks like something is blocking his vision. Please call back to further assist.

## 2023-11-15 ENCOUNTER — TELEPHONE (OUTPATIENT)
Dept: OPTOMETRY | Facility: CLINIC | Age: 63
End: 2023-11-15

## 2023-11-15 ENCOUNTER — OFFICE VISIT (OUTPATIENT)
Dept: OPTOMETRY | Facility: CLINIC | Age: 63
End: 2023-11-15
Payer: MEDICARE

## 2023-11-15 ENCOUNTER — TELEPHONE (OUTPATIENT)
Dept: OPHTHALMOLOGY | Facility: CLINIC | Age: 63
End: 2023-11-15
Payer: MEDICARE

## 2023-11-15 DIAGNOSIS — H52.4 MYOPIA OF BOTH EYES WITH REGULAR ASTIGMATISM AND PRESBYOPIA: ICD-10-CM

## 2023-11-15 DIAGNOSIS — H02.834 DERMATOCHALASIS OF BOTH UPPER EYELIDS: ICD-10-CM

## 2023-11-15 DIAGNOSIS — H52.223 MYOPIA OF BOTH EYES WITH REGULAR ASTIGMATISM AND PRESBYOPIA: ICD-10-CM

## 2023-11-15 DIAGNOSIS — H25.13 SENILE NUCLEAR CATARACT, BILATERAL: ICD-10-CM

## 2023-11-15 DIAGNOSIS — E11.65 UNCONTROLLED TYPE 2 DIABETES MELLITUS WITH HYPERGLYCEMIA: ICD-10-CM

## 2023-11-15 DIAGNOSIS — H02.831 DERMATOCHALASIS OF BOTH UPPER EYELIDS: ICD-10-CM

## 2023-11-15 DIAGNOSIS — E11.9 TYPE 2 DIABETES MELLITUS WITHOUT RETINOPATHY: Primary | ICD-10-CM

## 2023-11-15 DIAGNOSIS — H52.13 MYOPIA OF BOTH EYES WITH REGULAR ASTIGMATISM AND PRESBYOPIA: ICD-10-CM

## 2023-11-15 DIAGNOSIS — E11.65 TYPE 2 DIABETES MELLITUS WITH HYPERGLYCEMIA, WITHOUT LONG-TERM CURRENT USE OF INSULIN: ICD-10-CM

## 2023-11-15 PROCEDURE — 4010F PR ACE/ARB THEARPY RXD/TAKEN: ICD-10-PCS | Mod: HCNC,CPTII,S$GLB, | Performed by: OPTOMETRIST

## 2023-11-15 PROCEDURE — 99999 PR PBB SHADOW E&M-EST. PATIENT-LVL III: ICD-10-PCS | Mod: PBBFAC,HCNC,, | Performed by: OPTOMETRIST

## 2023-11-15 PROCEDURE — 3066F NEPHROPATHY DOC TX: CPT | Mod: HCNC,CPTII,S$GLB, | Performed by: OPTOMETRIST

## 2023-11-15 PROCEDURE — 3061F NEG MICROALBUMINURIA REV: CPT | Mod: HCNC,CPTII,S$GLB, | Performed by: OPTOMETRIST

## 2023-11-15 PROCEDURE — 2023F PR DILATED RETINAL EXAM W/O EVID OF RETINOPATHY: ICD-10-PCS | Mod: HCNC,CPTII,S$GLB, | Performed by: OPTOMETRIST

## 2023-11-15 PROCEDURE — 99999 PR PBB SHADOW E&M-EST. PATIENT-LVL III: CPT | Mod: PBBFAC,HCNC,, | Performed by: OPTOMETRIST

## 2023-11-15 PROCEDURE — 4010F ACE/ARB THERAPY RXD/TAKEN: CPT | Mod: HCNC,CPTII,S$GLB, | Performed by: OPTOMETRIST

## 2023-11-15 PROCEDURE — 92014 COMPRE OPH EXAM EST PT 1/>: CPT | Mod: HCNC,S$GLB,, | Performed by: OPTOMETRIST

## 2023-11-15 PROCEDURE — 2023F DILAT RTA XM W/O RTNOPTHY: CPT | Mod: HCNC,CPTII,S$GLB, | Performed by: OPTOMETRIST

## 2023-11-15 PROCEDURE — 3051F HG A1C>EQUAL 7.0%<8.0%: CPT | Mod: HCNC,CPTII,S$GLB, | Performed by: OPTOMETRIST

## 2023-11-15 PROCEDURE — 1159F MED LIST DOCD IN RCRD: CPT | Mod: HCNC,CPTII,S$GLB, | Performed by: OPTOMETRIST

## 2023-11-15 PROCEDURE — 3061F PR NEG MICROALBUMINURIA RESULT DOCUMENTED/REVIEW: ICD-10-PCS | Mod: HCNC,CPTII,S$GLB, | Performed by: OPTOMETRIST

## 2023-11-15 PROCEDURE — 3051F PR MOST RECENT HEMOGLOBIN A1C LEVEL 7.0 - < 8.0%: ICD-10-PCS | Mod: HCNC,CPTII,S$GLB, | Performed by: OPTOMETRIST

## 2023-11-15 PROCEDURE — 1159F PR MEDICATION LIST DOCUMENTED IN MEDICAL RECORD: ICD-10-PCS | Mod: HCNC,CPTII,S$GLB, | Performed by: OPTOMETRIST

## 2023-11-15 PROCEDURE — 3066F PR DOCUMENTATION OF TREATMENT FOR NEPHROPATHY: ICD-10-PCS | Mod: HCNC,CPTII,S$GLB, | Performed by: OPTOMETRIST

## 2023-11-15 PROCEDURE — 92014 PR EYE EXAM, EST PATIENT,COMPREHESV: ICD-10-PCS | Mod: HCNC,S$GLB,, | Performed by: OPTOMETRIST

## 2023-11-15 RX ORDER — GLIPIZIDE 10 MG/1
10 TABLET ORAL
Qty: 90 TABLET | Refills: 1 | Status: SHIPPED | OUTPATIENT
Start: 2023-11-15 | End: 2023-12-20 | Stop reason: SDUPTHER

## 2023-11-15 NOTE — TELEPHONE ENCOUNTER
Refill Decision Note   Graham Caceres  is requesting a refill authorization.  Brief Assessment and Rationale for Refill:  Approve     Medication Therapy Plan:         Comments:     Note composed:12:13 PM 11/15/2023

## 2023-11-15 NOTE — TELEPHONE ENCOUNTER
No care due was identified.  [facility] Embedded Care Due Messages. Reference number: [id number].   11/15/2023 7:42:22 AM CST

## 2023-11-15 NOTE — PROGRESS NOTES
HPI    Graham Caceres is a/an 63 y.o. male who comes in for an ocular health exam.   Pt. States vision is slightly blurry at distance. Pt. States seeing   constant floater in OD.   Pt states his lid is occluding his vision.    (+)blurred vision  (--)Headaches  (--)diplopia  (--)flashes  (+)floaters  (--)pain  (--)Itching  (--)tearing  (--)burning  (+)Dryness  (--) OTC Drops  (+)Photophobia      Last edited by Brooklynn Tejada on 11/15/2023 11:08 AM.            Assessment /Plan     For exam results, see Encounter Report.    Type 2 diabetes mellitus without retinopathy    Type 2 diabetes mellitus with hyperglycemia, without long-term current use of insulin    Senile nuclear cataract, bilateral    Myopia of both eyes with regular astigmatism and presbyopia    Dermatochalasis of both upper eyelids      MONITOR. ED PT ON ALL EXAM FINDINGS  TYPE 2 DM W/O RETINOPATHY OU; CONTINUE WITH PCP FOR GLYCEMIC CONTROL  MILD NS OU; PRESURGICAL OU; MONITOR.  CONTINUE WITH HABITUAL SPECS; GROSSLY STABLE OU; RELATIVELY NEW RX  DERMATOCHALASIS W/ VISUAL INTERACTION; REFER TO OCULOPLASTICS FOR LID CONSULT   RTC  1 YR//PRN FOR REE/DFE

## 2023-11-15 NOTE — TELEPHONE ENCOUNTER
Fwd message to Dr. De Luna for appointment.    ----- Message from Radha Montanez OD sent at 11/15/2023 12:31 PM CST -----  Please schedule with Annamarie for oculoplastics for blepharoplasty consult

## 2023-12-13 PROBLEM — E11.59 HYPERTENSION ASSOCIATED WITH TYPE 2 DIABETES MELLITUS: Status: ACTIVE | Noted: 2023-12-13

## 2023-12-13 PROBLEM — E11.69 HYPERLIPIDEMIA ASSOCIATED WITH TYPE 2 DIABETES MELLITUS: Status: ACTIVE | Noted: 2023-12-13

## 2023-12-13 PROBLEM — I15.2 HYPERTENSION ASSOCIATED WITH TYPE 2 DIABETES MELLITUS: Status: ACTIVE | Noted: 2023-12-13

## 2023-12-13 PROBLEM — E78.5 HYPERLIPIDEMIA ASSOCIATED WITH TYPE 2 DIABETES MELLITUS: Status: ACTIVE | Noted: 2023-12-13

## 2023-12-18 ENCOUNTER — TELEPHONE (OUTPATIENT)
Dept: ADMINISTRATIVE | Facility: CLINIC | Age: 63
End: 2023-12-18
Payer: MEDICARE

## 2023-12-19 ENCOUNTER — TELEPHONE (OUTPATIENT)
Dept: INTERNAL MEDICINE | Facility: CLINIC | Age: 63
End: 2023-12-19
Payer: MEDICARE

## 2023-12-19 NOTE — TELEPHONE ENCOUNTER
----- Message from Afsaneh Stefficynthia sent at 12/18/2023  3:58 PM CST -----  Regarding: REFILL  Good Afternoon ,Called pt to confirm liya and he stated he is out of his 2 diabetes meds. Actos & Glucotrol. He said he's not been able to talk to his ENDO and I couldn't find his staff either to get this message to. He said he fills at this [facility]  [address] [facility] [address]  [address] [address]    Here is his #  [phone].   Thank You

## 2023-12-19 NOTE — TELEPHONE ENCOUNTER
Called pt; pt did not answer    Intent of call to find out which medications needed refills    Left message asking pt to call back

## 2023-12-20 ENCOUNTER — TELEPHONE (OUTPATIENT)
Dept: INTERNAL MEDICINE | Facility: CLINIC | Age: 63
End: 2023-12-20
Payer: MEDICARE

## 2023-12-20 ENCOUNTER — TELEPHONE (OUTPATIENT)
Dept: FAMILY MEDICINE | Facility: CLINIC | Age: 63
End: 2023-12-20
Payer: MEDICARE

## 2023-12-20 DIAGNOSIS — E11.65 UNCONTROLLED TYPE 2 DIABETES MELLITUS WITH HYPERGLYCEMIA: ICD-10-CM

## 2023-12-20 NOTE — TELEPHONE ENCOUNTER
----- Message from Afsaneh Stefficynthia sent at 12/18/2023  3:58 PM CST -----  Regarding: REFILL  Good Afternoon ,Called pt to confirm liya and he stated he is out of his 2 diabetes meds. Actos & Glucotrol. He said he's not been able to talk to his ENDO and I couldn't find his staff either to get this message to. He said he fills at this [facility]  [address] [facility] [address]  [address] [id number]    Here is his #  [phone].   Thank You

## 2023-12-20 NOTE — TELEPHONE ENCOUNTER
Called patient to confirm appt for today. Patient requesting to reschedule. He is out of town and will not make it back for appt time. AWV rescheduled per request.   Christianne Davis, MSN, APRN, FNP-C  Family Medicine  Office [phone]

## 2023-12-21 RX ORDER — PIOGLITAZONEHYDROCHLORIDE 30 MG/1
30 TABLET ORAL DAILY
Qty: 90 TABLET | Refills: 3 | Status: SHIPPED | OUTPATIENT
Start: 2023-12-21 | End: 2024-12-20

## 2023-12-21 RX ORDER — GLIPIZIDE 10 MG/1
10 TABLET ORAL
Qty: 90 TABLET | Refills: 3 | Status: SHIPPED | OUTPATIENT
Start: 2023-12-21 | End: 2024-01-19 | Stop reason: SDUPTHER

## 2023-12-25 DIAGNOSIS — E11.59 HYPERTENSION ASSOCIATED WITH DIABETES: ICD-10-CM

## 2023-12-25 DIAGNOSIS — I15.2 HYPERTENSION ASSOCIATED WITH DIABETES: ICD-10-CM

## 2023-12-26 RX ORDER — LOSARTAN POTASSIUM 50 MG/1
TABLET ORAL
Qty: 90 TABLET | Refills: 3 | Status: SHIPPED | OUTPATIENT
Start: 2023-12-26

## 2023-12-27 ENCOUNTER — TELEPHONE (OUTPATIENT)
Dept: INTERNAL MEDICINE | Facility: CLINIC | Age: 63
End: 2023-12-27
Payer: MEDICARE

## 2023-12-27 NOTE — TELEPHONE ENCOUNTER
----- Message from Lilian Sloan sent at 12/27/2023 10:19 AM CST -----  Patient would like to get a referral.  Referral to what specialty:  Endocrinology since Dr Yen is no longer at [facility] pt stated  Does the patient want the referral with a specific physician:  no  [facility]  Reason (be specific):  Pts sugar is 200  Does the patient already have the specialty clinic appointment scheduled:  no  If yes, what date is the appointment scheduled:     Is the insurance listed in Epic correct? (this is important for a referral):  yes  Advised patient that once provider approves this either a nurse or  will return their call?: yes  Would the patient like a call back, or a response through their [facility] portal?:   Call Back Please  Comments:

## 2024-01-02 NOTE — PROGRESS NOTES
Graham Caceres presented for a  Medicare AWV and comprehensive Health Risk Assessment today. The following components were reviewed and updated:    Medical history  Family History  Social history  Allergies and Current Medications  Health Risk Assessment  Health Maintenance  Care Team         ** See Completed Assessments for Annual Wellness Visit within the encounter summary.**         The following assessments were completed:  Living Situation  CAGE  Depression Screening  Timed Get Up and Go  Whisper Test  Cognitive Function Screening - clock completed correctly, upload did not work and paper discarded  Nutrition Screening  ADL Screening  PAQ Screening      Review for Opioid Screening: Pt does not have Rx for Opioids      Review for Substance Use Disorders: Patient does not use substance        Current Outpatient Medications:     amoxicillin (AMOXIL) 500 MG capsule, Take 500 mg by mouth 3 (three) times daily., Disp: , Rfl:     blood sugar diagnostic (TRUE METRIX GLUCOSE TEST STRIP) Strp, 3 times a day, Disp: 200 strip, Rfl: 3    blood-glucose meter kit, To check BG 2 times daily, to use with insurance preferred meter, Disp: 1 each, Rfl: 0    clotrimazole (LOTRIMIN) 1 % Soln, Apply 4 drops to left ear 2 times daily for 14 days., Disp: 10 mL, Rfl: 1    glipiZIDE (GLUCOTROL) 10 MG tablet, Take 1 tablet (10 mg total) by mouth daily with breakfast., Disp: 90 tablet, Rfl: 3    ibuprofen (ADVIL,MOTRIN) 800 MG tablet, Take 800 mg by mouth every 8 (eight) hours as needed., Disp: , Rfl:     ketoconazole (NIZORAL) 2 % cream, Apply topically once daily., Disp: 1 each, Rfl: 2    lancets Misc, To check BG 2 times daily, to use with insurance preferred meter, Disp: 200 each, Rfl: 3    losartan (COZAAR) 50 MG tablet, TAKE 1 TABLET EVERY DAY, Disp: 90 tablet, Rfl: 3    pioglitazone (ACTOS) 30 MG tablet, Take 1 tablet (30 mg total) by mouth once daily., Disp: 90 tablet, Rfl: 3    rosuvastatin (CRESTOR) 40 MG Tab, TAKE 1 TABLET EVERY  "EVENING, Disp: 90 tablet, Rfl: 3    terbinafine HCL (LAMISIL) 1 % cream, Apply topically 2 (two) times daily., Disp: 28.4 g, Rfl: 5    triamcinolone acetonide 0.1% (KENALOG) 0.1 % cream, Apply topically 2 (two) times daily., Disp: 45 g, Rfl: 1    ketoconazole (NIZORAL) 2 % shampoo, Apply topically twice a week. (Patient not taking: Reported on 1/3/2024), Disp: 120 mL, Rfl: 1       Vitals:    01/03/24 0816   BP: 116/72   Pulse: 102   SpO2: 95%   Weight: 83.5 kg (184 lb)   Height: 5' 7" (1.702 m)   PainSc: 0-No pain      Physical Exam  Vitals and nursing note reviewed.   Constitutional:       General: He is not in acute distress.     Appearance: Normal appearance. He is not ill-appearing.   HENT:      Head: Normocephalic and atraumatic.   Eyes:      General: No scleral icterus.        Right eye: No discharge.         Left eye: No discharge.      Extraocular Movements: Extraocular movements intact.      Conjunctiva/sclera: Conjunctivae normal.      Comments: +glasses   Cardiovascular:      Rate and Rhythm: Normal rate.   Pulmonary:      Effort: Pulmonary effort is normal.   Musculoskeletal:      Cervical back: Normal range of motion.      Right lower leg: No edema.      Left lower leg: No edema.   Skin:     General: Skin is warm and dry.      Findings: No rash.   Neurological:      Mental Status: He is alert and oriented to person, place, and time.   Psychiatric:         Mood and Affect: Mood normal.         Behavior: Behavior normal. Behavior is cooperative.         Cognition and Memory: Cognition and memory normal.               Diagnoses and health risks identified today and associated recommendations/orders:    1. Encounter for preventive health examination  - Chart reviewed. Problem list updated. Discussed current medical diagnosis, current medications, medical/surgical/family/social history; updated provider list; documented vital signs; identified any cognitive impairment; and updated risk factor list. Addressed " any outstanding health maintenance. Provided patient with personalized health advice. Continue to follow up with PCP and any specialists.     2. Hepatic cirrhosis due to chronic hepatitis C infection  Chronic; stable on current treatment plan; follow up with PCP  - follow up with hepatology     3. Hyperlipidemia associated with type 2 diabetes mellitus  Chronic; stable on current treatment plan; follow up with PCP  - referral endocrine - per patient request  - taking glipizide and pioglitazone     4. Hypertension associated with type 2 diabetes mellitus  Chronic; stable on current treatment plan; follow up with PCP  - referral endocrine - per patient request  - taking losartan    5. Type 2 diabetes mellitus without retinopathy  Chronic; stable on current treatment plan; follow up with PCP  - referral endocrine - per patient request  - follow up with optometry    6. NAFLD (nonalcoholic fatty liver disease)  Chronic; stable on current treatment plan; follow up with PCP  - follow up with hepatology     7. Type 2 diabetes mellitus with hyperglycemia, without long-term current use of insulin  Chronic; stable on current treatment plan; follow up with PCP  - referral endocrine - per patient request  - Ambulatory referral/consult to Endocrinology; Future    8. LTBI (latent tuberculosis infection)  Chronic; stable on current treatment plan; follow up with PCP    9. Encounter for Medicare annual wellness exam  - Ambulatory Referral/Consult to Enhanced Annual Wellness Visit (eAWV)    10. BMI 28.0-28.9,adult  - Recommendation for healthy diet and increasing exercise as tolerated with goal of 150min/week . Recommend weight loss      Provided Hsian with a 5-10 year written screening schedule and personal prevention plan. Recommendations were developed using the USPSTF age appropriate recommendations. Education, counseling, and referrals were provided as needed. After Visit Summary printed and given to patient which includes a  list of additional screenings\tests needed.    Follow up in about 1 year (around 1/3/2025) for your next annual wellness visit.    Christianne Davis, FNP-C    Advance Care Planning     I offered to discuss advanced care planning, including how to pick a person who would make decisions for you if you were unable to make them for yourself, called a health care power of , and what kind of decisions you might make such as use of life sustaining treatments such as ventilators and tube feeding when faced with a life limiting illness recorded on a living will that they will need to know. (How you want to be cared for as you near the end of your natural life)     X Patient is interested in learning more about how to make advanced directives.  I provided them paperwork and offered to discuss this with them.

## 2024-01-03 ENCOUNTER — OFFICE VISIT (OUTPATIENT)
Dept: FAMILY MEDICINE | Facility: CLINIC | Age: 64
End: 2024-01-03
Payer: MEDICARE

## 2024-01-03 VITALS
HEART RATE: 102 BPM | SYSTOLIC BLOOD PRESSURE: 116 MMHG | OXYGEN SATURATION: 95 % | WEIGHT: 184 LBS | HEIGHT: 67 IN | BODY MASS INDEX: 28.88 KG/M2 | DIASTOLIC BLOOD PRESSURE: 72 MMHG

## 2024-01-03 DIAGNOSIS — I15.2 HYPERTENSION ASSOCIATED WITH TYPE 2 DIABETES MELLITUS: ICD-10-CM

## 2024-01-03 DIAGNOSIS — K74.60 HEPATIC CIRRHOSIS DUE TO CHRONIC HEPATITIS C INFECTION: ICD-10-CM

## 2024-01-03 DIAGNOSIS — Z22.7 LTBI (LATENT TUBERCULOSIS INFECTION): ICD-10-CM

## 2024-01-03 DIAGNOSIS — Z00.00 ENCOUNTER FOR PREVENTIVE HEALTH EXAMINATION: Primary | ICD-10-CM

## 2024-01-03 DIAGNOSIS — E78.5 HYPERLIPIDEMIA ASSOCIATED WITH TYPE 2 DIABETES MELLITUS: ICD-10-CM

## 2024-01-03 DIAGNOSIS — E11.9 TYPE 2 DIABETES MELLITUS WITHOUT RETINOPATHY: ICD-10-CM

## 2024-01-03 DIAGNOSIS — E11.69 HYPERLIPIDEMIA ASSOCIATED WITH TYPE 2 DIABETES MELLITUS: ICD-10-CM

## 2024-01-03 DIAGNOSIS — E11.65 TYPE 2 DIABETES MELLITUS WITH HYPERGLYCEMIA, WITHOUT LONG-TERM CURRENT USE OF INSULIN: ICD-10-CM

## 2024-01-03 DIAGNOSIS — B18.2 HEPATIC CIRRHOSIS DUE TO CHRONIC HEPATITIS C INFECTION: ICD-10-CM

## 2024-01-03 DIAGNOSIS — K76.0 NAFLD (NONALCOHOLIC FATTY LIVER DISEASE): ICD-10-CM

## 2024-01-03 DIAGNOSIS — Z00.00 ENCOUNTER FOR MEDICARE ANNUAL WELLNESS EXAM: ICD-10-CM

## 2024-01-03 DIAGNOSIS — E11.59 HYPERTENSION ASSOCIATED WITH TYPE 2 DIABETES MELLITUS: ICD-10-CM

## 2024-01-03 PROCEDURE — 3074F SYST BP LT 130 MM HG: CPT | Mod: CPTII,S$GLB,, | Performed by: NURSE PRACTITIONER

## 2024-01-03 PROCEDURE — 99999 PR PBB SHADOW E&M-EST. PATIENT-LVL V: CPT | Mod: PBBFAC,,, | Performed by: NURSE PRACTITIONER

## 2024-01-03 PROCEDURE — 1159F MED LIST DOCD IN RCRD: CPT | Mod: CPTII,S$GLB,, | Performed by: NURSE PRACTITIONER

## 2024-01-03 PROCEDURE — 3078F DIAST BP <80 MM HG: CPT | Mod: CPTII,S$GLB,, | Performed by: NURSE PRACTITIONER

## 2024-01-03 PROCEDURE — 1160F RVW MEDS BY RX/DR IN RCRD: CPT | Mod: CPTII,S$GLB,, | Performed by: NURSE PRACTITIONER

## 2024-01-03 PROCEDURE — G0439 PPPS, SUBSEQ VISIT: HCPCS | Mod: S$GLB,,, | Performed by: NURSE PRACTITIONER

## 2024-01-03 PROCEDURE — 3072F LOW RISK FOR RETINOPATHY: CPT | Mod: CPTII,S$GLB,, | Performed by: NURSE PRACTITIONER

## 2024-01-03 RX ORDER — IBUPROFEN 800 MG/1
800 TABLET ORAL EVERY 8 HOURS PRN
COMMUNITY
Start: 2023-12-29

## 2024-01-03 RX ORDER — AMOXICILLIN 500 MG/1
500 CAPSULE ORAL 3 TIMES DAILY
COMMUNITY
Start: 2023-12-29

## 2024-01-03 NOTE — Clinical Note
I saw this patient for medicare wellness visit. He is scheduled to see you in April. He is requesting to see you sooner as he is concerned about diabetes. He has lab appt in February for Hepatology. It looks like he will be due for HgA1c. Please place orders for any labs you may want for upcoming appt and have your nurse link to lab appt in February. Thank you so much.

## 2024-01-03 NOTE — Clinical Note
This patient is requesting sooner follow up with you due to feeling like vision is blurry and eyes sensitive to lights. thanks

## 2024-01-03 NOTE — PATIENT INSTRUCTIONS
Counseling and Referral of Other Preventative  (Italic type indicates deductible and co-insurance are waived)    Patient Name: Graham Caceres  Today's Date: 1/3/2024    Health Maintenance       Date Due Completion Date    Influenza Vaccine (1) Never done ---    Shingles Vaccine (1 of 2) 01/04/2024 (Originally 1/18/2011) ---    RSV Vaccine (Age 60+ and Pregnant patients) (1 - 1-dose 60+ series) 01/11/2024 (Originally 1/18/2021) ---    COVID-19 Vaccine (4 - 2023-24 season) 01/02/2025 (Originally 9/1/2023) 10/14/2022    Hemoglobin A1c 04/05/2024 10/5/2023    Diabetes Urine Screening 10/05/2024 10/5/2023    Foot Exam 10/05/2024 10/5/2023    Override on 6/10/2022: Done (no loss of protective sensation)    Override on 1/14/2020: Done    Override on 5/25/2018: Done    Override on 5/18/2016: Done    Lipid Panel 10/05/2024 10/5/2023    Eye Exam 11/15/2024 11/15/2023    TETANUS VACCINE 05/18/2026 5/18/2016    Colorectal Cancer Screening 05/19/2026 5/19/2016        No orders of the defined types were placed in this encounter.      The following information is provided to all patients.  This information is to help you find resources for any of the problems found today that may be affecting your health:                  Living healthy guide: www.[facility].[place].gov      Understanding Diabetes: www.diabetes.org      Eating healthy: www.cdc.gov/healthyweight      CDC home safety checklist: www.cdc.gov/steadi/patient.html      Agency on Aging: www.goea.[place].gov      Alcoholics anonymous (AA): www.aa.org      Physical Activity: www.luc.nih.gov/sj6tylz      Tobacco use: www.quitwithusla.org

## 2024-01-19 DIAGNOSIS — E11.65 UNCONTROLLED TYPE 2 DIABETES MELLITUS WITH HYPERGLYCEMIA: ICD-10-CM

## 2024-01-19 RX ORDER — GLIPIZIDE 10 MG/1
10 TABLET ORAL
Qty: 90 TABLET | Refills: 3 | Status: SHIPPED | OUTPATIENT
Start: 2024-01-19 | End: 2024-04-05

## 2024-01-19 RX ORDER — GLIPIZIDE 10 MG/1
10 TABLET ORAL
Qty: 90 TABLET | Refills: 3 | Status: CANCELLED | OUTPATIENT
Start: 2024-01-19

## 2024-01-19 RX ORDER — GLIPIZIDE 10 MG/1
10 TABLET ORAL
Qty: 90 TABLET | Refills: 3 | Status: SHIPPED | OUTPATIENT
Start: 2024-01-19 | End: 2024-01-19 | Stop reason: SDUPTHER

## 2024-01-19 NOTE — TELEPHONE ENCOUNTER
Care Due:                  Date            Visit Type   Department     Provider  --------------------------------------------------------------------------------                                SAME DAY -                              ESTABLISHED   [facility] INTERNAL  Last Visit: 10-      PATIENT      MEDICINE       Ko Franz                              EP -                              PRIMARY      [facility] INTERNAL  Next Visit: 04-      CARE ([facility])   MEDICINE       Ko Franz                                                            Last  Test          Frequency    Reason                     Performed    Due Date  --------------------------------------------------------------------------------    HBA1C.......  6 months...  glipiZIDE, pioglitazone..  10-   04-    [employer] Embedded Care Due Messages. Reference number: [id number].   1/19/2024 4:08:51 PM CST

## 2024-01-19 NOTE — TELEPHONE ENCOUNTER
----- Message from Pau Conti sent at 1/19/2024  3:23 PM CST -----  Contact: Graham (Aleksandar) [phone]  Pt needs a refill on glipiZIDE (GLUCOTROL) 10 MG tablet called into     [facility] - [place], [place] - [address]  [address] [address]  Phone: [phone] Fax: [phone]      Pt mom/dad/guardian can be reached at [phone]      Pt is calling to request to a refill a RX and to say he will bring in a form to the provider to fill and send to [employer]. Please call Aleksandar back for advice.

## 2024-02-22 ENCOUNTER — OFFICE VISIT (OUTPATIENT)
Dept: OPTOMETRY | Facility: CLINIC | Age: 64
End: 2024-02-22
Payer: MEDICARE

## 2024-02-22 DIAGNOSIS — H02.831 DERMATOCHALASIS OF BOTH UPPER EYELIDS: ICD-10-CM

## 2024-02-22 DIAGNOSIS — H53.10 SUBJECTIVE VISUAL DISTURBANCE: Primary | ICD-10-CM

## 2024-02-22 DIAGNOSIS — H02.834 DERMATOCHALASIS OF BOTH UPPER EYELIDS: ICD-10-CM

## 2024-02-22 DIAGNOSIS — H04.123 DRY EYE SYNDROME OF BOTH EYES: ICD-10-CM

## 2024-02-22 DIAGNOSIS — H25.13 SENILE NUCLEAR CATARACT, BILATERAL: ICD-10-CM

## 2024-02-22 PROCEDURE — 99499 UNLISTED E&M SERVICE: CPT | Mod: S$GLB,,, | Performed by: OPTOMETRIST

## 2024-02-22 PROCEDURE — 99999 PR PBB SHADOW E&M-EST. PATIENT-LVL III: CPT | Mod: PBBFAC,,, | Performed by: OPTOMETRIST

## 2024-02-22 RX ORDER — PREDNISOLONE ACETATE 10 MG/ML
1 SUSPENSION/ DROPS OPHTHALMIC 3 TIMES DAILY
Qty: 5 ML | Refills: 1 | Status: SHIPPED | OUTPATIENT
Start: 2024-02-22 | End: 2025-02-21

## 2024-02-22 NOTE — PROGRESS NOTES
HPI    DLS: 11/15/2023 Dr. Montanez    Patient is here today for concerns about ocular health. Patient states   that he has noticed a increase in light sensitivity while in the sun and   has also noticed that the glare is more bothersome now than previously   when watching television. States that va appears glazed OD more than OS   and sort of cloudy and that he isn't sure if it is due to his diabetes or   his pressure. States that he has noticed that the back of his eyes are   sensitive and sometimes have pain OD more than OS. Reports occasional   flashes and floaters but states that he has not see it in a long time.   Last edited by Martha Mason on 2/22/2024 11:06 AM.            Assessment /Plan     For exam results, see Encounter Report.    Subjective visual disturbance    Dry eye syndrome of both eyes    Dermatochalasis of both upper eyelids    Senile nuclear cataract, bilateral      MONITOR. ED PT ON ALL EXAM FINDINGS  HOLD SPECS  MILD SPK OU; RX PF 1 % TID OU; DISCUSSED AT'S PRN   VISUALLY SIGNIFICANT DERMATOCHALASIS UL OU PER PT; REFER FOR BLEPHAROPLASTY CONSULT W/ OCULOPLASTICS   MILD NS OU; MONITOR.   RTC 1-2 WEEKS FOR F/U AND REFRACTION

## 2024-03-06 ENCOUNTER — OFFICE VISIT (OUTPATIENT)
Dept: OPTOMETRY | Facility: CLINIC | Age: 64
End: 2024-03-06
Payer: MEDICARE

## 2024-03-06 DIAGNOSIS — H53.10 SUBJECTIVE VISUAL DISTURBANCE: Primary | ICD-10-CM

## 2024-03-06 DIAGNOSIS — H04.123 DRY EYE SYNDROME OF BOTH EYES: ICD-10-CM

## 2024-03-06 PROCEDURE — 1159F MED LIST DOCD IN RCRD: CPT | Mod: CPTII,S$GLB,, | Performed by: OPTOMETRIST

## 2024-03-06 PROCEDURE — 99999 PR PBB SHADOW E&M-EST. PATIENT-LVL I: CPT | Mod: PBBFAC,,, | Performed by: OPTOMETRIST

## 2024-03-06 PROCEDURE — 92012 INTRM OPH EXAM EST PATIENT: CPT | Mod: S$GLB,,, | Performed by: OPTOMETRIST

## 2024-03-06 NOTE — PROGRESS NOTES
HPI     Ptosis            Laterality: right upper lid    Severity: moderate    Onset: chronic    Duration: years    Course: stable    Comments: Patient reports decreased vision OD due to dermatochalasis with   hooding. Pt reports light sensitivity OU even through transitions, wears   sunglasses over transition spec RX.  Patient notes that he feels like he is looking through film, worse at   night x 3 years OU.  Pt using pred forte gtts bid-tid OU         Last edited by Radha Montanez, OD on 3/6/2024 12:01 PM.            Assessment /Plan     For exam results, see Encounter Report.    Subjective visual disturbance    Dry eye syndrome of both eyes      MONITOR. ED PT ON ALL EXAM FINDINGS  IMPROVED DRYNESS S/S; CONTINUE WITH AT'S. OK TO D/C RX'D GTS  RX FINAL SPECS   RTC 6 MONTHS FOR REE/DFE

## 2024-03-12 ENCOUNTER — TELEPHONE (OUTPATIENT)
Dept: HEPATOLOGY | Facility: CLINIC | Age: 64
End: 2024-03-12
Payer: MEDICARE

## 2024-04-03 ENCOUNTER — OFFICE VISIT (OUTPATIENT)
Dept: DERMATOLOGY | Facility: CLINIC | Age: 64
End: 2024-04-03
Payer: MEDICARE

## 2024-04-03 DIAGNOSIS — L21.9 SEBORRHEIC DERMATITIS: ICD-10-CM

## 2024-04-03 DIAGNOSIS — L30.8 ASTEATOTIC DERMATITIS: Primary | ICD-10-CM

## 2024-04-03 PROCEDURE — 99204 OFFICE O/P NEW MOD 45 MIN: CPT | Mod: S$GLB,,, | Performed by: STUDENT IN AN ORGANIZED HEALTH CARE EDUCATION/TRAINING PROGRAM

## 2024-04-03 PROCEDURE — 1160F RVW MEDS BY RX/DR IN RCRD: CPT | Mod: CPTII,S$GLB,, | Performed by: STUDENT IN AN ORGANIZED HEALTH CARE EDUCATION/TRAINING PROGRAM

## 2024-04-03 PROCEDURE — 1159F MED LIST DOCD IN RCRD: CPT | Mod: CPTII,S$GLB,, | Performed by: STUDENT IN AN ORGANIZED HEALTH CARE EDUCATION/TRAINING PROGRAM

## 2024-04-03 PROCEDURE — 99999 PR PBB SHADOW E&M-EST. PATIENT-LVL II: CPT | Mod: PBBFAC,,, | Performed by: STUDENT IN AN ORGANIZED HEALTH CARE EDUCATION/TRAINING PROGRAM

## 2024-04-03 PROCEDURE — 3072F LOW RISK FOR RETINOPATHY: CPT | Mod: CPTII,S$GLB,, | Performed by: STUDENT IN AN ORGANIZED HEALTH CARE EDUCATION/TRAINING PROGRAM

## 2024-04-03 RX ORDER — CLOBETASOL PROPIONATE 0.5 MG/G
OINTMENT TOPICAL 2 TIMES DAILY
Qty: 60 G | Refills: 2 | Status: SHIPPED | OUTPATIENT
Start: 2024-04-03

## 2024-04-03 RX ORDER — HYDROCORTISONE 25 MG/G
CREAM TOPICAL 2 TIMES DAILY
Qty: 28 G | Refills: 3 | Status: SHIPPED | OUTPATIENT
Start: 2024-04-03

## 2024-04-03 RX ORDER — KETOCONAZOLE 20 MG/ML
SHAMPOO, SUSPENSION TOPICAL
Qty: 240 ML | Refills: 10 | Status: SHIPPED | OUTPATIENT
Start: 2024-04-04

## 2024-04-03 NOTE — PATIENT INSTRUCTIONS
XEROSIS (DRY SKIN)        Definition    Xerosis is the term for dry skin.  We all have a natural oil coating over our skin produced by the skin oil glands.  If this oil is removed, the skin becomes dry which can lead to cracking, which can lead to inflammation.  Xerosis is usually a long-term problem that recurs often, especially in the winter.    Cause    Long hot baths or showers can remove our natural oil and lead to xerosis.  One should never take more than one bath or shower a day and for no longer than ten minutes.  Use of harsh soaps such as Zest, Dial, and Ivory can worsen and cause xerosis.  Cold winter weather worsens xerosis because the amount of moisture contained in cold air is much less than the amount of moisture in warm air.    Treatment    Treatment is intended to restore the natural oil to your skin.  Keep the skin lubricated.    Do not take more than one bath or shower a day.  Use lukewarm water, not hot.  Hot water dries out the skin.    Use a gentle moisturizing soap such as Cetaphil soap, Oil of Olay, Dove, Basis, Ivory moisture care, Restoraderm cleanser.    When toweling dry, dont rub.  Blot the skin so there is still some water left on the skin.  You should apply a moisturizing cream to all of the skin such as Cerave cream, Cetaphil cream, Lipikar Dyer AP+ Intense Repair Moisturizing Cream or Restoraderm or Eucerin Original Formula cream.   Alpha hydroxyacid lotions, i.e., AmLactin, also work very well for preventing dry skin, but may burn when used on inflamed or reddened skin.    If you like to swim during the winter months, you should not use soap when getting out of the pool.  When you have finished swimming, rinse off the chlorine with cool to warm water.  If this will be the only shower of the day, then you may use Cetaphil or another mild soap to cleanse your skin.  After the shower, apply a moisturizing cream to all of the skin as above.        [address] [place] [address]/ [phone] FAX/ [email or web address]       Seborrheic Dermatitis    Seborrheic Dermatitis is inflammation around the hair follicles that occurs in some people. It tends to be chronic and can worsen with changes in weather, stress, and illness. We have certain medications that can help control the inflammation.     Care for your scalp:  Products we like:    Free & Clear Dandruff Shampoo (OTC) à Use this if you have sensitive skin or acne.  Nizoral (Ketoconazole 2% prescription, 1% OTC)  Selsun Blue (OTC)  Head & Shoulders (OTC)  T-Gel (OTC)  T-Sal (OTC)  **MOST IMPORTANT THING IS TO KEEP IN SCALP FOR 3-5 MINUTES BEFORE WASHING OUT**  The products work based on contact time with the scalp. You do not have to wash them into your hair before rinsing out.   Some people may find benefit if they pick 3 shampoos and rotate them.  Can rotate with every use or every week   Example #1: Nizoral on Monday, Selsun Blue on Wednesday, T-Gel on Friday, repeat    Example #2: Nizoral for week 1, Selsun Blue for week 2, T-gel for week 3, repeat   Can use regular shampoo in between   If itch is really bad, can use steroid solution (prescription) daily as needed    Care for your face/ears:  Zinc Pyrithione 2% soap for the face- Vanicream Z- bar or DermaHarmony on [employer].com  Sulfur soap- Joesoef Sulfur Soap available on [employer].con  Can use Ketoconazole 2% cream 2x/day to prevent the rash  If you have redness or itching, can mix Hydrocortisone cream 1% or 2.5% with Ketoconazole in a small bowl, use once/day

## 2024-04-03 NOTE — PROGRESS NOTES
Subjective:      Patient ID:  Graham Caceres is a 64 y.o. male who presents for   Chief Complaint   Patient presents with    Rash     Pt here for rash     Pt also concerned about dry skin on ears and scalp    Rash - Initial  Affected locations: left lower leg  Duration: 3 years  Signs / symptoms: itching and dryness  Relieving factors/Treatments tried: OTC moisturizer and OTC hydrocortisone      Review of Systems   Skin:  Positive for rash.       Objective:   Physical Exam   Constitutional: He appears well-developed and well-nourished.   Neurological: He is alert and oriented to person, place, and time.   Psychiatric: He has a normal mood and affect.   Skin:   Areas Examined (abnormalities noted in diagram):   Scalp / Hair Palpated and Inspected  LLE Inspection Performed                 Diagram Legend     Erythematous scaling macule/papule c/w actinic keratosis       Vascular papule c/w angioma      Pigmented verrucoid papule/plaque c/w seborrheic keratosis      Yellow umbilicated papule c/w sebaceous hyperplasia      Irregularly shaped tan macule c/w lentigo     1-2 mm smooth white papules consistent with Milia      Movable subcutaneous cyst with punctum c/w epidermal inclusion cyst      Subcutaneous movable cyst c/w pilar cyst      Firm pink to brown papule c/w dermatofibroma      Pedunculated fleshy papule(s) c/w skin tag(s)      Evenly pigmented macule c/w junctional nevus     Mildly variegated pigmented, slightly irregular-bordered macule c/w mildly atypical nevus      Flesh colored to evenly pigmented papule c/w intradermal nevus       Pink pearly papule/plaque c/w basal cell carcinoma      Erythematous hyperkeratotic cursted plaque c/w SCC      Surgical scar with no sign of skin cancer recurrence      Open and closed comedones      Inflammatory papules and pustules      Verrucoid papule consistent consistent with wart     Erythematous eczematous patches and plaques     Dystrophic onycholytic nail with  subungual debris c/w onychomycosis     Umbilicated papule    Erythematous-base heme-crusted tan verrucoid plaque consistent with inflamed seborrheic keratosis     Erythematous Silvery Scaling Plaque c/w Psoriasis     See annotation      Assessment / Plan:        Asteatotic dermatitis  - counseled on dry skin care. Hand out provided. [place] bland emollient bid  -     clobetasol 0.05% (TEMOVATE) 0.05 % Oint; Apply topically 2 (two) times daily. Use to affected areas for up to 2 weeks then take a 1 week break or decrease to 3 times weekly. Do not apply to groin or face. Apply to itchy areas on legs and hand  Dispense: 60 g; Refill: 2    Seborrheic dermatitis--scalp and ears  -     ketoconazole (NIZORAL) 2 % shampoo; Apply topically twice a week. Use to wash scalp and ears. Leave on for 5 minutes then wash off  Dispense: 240 mL; Refill: 10  -     hydrocortisone 2.5 % cream; Apply topically 2 (two) times daily. Use to affected areas for up to 2 weeks then take a 1 week break or decrease to 3 times weekly. Apply to dry itchy areas on face and ears  Dispense: 28 g; Refill: 3         Follow up if symptoms worsen or fail to improve.

## 2024-04-05 ENCOUNTER — OFFICE VISIT (OUTPATIENT)
Dept: INTERNAL MEDICINE | Facility: CLINIC | Age: 64
End: 2024-04-05
Payer: MEDICARE

## 2024-04-05 ENCOUNTER — TELEPHONE (OUTPATIENT)
Dept: INTERNAL MEDICINE | Facility: CLINIC | Age: 64
End: 2024-04-05
Payer: MEDICARE

## 2024-04-05 ENCOUNTER — LAB VISIT (OUTPATIENT)
Dept: LAB | Facility: HOSPITAL | Age: 64
End: 2024-04-05
Attending: INTERNAL MEDICINE
Payer: MEDICARE

## 2024-04-05 VITALS
DIASTOLIC BLOOD PRESSURE: 78 MMHG | BODY MASS INDEX: 28.34 KG/M2 | HEART RATE: 90 BPM | OXYGEN SATURATION: 98 % | SYSTOLIC BLOOD PRESSURE: 114 MMHG | WEIGHT: 180.56 LBS | HEIGHT: 67 IN

## 2024-04-05 DIAGNOSIS — B18.2 HEPATIC CIRRHOSIS DUE TO CHRONIC HEPATITIS C INFECTION: ICD-10-CM

## 2024-04-05 DIAGNOSIS — Z92.89 HISTORY OF POSITIVE PPD: ICD-10-CM

## 2024-04-05 DIAGNOSIS — K74.60 HEPATIC CIRRHOSIS DUE TO CHRONIC HEPATITIS C INFECTION: ICD-10-CM

## 2024-04-05 DIAGNOSIS — E11.65 UNCONTROLLED TYPE 2 DIABETES MELLITUS WITH HYPERGLYCEMIA: Primary | ICD-10-CM

## 2024-04-05 DIAGNOSIS — Z22.7 LTBI (LATENT TUBERCULOSIS INFECTION): ICD-10-CM

## 2024-04-05 DIAGNOSIS — E78.2 MIXED HYPERLIPIDEMIA: ICD-10-CM

## 2024-04-05 DIAGNOSIS — E11.65 UNCONTROLLED TYPE 2 DIABETES MELLITUS WITH HYPERGLYCEMIA: ICD-10-CM

## 2024-04-05 DIAGNOSIS — I10 PRIMARY HYPERTENSION: ICD-10-CM

## 2024-04-05 LAB
ANION GAP SERPL CALC-SCNC: 13 MMOL/L (ref 8–16)
BUN SERPL-MCNC: 14 MG/DL (ref 8–23)
CALCIUM SERPL-MCNC: 10.6 MG/DL (ref 8.7–10.5)
CHLORIDE SERPL-SCNC: 100 MMOL/L (ref 95–110)
CO2 SERPL-SCNC: 25 MMOL/L (ref 23–29)
CREAT SERPL-MCNC: 0.8 MG/DL (ref 0.5–1.4)
EST. GFR  (NO RACE VARIABLE): >60 ML/MIN/1.73 M^2
ESTIMATED AVG GLUCOSE: 177 MG/DL (ref 68–131)
GLUCOSE SERPL-MCNC: 202 MG/DL (ref 70–110)
HBA1C MFR BLD: 7.8 % (ref 4–5.6)
POTASSIUM SERPL-SCNC: 4.6 MMOL/L (ref 3.5–5.1)
SODIUM SERPL-SCNC: 138 MMOL/L (ref 136–145)

## 2024-04-05 PROCEDURE — 3072F LOW RISK FOR RETINOPATHY: CPT | Mod: CPTII,S$GLB,, | Performed by: INTERNAL MEDICINE

## 2024-04-05 PROCEDURE — 3051F HG A1C>EQUAL 7.0%<8.0%: CPT | Mod: CPTII,S$GLB,, | Performed by: INTERNAL MEDICINE

## 2024-04-05 PROCEDURE — 99999 PR PBB SHADOW E&M-EST. PATIENT-LVL III: CPT | Mod: PBBFAC,,, | Performed by: INTERNAL MEDICINE

## 2024-04-05 PROCEDURE — 3078F DIAST BP <80 MM HG: CPT | Mod: CPTII,S$GLB,, | Performed by: INTERNAL MEDICINE

## 2024-04-05 PROCEDURE — 3074F SYST BP LT 130 MM HG: CPT | Mod: CPTII,S$GLB,, | Performed by: INTERNAL MEDICINE

## 2024-04-05 PROCEDURE — 80048 BASIC METABOLIC PNL TOTAL CA: CPT | Performed by: INTERNAL MEDICINE

## 2024-04-05 PROCEDURE — 99214 OFFICE O/P EST MOD 30 MIN: CPT | Mod: S$GLB,,, | Performed by: INTERNAL MEDICINE

## 2024-04-05 PROCEDURE — 83036 HEMOGLOBIN GLYCOSYLATED A1C: CPT | Performed by: INTERNAL MEDICINE

## 2024-04-05 PROCEDURE — 3008F BODY MASS INDEX DOCD: CPT | Mod: CPTII,S$GLB,, | Performed by: INTERNAL MEDICINE

## 2024-04-05 PROCEDURE — 36415 COLL VENOUS BLD VENIPUNCTURE: CPT | Performed by: INTERNAL MEDICINE

## 2024-04-05 RX ORDER — ORAL SEMAGLUTIDE 3 MG/1
3 TABLET ORAL DAILY
Qty: 30 TABLET | Refills: 0 | Status: SHIPPED | OUTPATIENT
Start: 2024-04-05 | End: 2024-05-14 | Stop reason: ALTCHOICE

## 2024-04-05 NOTE — TELEPHONE ENCOUNTER
----- Message from Ko Franz MD sent at 4/5/2024 10:40 AM CDT -----  I would like you to start seeing this patient; previously followed by endocrinology (Dr. Yen).

## 2024-04-05 NOTE — PATIENT INSTRUCTIONS
Continue glipizide 10 mg daily.     Start rybelsus (semaglutide) 3 mg daily once it comes in the mail. After 30 days, increase to 7 mg tablet (also should come in mail).     When you receive your rybelsus, STOP your actos (pioglitazone).     Let the office know when you receive your new medicine. [phone] - leave message for Dr. Franz    Labwork today  Labwork in 3 months  Return to clinic in 3 months.

## 2024-04-05 NOTE — PROGRESS NOTES
Subjective:       Patient ID: Graham Caceres is a 64 y.o. male.    Chief Complaint: Follow-up      HPI  Graham Caceres is a 64 y.o. year old male with hypertension, hyperlipidemia, type 2 diabetes, hepatic cirrhosis 2'/2 hep C status post treatment with Harvoni, latent TB s/p isoniazid (2016) presents for annual exam. Doing well.     Review of Systems   Constitutional:  Negative for activity change, appetite change, chills, fatigue, fever and unexpected weight change.   HENT:  Negative for congestion, rhinorrhea and sore throat.    Eyes:  Negative for visual disturbance.   Respiratory:  Negative for shortness of breath.    Cardiovascular:  Negative for chest pain.   Gastrointestinal:  Negative for abdominal pain, diarrhea, nausea and vomiting.   Genitourinary:  Negative for difficulty urinating and dysuria.   Musculoskeletal:  Negative for arthralgias, back pain and myalgias.   Skin:  Negative for color change and rash.   Neurological:  Negative for dizziness, weakness and headaches.         Past Medical History:   Diagnosis Date    Cirrhosis     Diverticulosis     History of hepatitis C, s/p successful Rx w/ SVR24 - 6/2017 4/7/2016    S/p harvoni w/ SVR    Hyperlipidemia 04/2016    Hypertension     Internal hemorrhoid     New onset type 2 diabetes mellitus 4/2016    Positive QuantiFERON-TB Gold test 4/2016    Sebopsoriasis     SIRS (systemic inflammatory response syndrome) 8/11/2018        Prior to Admission medications    Medication Sig Start Date End Date Taking? Authorizing Provider   losartan (COZAAR) 50 MG tablet TAKE 1 TABLET EVERY DAY 12/26/23  Yes Ko Franz MD   rosuvastatin (CRESTOR) 40 MG Tab TAKE 1 TABLET EVERY EVENING 4/27/23  Yes Ko Franz MD   amoxicillin (AMOXIL) 500 MG capsule Take 500 mg by mouth 3 (three) times daily. 12/29/23   Provider, Historical   blood sugar diagnostic (TRUE METRIX GLUCOSE TEST STRIP) Strp 3 times a day 11/22/22   Blayne Allen MD   blood-glucose meter kit To check BG 2  times daily, to use with insurance preferred meter 9/10/20   Jennifer Manzanares MD   clobetasol 0.05% (TEMOVATE) 0.05 % Oint Apply topically 2 (two) times daily. Use to affected areas for up to 2 weeks then take a 1 week break or decrease to 3 times weekly. Do not apply to groin or face. Apply to itchy areas on legs and hand 4/3/24   Rivera Jacobsen MD   clotrimazole (LOTRIMIN) 1 % Soln Apply 4 drops to left ear 2 times daily for 14 days. 1/28/22   Kerri Vasquez DNP, FNP-C   hydrocortisone 2.5 % cream Apply topically 2 (two) times daily. Use to affected areas for up to 2 weeks then take a 1 week break or decrease to 3 times weekly. Apply to dry itchy areas on face and ears 4/3/24   Rivera Jacobsen MD   ibuprofen (ADVIL,MOTRIN) 800 MG tablet Take 800 mg by mouth every 8 (eight) hours as needed. 12/29/23   Sarita, Rudy   ketoconazole (NIZORAL) 2 % cream Apply topically once daily. 1/26/22   Cleo Bolaños FNP   ketoconazole (NIZORAL) 2 % shampoo Apply topically twice a week. Use to wash scalp and ears. Leave on for 5 minutes then wash off 4/4/24   Rivera Jacobsen MD   lancets Misc To check BG 2 times daily, to use with insurance preferred meter 9/28/20   Jennifer Manzanares MD   pioglitazone (ACTOS) 30 MG tablet Take 1 tablet (30 mg total) by mouth once daily. 12/21/23 12/20/24  Ko Franz MD   prednisoLONE acetate (PRED FORTE) 1 % DrpS Place 1 drop into both eyes 3 (three) times daily. 2/22/24 2/21/25  Radha Montanez OD   semaglutide (RYBELSUS) 3 mg tablet Take 1 tablet (3 mg total) by mouth once daily. 4/5/24 5/5/24  Ko Franz MD   semaglutide (RYBELSUS) 7 mg tablet Take 1 tablet (7 mg total) by mouth once daily. 5/6/24   Ko Franz MD   terbinafine HCL (LAMISIL) 1 % cream Apply topically 2 (two) times daily. 10/5/23   Ko Franz MD   triamcinolone acetonide 0.1% (KENALOG) 0.1 % cream Apply topically 2 (two) times daily. 10/5/23   Ko Franz MD   glipiZIDE (GLUCOTROL) 10 MG tablet Take  "1 tablet (10 mg total) by mouth daily with breakfast. 1/19/24 4/5/24  Ko Franz MD        Past medical history, surgical history, and family medical history reviewed and updated as appropriate.    Medications and allergies reviewed.     Objective:          Vitals:    04/05/24 1004   BP: 114/78   BP Location: Right arm   Patient Position: Sitting   BP Method: Medium (Manual)   Pulse: 90   SpO2: 98%   Weight: 81.9 kg (180 lb 8.9 oz)   Height: 5' 7" (1.702 m)     Body mass index is 28.28 kg/m².  Physical Exam  Constitutional:       General: He is not in acute distress.     Appearance: He is well-developed.   HENT:      Head: Normocephalic and atraumatic.      Nose: Nose normal.   Eyes:      General: No scleral icterus.     Extraocular Movements: Extraocular movements intact.   Neck:      Thyroid: No thyromegaly.      Vascular: No JVD.      Trachea: No tracheal deviation.   Cardiovascular:      Rate and Rhythm: Normal rate and regular rhythm.      Heart sounds: Normal heart sounds. No murmur heard.     No friction rub. No gallop.   Pulmonary:      Effort: Pulmonary effort is normal. No respiratory distress.      Breath sounds: Normal breath sounds. No wheezing or rales.   Abdominal:      General: Bowel sounds are normal. There is no distension.      Palpations: Abdomen is soft. There is no mass.      Tenderness: There is no abdominal tenderness.   Musculoskeletal:         General: No tenderness. Normal range of motion.      Cervical back: Normal range of motion and neck supple.   Lymphadenopathy:      Cervical: No cervical adenopathy.   Skin:     General: Skin is warm and dry.      Findings: No rash.   Neurological:      Mental Status: He is alert and oriented to person, place, and time.      Cranial Nerves: No cranial nerve deficit.      Deep Tendon Reflexes: Reflexes normal.   Psychiatric:         Behavior: Behavior normal.         Lab Results   Component Value Date    WBC 6.69 10/05/2023    HGB 16.0 10/05/2023    " HCT 48.4 10/05/2023     10/05/2023    CHOL 310 (H) 10/05/2023    TRIG 259 (H) 10/05/2023    HDL 44 10/05/2023    ALT 47 (H) 10/05/2023    AST 30 10/05/2023     10/05/2023    K 4.5 10/05/2023     10/05/2023    CREATININE 0.8 10/05/2023    BUN 12 10/05/2023    CO2 26 10/05/2023    TSH 1.830 10/05/2023    INR 1.0 08/04/2022    HGBA1C 7.3 (H) 10/05/2023       Assessment:       1. Uncontrolled type 2 diabetes mellitus with hyperglycemia    2. Primary hypertension    3. Mixed hyperlipidemia    4. History of positive PPD    5. LTBI (latent tuberculosis infection)    6. Hepatic cirrhosis due to chronic hepatitis C infection          Plan:     Graham was seen today for follow-up.    Diagnoses and all orders for this visit:    Graham was seen today for follow-up.    Diagnoses and all orders for this visit:    Uncontrolled type 2 diabetes mellitus with hyperglycemia  Comments:  switch actos to rybelsus. referral to establish with diabetes NP  Orders:  -     HEMOGLOBIN A1C; Future  -     BASIC METABOLIC PANEL; Future  -     semaglutide (RYBELSUS) 3 mg tablet; Take 1 tablet (3 mg total) by mouth once daily.  -     semaglutide (RYBELSUS) 7 mg tablet; Take 1 tablet (7 mg total) by mouth once daily.  -     Hemoglobin A1C; Future  -     Comprehensive Metabolic Panel; Future    Primary hypertension  Comments:  controlled on losartan 50    Mixed hyperlipidemia  Comments:  controlled on rosvasatin 40    History of positive PPD    LTBI (latent tuberculosis infection)  Comments:  treated 2016 with isoniazid    Hepatic cirrhosis due to chronic hepatitis C infection  Comments:  s/p treatment with harvoni, follows with hepatology    Patient to establish with diabetes NP as his previous endocrinologist is no longer was [facility].  Plan to start back dose and start ramipril serous if insurance covers.    Health maintenance reviewed with patient.    Follow up in about 3 months (around 7/5/2024).    Ko Franz MD  Internal  Medicine / Primary Care  [facility]  4/5/2024

## 2024-04-19 PROBLEM — E78.5 HYPERLIPIDEMIA: Status: RESOLVED | Noted: 2018-08-11 | Resolved: 2024-04-19

## 2024-04-19 NOTE — PROGRESS NOTES
CHIEF COMPLAINT: Type 2 Diabetes     HPI: Mr. Graham Caceres is a 64 y.o. male who was diagnosed with Type 2 DM in 2006.   H/o cirrhosis, latent tb, hyperlipidemia, hep C, HTN, NAFLD.    On insulin at one point.  Being seen by me for the first time.  Lab Results   Component Value Date    HGBA1C 7.8 (H) 04/05/2024   Accompanied by wife.   Seen by Dr. Yovana Pugh.  Eating more fela noodle  Eats apples, night time- blackberries w/ brown sugar  F/u with hepatology  1 cup of milk at night    mg/dl, then 190s mg/dl   Stir flores-sauce, black bean sauce  Tacos hot sauce  Crawfish, sausage  Water- 5 bottles/day   Exercise:   Occ walking q other day 1 -3 blocks     About to join [employer]  Sleep -not the best, back issues, pain     1st meal 8-9a  2nd meal 5p -6p  Coffee or water mid day   Diet coke    PREVIOUS DIABETES MEDICATIONS TRIED  Januvia  Rybelsus  Actos     CURRENT DIABETIC MEDS: rybelsus 3 mg , stopped actos r/t ses/black box warning, glipizide 10 mg w/ breakfast  Makes frequent changes to his/her insulin regimen on the basis of blood glucose data  Testing 2 x a day  Patient is willing and able to use the device  Demonstrated an understanding of the technology and is motivated to use CGM  Patient expected to adhere to a comprehensive diabetes treatment plan and patient has adequate medical supervision  Patient experiences recurrent, unexplained, severe (lows of <50 mg/dl) hypoglycemia   Has multiple impaired awareness of hypoglycemia (hypoglycemia unawareness)      Diabetes Management Status    Statin: Taking  ACE/ARB: Taking    Screening or Prevention Patient's value Goal Complete/Controlled?   HgA1C Testing and Control   Lab Results   Component Value Date    HGBA1C 7.8 (H) 04/05/2024      Annually/Less than 8% Yes   Lipid profile : 10/05/2023 Annually Yes   LDL control Lab Results   Component Value Date    LDLCALC 214.2 (H) 10/05/2023    Annually/Less than 100 mg/dl  No   Nephropathy screening Lab Results  "  Component Value Date    LABMICR 18.0 10/05/2023     Lab Results   Component Value Date    PROTEINUA Negative 11/16/2020    Annually Yes   Blood pressure BP Readings from Last 1 Encounters:   04/05/24 114/78    Less than 140/90 Yes   Dilated retinal exam : 11/15/2023 Annually Yes   Foot exam   : 10/05/2023 Annually Yes     REVIEW OF SYSTEMS  General: no weakness, fatigue, or weight changes.   Eyes: no double or blurred vision, eye pain, or redness  Cardiovascular: no chest pain, palpitations, edema, or murmurs.   Respiratory: no cough or dyspnea.   GI: no heartburn, nausea, or changes in bowel patterns; good appetite.   Skin: no rashes, dryness, itching, or reactions at insulin injection sites.  Neuro: no numbness, tingling, tremors, or vertigo.   Endocrine: no polyuria, polydipsia, polyphagia, heat or cold intolerance.     Vital Signs  BP (!) 144/88 (BP Location: Left arm, Patient Position: Sitting, BP Method: Medium (Manual))   Pulse 100   Ht 5' 7" (1.702 m)   Wt 82.2 kg (181 lb 3.5 oz)   SpO2 97%   BMI 28.38 kg/m²     Hemoglobin A1C   Date Value Ref Range Status   04/05/2024 7.8 (H) 4.0 - 5.6 % Final     Comment:     ADA Screening Guidelines:  5.7-6.4%  Consistent with prediabetes  >or=6.5%  Consistent with diabetes    High levels of fetal hemoglobin interfere with the HbA1C  assay. Heterozygous hemoglobin variants (HbS, HgC, etc)do  not significantly interfere with this assay.   However, presence of multiple variants may affect accuracy.     10/05/2023 7.3 (H) 4.0 - 5.6 % Final     Comment:     ADA Screening Guidelines:  5.7-6.4%  Consistent with prediabetes  >or=6.5%  Consistent with diabetes    High levels of fetal hemoglobin interfere with the HbA1C  assay. Heterozygous hemoglobin variants (HbS, HgC, etc)do  not significantly interfere with this assay.   However, presence of multiple variants may affect accuracy.     02/06/2023 7.2 (H) 4.0 - 5.6 % Final     Comment:     ADA Screening " Guidelines:  5.7-6.4%  Consistent with prediabetes  >or=6.5%  Consistent with diabetes    High levels of fetal hemoglobin interfere with the HbA1C  assay. Heterozygous hemoglobin variants (HbS, HgC, etc)do  not significantly interfere with this assay.   However, presence of multiple variants may affect accuracy.          Chemistry        Component Value Date/Time     04/05/2024 1111    K 4.6 04/05/2024 1111     04/05/2024 1111    CO2 25 04/05/2024 1111    BUN 14 04/05/2024 1111    CREATININE 0.8 04/05/2024 1111     (H) 04/05/2024 1111        Component Value Date/Time    CALCIUM 10.6 (H) 04/05/2024 1111    ALKPHOS 61 10/05/2023 1212    AST 30 10/05/2023 1212    ALT 47 (H) 10/05/2023 1212    BILITOT 0.8 10/05/2023 1212    ESTGFRAFRICA >60.0 02/04/2022 0930    EGFRNONAA >60.0 02/04/2022 0930           Lab Results   Component Value Date    TSH 1.830 10/05/2023      Lab Results   Component Value Date    CHOL 310 (H) 10/05/2023    CHOL 188 01/26/2022    CHOL 221 (H) 08/17/2020     Lab Results   Component Value Date    HDL 44 10/05/2023    HDL 44 01/26/2022    HDL 45 08/17/2020     Lab Results   Component Value Date    LDLCALC 214.2 (H) 10/05/2023    LDLCALC 101.8 01/26/2022    LDLCALC 136.0 08/17/2020     Lab Results   Component Value Date    TRIG 259 (H) 10/05/2023    TRIG 211 (H) 01/26/2022    TRIG 200 (H) 08/17/2020     Lab Results   Component Value Date    CHOLHDL 14.2 (L) 10/05/2023    CHOLHDL 23.4 01/26/2022    CHOLHDL 20.4 08/17/2020         PHYSICAL EXAMINATION  Constitutional: Appears well, no distress Reviewed vitals above.  Eyes: conjunctivae & lids intact; PERRLA, EOMs intact; optic discs   Neck: Supple, trachea midline.   Respiratory: No wheezes, even and unlabored  Cardiovascular: RRR; no edema or varicosities  Lymph: no lymphadenopathy palpated  Skin: warm and dry; no injection site reactions, no acanthosis nigracans observed.  Neuro:patient alert and cooperative, normal affect; steady  gait.  Psychiatric: judgement & insight intact, orientation of time, place & person intact, memory; mood & affect wnl     Diabetes Foot Exam: deferred     Assessment/Plan    1. Type 2 diabetes mellitus without retinopathy  Hemoglobin A1C      2. Overweight (BMI 25.0-29.9)        3. Non compliance w medication regimen        4. Hypertension associated with type 2 diabetes mellitus        5. Hyperlipidemia associated with type 2 diabetes mellitus        6. Hepatic cirrhosis due to chronic hepatitis C infection        7. Adenomyomatosis of gallbladder        8. Chronic midline low back pain without sciatica  Ambulatory referral/consult to Medical Massage Therapy        1-8. Follow up in 4 months w/ me   A1c prior -4 months   Body mass index is 28.38 kg/m².  May increase insulin resistance   Stop rybelsus   Add ozempic 0.5 mg weekly -pt assistance program  Decrease glipizide 5 mg xr daily w/ breakfast  Lab Results   Component Value Date    LDLCALC 214.2 (H) 10/05/2023   On rosuvastatin at night   Above goal   F/u with hepatology   Discussed dietary habits, stressors   Reach out to patient assistance program   Change to coke zero    FOLLOW UP  In 4 months

## 2024-04-22 ENCOUNTER — OFFICE VISIT (OUTPATIENT)
Dept: INTERNAL MEDICINE | Facility: CLINIC | Age: 64
End: 2024-04-22
Payer: MEDICARE

## 2024-04-22 VITALS
HEART RATE: 100 BPM | OXYGEN SATURATION: 97 % | WEIGHT: 181.19 LBS | BODY MASS INDEX: 28.44 KG/M2 | SYSTOLIC BLOOD PRESSURE: 128 MMHG | DIASTOLIC BLOOD PRESSURE: 88 MMHG | HEIGHT: 67 IN

## 2024-04-22 DIAGNOSIS — G89.29 CHRONIC MIDLINE LOW BACK PAIN WITHOUT SCIATICA: ICD-10-CM

## 2024-04-22 DIAGNOSIS — E11.59 HYPERTENSION ASSOCIATED WITH TYPE 2 DIABETES MELLITUS: ICD-10-CM

## 2024-04-22 DIAGNOSIS — I15.2 HYPERTENSION ASSOCIATED WITH TYPE 2 DIABETES MELLITUS: ICD-10-CM

## 2024-04-22 DIAGNOSIS — E66.3 OVERWEIGHT (BMI 25.0-29.9): ICD-10-CM

## 2024-04-22 DIAGNOSIS — Z91.148 NON COMPLIANCE W MEDICATION REGIMEN: ICD-10-CM

## 2024-04-22 DIAGNOSIS — E11.9 TYPE 2 DIABETES MELLITUS WITHOUT RETINOPATHY: Primary | ICD-10-CM

## 2024-04-22 DIAGNOSIS — E11.69 HYPERLIPIDEMIA ASSOCIATED WITH TYPE 2 DIABETES MELLITUS: ICD-10-CM

## 2024-04-22 DIAGNOSIS — D13.5 ADENOMYOMATOSIS OF GALLBLADDER: ICD-10-CM

## 2024-04-22 DIAGNOSIS — B18.2 HEPATIC CIRRHOSIS DUE TO CHRONIC HEPATITIS C INFECTION: ICD-10-CM

## 2024-04-22 DIAGNOSIS — E78.5 HYPERLIPIDEMIA ASSOCIATED WITH TYPE 2 DIABETES MELLITUS: ICD-10-CM

## 2024-04-22 DIAGNOSIS — M54.50 CHRONIC MIDLINE LOW BACK PAIN WITHOUT SCIATICA: ICD-10-CM

## 2024-04-22 DIAGNOSIS — K74.60 HEPATIC CIRRHOSIS DUE TO CHRONIC HEPATITIS C INFECTION: ICD-10-CM

## 2024-04-22 PROCEDURE — 3051F HG A1C>EQUAL 7.0%<8.0%: CPT | Mod: CPTII,S$GLB,, | Performed by: NURSE PRACTITIONER

## 2024-04-22 PROCEDURE — 99999 PR PBB SHADOW E&M-EST. PATIENT-LVL IV: CPT | Mod: PBBFAC,,, | Performed by: NURSE PRACTITIONER

## 2024-04-22 PROCEDURE — 1160F RVW MEDS BY RX/DR IN RCRD: CPT | Mod: CPTII,S$GLB,, | Performed by: NURSE PRACTITIONER

## 2024-04-22 PROCEDURE — 99214 OFFICE O/P EST MOD 30 MIN: CPT | Mod: S$GLB,,, | Performed by: NURSE PRACTITIONER

## 2024-04-22 PROCEDURE — 1159F MED LIST DOCD IN RCRD: CPT | Mod: CPTII,S$GLB,, | Performed by: NURSE PRACTITIONER

## 2024-04-22 PROCEDURE — 3008F BODY MASS INDEX DOCD: CPT | Mod: CPTII,S$GLB,, | Performed by: NURSE PRACTITIONER

## 2024-04-22 PROCEDURE — 3077F SYST BP >= 140 MM HG: CPT | Mod: CPTII,S$GLB,, | Performed by: NURSE PRACTITIONER

## 2024-04-22 PROCEDURE — 3072F LOW RISK FOR RETINOPATHY: CPT | Mod: CPTII,S$GLB,, | Performed by: NURSE PRACTITIONER

## 2024-04-22 PROCEDURE — 3079F DIAST BP 80-89 MM HG: CPT | Mod: CPTII,S$GLB,, | Performed by: NURSE PRACTITIONER

## 2024-04-22 RX ORDER — GLIPIZIDE 5 MG/1
5 TABLET, FILM COATED, EXTENDED RELEASE ORAL
Qty: 90 TABLET | Refills: 3 | Status: SHIPPED | OUTPATIENT
Start: 2024-04-22 | End: 2025-04-22

## 2024-04-22 RX ORDER — ROSUVASTATIN CALCIUM 40 MG/1
40 TABLET, COATED ORAL NIGHTLY
Qty: 90 TABLET | Refills: 3 | Status: SHIPPED | OUTPATIENT
Start: 2024-04-22

## 2024-04-22 RX ORDER — SEMAGLUTIDE 0.68 MG/ML
0.5 INJECTION, SOLUTION SUBCUTANEOUS
Start: 2024-04-22 | End: 2024-05-14

## 2024-04-22 NOTE — PATIENT INSTRUCTIONS
Follow up in 4 months w/Irielle   A1c in 4 months  Cancel 5/9/24 appt with [facility] endocrinology   Medical [facility] referral 2024    Lab Results   Component Value Date    HGBA1C 7.8 (H) 04/05/2024     Goal less than 7%    [email or web address]  Eat fit liya  Myfitnesspal liya  [email or web address]    mySugr liya     Ozempic 0.5 mg weekly  Stop rybelsus   Add glipzide 5 mg xr w/ breakfast   Stop actos

## 2024-05-14 ENCOUNTER — HOSPITAL ENCOUNTER (OUTPATIENT)
Dept: RADIOLOGY | Facility: HOSPITAL | Age: 64
Discharge: HOME | End: 2024-05-14
Attending: NURSE PRACTITIONER
Payer: MEDICARE

## 2024-05-14 ENCOUNTER — PROCEDURE VISIT (OUTPATIENT)
Dept: HEPATOLOGY | Facility: CLINIC | Age: 64
End: 2024-05-14
Payer: MEDICARE

## 2024-05-14 ENCOUNTER — OFFICE VISIT (OUTPATIENT)
Dept: HEPATOLOGY | Facility: CLINIC | Age: 64
End: 2024-05-14
Payer: MEDICARE

## 2024-05-14 VITALS — WEIGHT: 177.5 LBS | BODY MASS INDEX: 27.86 KG/M2 | HEIGHT: 67 IN

## 2024-05-14 DIAGNOSIS — Z86.19 HISTORY OF HEPATITIS C: Primary | ICD-10-CM

## 2024-05-14 DIAGNOSIS — E66.3 OVERWEIGHT (BMI 25.0-29.9): ICD-10-CM

## 2024-05-14 DIAGNOSIS — K74.00 LIVER FIBROSIS: ICD-10-CM

## 2024-05-14 DIAGNOSIS — Z86.19 HISTORY OF HEPATITIS C: ICD-10-CM

## 2024-05-14 DIAGNOSIS — K76.0 NAFLD (NONALCOHOLIC FATTY LIVER DISEASE): ICD-10-CM

## 2024-05-14 DIAGNOSIS — E11.69 HYPERLIPIDEMIA ASSOCIATED WITH TYPE 2 DIABETES MELLITUS: ICD-10-CM

## 2024-05-14 DIAGNOSIS — R76.8 HEPATITIS B CORE ANTIBODY POSITIVE: ICD-10-CM

## 2024-05-14 DIAGNOSIS — E11.59 HYPERTENSION ASSOCIATED WITH TYPE 2 DIABETES MELLITUS: ICD-10-CM

## 2024-05-14 DIAGNOSIS — E78.5 HYPERLIPIDEMIA ASSOCIATED WITH TYPE 2 DIABETES MELLITUS: ICD-10-CM

## 2024-05-14 DIAGNOSIS — E78.2 MIXED HYPERLIPIDEMIA: ICD-10-CM

## 2024-05-14 DIAGNOSIS — I15.2 HYPERTENSION ASSOCIATED WITH TYPE 2 DIABETES MELLITUS: ICD-10-CM

## 2024-05-14 DIAGNOSIS — R74.8 ELEVATED LIVER ENZYMES: ICD-10-CM

## 2024-05-14 DIAGNOSIS — E11.9 TYPE 2 DIABETES MELLITUS WITHOUT RETINOPATHY: ICD-10-CM

## 2024-05-14 PROCEDURE — 99999 PR PBB SHADOW E&M-EST. PATIENT-LVL III: CPT | Mod: PBBFAC,,, | Performed by: NURSE PRACTITIONER

## 2024-05-14 PROCEDURE — 76700 US EXAM ABDOM COMPLETE: CPT | Mod: 26,,, | Performed by: INTERNAL MEDICINE

## 2024-05-14 PROCEDURE — 91200 LIVER ELASTOGRAPHY: CPT | Mod: S$GLB,,, | Performed by: NURSE PRACTITIONER

## 2024-05-14 PROCEDURE — 76700 US EXAM ABDOM COMPLETE: CPT | Mod: TC

## 2024-05-14 PROCEDURE — 1159F MED LIST DOCD IN RCRD: CPT | Mod: CPTII,S$GLB,, | Performed by: NURSE PRACTITIONER

## 2024-05-14 PROCEDURE — 3051F HG A1C>EQUAL 7.0%<8.0%: CPT | Mod: CPTII,S$GLB,, | Performed by: NURSE PRACTITIONER

## 2024-05-14 PROCEDURE — 3072F LOW RISK FOR RETINOPATHY: CPT | Mod: CPTII,S$GLB,, | Performed by: NURSE PRACTITIONER

## 2024-05-14 PROCEDURE — 3008F BODY MASS INDEX DOCD: CPT | Mod: CPTII,S$GLB,, | Performed by: NURSE PRACTITIONER

## 2024-05-14 PROCEDURE — 99214 OFFICE O/P EST MOD 30 MIN: CPT | Mod: S$GLB,,, | Performed by: NURSE PRACTITIONER

## 2024-05-14 PROCEDURE — 1160F RVW MEDS BY RX/DR IN RCRD: CPT | Mod: CPTII,S$GLB,, | Performed by: NURSE PRACTITIONER

## 2024-05-14 RX ORDER — ORAL SEMAGLUTIDE 7 MG/1
7 TABLET ORAL DAILY
COMMUNITY
Start: 2024-05-14

## 2024-05-14 NOTE — PROGRESS NOTES
HEPATOLOGY CLINIC VISIT NOTE  CHIEF COMPLAINT: Follow up    HISTORY     This is a 64 y.o.   male with a history of liver fibrosis/? well compensated HCV cirrhosis (treated / cured) as well as NAFLD/SANDOVAL, here for follow up.    Interval history:  U/S last year showed hepatomegaly, due to fatty infiltration of the liver, with no liver lesions or ascites, and stable 3 mm gallbladder polyp versus stone. US today is pending.  LFT's and AFP remain normal/stable. ALT mildly elevated (47), in 10/2023 due to NAFLD/SANDOVAL. LFT's today are pending.  Fibroscan today is again suggestive of F2 (moderate) fibrosis and a low likelihood of cirrhosis. He has lost 7 lbs since January, through dietary changes and steatosis is improved on today's Fibroscan.    Feels well  Current symptoms of hepatic decompensation:  Ascites:              none  TBili elevation:   none  HE:                    none  EV bleed:          none    Cirrhosis history:  FibroScan 4/28/16 - kPA 17.3, F4   (Labs / imaging / pretreatment APRI / FIB-4 all consistent w/ advanced fibrosis)    Cirrhosis maintenance:  HCC screening - Up to date.  Varices screening - EGD 5/2016 Dr Soto - no varices     Fatty Liver:  - steatosis on imaging  - (+) uncontrolled DM and Hyperlipidemia w/ hx of medication non adherence but states that he is now taking medications as prescribed. Followed by Endocrinology.  - Last HgbA1c was worsened at 7.8% (4/2024). He is now on Rybelsus and Glipizide; he is scheduled to have HgbA1c repeated this summer. BMI 27     HCV history:  Completed 24 weeks harvoni w/ SVR12 as of 2/2017 (treated / cured)  - genotype 1B  - Prior HCV tx w/ Dr Laurent: PegIFN + RBV (10-15 yrs ago) for few months - nonresponder    PMH, PSH, PROBLEM LIST, SOCIAL HX, FAMILY HX, MEDS, ALLERGIES   Reviewed in Epic  FAMILY HX: neg for liver diease  SOCIAL HISTORY: Moved from [facility] [place] during teenage years. Resides in [place]. . 1 daughter from prior marriage  Not working,  "on disability. Worked at [employer] many years ago  Alcohol - none  Tobacco - none  Drugs - none    ROS:   Review of Systems   Constitutional:  Negative for fever and malaise/fatigue.   Cardiovascular:  Negative for chest pain and leg swelling.   Gastrointestinal:  Negative for abdominal pain and melena.   Skin:  Negative for rash.   Psychiatric/Behavioral:  Negative for memory loss.      PHYSICAL EXAM:  Friendly   male, in no acute distress; alert and oriented to person, place and time  VITALS: Ht 5' 7" (1.702 m)   Wt 80.5 kg (177 lb 7.5 oz)   BMI 27.80 kg/m²   HEENT: Sclerae anicteric.   LUNGS: Normal respiratory effort.  ABDOMEN: Flat, soft, nontender.   SKIN: Warm and dry. No jaundice, No obvious rashes.   EXTREMITIES: No lower extremity edema.  NEURO/PSYCH: Normal gait. Memory intact. Thought and speech pattern appropriate. Behavior normal. No depression or anxiety noted.    PERTINENT DIAGNOSTIC RESULTS     Lab Results   Component Value Date    WBC 6.69 10/05/2023    HGB 16.0 10/05/2023     10/05/2023     Lab Results   Component Value Date    INR 1.0 2022     Lab Results   Component Value Date    AST 30 10/05/2023    ALT 47 (H) 10/05/2023    BILITOT 0.8 10/05/2023    ALBUMIN 4.5 10/05/2023    ALKPHOS 61 10/05/2023    CREATININE 0.8 2024    BUN 14 2024     2024    K 4.6 2024    AFP 3.1 10/05/2023     FIBROSCAN 2016:    Fibroscan readin.3 KPa     IQR/med:10 %     Fibrosis:F4      US Abdomen Complete  Narrative: EXAMINATION:  US ABDOMEN COMPLETE    CLINICAL HISTORY:  Chronic viral hepatitis C    TECHNIQUE:  Complete abdominal ultrasound (including pancreas, aorta, liver, gallbladder, common bile duct, IVC, kidneys, and spleen) was performed.    COMPARISON:  Abdominal ultrasound 2022, 2022.    FINDINGS:  Pancreas: The visualized portions of pancreas appear normal.    Aorta: No aneurysm.    Liver: 19.6 cm, enlarged.  Diffusely " increased parenchymal echogenicity consistent with steatosis.   Hepatorenal index measures 1.7.  Geographic region of relative parenchymal hypoechogenicity near the gallbladder fossa, likely focal fatty sparing.  No suspicious focal lesions.    Gallbladder: 3 mm non mobile echogenic focus, likely small polyp versus adherent stone.  Prior adenomyomatosis is not well visualized on today's exam.  No calculi, wall thickening, or pericholecystic fluid.  Negative sonographic Mason's sign.    Biliary system: 4 mm common bile duct.  No intrahepatic ductal dilatation.    Inferior vena cava: Normal in appearance.    Right kidney: 12.8 cm. No hydronephrosis.    Left kidney: 13.1 cm. No hydronephrosis.    Spleen: 10.9 x 3.8 cm.  Normal in size with homogeneous echotexture.    Miscellaneous: No ascites.  Impression: Hepatomegaly with steatosis and focal fatty sparing about the gallbladder fossa.    Stable 3 mm gallbladder polyp versus adherent stone.    Electronically signed by resident: Jean Soler  Date:    02/09/2023  Time:    10:21    Electronically signed by: Harris Littlejohn MD  Date:    02/09/2023  Time:    10:29    FIBROSCAN 2/16/2023:    Findings  Median liver stiffness score:  7.6  CAP Reading: dB/m:  350     IQR/med %:  16  Interpretation  Fibrosis interpretation is based on medial liver stiffness - Kilopascal (kPa).     Fibrosis Stage:  F2  Steatosis interpretation is based on controlled attenuation parameter - (dB/m).     Steatosis Grade:  S3    ASSESSMENT     64 y.o.   male  1.LIVER FIBROSIS/CIRRHOSIS   -- HCC screening - up to date - continue with annual surveillance  -- EGD 5/2016 varices screening - showed no varices     2. HISTORY OF HEPATITIS C, GENOTYPE 1B - (treated / cured SVR12 2/2017)  -- completed 24weeks of Harvoni  -- (+) Immunity to HAV   -- prior resolved HBV     3. MILD TRANSAMINASE ELEVATION, likely due to NAFLD/SANDOVAL  -- serologic evaluation for other causes of elevated transaminases has  been unyielding     4. HYPERLIPIDEMIA - on statin  5. Uncontrolled DM - worsened- last HgbA1c was 7.8%  6. BMI 27    PLAN     - Recommend Fibroscan in 2 years to non-invasively restage liver disease.  - Monitor LFT's every 6-12 months (can be done by PCP and Endocrinology).   - Recommend abdominal ultrasound with AFP measurement annually for HCC surveillance, next due 5/2025.  - Recommend additional weight loss of 15-20 pounds, through diet and exercise.  - Continue close follow up with PCP and Endocrinology.  - Avoid alcohol and herbal supplements/alternative remedies.  - Return to clinic in 1 year.  _____________________________________________________________    Duration of encounter: 30 min  This includes face-to-face time and non face-to-face time preparing to see the patient (eg, review of tests), obtaining and/or reviewing separately obtained history, documenting clinical information in the electronic or other health record, independently interpreting resultsand communicating results to the patient/family/caregiver, or care coordination.       Hepatology Nurse Practitioner  [facility]

## 2024-06-10 ENCOUNTER — PATIENT MESSAGE (OUTPATIENT)
Dept: INTERNAL MEDICINE | Facility: CLINIC | Age: 64
End: 2024-06-10
Payer: MEDICARE

## 2024-07-02 ENCOUNTER — PATIENT OUTREACH (OUTPATIENT)
Dept: ADMINISTRATIVE | Facility: HOSPITAL | Age: 64
End: 2024-07-02
Payer: MEDICARE

## 2024-07-10 ENCOUNTER — LAB VISIT (OUTPATIENT)
Dept: LAB | Facility: HOSPITAL | Age: 64
End: 2024-07-10
Attending: INTERNAL MEDICINE
Payer: MEDICARE

## 2024-07-10 DIAGNOSIS — E11.65 UNCONTROLLED TYPE 2 DIABETES MELLITUS WITH HYPERGLYCEMIA: ICD-10-CM

## 2024-07-10 LAB
ALBUMIN SERPL BCP-MCNC: 4.2 G/DL (ref 3.5–5.2)
ALP SERPL-CCNC: 55 U/L (ref 55–135)
ALT SERPL W/O P-5'-P-CCNC: 43 U/L (ref 10–44)
ANION GAP SERPL CALC-SCNC: 9 MMOL/L (ref 8–16)
AST SERPL-CCNC: 27 U/L (ref 10–40)
BILIRUB SERPL-MCNC: 0.7 MG/DL (ref 0.1–1)
BUN SERPL-MCNC: 11 MG/DL (ref 8–23)
CALCIUM SERPL-MCNC: 9.7 MG/DL (ref 8.7–10.5)
CHLORIDE SERPL-SCNC: 105 MMOL/L (ref 95–110)
CO2 SERPL-SCNC: 27 MMOL/L (ref 23–29)
CREAT SERPL-MCNC: 0.8 MG/DL (ref 0.5–1.4)
EST. GFR  (NO RACE VARIABLE): >60 ML/MIN/1.73 M^2
ESTIMATED AVG GLUCOSE: 137 MG/DL (ref 68–131)
GLUCOSE SERPL-MCNC: 167 MG/DL (ref 70–110)
HBA1C MFR BLD: 6.4 % (ref 4–5.6)
POTASSIUM SERPL-SCNC: 4.3 MMOL/L (ref 3.5–5.1)
PROT SERPL-MCNC: 7.5 G/DL (ref 6–8.4)
SODIUM SERPL-SCNC: 141 MMOL/L (ref 136–145)

## 2024-07-10 PROCEDURE — 36415 COLL VENOUS BLD VENIPUNCTURE: CPT | Performed by: INTERNAL MEDICINE

## 2024-07-10 PROCEDURE — 83036 HEMOGLOBIN GLYCOSYLATED A1C: CPT | Performed by: INTERNAL MEDICINE

## 2024-07-10 PROCEDURE — 80053 COMPREHEN METABOLIC PANEL: CPT | Performed by: INTERNAL MEDICINE

## 2024-07-16 ENCOUNTER — OFFICE VISIT (OUTPATIENT)
Dept: INTERNAL MEDICINE | Facility: CLINIC | Age: 64
End: 2024-07-16
Payer: MEDICARE

## 2024-07-16 VITALS
HEART RATE: 91 BPM | WEIGHT: 177.69 LBS | DIASTOLIC BLOOD PRESSURE: 78 MMHG | OXYGEN SATURATION: 98 % | SYSTOLIC BLOOD PRESSURE: 114 MMHG | BODY MASS INDEX: 27.89 KG/M2 | HEIGHT: 67 IN

## 2024-07-16 DIAGNOSIS — E11.9 TYPE 2 DIABETES MELLITUS WITHOUT RETINOPATHY: Primary | ICD-10-CM

## 2024-07-16 DIAGNOSIS — B18.2 HEPATIC CIRRHOSIS DUE TO CHRONIC HEPATITIS C INFECTION: ICD-10-CM

## 2024-07-16 DIAGNOSIS — E11.59 HYPERTENSION ASSOCIATED WITH TYPE 2 DIABETES MELLITUS: ICD-10-CM

## 2024-07-16 DIAGNOSIS — E78.5 HYPERLIPIDEMIA ASSOCIATED WITH TYPE 2 DIABETES MELLITUS: ICD-10-CM

## 2024-07-16 DIAGNOSIS — E11.69 HYPERLIPIDEMIA ASSOCIATED WITH TYPE 2 DIABETES MELLITUS: ICD-10-CM

## 2024-07-16 DIAGNOSIS — K74.60 HEPATIC CIRRHOSIS DUE TO CHRONIC HEPATITIS C INFECTION: ICD-10-CM

## 2024-07-16 DIAGNOSIS — I15.2 HYPERTENSION ASSOCIATED WITH TYPE 2 DIABETES MELLITUS: ICD-10-CM

## 2024-07-16 PROCEDURE — 3078F DIAST BP <80 MM HG: CPT | Mod: CPTII,S$GLB,, | Performed by: INTERNAL MEDICINE

## 2024-07-16 PROCEDURE — 99999 PR PBB SHADOW E&M-EST. PATIENT-LVL IV: CPT | Mod: PBBFAC,,, | Performed by: INTERNAL MEDICINE

## 2024-07-16 PROCEDURE — 1159F MED LIST DOCD IN RCRD: CPT | Mod: CPTII,S$GLB,, | Performed by: INTERNAL MEDICINE

## 2024-07-16 PROCEDURE — 3072F LOW RISK FOR RETINOPATHY: CPT | Mod: CPTII,S$GLB,, | Performed by: INTERNAL MEDICINE

## 2024-07-16 PROCEDURE — 3074F SYST BP LT 130 MM HG: CPT | Mod: CPTII,S$GLB,, | Performed by: INTERNAL MEDICINE

## 2024-07-16 PROCEDURE — 99214 OFFICE O/P EST MOD 30 MIN: CPT | Mod: S$GLB,,, | Performed by: INTERNAL MEDICINE

## 2024-07-16 PROCEDURE — 3008F BODY MASS INDEX DOCD: CPT | Mod: CPTII,S$GLB,, | Performed by: INTERNAL MEDICINE

## 2024-07-16 PROCEDURE — 3044F HG A1C LEVEL LT 7.0%: CPT | Mod: CPTII,S$GLB,, | Performed by: INTERNAL MEDICINE

## 2024-07-16 NOTE — PATIENT INSTRUCTIONS
"Labwork in 6 months  Schedule optometry appointment with Dr. Montanez.  Return to clinic in 6 months.     Shingles vaccination - "Shingrix" two shot series, 2-6 months apart. Can get here at [facility] or at any local pharmacy.     "

## 2024-07-16 NOTE — PROGRESS NOTES
Subjective:       Patient ID: Graham Caceres is a 64 y.o. male.    Chief Complaint: Follow-up and Dizziness      HPI  Graham Caceres is a 64 y.o. year old male with  hypertension, hyperlipidemia, type 2 diabetes, hepatic cirrhosis 2'/2 hep C status post treatment with Harvoni, latent TB s/p isoniazid (2016) presents for follow up for diabetes. Recently did labwork, hemoglobin A1c 6.4, much improved from previous since starting rybelsus. Has been taking 7 mg daily. Denies any adverse effects.     Review of Systems   Constitutional:  Negative for activity change, appetite change, chills, fatigue, fever and unexpected weight change.   HENT:  Negative for congestion, rhinorrhea and sore throat.    Eyes:  Negative for visual disturbance.   Respiratory:  Negative for shortness of breath.    Cardiovascular:  Negative for chest pain.   Gastrointestinal:  Negative for abdominal pain, diarrhea, nausea and vomiting.   Genitourinary:  Negative for difficulty urinating and dysuria.   Musculoskeletal:  Negative for arthralgias, back pain and myalgias.   Skin:  Negative for color change and rash.   Neurological:  Negative for dizziness, weakness and headaches.         Past Medical History:   Diagnosis Date    Cirrhosis     Diverticulosis     History of hepatitis C, s/p successful Rx w/ SVR24 - 6/2017 4/7/2016    S/p harvoni w/ SVR    Hyperlipidemia 04/2016    Hypertension     Internal hemorrhoid     New onset type 2 diabetes mellitus 4/2016    Positive QuantiFERON-TB Gold test 4/2016    Sebopsoriasis     SIRS (systemic inflammatory response syndrome) 8/11/2018        Prior to Admission medications    Medication Sig Start Date End Date Taking? Authorizing Provider   blood sugar diagnostic (TRUE METRIX GLUCOSE TEST STRIP) Strp 3 times a day 11/22/22   Blayne Allen MD   blood-glucose meter kit To check BG 2 times daily, to use with insurance preferred meter 9/10/20   Jennifer Manzanares MD   clobetasol 0.05% (TEMOVATE) 0.05 % Oint Apply  topically 2 (two) times daily. Use to affected areas for up to 2 weeks then take a 1 week break or decrease to 3 times weekly. Do not apply to groin or face. Apply to itchy areas on legs and hand 4/3/24   Rivera Jacobsen MD   clotrimazole (LOTRIMIN) 1 % Soln Apply 4 drops to left ear 2 times daily for 14 days. 1/28/22   Kerri Vasquez, EH, FNP-C   glipiZIDE 5 MG TR24 Take 1 tablet (5 mg total) by mouth daily with breakfast. 4/22/24 4/22/25  Lizeth Mcdonald APRN, FNP   hydrocortisone 2.5 % cream Apply topically 2 (two) times daily. Use to affected areas for up to 2 weeks then take a 1 week break or decrease to 3 times weekly. Apply to dry itchy areas on face and ears 4/3/24   Rivera Jacobsen MD   ketoconazole (NIZORAL) 2 % cream Apply topically once daily. 1/26/22   Cleo Bolaños FNP   ketoconazole (NIZORAL) 2 % shampoo Apply topically twice a week. Use to wash scalp and ears. Leave on for 5 minutes then wash off 4/4/24   Rivear Jacobsen MD   lancets [facility] To check BG 2 times daily, to use with insurance preferred meter 9/28/20   Jennifer Manzanares MD   losartan (COZAAR) 50 MG tablet TAKE 1 TABLET EVERY DAY 12/26/23   Ko Franz MD   prednisoLONE acetate (PRED FORTE) 1 % DrpS Place 1 drop into both eyes 3 (three) times daily. 2/22/24 2/21/25  Radha Montanez, JOCY   rosuvastatin (CRESTOR) 40 MG Tab Take 1 tablet (40 mg total) by mouth every evening. 4/22/24   Lizeth Mcdonald APRN, FNP   semaglutide (RYBELSUS) 7 mg tablet Take 1 tablet (7 mg total) by mouth once daily. 5/14/24   Genesis Polanco, NP   terbinafine HCL (LAMISIL) 1 % cream Apply topically 2 (two) times daily. 10/5/23   Ko Franz MD   triamcinolone acetonide 0.1% (KENALOG) 0.1 % cream Apply topically 2 (two) times daily. 10/5/23   Ko Franz MD        Past medical history, surgical history, and family medical history reviewed and updated as appropriate.    Medications and allergies reviewed.     Objective:         "  Vitals:    07/16/24 0846   BP: 114/78   BP Location: Right arm   Patient Position: Sitting   Pulse: 91   SpO2: 98%   Weight: 80.6 kg (177 lb 11.1 oz)   Height: 5' 7" (1.702 m)     Body mass index is 27.83 kg/m².  Physical Exam  Constitutional:       General: He is not in acute distress.     Appearance: He is well-developed.   HENT:      Head: Normocephalic and atraumatic.      Nose: Nose normal.   Eyes:      General: No scleral icterus.     Extraocular Movements: Extraocular movements intact.   Neck:      Thyroid: No thyromegaly.      Vascular: No JVD.      Trachea: No tracheal deviation.   Cardiovascular:      Rate and Rhythm: Normal rate and regular rhythm.      Heart sounds: Normal heart sounds. No murmur heard.     No friction rub. No gallop.   Pulmonary:      Effort: Pulmonary effort is normal. No respiratory distress.      Breath sounds: Normal breath sounds. No wheezing or rales.   Abdominal:      General: Bowel sounds are normal. There is no distension.      Palpations: Abdomen is soft. There is no mass.      Tenderness: There is no abdominal tenderness.   Musculoskeletal:         General: No tenderness. Normal range of motion.      Cervical back: Normal range of motion and neck supple.   Lymphadenopathy:      Cervical: No cervical adenopathy.   Skin:     General: Skin is warm and dry.      Findings: No rash.   Neurological:      Mental Status: He is alert and oriented to person, place, and time.      Cranial Nerves: No cranial nerve deficit.      Deep Tendon Reflexes: Reflexes normal.   Psychiatric:         Behavior: Behavior normal.         Lab Results   Component Value Date    WBC 6.69 10/05/2023    HGB 16.0 10/05/2023    HCT 48.4 10/05/2023     10/05/2023    CHOL 310 (H) 10/05/2023    TRIG 259 (H) 10/05/2023    HDL 44 10/05/2023    ALT 43 07/10/2024    AST 27 07/10/2024     07/10/2024    K 4.3 07/10/2024     07/10/2024    CREATININE 0.8 07/10/2024    BUN 11 07/10/2024    CO2 27 " 07/10/2024    TSH 1.830 10/05/2023    INR 1.0 08/04/2022    HGBA1C 6.4 (H) 07/10/2024       Assessment:       1. Type 2 diabetes mellitus without retinopathy    2. Hypertension associated with type 2 diabetes mellitus    3. Hyperlipidemia associated with type 2 diabetes mellitus    4. Hepatic cirrhosis due to chronic hepatitis C infection          Plan:     Graham was seen today for follow-up and dizziness.    Diagnoses and all orders for this visit:    Type 2 diabetes mellitus without retinopathy  Comments:  last a1c 6.4, improved with rybelsus, tolerating 7 mg daily dose.  Orders:  -     Hemoglobin A1C; Future    Hypertension associated with type 2 diabetes mellitus  Comments:  controlled, no changes to current management  Orders:  -     TSH; Future  -     CBC W/ AUTO DIFFERENTIAL; Future  -     BASIC METABOLIC PANEL; Future    Hyperlipidemia associated with type 2 diabetes mellitus  -     Lipid Panel; Future    Hepatic cirrhosis due to chronic hepatitis C infection        Health maintenance reviewed with patient.     Follow up in about 6 months (around 1/16/2025).    Ko Franz MD  Internal Medicine / Primary Care  [facility]  7/16/2024

## 2024-07-30 DIAGNOSIS — E11.9 TYPE 2 DIABETES MELLITUS WITHOUT RETINOPATHY: ICD-10-CM

## 2024-07-30 NOTE — TELEPHONE ENCOUNTER
No care due was identified.  [facility] Embedded Care Due Messages. Reference number: [id number].   7/30/2024 2:12:38 PM CDT

## 2024-07-30 NOTE — TELEPHONE ENCOUNTER
----- Message from Doni Adams sent at 7/30/2024 12:27 PM CDT -----  Contact: [phone]@[email or web address]  Requesting an RX refill or new RX.semaglutide (RYBELSUS) 7 mg tablet    Is this a refill or new RX: refill     RX name and strength (copy/paste from chart):      Is this a 30 day or 90 day RX:     [facility]arm[facility] and phone [id number] [place] - [address] [facility] [address]   Phone: [phone]    The doctors have asked that we provide their patients with the following 2 reminders -- prescription refills can take up to 72 hours, and a friendly reminder that in the future you can use your [facility] account to request refills:

## 2024-07-31 DIAGNOSIS — E11.9 TYPE 2 DIABETES MELLITUS WITHOUT RETINOPATHY: ICD-10-CM

## 2024-07-31 RX ORDER — ORAL SEMAGLUTIDE 7 MG/1
7 TABLET ORAL DAILY
Qty: 90 TABLET | Refills: 2 | OUTPATIENT
Start: 2024-07-31

## 2024-07-31 RX ORDER — ORAL SEMAGLUTIDE 7 MG/1
7 TABLET ORAL DAILY
Qty: 90 TABLET | Refills: 3 | Status: SHIPPED | OUTPATIENT
Start: 2024-07-31

## 2024-07-31 NOTE — TELEPHONE ENCOUNTER
Refill Routing Note   Medication(s) are not appropriate for processing by [facility] for the following reason(s):      New or recently adjusted medication  No active prescription written by provider    [facility] action(s):  Defer Care Due:  None identified            Appointments  past 12m or future 3m with PCP    Date Provider   Last Visit   7/16/2024 Ko Franz MD   Next Visit   1/16/2025 Ko Franz MD   ED visits in past 90 days: 0        Note composed:8:51 PM 07/30/2024

## 2024-07-31 NOTE — TELEPHONE ENCOUNTER
----- Message from Rona Agosto sent at 7/30/2024  4:38 PM CDT -----  Contact: [phone]  Requesting an RX refill or new RX.    Is this a refill or new RX: refill    RX name and strength (copy/paste from chart):  semaglutide (RYBELSUS) 7 mg tablet     Is this a 30 day or 90 day RX: 90    Pharmacy name and phone # (copy/paste from chart):    [id number] - [place] - [address] [facility] [address]  [address] [address]  Phone: [phone] Fax: [phone]    The doctors have asked that we provide their patients with the following 2 reminders -- prescription refills can take up to 72 hours, and a friendly reminder that in the future you can use your [facility] account to request refills: yes      Pt states he is out of medication and requesting this be called in urgently.

## 2024-07-31 NOTE — TELEPHONE ENCOUNTER
Refill Decision Note   Graham Caceres  is requesting a refill authorization.  Brief Assessment and Rationale for Refill:  Quick Discontinue     Medication Therapy Plan:       Medication Reconciliation Completed: No   Comments: Duplicate medication request--pended in a previous encounter and awaiting assessment    No Care Gaps recommended.     Duplicate Pended Encounter(s)/ Last Prescribed Details:    Pharmacy    [facility] #[id number] - [place] - [address] AT [facility] [address]  [address] [address]  Phone: [phone]  Fax: [phone]  [place] #: [id number]   MACARIO Reason: --     Outpatient Medication Detail     Disp Refills Start End MACARIO   semaglutide (RYBELSUS) 7 mg tablet 90 tablet 3 7/30/2024 -- No   Sig - Route: Take 1 tablet (7 mg total) by mouth once daily. - Oral   Class: Normal   Order: [id number]   Date/Time Signed: 7/30/2024 14:12         Ordering Encounter Report    Associated Reports   View Encounter            Note composed:11:15 AM 07/31/2024

## 2024-07-31 NOTE — TELEPHONE ENCOUNTER
No care due was identified.  [facility] Embedded Care Due Messages. Reference number: [id number].   7/31/2024 6:35:34 AM CDT

## 2024-08-23 ENCOUNTER — LAB VISIT (OUTPATIENT)
Dept: LAB | Facility: HOSPITAL | Age: 64
End: 2024-08-23
Attending: NURSE PRACTITIONER
Payer: MEDICARE

## 2024-08-23 DIAGNOSIS — E11.9 TYPE 2 DIABETES MELLITUS WITHOUT RETINOPATHY: ICD-10-CM

## 2024-08-23 LAB
ESTIMATED AVG GLUCOSE: 134 MG/DL (ref 68–131)
HBA1C MFR BLD: 6.3 % (ref 4–5.6)

## 2024-08-23 PROCEDURE — 83036 HEMOGLOBIN GLYCOSYLATED A1C: CPT | Performed by: NURSE PRACTITIONER

## 2024-08-23 PROCEDURE — 36415 COLL VENOUS BLD VENIPUNCTURE: CPT | Performed by: NURSE PRACTITIONER

## 2024-08-26 NOTE — PROGRESS NOTES
CHIEF COMPLAINT: Type 2 Diabetes     HPI: Mr. Graham Caceres is a 64 y.o. male who was diagnosed with Type 2 DM in 2006.   H/o cirrhosis, latent tb, hyperlipidemia, hep C, HTN, NAFLD.    On insulin at one point.  Being seen by me for the second time.   Lab Results   Component Value Date    HGBA1C 6.3 (H) 08/23/2024     Seen by Dr. Yovana Pugh.  Mini 8 oz coke zero  1 milk 8 oz  2 meals  Smaller portions- pasta  Eats apples, night time- blackberries w/ brown sugar  F/u with hepatology  Stir flores-sauce, black bean sauce  Tacos hot sauce  Crawfish, sausage  Water- 5 bottles/day     Exercise:   Occ walking q other day 1 -3 blocks   Goes to gym-fitness center- [place]-2x a month   Sleep -not the best, back issues, pain     1st meal 8-9a  2nd meal 5p -6p  Coffee or water mid day   See above     PREVIOUS DIABETES MEDICATIONS TRIED  Januvia  Rybelsus  Actos   Glipizide     CURRENT DIABETIC MEDS: rybelsus 7 mg , glipizide 5 mg xr w/ breakfast    Makes frequent changes to his/her insulin regimen on the basis of blood glucose data  Testing 2 x a day  Patient is willing and able to use the device  Demonstrated an understanding of the technology and is motivated to use CGM  Patient expected to adhere to a comprehensive diabetes treatment plan and patient has adequate medical supervision  Patient experiences recurrent, unexplained, severe (lows of <50 mg/dl) hypoglycemia   Has multiple impaired awareness of hypoglycemia (hypoglycemia unawareness)      Diabetes Management Status    Statin: Taking  ACE/ARB: Taking    Screening or Prevention Patient's value Goal Complete/Controlled?   HgA1C Testing and Control   Lab Results   Component Value Date    HGBA1C 6.3 (H) 08/23/2024      Annually/Less than 8% Yes   Lipid profile : 10/05/2023 Annually Yes   LDL control Lab Results   Component Value Date    LDLCALC 214.2 (H) 10/05/2023    Annually/Less than 100 mg/dl  No   Nephropathy screening Lab Results   Component Value Date    LABMICR 18.0  10/05/2023     Lab Results   Component Value Date    PROTEINUA Negative 11/16/2020    Annually Yes   Blood pressure BP Readings from Last 1 Encounters:   07/16/24 114/78    Less than 140/90 Yes   Dilated retinal exam : 11/15/2023 Annually Yes   Foot exam   : 04/22/2024 Annually Yes     REVIEW OF SYSTEMS  General: no weakness, fatigue, or weight changes.   Eyes: no double or blurred vision, eye pain, or redness  Cardiovascular: no chest pain, palpitations, edema, or murmurs.   Respiratory: no cough or dyspnea.   GI: no heartburn, nausea, or changes in bowel patterns; good appetite.   Skin: no rashes, dryness, itching, or reactions at insulin injection sites.  Neuro: no numbness, tingling, tremors, or vertigo.   Endocrine: no polyuria, polydipsia, polyphagia, heat or cold intolerance.     Vital Signs  There were no vitals taken for this visit.    Hemoglobin A1C   Date Value Ref Range Status   08/23/2024 6.3 (H) 4.0 - 5.6 % Final     Comment:     ADA Screening Guidelines:  5.7-6.4%  Consistent with prediabetes  >or=6.5%  Consistent with diabetes    High levels of fetal hemoglobin interfere with the HbA1C  assay. Heterozygous hemoglobin variants (HbS, HgC, etc)do  not significantly interfere with this assay.   However, presence of multiple variants may affect accuracy.     07/10/2024 6.4 (H) 4.0 - 5.6 % Final     Comment:     ADA Screening Guidelines:  5.7-6.4%  Consistent with prediabetes  >or=6.5%  Consistent with diabetes    High levels of fetal hemoglobin interfere with the HbA1C  assay. Heterozygous hemoglobin variants (HbS, HgC, etc)do  not significantly interfere with this assay.   However, presence of multiple variants may affect accuracy.     04/05/2024 7.8 (H) 4.0 - 5.6 % Final     Comment:     ADA Screening Guidelines:  5.7-6.4%  Consistent with prediabetes  >or=6.5%  Consistent with diabetes    High levels of fetal hemoglobin interfere with the HbA1C  assay. Heterozygous hemoglobin variants (HbS, HgC,  etc)do  not significantly interfere with this assay.   However, presence of multiple variants may affect accuracy.          Chemistry        Component Value Date/Time     07/10/2024 0852    K 4.3 07/10/2024 0852     07/10/2024 0852    CO2 27 07/10/2024 0852    BUN 11 07/10/2024 0852    CREATININE 0.8 07/10/2024 0852     (H) 07/10/2024 0852        Component Value Date/Time    CALCIUM 9.7 07/10/2024 0852    ALKPHOS 55 07/10/2024 0852    AST 27 07/10/2024 0852    ALT 43 07/10/2024 0852    BILITOT 0.7 07/10/2024 0852    ESTGFRAFRICA >60.0 02/04/2022 0930    EGFRNONAA >60.0 02/04/2022 0930           Lab Results   Component Value Date    TSH 1.830 10/05/2023      Lab Results   Component Value Date    CHOL 310 (H) 10/05/2023    CHOL 188 01/26/2022    CHOL 221 (H) 08/17/2020     Lab Results   Component Value Date    HDL 44 10/05/2023    HDL 44 01/26/2022    HDL 45 08/17/2020     Lab Results   Component Value Date    LDLCALC 214.2 (H) 10/05/2023    LDLCALC 101.8 01/26/2022    LDLCALC 136.0 08/17/2020     Lab Results   Component Value Date    TRIG 259 (H) 10/05/2023    TRIG 211 (H) 01/26/2022    TRIG 200 (H) 08/17/2020     Lab Results   Component Value Date    CHOLHDL 14.2 (L) 10/05/2023    CHOLHDL 23.4 01/26/2022    CHOLHDL 20.4 08/17/2020         PHYSICAL EXAMINATION  Constitutional: Appears well, no distress Reviewed vitals above.  Eyes: conjunctivae & lids intact; PERRLA, EOMs intact; optic discs   Neck: Supple, trachea midline.   Respiratory: No wheezes, even and unlabored  Cardiovascular: RRR; no edema or varicosities  Lymph: no lymphadenopathy palpated  Skin: warm and dry; no injection site reactions, no acanthosis nigracans observed.  Neuro:patient alert and cooperative, normal affect; steady gait.  Psychiatric: judgement & insight intact, orientation of time, place & person intact, memory; mood & affect wnl     Diabetes Foot Exam: deferred     Assessment/Plan    1. Type 2 diabetes mellitus without  retinopathy  Hemoglobin A1C next time   Cut back glipizide 2.5 mg xr w/ 1st meal   Continue rybelsus 7 mg daily w/ 1st meal   A1c goal less than 7%  At goal   Doing really well       2. Chronic midline low back pain without sciatica  May affect insulin resistance   Stable       3. Hyperlipidemia associated with type 2 diabetes mellitus  Lipid Panel next   On statin         4. Hypertension associated with type 2 diabetes mellitus  Bp controlled  On arb      5. Liver fibrosis  F/u with hepatology   Stable       6. NAFLD (nonalcoholic fatty liver disease)  See above  On glp1a oral agent   Stable       7. Overweight (BMI 25.0-29.9)  Encourage exercise 3-5 x a week   15-30 mins per session           FOLLOW UP  In 4 months

## 2024-08-27 ENCOUNTER — OFFICE VISIT (OUTPATIENT)
Dept: INTERNAL MEDICINE | Facility: CLINIC | Age: 64
End: 2024-08-27
Payer: MEDICARE

## 2024-08-27 ENCOUNTER — TELEPHONE (OUTPATIENT)
Dept: CARDIOTHORACIC SURGERY | Facility: CLINIC | Age: 64
End: 2024-08-27
Payer: MEDICARE

## 2024-08-27 VITALS
BODY MASS INDEX: 28.52 KG/M2 | SYSTOLIC BLOOD PRESSURE: 118 MMHG | HEART RATE: 118 BPM | HEIGHT: 67 IN | DIASTOLIC BLOOD PRESSURE: 82 MMHG | WEIGHT: 181.69 LBS | OXYGEN SATURATION: 96 %

## 2024-08-27 DIAGNOSIS — K74.00 LIVER FIBROSIS: ICD-10-CM

## 2024-08-27 DIAGNOSIS — I15.2 HYPERTENSION ASSOCIATED WITH TYPE 2 DIABETES MELLITUS: ICD-10-CM

## 2024-08-27 DIAGNOSIS — M54.50 CHRONIC MIDLINE LOW BACK PAIN WITHOUT SCIATICA: ICD-10-CM

## 2024-08-27 DIAGNOSIS — E11.59 HYPERTENSION ASSOCIATED WITH TYPE 2 DIABETES MELLITUS: ICD-10-CM

## 2024-08-27 DIAGNOSIS — E66.3 OVERWEIGHT (BMI 25.0-29.9): ICD-10-CM

## 2024-08-27 DIAGNOSIS — K76.0 NAFLD (NONALCOHOLIC FATTY LIVER DISEASE): ICD-10-CM

## 2024-08-27 DIAGNOSIS — E11.69 HYPERLIPIDEMIA ASSOCIATED WITH TYPE 2 DIABETES MELLITUS: ICD-10-CM

## 2024-08-27 DIAGNOSIS — G89.29 CHRONIC MIDLINE LOW BACK PAIN WITHOUT SCIATICA: ICD-10-CM

## 2024-08-27 DIAGNOSIS — E78.5 HYPERLIPIDEMIA ASSOCIATED WITH TYPE 2 DIABETES MELLITUS: ICD-10-CM

## 2024-08-27 DIAGNOSIS — E11.9 TYPE 2 DIABETES MELLITUS WITHOUT RETINOPATHY: Primary | ICD-10-CM

## 2024-08-27 PROCEDURE — 3072F LOW RISK FOR RETINOPATHY: CPT | Mod: CPTII,S$GLB,, | Performed by: NURSE PRACTITIONER

## 2024-08-27 PROCEDURE — 3008F BODY MASS INDEX DOCD: CPT | Mod: CPTII,S$GLB,, | Performed by: NURSE PRACTITIONER

## 2024-08-27 PROCEDURE — 1160F RVW MEDS BY RX/DR IN RCRD: CPT | Mod: CPTII,S$GLB,, | Performed by: NURSE PRACTITIONER

## 2024-08-27 PROCEDURE — 99999 PR PBB SHADOW E&M-EST. PATIENT-LVL IV: CPT | Mod: PBBFAC,,, | Performed by: NURSE PRACTITIONER

## 2024-08-27 PROCEDURE — 3079F DIAST BP 80-89 MM HG: CPT | Mod: CPTII,S$GLB,, | Performed by: NURSE PRACTITIONER

## 2024-08-27 PROCEDURE — 1159F MED LIST DOCD IN RCRD: CPT | Mod: CPTII,S$GLB,, | Performed by: NURSE PRACTITIONER

## 2024-08-27 PROCEDURE — 3044F HG A1C LEVEL LT 7.0%: CPT | Mod: CPTII,S$GLB,, | Performed by: NURSE PRACTITIONER

## 2024-08-27 PROCEDURE — 3074F SYST BP LT 130 MM HG: CPT | Mod: CPTII,S$GLB,, | Performed by: NURSE PRACTITIONER

## 2024-08-27 PROCEDURE — 99214 OFFICE O/P EST MOD 30 MIN: CPT | Mod: S$GLB,,, | Performed by: NURSE PRACTITIONER

## 2024-08-27 RX ORDER — GLIPIZIDE 2.5 MG/1
2.5 TABLET, EXTENDED RELEASE ORAL
Qty: 90 TABLET | Refills: 3 | Status: SHIPPED | OUTPATIENT
Start: 2024-08-27 | End: 2025-08-27

## 2024-08-27 NOTE — TELEPHONE ENCOUNTER
----- Message from Minh Watts sent at 8/27/2024  4:37 PM CDT -----  Pt has a referral in to be seen by Dr Ni if possible     Please Call    Contact  [phone]

## 2024-08-27 NOTE — PATIENT INSTRUCTIONS
Rybelsus 7 mg on empty stomach  Glipizide xr 2.5 mg daily w/ 1st meal     Lab Results   Component Value Date    HGBA1C 6.3 (H) 08/23/2024     Goal less than 7%    [email or web address]  Eat fit liya  Myfitnesspal liya  [email or web address]    mySugr liya     Goal  no higher than 180     Follow up in 4 months w/Irielle   A1c lipid prior -4 months   Vascular medicine : Dr. Ni

## 2024-09-05 ENCOUNTER — TELEPHONE (OUTPATIENT)
Dept: INTERNAL MEDICINE | Facility: CLINIC | Age: 64
End: 2024-09-05
Payer: MEDICARE

## 2024-09-05 RX ORDER — GLIPIZIDE 2.5 MG/1
2.5 TABLET, EXTENDED RELEASE ORAL
Qty: 90 TABLET | Refills: 3 | Status: SHIPPED | OUTPATIENT
Start: 2024-09-05 | End: 2024-09-05 | Stop reason: SDUPTHER

## 2024-09-05 RX ORDER — GLIPIZIDE 2.5 MG/1
TABLET, EXTENDED RELEASE ORAL
Qty: 90 TABLET | Refills: 3 | Status: SHIPPED | OUTPATIENT
Start: 2024-09-05

## 2024-09-05 NOTE — TELEPHONE ENCOUNTER
----- Message from Dorina Merino sent at 9/5/2024 12:31 PM CDT -----  Contact: [phone]  Pharmacy is calling to clarify an RX.    RX name:   glipiZIDE (GLUCOTROL) 2.5 MG TR24           What do they need to clarify:  Direction ( should be taking before breakfast?) Patient also has 5 mg on file     Comments:       [facility] - [place], [place] - [address]   [address] [facility] [address]  Phone: [phone] Fax: [phone]

## 2024-09-05 NOTE — TELEPHONE ENCOUNTER
"Spoke to [facility]. They are requesting Rx to be sent over with clearer directions.   "by mouth daily with breakfast. Take on empty stomach, water only"  Does pt eat breakfast or not?  "

## 2024-09-16 ENCOUNTER — TELEPHONE (OUTPATIENT)
Dept: INTERNAL MEDICINE | Facility: CLINIC | Age: 64
End: 2024-09-16
Payer: MEDICARE

## 2024-09-16 NOTE — TELEPHONE ENCOUNTER
----- Message from Yina Soto sent at 9/16/2024 12:17 PM CDT -----  Contact: Bill with [facility] pharmacy   Pharmacy is calling to clarify an RX.    RX name:  glipiZIDE (GLUCOTROL) 2.5 MG TR24     What do they need to clarify:  Pharmacist states this medication is usually taken with breakfast     Comments: [facility] - [place], [place] - [address]   Phone: [phone]  Fax: [phone]

## 2024-09-30 ENCOUNTER — TELEPHONE (OUTPATIENT)
Dept: OPTOMETRY | Facility: CLINIC | Age: 64
End: 2024-09-30
Payer: MEDICARE

## 2024-10-01 ENCOUNTER — OFFICE VISIT (OUTPATIENT)
Dept: OPTOMETRY | Facility: CLINIC | Age: 64
End: 2024-10-01
Payer: MEDICARE

## 2024-10-01 DIAGNOSIS — H02.834 DERMATOCHALASIS OF BOTH UPPER EYELIDS: ICD-10-CM

## 2024-10-01 DIAGNOSIS — H52.223 MYOPIA OF BOTH EYES WITH REGULAR ASTIGMATISM AND PRESBYOPIA: ICD-10-CM

## 2024-10-01 DIAGNOSIS — H04.123 DRY EYE SYNDROME OF BOTH EYES: ICD-10-CM

## 2024-10-01 DIAGNOSIS — E11.65 TYPE 2 DIABETES MELLITUS WITH HYPERGLYCEMIA, WITHOUT LONG-TERM CURRENT USE OF INSULIN: ICD-10-CM

## 2024-10-01 DIAGNOSIS — H52.4 MYOPIA OF BOTH EYES WITH REGULAR ASTIGMATISM AND PRESBYOPIA: ICD-10-CM

## 2024-10-01 DIAGNOSIS — E11.9 TYPE 2 DIABETES MELLITUS WITHOUT RETINOPATHY: Primary | ICD-10-CM

## 2024-10-01 DIAGNOSIS — H52.13 MYOPIA OF BOTH EYES WITH REGULAR ASTIGMATISM AND PRESBYOPIA: ICD-10-CM

## 2024-10-01 DIAGNOSIS — H02.831 DERMATOCHALASIS OF BOTH UPPER EYELIDS: ICD-10-CM

## 2024-10-01 DIAGNOSIS — H25.13 SENILE NUCLEAR CATARACT, BILATERAL: ICD-10-CM

## 2024-10-01 PROCEDURE — 99999 PR PBB SHADOW E&M-EST. PATIENT-LVL III: CPT | Mod: PBBFAC,,, | Performed by: OPTOMETRIST

## 2024-10-01 PROCEDURE — 2023F DILAT RTA XM W/O RTNOPTHY: CPT | Mod: CPTII,S$GLB,, | Performed by: OPTOMETRIST

## 2024-10-01 PROCEDURE — 92015 DETERMINE REFRACTIVE STATE: CPT | Mod: S$GLB,,, | Performed by: OPTOMETRIST

## 2024-10-01 PROCEDURE — 3044F HG A1C LEVEL LT 7.0%: CPT | Mod: CPTII,S$GLB,, | Performed by: OPTOMETRIST

## 2024-10-01 PROCEDURE — 1159F MED LIST DOCD IN RCRD: CPT | Mod: CPTII,S$GLB,, | Performed by: OPTOMETRIST

## 2024-10-01 PROCEDURE — 99214 OFFICE O/P EST MOD 30 MIN: CPT | Mod: S$GLB,,, | Performed by: OPTOMETRIST

## 2024-10-01 NOTE — PROGRESS NOTES
HPI    ZEENAT: 06/03/2024 Dr Montanez  Last DFE: Not sure   Chief complaint (CC): 64 yr old male in today for Annual  Glasses? Yes  Contacts? No  H/o eye surgery, injections or laser: no  H/o eye injury: No  Known eye conditions? Dry Eyes  Family h/o eye conditions? No  Eye gtts? Prednisolone drops      (-) Flashes (-)  Floaters (-) Mucous   (-)  Tearing (-) Itching (-) Burning   (-) Headaches (-) Eye Pain/discomfort (-) Irritation   (-)  Redness (-) Double vision (+) Blurry vision    CL Exam: Yes/No  Current CL Brand: N/A  Rx OD     OS               Wears full-time or part-time:  Full time/Part Time     Sleeps with contact lenses:  Yes/No     CL Solution used:     How often replace CLs:      Any problem with VA with CLs?  Yes/No     Diabetic? Yes  A1c? Lab Results       Component                Value               Date                       HGBA1C                   6.3 (H)             08/23/2024              Last edited by Abena Burgos on 10/1/2024 11:09 AM.            Assessment /Plan     For exam results, see Encounter Report.    Type 2 diabetes mellitus without retinopathy    Type 2 diabetes mellitus with hyperglycemia, without long-term current use of insulin    Dermatochalasis of both upper eyelids    Dry eye syndrome of both eyes    Senile nuclear cataract, bilateral    Myopia of both eyes with regular astigmatism and presbyopia      MONITOR. ED PT ON ALL EXAM FINDINGS  RX FINAL SPECS   OCULAR HEALTH STABLE OD, OS   TYPE 2 DM W/O RETINOPATHY OU; CONTINUE WITH HABITUAL SPECS   MILD NS OU; UV PROTECTION; MONITOR  DISCUSSED DRY EYE THERAPIES; CONTINUE W/ TOPICAL AT'S PRN   RTC 1 YR//PRN FOR REE/DFE

## 2024-10-13 DIAGNOSIS — I15.2 HYPERTENSION ASSOCIATED WITH DIABETES: ICD-10-CM

## 2024-10-13 DIAGNOSIS — E11.59 HYPERTENSION ASSOCIATED WITH DIABETES: ICD-10-CM

## 2024-10-13 RX ORDER — LOSARTAN POTASSIUM 50 MG/1
TABLET ORAL
Qty: 90 TABLET | Refills: 2 | Status: SHIPPED | OUTPATIENT
Start: 2024-10-13

## 2024-10-13 NOTE — TELEPHONE ENCOUNTER
Refill Decision Note   Graham Caceres  is requesting a refill authorization.  Brief Assessment and Rationale for Refill:  Approve     Medication Therapy Plan:         Comments:     Note composed:2:10 PM 10/13/2024

## 2024-10-13 NOTE — TELEPHONE ENCOUNTER
No care due was identified.  Health [facility] Embedded Care Due Messages. Reference number: [id number].   10/13/2024 6:44:57 AM CDT

## 2024-10-24 ENCOUNTER — OFFICE VISIT (OUTPATIENT)
Dept: CARDIOLOGY | Facility: CLINIC | Age: 64
End: 2024-10-24
Payer: MEDICARE

## 2024-10-24 VITALS
DIASTOLIC BLOOD PRESSURE: 79 MMHG | HEART RATE: 94 BPM | SYSTOLIC BLOOD PRESSURE: 120 MMHG | BODY MASS INDEX: 28.18 KG/M2 | WEIGHT: 179.88 LBS

## 2024-10-24 DIAGNOSIS — M79.605 LEFT LEG PAIN: ICD-10-CM

## 2024-10-24 DIAGNOSIS — E78.5 HYPERLIPIDEMIA ASSOCIATED WITH TYPE 2 DIABETES MELLITUS: ICD-10-CM

## 2024-10-24 DIAGNOSIS — E11.9 TYPE 2 DIABETES MELLITUS WITHOUT RETINOPATHY: ICD-10-CM

## 2024-10-24 DIAGNOSIS — I15.2 HYPERTENSION ASSOCIATED WITH TYPE 2 DIABETES MELLITUS: ICD-10-CM

## 2024-10-24 DIAGNOSIS — E66.3 OVERWEIGHT (BMI 25.0-29.9): Primary | ICD-10-CM

## 2024-10-24 DIAGNOSIS — E11.69 HYPERLIPIDEMIA ASSOCIATED WITH TYPE 2 DIABETES MELLITUS: ICD-10-CM

## 2024-10-24 DIAGNOSIS — E11.59 HYPERTENSION ASSOCIATED WITH TYPE 2 DIABETES MELLITUS: ICD-10-CM

## 2024-10-24 PROCEDURE — 4010F ACE/ARB THERAPY RXD/TAKEN: CPT | Mod: CPTII,S$GLB,, | Performed by: INTERNAL MEDICINE

## 2024-10-24 PROCEDURE — 99999 PR PBB SHADOW E&M-EST. PATIENT-LVL III: CPT | Mod: PBBFAC,,, | Performed by: INTERNAL MEDICINE

## 2024-10-24 PROCEDURE — 1159F MED LIST DOCD IN RCRD: CPT | Mod: CPTII,S$GLB,, | Performed by: INTERNAL MEDICINE

## 2024-10-24 PROCEDURE — 3074F SYST BP LT 130 MM HG: CPT | Mod: CPTII,S$GLB,, | Performed by: INTERNAL MEDICINE

## 2024-10-24 PROCEDURE — 99204 OFFICE O/P NEW MOD 45 MIN: CPT | Mod: S$GLB,,, | Performed by: INTERNAL MEDICINE

## 2024-10-24 PROCEDURE — 3044F HG A1C LEVEL LT 7.0%: CPT | Mod: CPTII,S$GLB,, | Performed by: INTERNAL MEDICINE

## 2024-10-24 PROCEDURE — 3008F BODY MASS INDEX DOCD: CPT | Mod: CPTII,S$GLB,, | Performed by: INTERNAL MEDICINE

## 2024-10-24 PROCEDURE — 1160F RVW MEDS BY RX/DR IN RCRD: CPT | Mod: CPTII,S$GLB,, | Performed by: INTERNAL MEDICINE

## 2024-10-24 PROCEDURE — 3078F DIAST BP <80 MM HG: CPT | Mod: CPTII,S$GLB,, | Performed by: INTERNAL MEDICINE

## 2024-10-24 RX ORDER — ROSUVASTATIN CALCIUM 40 MG/1
40 TABLET, COATED ORAL NIGHTLY
Qty: 90 TABLET | Refills: 3 | Status: SHIPPED | OUTPATIENT
Start: 2024-10-24

## 2024-10-24 NOTE — PROGRESS NOTES
HISTORY:      64-year-old male with a history of hyperlipidemia, diabetes, obesity, HCV cirrhosis status post therapy, fatty liver, DDD presenting for initial evaluation by me.      The patient denies any symptoms of chest pain, shortness of breath, or dyspnea on exertion.    Reports intermittent LLE pain from his left hip down to his left ankle. Shocking sensation. Can be with activity or at rest. Intermittent over a couple of months. Lasts a few minutes at a time when it comes. He feels better when he applies pressure to it or hits it with a stick.    Activity levels mild to moderate by choice.     Tolerates rosuvastatin 40 x 1 and semaglutide.    PHYSICAL EXAM:    Vitals:    10/24/24 1452   BP: 120/79   Pulse: 94       NAD, A+Ox3.  No jvd, no bruit.  RRR nml s1,s2. No murmurs.  CTA B no wheezes or crackles.  No edema. B varicosities with chronic venous stasis changes. LLE DP/PT multiphasic.     LABS/STUDIES (imaging reviewed during clinic visit):    October 2023 CBC normal.  July 2024 CMP normal.  2023 /HDL 44// (2022 /HDL 44//).  A1c 6.3.  TSH normal.    ECG October 2024 demonstrates sinus rhythm with no Q-waves or ST changes.    Venous ultrasound 2018 no evidence of DVT bilaterally.  No evidence of superficial venous reflux bilaterally.    ASSESSMENT & PLAN:    1. Overweight (BMI 25.0-29.9)    2. Type 2 diabetes mellitus without retinopathy    3. Hyperlipidemia associated with type 2 diabetes mellitus    4. Hypertension associated with type 2 diabetes mellitus    5. Left leg pain        Orders Placed This Encounter    Ankle Brachial Indices (CALLY)    CV US Lower Extremity Veins Bilateral Insufficiency    rosuvastatin (CRESTOR) 40 MG Tab        Pt with LLE discomfort that appears non-vascular. Check venous us and CALLY.    No cardiac symptoms.     LDL was better controlled on rosuvastatin 40x1. Admits to not taking. Recommend compliance for risk reduction.     Bps controlled.      Follow up in about 6 months (around 4/24/2025).      Tawanna Ortiz MD

## 2024-11-08 DIAGNOSIS — L30.8 ASTEATOTIC DERMATITIS: ICD-10-CM

## 2024-11-08 DIAGNOSIS — L21.9 SEBORRHEIC DERMATITIS: ICD-10-CM

## 2024-11-11 RX ORDER — CLOBETASOL PROPIONATE 0.5 MG/G
OINTMENT TOPICAL 2 TIMES DAILY
Qty: 60 G | Refills: 2 | Status: SHIPPED | OUTPATIENT
Start: 2024-11-11

## 2024-11-11 RX ORDER — PREDNISOLONE ACETATE 10 MG/ML
1 SUSPENSION/ DROPS OPHTHALMIC 3 TIMES DAILY
Qty: 5 ML | Refills: 1 | Status: SHIPPED | OUTPATIENT
Start: 2024-11-11 | End: 2025-11-11

## 2024-11-11 RX ORDER — HYDROCORTISONE 25 MG/G
CREAM TOPICAL 2 TIMES DAILY
Qty: 28 G | Refills: 3 | Status: SHIPPED | OUTPATIENT
Start: 2024-11-11

## 2024-11-15 ENCOUNTER — HOSPITAL ENCOUNTER (OUTPATIENT)
Dept: CARDIOLOGY | Facility: HOSPITAL | Age: 64
Discharge: HOME | End: 2024-11-15
Attending: INTERNAL MEDICINE
Payer: MEDICARE

## 2024-11-15 DIAGNOSIS — M79.605 LEFT LEG PAIN: ICD-10-CM

## 2024-11-15 LAB
LEFT ABI: 1.14
LEFT ARM BP: 125 MMHG
LEFT DORSALIS PEDIS: 143 MMHG
LEFT GREAT SAPHENOUS DISTAL THIGH DIA: 0.55 CM
LEFT GREAT SAPHENOUS JUNCTION DIA: 0.54 CM
LEFT GREAT SAPHENOUS KNEE DIA: 0.34 CM
LEFT GREAT SAPHENOUS MIDDLE THIGH DIA: 0.53 CM
LEFT GREAT SAPHENOUS PROXIMAL CALF DIA: 0.32 CM
LEFT GREAT SAPHENOUS PROXIMAL CALF REFLUX: 491 MS
LEFT POSTERIOR TIBIAL: 143 MMHG
LEFT SMALL SAPHENOUS KNEE DIA: 0.16 CM
LEFT SMALL SAPHENOUS SPJ DIA: 0.21 CM
RIGHT ABI: 1.1
RIGHT ARM BP: 116 MMHG
RIGHT DORSALIS PEDIS: 137 MMHG
RIGHT GREAT SAPHENOUS DISTAL THIGH DIA: 0.53 CM
RIGHT GREAT SAPHENOUS JUNCTION DIA: 0.69 CM
RIGHT GREAT SAPHENOUS KNEE DIA: 0.41 CM
RIGHT GREAT SAPHENOUS MIDDLE THIGH DIA: 0.45 CM
RIGHT GREAT SAPHENOUS PROXIMAL CALF DIA: 0.31 CM
RIGHT GREAT SAPHENOUS PROXIMAL CALF REFLUX: 3876 MS
RIGHT POSTERIOR TIBIAL: 135 MMHG
RIGHT SMALL SAPHENOUS KNEE DIA: 0.17 CM
RIGHT SMALL SAPHENOUS SPJ DIA: 0.21 CM

## 2024-11-15 PROCEDURE — 93970 EXTREMITY STUDY: CPT | Mod: 26,,, | Performed by: INTERNAL MEDICINE

## 2024-11-15 PROCEDURE — 93970 EXTREMITY STUDY: CPT | Mod: TC

## 2024-11-15 PROCEDURE — 93922 UPR/L XTREMITY ART 2 LEVELS: CPT

## 2024-11-15 PROCEDURE — 93922 UPR/L XTREMITY ART 2 LEVELS: CPT | Mod: 26,,, | Performed by: INTERNAL MEDICINE

## 2025-01-02 DIAGNOSIS — E11.9 TYPE 2 DIABETES MELLITUS WITHOUT RETINOPATHY: Primary | ICD-10-CM

## 2025-01-02 DIAGNOSIS — E11.69 HYPERLIPIDEMIA ASSOCIATED WITH TYPE 2 DIABETES MELLITUS: ICD-10-CM

## 2025-01-02 DIAGNOSIS — E78.5 HYPERLIPIDEMIA ASSOCIATED WITH TYPE 2 DIABETES MELLITUS: ICD-10-CM

## 2025-01-03 ENCOUNTER — PATIENT MESSAGE (OUTPATIENT)
Dept: INTERNAL MEDICINE | Facility: CLINIC | Age: 65
End: 2025-01-03
Payer: MEDICARE

## 2025-01-03 ENCOUNTER — LAB VISIT (OUTPATIENT)
Dept: LAB | Facility: HOSPITAL | Age: 65
End: 2025-01-03
Attending: NURSE PRACTITIONER
Payer: MEDICARE

## 2025-01-03 DIAGNOSIS — E11.69 HYPERLIPIDEMIA ASSOCIATED WITH TYPE 2 DIABETES MELLITUS: ICD-10-CM

## 2025-01-03 DIAGNOSIS — E11.9 TYPE 2 DIABETES MELLITUS WITHOUT RETINOPATHY: ICD-10-CM

## 2025-01-03 DIAGNOSIS — E78.5 HYPERLIPIDEMIA ASSOCIATED WITH TYPE 2 DIABETES MELLITUS: ICD-10-CM

## 2025-01-03 LAB
CHOLEST SERPL-MCNC: 340 MG/DL (ref 120–199)
CHOLEST/HDLC SERPL: 7.6 {RATIO} (ref 2–5)
ESTIMATED AVG GLUCOSE: 171 MG/DL (ref 68–131)
HBA1C MFR BLD: 7.6 % (ref 4–5.6)
HDLC SERPL-MCNC: 45 MG/DL (ref 40–75)
HDLC SERPL: 13.2 % (ref 20–50)
LDLC SERPL CALC-MCNC: ABNORMAL MG/DL (ref 63–159)
NONHDLC SERPL-MCNC: 295 MG/DL
TRIGL SERPL-MCNC: 433 MG/DL (ref 30–150)

## 2025-01-03 PROCEDURE — 80061 LIPID PANEL: CPT | Performed by: NURSE PRACTITIONER

## 2025-01-03 PROCEDURE — 83036 HEMOGLOBIN GLYCOSYLATED A1C: CPT | Performed by: NURSE PRACTITIONER

## 2025-01-03 PROCEDURE — 36415 COLL VENOUS BLD VENIPUNCTURE: CPT | Performed by: NURSE PRACTITIONER

## 2025-01-06 ENCOUNTER — TELEPHONE (OUTPATIENT)
Dept: INTERNAL MEDICINE | Facility: CLINIC | Age: 65
End: 2025-01-06

## 2025-01-06 ENCOUNTER — OFFICE VISIT (OUTPATIENT)
Dept: INTERNAL MEDICINE | Facility: CLINIC | Age: 65
End: 2025-01-06
Payer: MEDICARE

## 2025-01-06 VITALS
HEART RATE: 90 BPM | OXYGEN SATURATION: 95 % | WEIGHT: 181.19 LBS | HEIGHT: 67 IN | SYSTOLIC BLOOD PRESSURE: 148 MMHG | BODY MASS INDEX: 28.44 KG/M2 | DIASTOLIC BLOOD PRESSURE: 100 MMHG

## 2025-01-06 DIAGNOSIS — E11.59 HYPERTENSION ASSOCIATED WITH TYPE 2 DIABETES MELLITUS: ICD-10-CM

## 2025-01-06 DIAGNOSIS — K74.60 HEPATIC CIRRHOSIS DUE TO CHRONIC HEPATITIS C INFECTION: ICD-10-CM

## 2025-01-06 DIAGNOSIS — B18.2 HEPATIC CIRRHOSIS DUE TO CHRONIC HEPATITIS C INFECTION: ICD-10-CM

## 2025-01-06 DIAGNOSIS — E11.9 TYPE 2 DIABETES MELLITUS WITHOUT RETINOPATHY: ICD-10-CM

## 2025-01-06 DIAGNOSIS — E66.3 OVERWEIGHT (BMI 25.0-29.9): ICD-10-CM

## 2025-01-06 DIAGNOSIS — G89.29 CHRONIC MIDLINE LOW BACK PAIN WITHOUT SCIATICA: ICD-10-CM

## 2025-01-06 DIAGNOSIS — E11.65 TYPE 2 DIABETES MELLITUS WITH HYPERGLYCEMIA, WITHOUT LONG-TERM CURRENT USE OF INSULIN: Primary | ICD-10-CM

## 2025-01-06 DIAGNOSIS — E11.69 HYPERLIPIDEMIA ASSOCIATED WITH TYPE 2 DIABETES MELLITUS: ICD-10-CM

## 2025-01-06 DIAGNOSIS — K76.0 NAFLD (NONALCOHOLIC FATTY LIVER DISEASE): ICD-10-CM

## 2025-01-06 DIAGNOSIS — M54.50 CHRONIC MIDLINE LOW BACK PAIN WITHOUT SCIATICA: ICD-10-CM

## 2025-01-06 DIAGNOSIS — E78.5 HYPERLIPIDEMIA ASSOCIATED WITH TYPE 2 DIABETES MELLITUS: ICD-10-CM

## 2025-01-06 DIAGNOSIS — I15.2 HYPERTENSION ASSOCIATED WITH TYPE 2 DIABETES MELLITUS: ICD-10-CM

## 2025-01-06 PROCEDURE — 99999 PR PBB SHADOW E&M-EST. PATIENT-LVL IV: CPT | Mod: PBBFAC,,, | Performed by: NURSE PRACTITIONER

## 2025-01-06 PROCEDURE — 3008F BODY MASS INDEX DOCD: CPT | Mod: CPTII,S$GLB,, | Performed by: NURSE PRACTITIONER

## 2025-01-06 PROCEDURE — 3072F LOW RISK FOR RETINOPATHY: CPT | Mod: CPTII,S$GLB,, | Performed by: NURSE PRACTITIONER

## 2025-01-06 PROCEDURE — 1160F RVW MEDS BY RX/DR IN RCRD: CPT | Mod: CPTII,S$GLB,, | Performed by: NURSE PRACTITIONER

## 2025-01-06 PROCEDURE — 3051F HG A1C>EQUAL 7.0%<8.0%: CPT | Mod: CPTII,S$GLB,, | Performed by: NURSE PRACTITIONER

## 2025-01-06 PROCEDURE — 99214 OFFICE O/P EST MOD 30 MIN: CPT | Mod: S$GLB,,, | Performed by: NURSE PRACTITIONER

## 2025-01-06 PROCEDURE — 3077F SYST BP >= 140 MM HG: CPT | Mod: CPTII,S$GLB,, | Performed by: NURSE PRACTITIONER

## 2025-01-06 PROCEDURE — 3080F DIAST BP >= 90 MM HG: CPT | Mod: CPTII,S$GLB,, | Performed by: NURSE PRACTITIONER

## 2025-01-06 PROCEDURE — 1159F MED LIST DOCD IN RCRD: CPT | Mod: CPTII,S$GLB,, | Performed by: NURSE PRACTITIONER

## 2025-01-06 RX ORDER — GLIPIZIDE 5 MG/1
TABLET, FILM COATED, EXTENDED RELEASE ORAL
Qty: 90 TABLET | Refills: 3 | Status: SHIPPED | OUTPATIENT
Start: 2025-01-06

## 2025-01-06 RX ORDER — ORAL SEMAGLUTIDE 7 MG/1
7 TABLET ORAL DAILY
Qty: 90 TABLET | Refills: 3 | Status: SHIPPED | OUTPATIENT
Start: 2025-01-06

## 2025-01-06 NOTE — PATIENT INSTRUCTIONS
Follow up in 4 months w/Irielle  A1c urine mac in 4 months     Glipizide xr 5 mg 15 mins before lunch  Rybelsus 7 mg -wait 30 mins before breakfast   Water only-empty stomach     Lab Results   Component Value Date    HGBA1C 7.6 (H) 01/03/2025     Goal less than 7%    [email or web address]  Eat fit liya  Main[employer]epal liya  [email or web address]    mySugr liya     Goal  no higher than 180    yes

## 2025-01-06 NOTE — TELEPHONE ENCOUNTER
Confirmed w/ Samantha semaglutide (RYBELSUS) 7 mg tablet has been filled- pt has not retrieved the Rx.

## 2025-01-06 NOTE — PROGRESS NOTES
CHIEF COMPLAINT: Type 2 Diabetes     HPI: Mr. Graham Caceres is a 64 y.o. male who was diagnosed with Type 2 DM in 2006.   H/o cirrhosis, latent tb, hyperlipidemia, hep C, HTN, NAFLD.    On insulin at one point.  Being seen by me again today.   A1c went up from 6.3% to 7.6%  Admits to drinking more sodas.   Not taking rybelsus on empty stomach.   Lab Results   Component Value Date    HGBA1C 7.6 (H) 01/03/2025   Dietary habits:  Varies  1 milk 8 oz  2 meals  Candy-snicker  Smaller portions- pasta  Eats apples, night time- blackberries w/ brown sugar  F/u with hepatology  Stir flores-sauce, black bean sauce  Tacos hot sauce  Crawfish, sausage  Water- 5 bottles/day     Exercise:   Occ walking q other day 1 -3 blocks   Goes to gym-fitness center- [place]-2x a month   Sleep -not the best, back issues, pain     1st meal 8-9a  2nd meal 5p -6p  Coffee or water mid day   See above     Highest 240, 260 mg/dl  Lowest : 160 mg/dl    PREVIOUS DIABETES MEDICATIONS TRIED  Januvia  Rybelsus  Actos   Glipizide     CURRENT DIABETIC MEDS: rybelsus 7 mg , glipizide 2.5 mg xr w/ breakfast    Makes frequent changes to his/her insulin regimen on the basis of blood glucose data  Testing 2 x a day  Patient is willing and able to use the device  Demonstrated an understanding of the technology and is motivated to use CGM  Patient expected to adhere to a comprehensive diabetes treatment plan and patient has adequate medical supervision  Patient experiences recurrent, unexplained, severe (lows of <50 mg/dl) hypoglycemia   Has multiple impaired awareness of hypoglycemia (hypoglycemia unawareness)      Diabetes Management Status    Statin: Taking  ACE/ARB: Taking    Screening or Prevention Patient's value Goal Complete/Controlled?   HgA1C Testing and Control   Lab Results   Component Value Date    HGBA1C 7.6 (H) 01/03/2025      Annually/Less than 8% Yes   Lipid profile : 01/03/2025 Annually Yes   LDL control Lab Results   Component Value Date     "LDLCALC Invalid, Trig>400.0 01/03/2025    Annually/Less than 100 mg/dl  No   Nephropathy screening Lab Results   Component Value Date    LABMICR 18.0 10/05/2023     Lab Results   Component Value Date    PROTEINUA Negative 11/16/2020    Annually Yes   Blood pressure BP Readings from Last 1 Encounters:   01/06/25 (!) 144/98    Less than 140/90 Yes   Dilated retinal exam : 10/01/2024 Annually Yes   Foot exam   : 04/22/2024 Annually Yes     REVIEW OF SYSTEMS  General: no weakness, fatigue, or weight changes.   Eyes: (R) skin fold, blurry vision, eye pain, or redness  Cardiovascular: no chest pain, palpitations, edema, or murmurs.   Respiratory: no cough or dyspnea.   GI: no heartburn, nausea, or changes in bowel patterns; good appetite.   Skin: no rashes, dryness, itching, or reactions at insulin injection sites.  Neuro: no numbness, tingling, tremors, or vertigo.   Endocrine: no polyuria, polydipsia, polyphagia, heat or cold intolerance.     Vital Signs  BP (!) 144/98 (BP Location: Left arm, Patient Position: Sitting)   Pulse 90   Ht 5' 7" (1.702 m)   Wt 82.2 kg (181 lb 3.5 oz)   SpO2 95%   BMI 28.38 kg/m²     Hemoglobin A1C   Date Value Ref Range Status   01/03/2025 7.6 (H) 4.0 - 5.6 % Final     Comment:     ADA Screening Guidelines:  5.7-6.4%  Consistent with prediabetes  >or=6.5%  Consistent with diabetes    High levels of fetal hemoglobin interfere with the HbA1C  assay. Heterozygous hemoglobin variants (HbS, HgC, etc)do  not significantly interfere with this assay.   However, presence of multiple variants may affect accuracy.     08/23/2024 6.3 (H) 4.0 - 5.6 % Final     Comment:     ADA Screening Guidelines:  5.7-6.4%  Consistent with prediabetes  >or=6.5%  Consistent with diabetes    High levels of fetal hemoglobin interfere with the HbA1C  assay. Heterozygous hemoglobin variants (HbS, HgC, etc)do  not significantly interfere with this assay.   However, presence of multiple variants may affect accuracy.   "   07/10/2024 6.4 (H) 4.0 - 5.6 % Final     Comment:     ADA Screening Guidelines:  5.7-6.4%  Consistent with prediabetes  >or=6.5%  Consistent with diabetes    High levels of fetal hemoglobin interfere with the HbA1C  assay. Heterozygous hemoglobin variants (HbS, HgC, etc)do  not significantly interfere with this assay.   However, presence of multiple variants may affect accuracy.          Chemistry        Component Value Date/Time     07/10/2024 0852    K 4.3 07/10/2024 0852     07/10/2024 0852    CO2 27 07/10/2024 0852    BUN 11 07/10/2024 0852    CREATININE 0.8 07/10/2024 0852     (H) 07/10/2024 0852        Component Value Date/Time    CALCIUM 9.7 07/10/2024 0852    ALKPHOS 55 07/10/2024 0852    AST 27 07/10/2024 0852    ALT 43 07/10/2024 0852    BILITOT 0.7 07/10/2024 0852    ESTGFRAFRICA >60.0 02/04/2022 0930    EGFRNONAA >60.0 02/04/2022 0930           Lab Results   Component Value Date    TSH 1.830 10/05/2023      Lab Results   Component Value Date    CHOL 340 (H) 01/03/2025    CHOL 310 (H) 10/05/2023    CHOL 188 01/26/2022     Lab Results   Component Value Date    HDL 45 01/03/2025    HDL 44 10/05/2023    HDL 44 01/26/2022     Lab Results   Component Value Date    LDLCALC Invalid, Trig>400.0 01/03/2025    LDLCALC 214.2 (H) 10/05/2023    LDLCALC 101.8 01/26/2022     Lab Results   Component Value Date    TRIG 433 (H) 01/03/2025    TRIG 259 (H) 10/05/2023    TRIG 211 (H) 01/26/2022     Lab Results   Component Value Date    CHOLHDL 13.2 (L) 01/03/2025    CHOLHDL 14.2 (L) 10/05/2023    CHOLHDL 23.4 01/26/2022         PHYSICAL EXAMINATION  Constitutional: Appears well, no distress Reviewed vitals above.  Eyes: conjunctivae & lids intact; PERRLA, EOMs intact; optic discs   Neck: Supple, trachea midline.   Respiratory: No wheezes, even and unlabored  Cardiovascular: RRR; no edema or varicosities  Lymph: no lymphadenopathy palpated  Skin: warm and dry; no injection site reactions, no acanthosis  nigracans observed.  Neuro:patient alert and cooperative, normal affect; steady gait.  Psychiatric: judgement & insight intact, orientation of time, place & person intact, memory; mood & affect wnl     Diabetes Foot Exam: deferred     Assessment/Plan  1. Type 2 diabetes mellitus with hyperglycemia, without long-term current use of insulin  Hemoglobin A1C next time   F/u in 4 months   Discussed dietary habits, stressors  A1c goal less than 7%  Discussed timing of rybelsus   Discussed higher dose glipizide xr -send glip xr 5 mg before lunch        2. Type 2 diabetes mellitus without retinopathy  semaglutide (RYBELSUS) 7 mg tablet  Placed instructions, reviewed       3. Hepatic cirrhosis due to chronic hepatitis C infection  F/u with hepatology   Stable       4. Overweight (BMI 25.0-29.9)  Microalbumin/Creatinine Ratio, Urine  Body mass index is 28.38 kg/m².  May increase insulin resistanec   Discussed exercise 3-5 x  a week       5. NAFLD (nonalcoholic fatty liver disease)  F/u with hepatology   Stable       6. Hypertension associated with type 2 diabetes mellitus  Microalbumin/Creatinine Ratio, Urine  On arb/acei       7. Hyperlipidemia associated with type 2 diabetes mellitus  Lab Results   Component Value Date    LDLCALC Invalid, Trig>400.0 01/03/2025     Above goal  Watch trig       8. Chronic midline low back pain without sciatica  May increase insulin resistance  Able to walk           FOLLOW UP  In 4 months

## 2025-01-13 ENCOUNTER — LAB VISIT (OUTPATIENT)
Dept: LAB | Facility: HOSPITAL | Age: 65
End: 2025-01-13
Attending: INTERNAL MEDICINE
Payer: MEDICARE

## 2025-01-13 DIAGNOSIS — E78.5 HYPERLIPIDEMIA ASSOCIATED WITH TYPE 2 DIABETES MELLITUS: ICD-10-CM

## 2025-01-13 DIAGNOSIS — E11.69 HYPERLIPIDEMIA ASSOCIATED WITH TYPE 2 DIABETES MELLITUS: ICD-10-CM

## 2025-01-13 DIAGNOSIS — I15.2 HYPERTENSION ASSOCIATED WITH TYPE 2 DIABETES MELLITUS: ICD-10-CM

## 2025-01-13 DIAGNOSIS — E11.9 TYPE 2 DIABETES MELLITUS WITHOUT RETINOPATHY: ICD-10-CM

## 2025-01-13 DIAGNOSIS — E11.59 HYPERTENSION ASSOCIATED WITH TYPE 2 DIABETES MELLITUS: ICD-10-CM

## 2025-01-13 LAB
ANION GAP SERPL CALC-SCNC: 10 MMOL/L (ref 8–16)
BASOPHILS # BLD AUTO: 0.05 K/UL (ref 0–0.2)
BASOPHILS NFR BLD: 0.9 % (ref 0–1.9)
BUN SERPL-MCNC: 15 MG/DL (ref 8–23)
CALCIUM SERPL-MCNC: 9.6 MG/DL (ref 8.7–10.5)
CHLORIDE SERPL-SCNC: 104 MMOL/L (ref 95–110)
CHOLEST SERPL-MCNC: 306 MG/DL (ref 120–199)
CHOLEST/HDLC SERPL: 7.5 {RATIO} (ref 2–5)
CO2 SERPL-SCNC: 24 MMOL/L (ref 23–29)
CREAT SERPL-MCNC: 0.8 MG/DL (ref 0.5–1.4)
DIFFERENTIAL METHOD BLD: ABNORMAL
EOSINOPHIL # BLD AUTO: 0.2 K/UL (ref 0–0.5)
EOSINOPHIL NFR BLD: 2.7 % (ref 0–8)
ERYTHROCYTE [DISTWIDTH] IN BLOOD BY AUTOMATED COUNT: 12.6 % (ref 11.5–14.5)
EST. GFR  (NO RACE VARIABLE): >60 ML/MIN/1.73 M^2
ESTIMATED AVG GLUCOSE: 160 MG/DL (ref 68–131)
GLUCOSE SERPL-MCNC: 166 MG/DL (ref 70–110)
HBA1C MFR BLD: 7.2 % (ref 4–5.6)
HCT VFR BLD AUTO: 51 % (ref 40–54)
HDLC SERPL-MCNC: 41 MG/DL (ref 40–75)
HDLC SERPL: 13.4 % (ref 20–50)
HGB BLD-MCNC: 16.8 G/DL (ref 14–18)
IMM GRANULOCYTES # BLD AUTO: 0.02 K/UL (ref 0–0.04)
IMM GRANULOCYTES NFR BLD AUTO: 0.3 % (ref 0–0.5)
LDLC SERPL CALC-MCNC: 187 MG/DL (ref 63–159)
LYMPHOCYTES # BLD AUTO: 1.9 K/UL (ref 1–4.8)
LYMPHOCYTES NFR BLD: 32.1 % (ref 18–48)
MCH RBC QN AUTO: 31.6 PG (ref 27–31)
MCHC RBC AUTO-ENTMCNC: 32.9 G/DL (ref 32–36)
MCV RBC AUTO: 96 FL (ref 82–98)
MONOCYTES # BLD AUTO: 0.4 K/UL (ref 0.3–1)
MONOCYTES NFR BLD: 6.1 % (ref 4–15)
NEUTROPHILS # BLD AUTO: 3.4 K/UL (ref 1.8–7.7)
NEUTROPHILS NFR BLD: 57.9 % (ref 38–73)
NONHDLC SERPL-MCNC: 265 MG/DL
NRBC BLD-RTO: 0 /100 WBC
PLATELET # BLD AUTO: 242 K/UL (ref 150–450)
PMV BLD AUTO: 11.4 FL (ref 9.2–12.9)
POTASSIUM SERPL-SCNC: 4.5 MMOL/L (ref 3.5–5.1)
RBC # BLD AUTO: 5.31 M/UL (ref 4.6–6.2)
SODIUM SERPL-SCNC: 138 MMOL/L (ref 136–145)
TRIGL SERPL-MCNC: 390 MG/DL (ref 30–150)
TSH SERPL DL<=0.005 MIU/L-ACNC: 2.85 UIU/ML (ref 0.4–4)
WBC # BLD AUTO: 5.86 K/UL (ref 3.9–12.7)

## 2025-01-13 PROCEDURE — 36415 COLL VENOUS BLD VENIPUNCTURE: CPT | Performed by: INTERNAL MEDICINE

## 2025-01-13 PROCEDURE — 85025 COMPLETE CBC W/AUTO DIFF WBC: CPT | Performed by: INTERNAL MEDICINE

## 2025-01-13 PROCEDURE — 84443 ASSAY THYROID STIM HORMONE: CPT | Performed by: INTERNAL MEDICINE

## 2025-01-13 PROCEDURE — 80048 BASIC METABOLIC PNL TOTAL CA: CPT | Performed by: INTERNAL MEDICINE

## 2025-01-13 PROCEDURE — 80061 LIPID PANEL: CPT | Performed by: INTERNAL MEDICINE

## 2025-01-13 PROCEDURE — 83036 HEMOGLOBIN GLYCOSYLATED A1C: CPT | Performed by: INTERNAL MEDICINE

## 2025-01-16 ENCOUNTER — OFFICE VISIT (OUTPATIENT)
Dept: INTERNAL MEDICINE | Facility: CLINIC | Age: 65
End: 2025-01-16
Payer: MEDICARE

## 2025-01-16 VITALS
SYSTOLIC BLOOD PRESSURE: 122 MMHG | DIASTOLIC BLOOD PRESSURE: 88 MMHG | WEIGHT: 179.25 LBS | HEIGHT: 67 IN | OXYGEN SATURATION: 97 % | BODY MASS INDEX: 28.13 KG/M2 | HEART RATE: 93 BPM

## 2025-01-16 DIAGNOSIS — Z91.148 NON COMPLIANCE W MEDICATION REGIMEN: ICD-10-CM

## 2025-01-16 DIAGNOSIS — I15.2 HYPERTENSION ASSOCIATED WITH TYPE 2 DIABETES MELLITUS: ICD-10-CM

## 2025-01-16 DIAGNOSIS — E11.69 HYPERLIPIDEMIA ASSOCIATED WITH TYPE 2 DIABETES MELLITUS: ICD-10-CM

## 2025-01-16 DIAGNOSIS — K74.60 HEPATIC CIRRHOSIS DUE TO CHRONIC HEPATITIS C INFECTION: ICD-10-CM

## 2025-01-16 DIAGNOSIS — E11.65 TYPE 2 DIABETES MELLITUS WITH HYPERGLYCEMIA, WITHOUT LONG-TERM CURRENT USE OF INSULIN: ICD-10-CM

## 2025-01-16 DIAGNOSIS — Z86.19 HISTORY OF HEPATITIS C: ICD-10-CM

## 2025-01-16 DIAGNOSIS — Z22.7 LTBI (LATENT TUBERCULOSIS INFECTION): ICD-10-CM

## 2025-01-16 DIAGNOSIS — K76.0 NAFLD (NONALCOHOLIC FATTY LIVER DISEASE): ICD-10-CM

## 2025-01-16 DIAGNOSIS — Z00.00 ENCOUNTER FOR ANNUAL PHYSICAL EXAM: Primary | ICD-10-CM

## 2025-01-16 DIAGNOSIS — E78.5 HYPERLIPIDEMIA ASSOCIATED WITH TYPE 2 DIABETES MELLITUS: ICD-10-CM

## 2025-01-16 DIAGNOSIS — B18.2 HEPATIC CIRRHOSIS DUE TO CHRONIC HEPATITIS C INFECTION: ICD-10-CM

## 2025-01-16 DIAGNOSIS — E11.59 HYPERTENSION ASSOCIATED WITH TYPE 2 DIABETES MELLITUS: ICD-10-CM

## 2025-01-16 PROCEDURE — 3051F HG A1C>EQUAL 7.0%<8.0%: CPT | Mod: CPTII,S$GLB,, | Performed by: INTERNAL MEDICINE

## 2025-01-16 PROCEDURE — 1160F RVW MEDS BY RX/DR IN RCRD: CPT | Mod: CPTII,S$GLB,, | Performed by: INTERNAL MEDICINE

## 2025-01-16 PROCEDURE — 99999 PR PBB SHADOW E&M-EST. PATIENT-LVL IV: CPT | Mod: PBBFAC,,, | Performed by: INTERNAL MEDICINE

## 2025-01-16 PROCEDURE — 3008F BODY MASS INDEX DOCD: CPT | Mod: CPTII,S$GLB,, | Performed by: INTERNAL MEDICINE

## 2025-01-16 PROCEDURE — 1159F MED LIST DOCD IN RCRD: CPT | Mod: CPTII,S$GLB,, | Performed by: INTERNAL MEDICINE

## 2025-01-16 PROCEDURE — 3079F DIAST BP 80-89 MM HG: CPT | Mod: CPTII,S$GLB,, | Performed by: INTERNAL MEDICINE

## 2025-01-16 PROCEDURE — 99396 PREV VISIT EST AGE 40-64: CPT | Mod: S$GLB,,, | Performed by: INTERNAL MEDICINE

## 2025-01-16 PROCEDURE — 3074F SYST BP LT 130 MM HG: CPT | Mod: CPTII,S$GLB,, | Performed by: INTERNAL MEDICINE

## 2025-01-16 PROCEDURE — 3072F LOW RISK FOR RETINOPATHY: CPT | Mod: CPTII,S$GLB,, | Performed by: INTERNAL MEDICINE

## 2025-01-16 NOTE — PROGRESS NOTES
"Subjective:       Patient ID: Graham Caceres is a 64 y.o. male.    Chief Complaint: Annual Exam and Follow-up    Arrives 14 minutes late for 20 minute appt    HPI  Graham Caceres is a 64 y.o. year old male with HTN, HLD, t2DM (last A1c 7.2), hx of hep C s/p harvoni, latent TB s/p INH presents for annual exam. Not taking crestor as prescribed. "You caught me." Denies any current issues. Recent increased dose of glipizide at noon, and taking rybelsus as prescribed.     Review of Systems   Constitutional:  Negative for activity change, appetite change, chills, fatigue, fever and unexpected weight change.   HENT:  Negative for congestion, rhinorrhea and sore throat.    Eyes:  Negative for visual disturbance.   Respiratory:  Negative for shortness of breath.    Cardiovascular:  Negative for chest pain.   Gastrointestinal:  Negative for abdominal pain, diarrhea, nausea and vomiting.   Genitourinary:  Negative for difficulty urinating and dysuria.   Musculoskeletal:  Negative for arthralgias, back pain and myalgias.   Skin:  Negative for color change and rash.   Neurological:  Negative for dizziness, weakness and headaches.         Past Medical History:   Diagnosis Date    Cirrhosis     Diverticulosis     History of hepatitis C, s/p successful Rx w/ SVR24 - 6/2017 4/7/2016    S/p harvoni w/ SVR    Hyperlipidemia 04/2016    Hypertension     Internal hemorrhoid     New onset type 2 diabetes mellitus 4/2016    Positive QuantiFERON-TB Gold test 4/2016    Sebopsoriasis     SIRS (systemic inflammatory response syndrome) 8/11/2018        Prior to Admission medications    Medication Sig Start Date End Date Taking? Authorizing Provider   blood sugar diagnostic (TRUE METRIX GLUCOSE TEST STRIP) Strp 3 times a day 11/22/22   Blayne Allen MD   blood-glucose meter kit To check BG 2 times daily, to use with insurance preferred meter 9/10/20   Jennifer Manzanares MD   clobetasol 0.05% (TEMOVATE) 0.05 % Oint Apply topically 2 (two) times daily. Use " to affected areas for up to 2 weeks then take a 1 week break or decrease to 3 times weekly. Do not apply to groin or face. Apply to itchy areas on legs and hand 11/11/24   Rivera Jacobsen MD   clotrimazole (LOTRIMIN) 1 % Soln Apply 4 drops to left ear 2 times daily for 14 days. 1/28/22   Kerri Vasquez DNP, FNP-C   glipiZIDE 5 MG TR24 Take 1 tablet before lunch, 15 mins prior 1/6/25   Lizeth Mcdonald APRN, FNP   hydrocortisone 2.5 % cream Apply topically 2 (two) times daily. Use to affected areas for up to 2 weeks then take a 1 week break or decrease to 3 times weekly. Apply to dry itchy areas on face and ears 11/11/24   Rivera Jacobsen MD   ketoconazole (NIZORAL) 2 % cream Apply topically once daily. 1/26/22   Cleo Bolaños FNP   ketoconazole (NIZORAL) 2 % shampoo Apply topically twice a week. Use to wash scalp and ears. Leave on for 5 minutes then wash off 4/4/24   Rivera Jacobsen MD   lancets [facility] To check BG 2 times daily, to use with insurance preferred meter 9/28/20   Jennifer Manzanares MD   losartan (COZAAR) 50 MG tablet TAKE 1 TABLET EVERY DAY 10/13/24   Ko Franz MD   prednisoLONE acetate (PRED FORTE) 1 % DrpS Place 1 drop into both eyes 3 (three) times daily. 11/11/24 11/11/25  Radha Montanez OD   rosuvastatin (CRESTOR) 40 MG Tab Take 1 tablet (40 mg total) by mouth every evening. 10/24/24   Tawanna Ortiz MD   semaglutide (RYBELSUS) 7 mg tablet Take 1 tablet (7 mg total) by mouth once daily. Wait 30 mins before food , water only 1/6/25   Lizeth Mcdonald, RALF, FNP   terbinafine HCL (LAMISIL) 1 % cream Apply topically 2 (two) times daily. 10/5/23   Ko Franz MD   triamcinolone acetonide 0.1% (KENALOG) 0.1 % cream Apply topically 2 (two) times daily. 10/5/23   Ko Franz MD        Past medical history, surgical history, and family medical history reviewed and updated as appropriate.    Medications and allergies reviewed.     Objective:          Vitals:    01/16/25 0903   BP:  "122/88   BP Location: Right arm   Patient Position: Sitting   Pulse: 93   SpO2: 97%   Weight: 81.3 kg (179 lb 3.7 oz)   Height: 5' 7" (1.702 m)     Body mass index is 28.07 kg/m².  Physical Exam  Constitutional:       General: He is not in acute distress.     Appearance: He is well-developed.   HENT:      Head: Normocephalic and atraumatic.      Nose: Nose normal.   Eyes:      General: No scleral icterus.     Extraocular Movements: Extraocular movements intact.   Neck:      Thyroid: No thyromegaly.      Vascular: No JVD.      Trachea: No tracheal deviation.   Cardiovascular:      Rate and Rhythm: Normal rate and regular rhythm.      Heart sounds: Normal heart sounds. No murmur heard.     No friction rub. No gallop.   Pulmonary:      Effort: Pulmonary effort is normal. No respiratory distress.      Breath sounds: Normal breath sounds. No wheezing or rales.   Abdominal:      General: Bowel sounds are normal. There is no distension.      Palpations: Abdomen is soft. There is no mass.      Tenderness: There is no abdominal tenderness.   Musculoskeletal:         General: No tenderness. Normal range of motion.      Cervical back: Normal range of motion and neck supple.   Lymphadenopathy:      Cervical: No cervical adenopathy.   Skin:     General: Skin is warm and dry.      Findings: No rash.   Neurological:      Mental Status: He is alert and oriented to person, place, and time.      Cranial Nerves: No cranial nerve deficit.      Deep Tendon Reflexes: Reflexes normal.   Psychiatric:         Behavior: Behavior normal.         Lab Results   Component Value Date    WBC 5.86 01/13/2025    HGB 16.8 01/13/2025    HCT 51.0 01/13/2025     01/13/2025    CHOL 306 (H) 01/13/2025    TRIG 390 (H) 01/13/2025    HDL 41 01/13/2025    ALT 43 07/10/2024    AST 27 07/10/2024     01/13/2025    K 4.5 01/13/2025     01/13/2025    CREATININE 0.8 01/13/2025    BUN 15 01/13/2025    CO2 24 01/13/2025    TSH 2.852 01/13/2025    " INR 1.0 08/04/2022    HGBA1C 7.2 (H) 01/13/2025       Assessment:       1. Encounter for annual physical exam    2. Hypertension associated with type 2 diabetes mellitus    3. Type 2 diabetes mellitus with hyperglycemia, without long-term current use of insulin    4. Hyperlipidemia associated with type 2 diabetes mellitus    5. Hepatic cirrhosis due to chronic hepatitis C infection    6. NAFLD (nonalcoholic fatty liver disease)    7. LTBI (latent tuberculosis infection)    8. History of hepatitis C    9. Non compliance w medication regimen          Plan:     Graham was seen today for annual exam and follow-up.    Diagnoses and all orders for this visit:    Encounter for annual physical exam    Hypertension associated with type 2 diabetes mellitus  Comments:  controlled, on losratan 50, no changes to current management    Type 2 diabetes mellitus with hyperglycemia, without long-term current use of insulin  Comments:  on rybelsus 7, glipizide 5. last a1c 7.2; working to improve this with lifestyle changes as well  Orders:  -     BASIC METABOLIC PANEL; Future  -     Hemoglobin A1C; Future    Hyperlipidemia associated with type 2 diabetes mellitus  Comments:  LDL approximately 200. previously 100 on crestor 20 when compliant. Restart crestor. ASCVD 32%  Orders:  -     Lipid Panel; Future    Hepatic cirrhosis due to chronic hepatitis C infection  Comments:  follows hepatology    NAFLD (nonalcoholic fatty liver disease)  Comments:  follows hepatology    LTBI (latent tuberculosis infection)  Comments:  s/p INH x 6 months    History of hepatitis C    Non compliance w medication regimen  Comments:  non compliant with statin, will restart    The 10-year ASCVD risk score (Zarina BATRES, et al., 2019) is: 32.2%    Values used to calculate the score:      Age: 64 years      Sex: Male      Is Non- : No      Diabetic: Yes      Tobacco smoker: No      Systolic Blood Pressure: 122 mmHg      Is BP treated: Yes       HDL Cholesterol: 41 mg/dL      Total Cholesterol: 306 mg/dL      Health maintenance reviewed with patient. Patient is declined vaccinations.     Follow up in about 6 months (around 7/16/2025).    Ko Franz MD  Internal Medicine / Primary Care  [facility]  1/16/2025

## 2025-01-21 ENCOUNTER — TELEPHONE (OUTPATIENT)
Dept: INTERNAL MEDICINE | Facility: CLINIC | Age: 65
End: 2025-01-21
Payer: MEDICARE

## 2025-01-22 NOTE — TELEPHONE ENCOUNTER
----- Message from Ko Franz MD sent at 1/20/2025  1:46 PM CST -----  Regarding: RE: Patient/  [phone]  Schedule patient for ECA slot  ----- Message -----  From: Fay Linda MA  Sent: 1/17/2025   2:15 PM CST  To: Ko Franz MD  Subject: FW: Patient/  [phone]                       Can I put his spouse in any slot?  ----- Message -----  From: Chary Barrios  Sent: 1/17/2025  12:12 PM CST  To: Osei Connell Staff  Subject: Patient/  [phone]                           Patient stated he asked Dr. Franz about seeing his spouse  Merissa Viramontes.  There's no appts for us to schedule, can  you please call patient to get spouse scheduled.  Thank you

## 2025-02-04 ENCOUNTER — TELEPHONE (OUTPATIENT)
Dept: RHEUMATOLOGY | Facility: CLINIC | Age: 65
End: 2025-02-04
Payer: MEDICARE

## 2025-02-04 NOTE — TELEPHONE ENCOUNTER
Received call from pt.    Pt was inquiring about a missed call. Informed pt that the (Rheumatology) office did not reach out and pt had not been seen by Rheumatology previously. Endorsed back pain. Encouraged pt to have PCP place referral if needing further evaluation. Thanked patient for his time.

## 2025-04-15 ENCOUNTER — OFFICE VISIT (OUTPATIENT)
Dept: CARDIOLOGY | Facility: CLINIC | Age: 65
End: 2025-04-15
Payer: MEDICARE

## 2025-04-15 VITALS
WEIGHT: 180.56 LBS | DIASTOLIC BLOOD PRESSURE: 80 MMHG | SYSTOLIC BLOOD PRESSURE: 125 MMHG | BODY MASS INDEX: 28.28 KG/M2 | HEART RATE: 92 BPM

## 2025-04-15 DIAGNOSIS — I15.2 HYPERTENSION ASSOCIATED WITH TYPE 2 DIABETES MELLITUS: ICD-10-CM

## 2025-04-15 DIAGNOSIS — E78.5 HYPERLIPIDEMIA ASSOCIATED WITH TYPE 2 DIABETES MELLITUS: Primary | ICD-10-CM

## 2025-04-15 DIAGNOSIS — E11.69 HYPERLIPIDEMIA ASSOCIATED WITH TYPE 2 DIABETES MELLITUS: Primary | ICD-10-CM

## 2025-04-15 DIAGNOSIS — E11.59 HYPERTENSION ASSOCIATED WITH TYPE 2 DIABETES MELLITUS: ICD-10-CM

## 2025-04-15 DIAGNOSIS — Z91.148 NON COMPLIANCE W MEDICATION REGIMEN: ICD-10-CM

## 2025-04-15 PROCEDURE — 3072F LOW RISK FOR RETINOPATHY: CPT | Mod: CPTII,S$GLB,, | Performed by: INTERNAL MEDICINE

## 2025-04-15 PROCEDURE — 3051F HG A1C>EQUAL 7.0%<8.0%: CPT | Mod: CPTII,S$GLB,, | Performed by: INTERNAL MEDICINE

## 2025-04-15 PROCEDURE — 3079F DIAST BP 80-89 MM HG: CPT | Mod: CPTII,S$GLB,, | Performed by: INTERNAL MEDICINE

## 2025-04-15 PROCEDURE — 1160F RVW MEDS BY RX/DR IN RCRD: CPT | Mod: CPTII,S$GLB,, | Performed by: INTERNAL MEDICINE

## 2025-04-15 PROCEDURE — 3008F BODY MASS INDEX DOCD: CPT | Mod: CPTII,S$GLB,, | Performed by: INTERNAL MEDICINE

## 2025-04-15 PROCEDURE — 3074F SYST BP LT 130 MM HG: CPT | Mod: CPTII,S$GLB,, | Performed by: INTERNAL MEDICINE

## 2025-04-15 PROCEDURE — 99214 OFFICE O/P EST MOD 30 MIN: CPT | Mod: S$GLB,,, | Performed by: INTERNAL MEDICINE

## 2025-04-15 PROCEDURE — 1159F MED LIST DOCD IN RCRD: CPT | Mod: CPTII,S$GLB,, | Performed by: INTERNAL MEDICINE

## 2025-04-15 PROCEDURE — 99999 PR PBB SHADOW E&M-EST. PATIENT-LVL III: CPT | Mod: PBBFAC,,, | Performed by: INTERNAL MEDICINE

## 2025-04-15 NOTE — PROGRESS NOTES
HISTORY:      65-year-old male with a history of hyperlipidemia, diabetes, obesity, HCV cirrhosis status post therapy, fatty liver, DDD presenting for follow-up.    The patient denies any symptoms of chest pain, shortness of breath, or dyspnea on exertion.    Initally evaluated in late '24 for intermittent LLE pain from his left hip down to his left ankle. Shocking sensation. Can be with activity or at rest. Intermittent over a year at this time. Lasts a few minutes at a time when it comes. He feels better when he applies pressure to it or hits it with a stick.    Activity levels mild to moderate by choice.     Tolerates rosuvastatin 40 x 1 and semaglutide.    PHYSICAL EXAM:    Vitals:    04/15/25 1153   BP: 125/80   Pulse: 92       NAD, A+Ox3.  No jvd, no bruit.  RRR nml s1,s2. No murmurs.  CTA B no wheezes or crackles.  No edema. B varicosities with chronic venous stasis changes. LLE DP/PT multiphasic.     LABS/STUDIES (imaging reviewed during clinic visit):    January 2025 CBC and CMP normal.  /HDL 41//.  A1c 7.2.  TSH normal.  October 2023 CBC normal.  July 2024 CMP normal.  2023 /HDL 44// (2022 /HDL 44//).  A1c 6.3.  TSH normal.    ECG October 2024 demonstrates sinus rhythm with no Q-waves or ST changes.    CALLY November 2024 normal at rest.  Venous ultrasound November 2024 no evidence of DVT bilaterally.  Right GSV reflux is present below-the-knee.  2018 no evidence of DVT bilaterally.  No evidence of superficial venous reflux bilaterally.    ASSESSMENT & PLAN:    1. Hyperlipidemia associated with type 2 diabetes mellitus    2. Hypertension associated with type 2 diabetes mellitus    3. Non compliance w medication regimen                  Pt with LLE discomfort that appears non-vascular. Check venous us and CALLY.    No cardiac symptoms.     LDL was better controlled on rosuvastatin 40x1. Admits to not taking. Recommend compliance for risk reduction. Okay  to take in the morning if it helps with compliance.     Bps controlled.     Follow up if symptoms worsen or fail to improve.      Tawanna Ortiz MD

## 2025-05-02 ENCOUNTER — LAB VISIT (OUTPATIENT)
Dept: LAB | Facility: HOSPITAL | Age: 65
End: 2025-05-02
Attending: NURSE PRACTITIONER
Payer: MEDICARE

## 2025-05-02 DIAGNOSIS — E11.65 TYPE 2 DIABETES MELLITUS WITH HYPERGLYCEMIA, WITHOUT LONG-TERM CURRENT USE OF INSULIN: ICD-10-CM

## 2025-05-02 LAB
EAG (OHS): 148 MG/DL (ref 68–131)
HBA1C MFR BLD: 6.8 % (ref 4–5.6)

## 2025-05-02 PROCEDURE — 83036 HEMOGLOBIN GLYCOSYLATED A1C: CPT

## 2025-05-02 PROCEDURE — 36415 COLL VENOUS BLD VENIPUNCTURE: CPT

## 2025-05-05 ENCOUNTER — RESULTS FOLLOW-UP (OUTPATIENT)
Dept: HEPATOLOGY | Facility: CLINIC | Age: 65
End: 2025-05-05

## 2025-05-05 ENCOUNTER — HOSPITAL ENCOUNTER (OUTPATIENT)
Dept: RADIOLOGY | Facility: HOSPITAL | Age: 65
Discharge: HOME | End: 2025-05-05
Attending: NURSE PRACTITIONER
Payer: MEDICARE

## 2025-05-05 DIAGNOSIS — I15.2 HYPERTENSION ASSOCIATED WITH TYPE 2 DIABETES MELLITUS: ICD-10-CM

## 2025-05-05 DIAGNOSIS — E11.59 HYPERTENSION ASSOCIATED WITH TYPE 2 DIABETES MELLITUS: ICD-10-CM

## 2025-05-05 DIAGNOSIS — K74.00 LIVER FIBROSIS: ICD-10-CM

## 2025-05-05 DIAGNOSIS — Z86.19 HISTORY OF HEPATITIS C: ICD-10-CM

## 2025-05-05 DIAGNOSIS — E11.9 TYPE 2 DIABETES MELLITUS WITHOUT RETINOPATHY: ICD-10-CM

## 2025-05-05 DIAGNOSIS — E11.69 HYPERLIPIDEMIA ASSOCIATED WITH TYPE 2 DIABETES MELLITUS: ICD-10-CM

## 2025-05-05 DIAGNOSIS — E66.3 OVERWEIGHT (BMI 25.0-29.9): ICD-10-CM

## 2025-05-05 DIAGNOSIS — K76.0 NAFLD (NONALCOHOLIC FATTY LIVER DISEASE): ICD-10-CM

## 2025-05-05 DIAGNOSIS — E78.5 HYPERLIPIDEMIA ASSOCIATED WITH TYPE 2 DIABETES MELLITUS: ICD-10-CM

## 2025-05-05 PROCEDURE — 76700 US EXAM ABDOM COMPLETE: CPT | Mod: TC

## 2025-05-06 ENCOUNTER — OFFICE VISIT (OUTPATIENT)
Dept: INTERNAL MEDICINE | Facility: CLINIC | Age: 65
End: 2025-05-06
Payer: MEDICARE

## 2025-05-06 VITALS
WEIGHT: 181.69 LBS | DIASTOLIC BLOOD PRESSURE: 80 MMHG | HEIGHT: 67 IN | BODY MASS INDEX: 28.52 KG/M2 | OXYGEN SATURATION: 96 % | HEART RATE: 90 BPM | SYSTOLIC BLOOD PRESSURE: 122 MMHG

## 2025-05-06 DIAGNOSIS — E11.59 HYPERTENSION ASSOCIATED WITH TYPE 2 DIABETES MELLITUS: ICD-10-CM

## 2025-05-06 DIAGNOSIS — H25.13 SENILE NUCLEAR CATARACT, BILATERAL: ICD-10-CM

## 2025-05-06 DIAGNOSIS — I15.2 HYPERTENSION ASSOCIATED WITH TYPE 2 DIABETES MELLITUS: ICD-10-CM

## 2025-05-06 DIAGNOSIS — B18.2 HEPATIC CIRRHOSIS DUE TO CHRONIC HEPATITIS C INFECTION: ICD-10-CM

## 2025-05-06 DIAGNOSIS — E11.69 HYPERLIPIDEMIA ASSOCIATED WITH TYPE 2 DIABETES MELLITUS: ICD-10-CM

## 2025-05-06 DIAGNOSIS — E66.3 OVERWEIGHT (BMI 25.0-29.9): ICD-10-CM

## 2025-05-06 DIAGNOSIS — K74.60 HEPATIC CIRRHOSIS DUE TO CHRONIC HEPATITIS C INFECTION: ICD-10-CM

## 2025-05-06 DIAGNOSIS — E78.5 HYPERLIPIDEMIA ASSOCIATED WITH TYPE 2 DIABETES MELLITUS: ICD-10-CM

## 2025-05-06 DIAGNOSIS — K74.00 LIVER FIBROSIS: ICD-10-CM

## 2025-05-06 DIAGNOSIS — G89.29 CHRONIC MIDLINE LOW BACK PAIN WITHOUT SCIATICA: ICD-10-CM

## 2025-05-06 DIAGNOSIS — E11.9 TYPE 2 DIABETES MELLITUS WITHOUT RETINOPATHY: Primary | ICD-10-CM

## 2025-05-06 DIAGNOSIS — K76.0 NAFLD (NONALCOHOLIC FATTY LIVER DISEASE): ICD-10-CM

## 2025-05-06 DIAGNOSIS — M54.50 CHRONIC MIDLINE LOW BACK PAIN WITHOUT SCIATICA: ICD-10-CM

## 2025-05-06 PROCEDURE — 3079F DIAST BP 80-89 MM HG: CPT | Mod: CPTII,S$GLB,, | Performed by: NURSE PRACTITIONER

## 2025-05-06 PROCEDURE — 99214 OFFICE O/P EST MOD 30 MIN: CPT | Mod: S$GLB,,, | Performed by: NURSE PRACTITIONER

## 2025-05-06 PROCEDURE — 99999 PR PBB SHADOW E&M-EST. PATIENT-LVL IV: CPT | Mod: PBBFAC,,, | Performed by: NURSE PRACTITIONER

## 2025-05-06 PROCEDURE — 3044F HG A1C LEVEL LT 7.0%: CPT | Mod: CPTII,S$GLB,, | Performed by: NURSE PRACTITIONER

## 2025-05-06 PROCEDURE — 1160F RVW MEDS BY RX/DR IN RCRD: CPT | Mod: CPTII,S$GLB,, | Performed by: NURSE PRACTITIONER

## 2025-05-06 PROCEDURE — 3008F BODY MASS INDEX DOCD: CPT | Mod: CPTII,S$GLB,, | Performed by: NURSE PRACTITIONER

## 2025-05-06 PROCEDURE — 3074F SYST BP LT 130 MM HG: CPT | Mod: CPTII,S$GLB,, | Performed by: NURSE PRACTITIONER

## 2025-05-06 PROCEDURE — 3072F LOW RISK FOR RETINOPATHY: CPT | Mod: CPTII,S$GLB,, | Performed by: NURSE PRACTITIONER

## 2025-05-06 PROCEDURE — 1159F MED LIST DOCD IN RCRD: CPT | Mod: CPTII,S$GLB,, | Performed by: NURSE PRACTITIONER

## 2025-05-06 NOTE — PROGRESS NOTES
CHIEF COMPLAINT: Type 2 Diabetes     HPI: Mr. Graham Caceres is a 65 y.o. male who was diagnosed with Type 2 DM in 2006.   H/o cirrhosis, latent tb, hyperlipidemia, hep C, HTN, NAFLD.    On insulin at one point.  Being seen by me again today.   A1c went up from 6.3% to 7.6%  Admits to drinking more sodas.   Not taking rybelsus on empty stomach.   Lab Results   Component Value Date    HGBA1C 6.8 (H) 05/02/2025   Dietary habits:  Varies  1 milk 8 oz  2 meals  Candy-snicker  Smaller portions- pasta  Eats apples, night time- blackberries w/ brown sugar  F/u with hepatology  Stir flores-sauce, black bean sauce  Tacos hot sauce  Crawfish, sausage  Water- 5 bottles/day     Exercise:   Occ walking q other day 1 -3 blocks   Goes to gym-fitness center- [place]-2x a month   Sleep -not the best, back issues, pain     1st meal 8-9a  2nd meal 5p -6p  Coffee or water mid day   See above     Highest 240, 260 mg/dl  Lowest : 160 mg/dl    PREVIOUS DIABETES MEDICATIONS TRIED  Januvia  Rybelsus  Actos   Glipizide     CURRENT DIABETIC MEDS: rybelsus 7 mg , glipizide 2.5 mg xr w/ breakfast    Makes frequent changes to his/her insulin regimen on the basis of blood glucose data  Testing 2 x a day  Patient is willing and able to use the device  Demonstrated an understanding of the technology and is motivated to use CGM  Patient expected to adhere to a comprehensive diabetes treatment plan and patient has adequate medical supervision  Patient experiences recurrent, unexplained, severe (lows of <50 mg/dl) hypoglycemia   Has multiple impaired awareness of hypoglycemia (hypoglycemia unawareness)      Diabetes Management Status    Statin: Taking  ACE/ARB: Taking    Screening or Prevention Patient's value Goal Complete/Controlled?   HgA1C Testing and Control   Lab Results   Component Value Date    HGBA1C 6.8 (H) 05/02/2025      Annually/Less than 8% Yes   Lipid profile : 01/13/2025 Annually Yes   LDL control Lab Results   Component Value Date     "LDLCALC 187.0 (H) 01/13/2025    Annually/Less than 100 mg/dl  No   Nephropathy screening Lab Results   Component Value Date    LABMICR 18.0 10/05/2023     Lab Results   Component Value Date    PROTEINUA Negative 11/16/2020    Annually Yes   Blood pressure BP Readings from Last 1 Encounters:   05/06/25 122/80    Less than 140/90 Yes   Dilated retinal exam : 10/01/2024 Annually Yes   Foot exam   : 04/22/2024 Annually Yes     REVIEW OF SYSTEMS  General: no weakness, fatigue, or weight changes.   Eyes: (R) skin fold, blurry vision, eye pain, or redness  Cardiovascular: no chest pain, palpitations, edema, or murmurs.   Respiratory: no cough or dyspnea.   GI: no heartburn, nausea, or changes in bowel patterns; good appetite.   Skin: no rashes, dryness, itching, or reactions at insulin injection sites.  Neuro: no numbness, tingling, tremors, or vertigo.   Endocrine: no polyuria, polydipsia, polyphagia, heat or cold intolerance.     Vital Signs  /80 (BP Location: Left arm, Patient Position: Sitting)   Pulse 90   Ht 5' 7" (1.702 m)   Wt 82.4 kg (181 lb 10.5 oz)   SpO2 96%   BMI 28.45 kg/m²     Hemoglobin A1C   Date Value Ref Range Status   01/13/2025 7.2 (H) 4.0 - 5.6 % Final     Comment:     ADA Screening Guidelines:  5.7-6.4%  Consistent with prediabetes  >or=6.5%  Consistent with diabetes    High levels of fetal hemoglobin interfere with the HbA1C  assay. Heterozygous hemoglobin variants (HbS, HgC, etc)do  not significantly interfere with this assay.   However, presence of multiple variants may affect accuracy.     01/03/2025 7.6 (H) 4.0 - 5.6 % Final     Comment:     ADA Screening Guidelines:  5.7-6.4%  Consistent with prediabetes  >or=6.5%  Consistent with diabetes    High levels of fetal hemoglobin interfere with the HbA1C  assay. Heterozygous hemoglobin variants (HbS, HgC, etc)do  not significantly interfere with this assay.   However, presence of multiple variants may affect accuracy.     08/23/2024 6.3 " (H) 4.0 - 5.6 % Final     Comment:     ADA Screening Guidelines:  5.7-6.4%  Consistent with prediabetes  >or=6.5%  Consistent with diabetes    High levels of fetal hemoglobin interfere with the HbA1C  assay. Heterozygous hemoglobin variants (HbS, HgC, etc)do  not significantly interfere with this assay.   However, presence of multiple variants may affect accuracy.       Hemoglobin A1c   Date Value Ref Range Status   05/02/2025 6.8 (H) 4.0 - 5.6 % Final     Comment:     ADA Screening Guidelines:  5.7-6.4%  Consistent with prediabetes  >=6.5%  Consistent with diabetes    High levels of fetal hemoglobin interfere with the HbA1C  assay. Heterozygous hemoglobin variants (HbS, HgC, etc)do  not significantly interfere with this assay.   However, presence of multiple variants may affect accuracy.        Chemistry        Component Value Date/Time     01/13/2025 0910    K 4.5 01/13/2025 0910     01/13/2025 0910    CO2 24 01/13/2025 0910    BUN 15 01/13/2025 0910    CREATININE 0.8 01/13/2025 0910     (H) 01/13/2025 0910        Component Value Date/Time    CALCIUM 9.6 01/13/2025 0910    ALKPHOS 62 05/05/2025 0856    ALKPHOS 55 07/10/2024 0852    AST 26 05/05/2025 0856    AST 27 07/10/2024 0852    ALT 50 (H) 05/05/2025 0856    ALT 43 07/10/2024 0852    BILITOT 0.6 05/05/2025 0856    BILITOT 0.7 07/10/2024 0852    ESTGFRAFRICA >60.0 02/04/2022 0930    EGFRNONAA >60.0 02/04/2022 0930           Lab Results   Component Value Date    TSH 2.852 01/13/2025      Lab Results   Component Value Date    CHOL 306 (H) 01/13/2025    CHOL 340 (H) 01/03/2025    CHOL 310 (H) 10/05/2023     Lab Results   Component Value Date    HDL 41 01/13/2025    HDL 45 01/03/2025    HDL 44 10/05/2023     Lab Results   Component Value Date    LDLCALC 187.0 (H) 01/13/2025    LDLCALC Invalid, Trig>400.0 01/03/2025    LDLCALC 214.2 (H) 10/05/2023     Lab Results   Component Value Date    TRIG 390 (H) 01/13/2025    TRIG 433 (H) 01/03/2025    TRIG  259 (H) 10/05/2023     Lab Results   Component Value Date    CHOLHDL 13.4 (L) 01/13/2025    CHOLHDL 13.2 (L) 01/03/2025    CHOLHDL 14.2 (L) 10/05/2023         PHYSICAL EXAMINATION  Constitutional: Appears well, no distress Reviewed vitals above.  Eyes: conjunctivae & lids intact; PERRLA, EOMs intact; optic discs   Neck: Supple, trachea midline.   Respiratory: No wheezes, even and unlabored  Cardiovascular: RRR; no edema or varicosities  Lymph: no lymphadenopathy palpated  Skin: warm and dry; no injection site reactions, no acanthosis nigracans observed.  Neuro:patient alert and cooperative, normal affect; steady gait.  Psychiatric: judgement & insight intact, orientation of time, place & person intact, memory; mood & affect wnl     Diabetes Foot Exam: deferred     Assessment/Plan  1. Type 2 diabetes mellitus without retinopathy  Hemoglobin A1C    Microalbumin/Creatinine Ratio, Urine    F/u in 4 months  Continue regimen   Has refills   A1c goal less than 7%      2. Overweight (BMI 25.0-29.9)  Body mass index is 28.45 kg/m².  May increase insulin resistance      3. NAFLD (nonalcoholic fatty liver disease)  F/u with hepatology  stable      4. Liver fibrosis  See above   Stable       5. Hypertension associated with type 2 diabetes mellitus  Microalbumin/Creatinine Ratio, Urine  On arb/acei  Stable       6. Hyperlipidemia associated with type 2 diabetes mellitus  Lipid Panel  On statin  Goal less than 7%      7. Hepatic cirrhosis due to chronic hepatitis C infection  F/u with hepatology   Stable       8. Senile nuclear cataract, bilateral  F/u with optometry  Message Dr. Montanez    9.      Chronic back pain                                                   Disabled. May affect bg, insulin resistance        FOLLOW UP  In 4 months

## 2025-05-06 NOTE — PATIENT INSTRUCTIONS
Glipizide 5 mg 15 mins prior to 1st meal  Rybelsus 7 mg, wait  30 -45 mins before eating, other meds, water only    Lab Results   Component Value Date    HGBA1C 6.8 (H) 05/02/2025     Goal less than 7%    [email or web address]  Eat fit liya  Myfitnesspal liya  [email or web address]    mySugr liya   Glucose guide liya     Goal  no higher than 180     Follow up in 4 months w/Irielle  A1c, urine mac, lipid panel prior   Lipid in 6 weeks
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
## 2025-05-07 ENCOUNTER — TELEPHONE (OUTPATIENT)
Dept: OPHTHALMOLOGY | Facility: CLINIC | Age: 65
End: 2025-05-07
Payer: MEDICARE

## 2025-05-13 ENCOUNTER — OFFICE VISIT (OUTPATIENT)
Dept: HEPATOLOGY | Facility: CLINIC | Age: 65
End: 2025-05-13
Payer: MEDICARE

## 2025-05-13 VITALS — WEIGHT: 179.25 LBS | BODY MASS INDEX: 28.13 KG/M2 | HEIGHT: 67 IN

## 2025-05-13 DIAGNOSIS — K74.00 LIVER FIBROSIS: Primary | ICD-10-CM

## 2025-05-13 DIAGNOSIS — E11.9 TYPE 2 DIABETES MELLITUS WITHOUT RETINOPATHY: ICD-10-CM

## 2025-05-13 DIAGNOSIS — R76.8 HEPATITIS B CORE ANTIBODY POSITIVE: ICD-10-CM

## 2025-05-13 DIAGNOSIS — K76.0 NAFLD (NONALCOHOLIC FATTY LIVER DISEASE): ICD-10-CM

## 2025-05-13 DIAGNOSIS — R74.8 ELEVATED LIVER ENZYMES: ICD-10-CM

## 2025-05-13 DIAGNOSIS — E78.2 MIXED HYPERLIPIDEMIA: ICD-10-CM

## 2025-05-13 DIAGNOSIS — E66.3 OVERWEIGHT (BMI 25.0-29.9): ICD-10-CM

## 2025-05-13 DIAGNOSIS — Z86.19 HISTORY OF HEPATITIS C: ICD-10-CM

## 2025-05-13 PROCEDURE — 3072F LOW RISK FOR RETINOPATHY: CPT | Mod: CPTII,S$GLB,, | Performed by: NURSE PRACTITIONER

## 2025-05-13 PROCEDURE — 99214 OFFICE O/P EST MOD 30 MIN: CPT | Mod: S$GLB,,, | Performed by: NURSE PRACTITIONER

## 2025-05-13 PROCEDURE — 3008F BODY MASS INDEX DOCD: CPT | Mod: CPTII,S$GLB,, | Performed by: NURSE PRACTITIONER

## 2025-05-13 PROCEDURE — 1159F MED LIST DOCD IN RCRD: CPT | Mod: CPTII,S$GLB,, | Performed by: NURSE PRACTITIONER

## 2025-05-13 PROCEDURE — 99999 PR PBB SHADOW E&M-EST. PATIENT-LVL III: CPT | Mod: PBBFAC,,, | Performed by: NURSE PRACTITIONER

## 2025-05-13 PROCEDURE — 3044F HG A1C LEVEL LT 7.0%: CPT | Mod: CPTII,S$GLB,, | Performed by: NURSE PRACTITIONER

## 2025-05-13 PROCEDURE — 1160F RVW MEDS BY RX/DR IN RCRD: CPT | Mod: CPTII,S$GLB,, | Performed by: NURSE PRACTITIONER

## 2025-05-13 NOTE — PROGRESS NOTES
HEPATOLOGY CLINIC VISIT NOTE  CHIEF COMPLAINT: Follow up    HISTORY     This is a 65 y.o.   male with a history of liver fibrosis/? well compensated HCV cirrhosis (treated / cured) as well as NAFLD/SANDOVAL, here for follow up.    Interval history:  LFT's and AFP remain normal/stable. ALT was mildly elevated on most recent labs (50), secondary to MASLD/MASH.  Fibroscan last year was again suggestive of F2 (moderate) fibrosis and a low likelihood of cirrhosis. His weight has remained stable over the past year.    Abdominal US this month for HCC surveillance showed:    US Abdomen Complete  Narrative: EXAMINATION:  US ABDOMEN COMPLETE    CLINICAL HISTORY:  Personal history of other infectious and parasitic diseases    TECHNIQUE:  Complete abdominal ultrasound (including pancreas, aorta, liver, gallbladder, common bile duct, IVC, kidneys, and spleen) was performed.    COMPARISON:  Ultrasound 05/14/2024    FINDINGS:  Pancreas: The visualized portions of pancreas appear normal.    Aorta: No aneurysm.    Liver: 14.7 cm, normal in size. Diffusely increased parenchymal echogenicity.  No focal lesions. Hepatorenal index 1.8.    Gallbladder: Adenomyomatosis.  No gallstones.  No wall thickening or pericholecystic fluid.  Negative sonographic Mason sign.    Biliary system: 3 mm common bile duct.  No intrahepatic ductal dilatation.    Inferior vena cava: Normal in appearance.    Right kidney: 11.9 cm. No hydronephrosis.    Left kidney: 12.4 cm. No hydronephrosis.    Spleen: 10.0 x 3.6 cm.  Normal in size with homogeneous echotexture.    Miscellaneous: No ascites.  Impression: Diffusely increased parenchymal echogenicity, likely hepatic steatosis or chronic liver disease.    No focal hepatic lesions.    Electronically signed by resident: Sandor Cole  Date:    05/05/2025  Time:    10:54    Electronically signed by: Leonid Dillon MD  Date:    05/05/2025  Time:    11:00    Feels well  Current symptoms of hepatic  "decompensation:  Ascites:              none  TBili elevation:   none  HE:                    none  EV bleed:          none    Cirrhosis history:  FibroScan 4/28/16 - kPA 17.3, F4   (Labs / imaging / pretreatment APRI / FIB-4 all consistent w/ advanced fibrosis)    Cirrhosis maintenance:  HCC screening - Up to date.  Varices screening - EGD 5/2016 Dr Soto - no varices     Fatty Liver:  - steatosis on imaging  - (+) history of uncontrolled DM and Hyperlipidemia w/ hx of medication non adherence but states that he is now taking medications as prescribed. Followed by Endocrinology.  - Last HgbA1c was further improved at 6.8% (5/2025). He remains on Rybelsus and Glipizide. BMI 28     HCV history:  Completed 24 weeks harvoni w/ SVR12 as of 2/2017 (treated / cured)  - genotype 1B  - Prior HCV tx w/ Dr Laurent: PegIFN + RBV (10-15 yrs ago) for few months - nonresponder    PMH, PSH, PROBLEM LIST, SOCIAL HX, FAMILY HX, MEDS, ALLERGIES   Review[facility] in [facility]  FAMILY HX: neg for liver diease  SOCIAL HISTORY: Moved from [facility] during teenage years. Resides in [place]. . 1 daughter from prior marriage  Not working, on disability. Worked at [employer] many years ago  Alcohol - none  Tobacco - none  Drugs - none    ROS:   Review of Systems   Constitutional:  Negative for fever and malaise/fatigue.   Cardiovascular:  Negative for chest pain and leg swelling.   Gastrointestinal:  Negative for abdominal pain and melena.   Skin:  Negative for rash.   Psychiatric/Behavioral:  Negative for memory loss.      PHYSICAL EXAM:  Friendly   male, in no acute distress; alert and oriented to person, place and time  VITALS: Ht 5' 7" (1.702 m)   Wt 81.3 kg (179 lb 3.7 oz)   BMI 28.07 kg/m²   HEENT: Sclerae anicteric.   LUNGS: Normal respiratory effort.  ABDOMEN: Non-distended abdomen.  SKIN: Warm and dry. No jaundice, No obvious rashes.   EXTREMITIES: No lower extremity edema.  NEURO/PSYCH: Normal gait. Memory intact. " Thought and speech pattern appropriate. Behavior normal. No depression or anxiety noted.    PERTINENT DIAGNOSTIC RESULTS     Lab Results   Component Value Date    WBC 5.86 2025    HGB 16.8 2025     2025     Lab Results   Component Value Date    INR 1.0 2022     Lab Results   Component Value Date    AST 26 2025    ALT 50 (H) 2025    BILITOT 0.6 2025    ALBUMIN 4.4 2025    ALKPHOS 62 2025    CREATININE 0.8 2025    BUN 15 2025     2025    K 4.5 2025    AFP 2.7 2025     FIBROSCAN 2016:    Fibroscan readin.3 KPa     IQR/med:10 %     Fibrosis:F4      FIBROSCAN 2023:    Findings  Median liver stiffness score:  7.6  CAP Reading: dB/m:  350     IQR/med %:  16  Interpretation  Fibrosis interpretation is based on medial liver stiffness - Kilopascal (kPa).     Fibrosis Stage:  F2  Steatosis interpretation is based on controlled attenuation parameter - (dB/m).     Steatosis Grade:  S3    FIBROSCAN 2024:    Findings  Median liver stiffness score:  9.4  CAP Reading: dB/m:  264     IQR/med %:  17  Interpretation  Fibrosis interpretation is based on medial liver stiffness - Kilopascal (kPa).     Fibrosis Stage:  F2  Steatosis interpretation is based on controlled attenuation parameter - (dB/m).     Steatosis Grade:  S1     US Abdomen Complete  Narrative: EXAMINATION:  US ABDOMEN COMPLETE    CLINICAL HISTORY:  Personal history of other infectious and parasitic diseases    TECHNIQUE:  Complete abdominal ultrasound (including pancreas, aorta, liver, gallbladder, common bile duct, IVC, kidneys, and spleen) was performed.    COMPARISON:  Ultrasound 2024    FINDINGS:  Pancreas: The visualized portions of pancreas appear normal.    Aorta: No aneurysm.    Liver: 14.7 cm, normal in size. Diffusely increased parenchymal echogenicity.  No focal lesions. Hepatorenal index 1.8.    Gallbladder: Adenomyomatosis.  No  gallstones.  No wall thickening or pericholecystic fluid.  Negative sonographic Mason sign.    Biliary system: 3 mm common bile duct.  No intrahepatic ductal dilatation.    Inferior vena cava: Normal in appearance.    Right kidney: 11.9 cm. No hydronephrosis.    Left kidney: 12.4 cm. No hydronephrosis.    Spleen: 10.0 x 3.6 cm.  Normal in size with homogeneous echotexture.    Miscellaneous: No ascites.  Impression: Diffusely increased parenchymal echogenicity, likely hepatic steatosis or chronic liver disease.    No focal hepatic lesions.    Electronically signed by resident: Sandor Cole  Date:    05/05/2025  Time:    10:54    Electronically signed by: Leonid Dillon MD  Date:    05/05/2025  Time:    11:00    ASSESSMENT     65 y.o.   male  1.LIVER FIBROSIS/CIRRHOSIS   -- HCC screening - up to date - continue with annual surveillance.  -- EGD 5/2016 varices screening - showed no varices     2. HISTORY OF HEPATITIS C, GENOTYPE 1B - (treated / cured SVR12 2/2017)  -- completed 24weeks of Harvoni  -- (+) Immunity to HAV   -- prior resolved HBV     3. MILD TRANSAMINASE ELEVATION, likely due to NAFLD/SANDOVAL  -- serologic evaluation for other causes of elevated transaminases has been unyielding     4. HYPERLIPIDEMIA - on statin  5. History of uncontrolled DM - improved - last HgbA1c was 6.8%  6. BMI 28    PLAN     - Recommend Fibroscan every 2 years to non-invasively restage liver disease.  - Monitor LFT's every 6-12 months (can be done by PCP and Endocrinology).   - Recommend abdominal ultrasound with AFP measurement annually for HCC surveillance, next due 5/2026.  - Recommend additional weight loss of 15-20 pounds, through diet and exercise.  - Continue close follow up with PCP and Endocrinology.  - Avoid alcohol and herbal supplements/alternative remedies.  - Return to clinic in 1 year, with Fibroscan prior to visit.  _____________________________________________________________    Duration of encounter: 30  min  This includes face-to-face time and non face-to-face time preparing to see the patient (eg, review of tests), obtaining and/or reviewing separately obtained history, documenting clinical information in the electronic or other health record, independently interpreting resultsand communicating results to the patient/family/caregiver, or care coordination.       Hepatology Nurse Practitioner  [facility] [place]

## 2025-05-16 RX ORDER — PREDNISOLONE ACETATE 10 MG/ML
SUSPENSION/ DROPS OPHTHALMIC
Qty: 5 ML | Refills: 1 | Status: SHIPPED | OUTPATIENT
Start: 2025-05-16

## 2025-05-21 DIAGNOSIS — I15.2 HYPERTENSION ASSOCIATED WITH DIABETES: ICD-10-CM

## 2025-05-21 DIAGNOSIS — E11.59 HYPERTENSION ASSOCIATED WITH DIABETES: ICD-10-CM

## 2025-05-21 RX ORDER — LOSARTAN POTASSIUM 50 MG/1
50 TABLET ORAL
Qty: 90 TABLET | Refills: 2 | Status: SHIPPED | OUTPATIENT
Start: 2025-05-21

## 2025-05-21 NOTE — TELEPHONE ENCOUNTER
Refill Routing Note   Medication(s) are not appropriate for processing by [facility] for the following reason(s):        Drug-disease interaction    [facility] action(s):  Defer        Medication Therapy Plan: Drug-Disease: losartan and Liver fibrosis; NAFLD (nonalcoholic fatty liver disease); Hepatic cirrhosis due to chronic hepatitis C infection    Pharmacist review requested: Yes     Appointments  past 12m or future 3m with PCP    Date Provider   Last Visit   1/16/2025 Ko Franz MD   Next Visit   8/5/2025 Ko Franz MD   ED visits in past 90 days: 0        Note composed:9:32 AM 05/21/2025

## 2025-05-21 NOTE — TELEPHONE ENCOUNTER
No care due was identified.  [facility] Embedded Care Due Messages. Reference number: [id number].   5/21/2025 4:23:24 AM CDT

## 2025-05-21 NOTE — TELEPHONE ENCOUNTER
Refill Decision Note   Aidaelvis Caceres  is requesting a refill authorization.  Brief Assessment and Rationale for Refill:  Approve     Medication Therapy Plan:        Pharmacist review requested: Yes   Extended chart review required: Yes   Comments:     Note composed:9:41 AM 05/21/2025

## 2025-06-17 ENCOUNTER — PATIENT MESSAGE (OUTPATIENT)
Dept: INTERNAL MEDICINE | Facility: CLINIC | Age: 65
End: 2025-06-17
Payer: MEDICARE

## 2025-06-17 ENCOUNTER — LAB VISIT (OUTPATIENT)
Dept: LAB | Facility: HOSPITAL | Age: 65
End: 2025-06-17
Attending: INTERNAL MEDICINE
Payer: MEDICARE

## 2025-06-17 DIAGNOSIS — E78.5 HYPERLIPIDEMIA ASSOCIATED WITH TYPE 2 DIABETES MELLITUS: ICD-10-CM

## 2025-06-17 DIAGNOSIS — E11.69 HYPERLIPIDEMIA ASSOCIATED WITH TYPE 2 DIABETES MELLITUS: ICD-10-CM

## 2025-06-17 LAB
CHOLEST SERPL-MCNC: 154 MG/DL (ref 120–199)
CHOLEST/HDLC SERPL: 3.3 {RATIO} (ref 2–5)
HDLC SERPL-MCNC: 47 MG/DL (ref 40–75)
HDLC SERPL: 30.5 % (ref 20–50)
LDLC SERPL CALC-MCNC: 68.6 MG/DL (ref 63–159)
NONHDLC SERPL-MCNC: 107 MG/DL
TRIGL SERPL-MCNC: 192 MG/DL (ref 30–150)

## 2025-06-17 PROCEDURE — 82465 ASSAY BLD/SERUM CHOLESTEROL: CPT

## 2025-06-17 PROCEDURE — 36415 COLL VENOUS BLD VENIPUNCTURE: CPT

## 2025-07-01 ENCOUNTER — TELEPHONE (OUTPATIENT)
Dept: INTERNAL MEDICINE | Facility: CLINIC | Age: 65
End: 2025-07-01
Payer: MEDICARE

## 2025-07-01 NOTE — TELEPHONE ENCOUNTER
Copied from CRM #[id number]. Topic: Appointments - Appointment Access  >> Jul 1, 2025  1:05 PM Fabiola wrote:  Type:  Sooner Apoointment Request    Caller is requesting a sooner appointment.  Caller declined first available appointment listed below.  Caller will not accept being placed on the waitlist and is requesting a message be sent to doctor.  Name of Caller: Pt   When is the first available appointment? 07/28   Symptoms: medical clearance for cataract surgery on 08/06  Would the patient rather a call back or a response via [facility]? Call   Best Call Back Number:[phone]   Additional Information:  pt leaves out of town on 07/09, requesting provider sign clearance before he leaves

## 2025-07-28 ENCOUNTER — LAB VISIT (OUTPATIENT)
Dept: LAB | Facility: HOSPITAL | Age: 65
End: 2025-07-28
Attending: INTERNAL MEDICINE
Payer: MEDICARE

## 2025-07-28 ENCOUNTER — OFFICE VISIT (OUTPATIENT)
Dept: INTERNAL MEDICINE | Facility: CLINIC | Age: 65
End: 2025-07-28
Payer: MEDICARE

## 2025-07-28 VITALS
WEIGHT: 176.81 LBS | DIASTOLIC BLOOD PRESSURE: 70 MMHG | OXYGEN SATURATION: 97 % | HEIGHT: 67 IN | BODY MASS INDEX: 27.75 KG/M2 | HEART RATE: 102 BPM | SYSTOLIC BLOOD PRESSURE: 124 MMHG

## 2025-07-28 DIAGNOSIS — L21.9 SEBORRHEIC DERMATITIS: ICD-10-CM

## 2025-07-28 DIAGNOSIS — E11.69 HYPERLIPIDEMIA ASSOCIATED WITH TYPE 2 DIABETES MELLITUS: ICD-10-CM

## 2025-07-28 DIAGNOSIS — Z01.818 PREOP EXAMINATION: Primary | ICD-10-CM

## 2025-07-28 DIAGNOSIS — R21 SKIN RASH: ICD-10-CM

## 2025-07-28 DIAGNOSIS — E78.5 HYPERLIPIDEMIA ASSOCIATED WITH TYPE 2 DIABETES MELLITUS: ICD-10-CM

## 2025-07-28 DIAGNOSIS — E11.59 HYPERTENSION ASSOCIATED WITH DIABETES: ICD-10-CM

## 2025-07-28 DIAGNOSIS — E11.9 TYPE 2 DIABETES MELLITUS WITHOUT RETINOPATHY: ICD-10-CM

## 2025-07-28 DIAGNOSIS — I15.2 HYPERTENSION ASSOCIATED WITH DIABETES: ICD-10-CM

## 2025-07-28 DIAGNOSIS — H25.13 SENILE NUCLEAR CATARACT, BILATERAL: ICD-10-CM

## 2025-07-28 DIAGNOSIS — Z01.818 PREOP EXAMINATION: ICD-10-CM

## 2025-07-28 LAB
ABSOLUTE EOSINOPHIL (OHS): 0.08 K/UL
ABSOLUTE MONOCYTE (OHS): 0.41 K/UL (ref 0.3–1)
ABSOLUTE NEUTROPHIL COUNT (OHS): 3.59 K/UL (ref 1.8–7.7)
ALBUMIN SERPL BCP-MCNC: 4.6 G/DL (ref 3.5–5.2)
ALBUMIN/CREAT UR: 5.6 UG/MG
ALP SERPL-CCNC: 56 UNIT/L (ref 40–150)
ALT SERPL W/O P-5'-P-CCNC: 86 UNIT/L (ref 0–55)
ANION GAP (OHS): 11 MMOL/L (ref 8–16)
AST SERPL-CCNC: 50 UNIT/L (ref 0–50)
BASOPHILS # BLD AUTO: 0.04 K/UL
BASOPHILS NFR BLD AUTO: 0.6 %
BILIRUB SERPL-MCNC: 0.7 MG/DL (ref 0.1–1)
BILIRUB UR QL STRIP.AUTO: NEGATIVE
BUN SERPL-MCNC: 20 MG/DL (ref 8–23)
CALCIUM SERPL-MCNC: 9.5 MG/DL (ref 8.7–10.5)
CHLORIDE SERPL-SCNC: 102 MMOL/L (ref 95–110)
CLARITY UR: CLEAR
CO2 SERPL-SCNC: 23 MMOL/L (ref 23–29)
COLOR UR AUTO: YELLOW
CREAT SERPL-MCNC: 0.9 MG/DL (ref 0.5–1.4)
CREAT UR-MCNC: 144 MG/DL (ref 23–375)
EAG (OHS): 151 MG/DL (ref 68–131)
ERYTHROCYTE [DISTWIDTH] IN BLOOD BY AUTOMATED COUNT: 12.7 % (ref 11.5–14.5)
GFR SERPLBLD CREATININE-BSD FMLA CKD-EPI: >60 ML/MIN/1.73/M2
GLUCOSE SERPL-MCNC: 136 MG/DL (ref 70–110)
GLUCOSE UR QL STRIP: ABNORMAL
HBA1C MFR BLD: 6.9 % (ref 4–5.6)
HCT VFR BLD AUTO: 47.6 % (ref 40–54)
HGB BLD-MCNC: 15.8 GM/DL (ref 14–18)
HGB UR QL STRIP: NEGATIVE
IMM GRANULOCYTES # BLD AUTO: 0.02 K/UL (ref 0–0.04)
IMM GRANULOCYTES NFR BLD AUTO: 0.3 % (ref 0–0.5)
KETONES UR QL STRIP: ABNORMAL
LEUKOCYTE ESTERASE UR QL STRIP: NEGATIVE
LYMPHOCYTES # BLD AUTO: 2.11 K/UL (ref 1–4.8)
MCH RBC QN AUTO: 31.2 PG (ref 27–31)
MCHC RBC AUTO-ENTMCNC: 33.2 G/DL (ref 32–36)
MCV RBC AUTO: 94 FL (ref 82–98)
MICROALBUMIN UR-MCNC: 8 UG/ML (ref ?–5000)
NITRITE UR QL STRIP: NEGATIVE
NUCLEATED RBC (/100WBC) (OHS): 0 /100 WBC
PH UR STRIP: 6 [PH]
PLATELET # BLD AUTO: 254 K/UL (ref 150–450)
PMV BLD AUTO: 10.5 FL (ref 9.2–12.9)
POTASSIUM SERPL-SCNC: 4.3 MMOL/L (ref 3.5–5.1)
PROT SERPL-MCNC: 7.8 GM/DL (ref 6–8.4)
PROT UR QL STRIP: ABNORMAL
RBC # BLD AUTO: 5.06 M/UL (ref 4.6–6.2)
RELATIVE EOSINOPHIL (OHS): 1.3 %
RELATIVE LYMPHOCYTE (OHS): 33.8 % (ref 18–48)
RELATIVE MONOCYTE (OHS): 6.6 % (ref 4–15)
RELATIVE NEUTROPHIL (OHS): 57.4 % (ref 38–73)
SODIUM SERPL-SCNC: 136 MMOL/L (ref 136–145)
SP GR UR STRIP: 1.03
UROBILINOGEN UR STRIP-ACNC: NEGATIVE EU/DL
WBC # BLD AUTO: 6.25 K/UL (ref 3.9–12.7)

## 2025-07-28 PROCEDURE — 3072F LOW RISK FOR RETINOPATHY: CPT | Mod: CPTII,S$GLB,, | Performed by: INTERNAL MEDICINE

## 2025-07-28 PROCEDURE — 3044F HG A1C LEVEL LT 7.0%: CPT | Mod: CPTII,S$GLB,, | Performed by: INTERNAL MEDICINE

## 2025-07-28 PROCEDURE — 80053 COMPREHEN METABOLIC PANEL: CPT

## 2025-07-28 PROCEDURE — 82043 UR ALBUMIN QUANTITATIVE: CPT

## 2025-07-28 PROCEDURE — 3074F SYST BP LT 130 MM HG: CPT | Mod: CPTII,S$GLB,, | Performed by: INTERNAL MEDICINE

## 2025-07-28 PROCEDURE — 93005 ELECTROCARDIOGRAM TRACING: CPT | Mod: S$GLB,,, | Performed by: INTERNAL MEDICINE

## 2025-07-28 PROCEDURE — 85025 COMPLETE CBC W/AUTO DIFF WBC: CPT

## 2025-07-28 PROCEDURE — 93010 ELECTROCARDIOGRAM REPORT: CPT | Mod: S$GLB,,, | Performed by: INTERNAL MEDICINE

## 2025-07-28 PROCEDURE — 1159F MED LIST DOCD IN RCRD: CPT | Mod: CPTII,S$GLB,, | Performed by: INTERNAL MEDICINE

## 2025-07-28 PROCEDURE — 3008F BODY MASS INDEX DOCD: CPT | Mod: CPTII,S$GLB,, | Performed by: INTERNAL MEDICINE

## 2025-07-28 PROCEDURE — 99999 PR PBB SHADOW E&M-EST. PATIENT-LVL III: CPT | Mod: PBBFAC,,, | Performed by: INTERNAL MEDICINE

## 2025-07-28 PROCEDURE — 83036 HEMOGLOBIN GLYCOSYLATED A1C: CPT

## 2025-07-28 PROCEDURE — 3078F DIAST BP <80 MM HG: CPT | Mod: CPTII,S$GLB,, | Performed by: INTERNAL MEDICINE

## 2025-07-28 PROCEDURE — 4010F ACE/ARB THERAPY RXD/TAKEN: CPT | Mod: CPTII,S$GLB,, | Performed by: INTERNAL MEDICINE

## 2025-07-28 PROCEDURE — 81003 URINALYSIS AUTO W/O SCOPE: CPT

## 2025-07-28 PROCEDURE — 36415 COLL VENOUS BLD VENIPUNCTURE: CPT

## 2025-07-28 PROCEDURE — 99214 OFFICE O/P EST MOD 30 MIN: CPT | Mod: S$GLB,,, | Performed by: INTERNAL MEDICINE

## 2025-07-28 RX ORDER — ROSUVASTATIN CALCIUM 40 MG/1
40 TABLET, COATED ORAL NIGHTLY
Qty: 90 TABLET | Refills: 3 | Status: SHIPPED | OUTPATIENT
Start: 2025-07-28

## 2025-07-28 RX ORDER — TRIAMCINOLONE ACETONIDE 1 MG/G
CREAM TOPICAL 2 TIMES DAILY
Qty: 45 G | Refills: 1 | Status: SHIPPED | OUTPATIENT
Start: 2025-07-28

## 2025-07-28 RX ORDER — KETOCONAZOLE 20 MG/ML
SHAMPOO, SUSPENSION TOPICAL
Qty: 240 ML | Refills: 10 | Status: SHIPPED | OUTPATIENT
Start: 2025-07-28

## 2025-07-28 NOTE — PROGRESS NOTES
Subjective:       Patient ID: Graham Caceres is a 65 y.o. male.    Chief Complaint: Pre-op Exam      HPI  Graham Caceres is a 65 y.o. year old male with HTN, HLD, t2DM presents for preop examation. Scheduled for R cataract extraction with Dr. Coral Linder on 8/6/2025. At baseline health with no new complaints.     Review of Systems   Constitutional:  Negative for activity change, appetite change, chills, fatigue, fever and unexpected weight change.   HENT:  Negative for congestion, rhinorrhea and sore throat.    Eyes:  Negative for visual disturbance.   Respiratory:  Negative for shortness of breath.    Cardiovascular:  Negative for chest pain.   Gastrointestinal:  Negative for abdominal pain, diarrhea, nausea and vomiting.   Genitourinary:  Negative for difficulty urinating and dysuria.   Musculoskeletal:  Negative for arthralgias, back pain and myalgias.   Skin:  Negative for color change and rash.   Neurological:  Negative for dizziness, weakness and headaches.         Past Medical History:   Diagnosis Date    Cirrhosis     Diverticulosis     History of hepatitis C, s/p successful Rx w/ SVR24 - 6/2017 4/7/2016    S/p harvoni w/ SVR    Hyperlipidemia 04/2016    Hypertension     Internal hemorrhoid     New onset type 2 diabetes mellitus 4/2016    Positive QuantiFERON-TB Gold test 4/2016    Sebopsoriasis     SIRS (systemic inflammatory response syndrome) 8/11/2018        Prior to Admission medications    Medication Sig Start Date End Date Taking? Authorizing Provider   blood sugar diagnostic (TRUE METRIX GLUCOSE TEST STRIP) Strp 3 times a day 11/22/22  Yes Blayne Allen MD   blood-glucose meter kit To check BG 2 times daily, to use with insurance preferred meter 9/10/20  Yes Jennifer Manzanares MD   clobetasol 0.05% (TEMOVATE) 0.05 % Oint Apply topically 2 (two) times daily. Use to affected areas for up to 2 weeks then take a 1 week break or decrease to 3 times weekly. Do not apply to groin or face. Apply to itchy areas on  legs and hand 11/11/24  Yes Rivera Jacobsen MD   clotrimazole (LOTRIMIN) 1 % Soln Apply 4 drops to left ear 2 times daily for 14 days. 1/28/22  Yes Kerri Vasquez DNP, FNP-C   glipiZIDE 5 MG TR24 Take 1 tablet before lunch, 15 mins prior 1/6/25  Yes Lizeth Mcdonadl APRN, FNP   hydrocortisone 2.5 % cream Apply topically 2 (two) times daily. Use to affected areas for up to 2 weeks then take a 1 week break or decrease to 3 times weekly. Apply to dry itchy areas on face and ears 11/11/24  Yes Rivera Jacobsen MD   ketoconazole (NIZORAL) 2 % cream Apply topically once daily. 1/26/22  Yes Cleo Bolaños FNP   lancets Misc To check BG 2 times daily, to use with insurance preferred meter 9/28/20  Yes Jennifer Manzanares MD   losartan (COZAAR) 50 MG tablet TAKE 1 TABLET EVERY DAY 5/21/25  Yes Ko Franz MD   prednisoLONE acetate (PRED FORTE) 1 % DrpS SHAKE LIQUID AND INSTILL 1 DROP IN BOTH EYES THREE TIMES DAILY 5/16/25  Yes Radha Montanez, JOCY   semaglutide (RYBELSUS) 7 mg tablet Take 1 tablet (7 mg total) by mouth once daily. Wait 30 mins before food , water only 1/6/25  Yes Lizeth Mcdonald APRN, FNP   terbinafine HCL (LAMISIL) 1 % cream Apply topically 2 (two) times daily. 10/5/23  Yes Ko Franz MD   ketoconazole (NIZORAL) 2 % shampoo Apply topically twice a week. Use to wash scalp and ears. Leave on for 5 minutes then wash off 4/4/24 7/28/25 Yes Rivera Jacobsen MD   rosuvastatin (CRESTOR) 40 MG Tab Take 1 tablet (40 mg total) by mouth every evening. 10/24/24 7/28/25 Yes Tawanna Ortiz MD   triamcinolone acetonide 0.1% (KENALOG) 0.1 % cream Apply topically 2 (two) times daily. 10/5/23 7/28/25 Yes Ko Franz MD   ketoconazole (NIZORAL) 2 % shampoo Apply topically twice a week. Use to wash scalp and ears. Leave on for 5 minutes then wash off 7/28/25   Ko Franz MD   rosuvastatin (CRESTOR) 40 MG Tab Take 1 tablet (40 mg total) by mouth every evening. 7/28/25   Ko Franz MD   triamcinolone  "acetonide 0.1% (KENALOG) 0.1 % cream Apply topically 2 (two) times daily. 7/28/25   Ko Franz MD        Past medical history, surgical history, and family medical history reviewed and updated as appropriate.    Medications and allergies reviewed.     Objective:          Vitals:    07/28/25 1516   BP: 124/70   BP Location: Right arm   Patient Position: Sitting   Pulse: 102   SpO2: 97%   Weight: 80.2 kg (176 lb 12.9 oz)   Height: 5' 7" (1.702 m)     Body mass index is 27.69 kg/m².  Physical Exam  Constitutional:       General: He is not in acute distress.     Appearance: He is well-developed.   HENT:      Head: Normocephalic and atraumatic.      Nose: Nose normal.   Eyes:      General: No scleral icterus.     Extraocular Movements: Extraocular movements intact.   Neck:      Thyroid: No thyromegaly.      Vascular: No JVD.      Trachea: No tracheal deviation.   Cardiovascular:      Rate and Rhythm: Normal rate and regular rhythm.      Heart sounds: Normal heart sounds. No murmur heard.     No friction rub. No gallop.   Pulmonary:      Effort: Pulmonary effort is normal. No respiratory distress.      Breath sounds: Normal breath sounds. No wheezing or rales.   Abdominal:      General: Bowel sounds are normal. There is no distension.      Palpations: Abdomen is soft. There is no mass.      Tenderness: There is no abdominal tenderness.   Musculoskeletal:         General: No tenderness. Normal range of motion.      Cervical back: Normal range of motion and neck supple.   Lymphadenopathy:      Cervical: No cervical adenopathy.   Skin:     General: Skin is warm and dry.      Findings: No rash.   Neurological:      Mental Status: He is alert and oriented to person, place, and time.      Cranial Nerves: No cranial nerve deficit.      Deep Tendon Reflexes: Reflexes normal.   Psychiatric:         Behavior: Behavior normal.         Lab Results   Component Value Date    WBC 5.86 01/13/2025    HGB 16.8 01/13/2025    HCT 51.0 " 01/13/2025     01/13/2025    CHOL 154 06/17/2025    TRIG 192 (H) 06/17/2025    HDL 47 06/17/2025    ALT 50 (H) 05/05/2025    AST 26 05/05/2025     01/13/2025    K 4.5 01/13/2025     01/13/2025    CREATININE 0.8 01/13/2025    BUN 15 01/13/2025    CO2 24 01/13/2025    TSH 2.852 01/13/2025    INR 1.0 08/04/2022    HGBA1C 6.8 (H) 05/02/2025       Assessment:       1. Preop examination    2. Type 2 diabetes mellitus without retinopathy    3. Hyperlipidemia associated with type 2 diabetes mellitus    4. Hypertension associated with diabetes    5. Senile nuclear cataract, bilateral    6. Skin rash    7. Seborrheic dermatitis          Plan:     Graham was seen today for pre-op exam.    Diagnoses and all orders for this visit:    Preop examination  -     IN OFFICE EKG 12-LEAD (to Muse)  -     CBC Auto Differential; Future  -     Comprehensive Metabolic Panel; Future  -     Urinalysis; Future    Type 2 diabetes mellitus without retinopathy  Comments:  last a1c 6.8, on rybelsus 7 mg daily, glipizide 5 mg daily  Orders:  -     CBC Auto Differential; Future  -     Comprehensive Metabolic Panel; Future  -     Microalbumin/Creatinine Ratio, Urine; Future  -     Hemoglobin A1C; Future    Hyperlipidemia associated with type 2 diabetes mellitus  Comments:  on rosuvastatin 40 mg daily, LDL goal <70, at goal, no changes to current management  Orders:  -     rosuvastatin (CRESTOR) 40 MG Tab; Take 1 tablet (40 mg total) by mouth every evening.    Hypertension associated with diabetes  Comments:  on losartan 50 mg daily, controlled, no changes to current management    Senile nuclear cataract, bilateral  Comments:  scheduled for R cataract extraction 8/6/2025 with Dr. Coral Linder.    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 left anterior tibial lesion  Orders:  -     triamcinolone acetonide 0.1% (KENALOG) 0.1 % cream; Apply topically 2 (two) times daily.    Seborrheic  dermatitis  -     ketoconazole (NIZORAL) 2 % shampoo; Apply topically twice a week. Use to wash scalp and ears. Leave on for 5 minutes then wash off    Patient medically optimized.   RCRI 0  The patient was evaluated in accordance with the 2014 ACC/AHA guidelines for summer-operative assessment.    1. This is not considered an emergent procedure.   2. There is no evidence of acute coronary syndrome or other unstable cardiovascular process that necessitates expedited evaluation and treatment.   3. The estimated summer-operative risk of a major cardiovascular adverse event based on the combined surgical/patient risk is <1% (low risk). Per the available literature, no further testing is required pre-operatively and the patient should proceed to the OR as planned.     Health maintenance reviewed with patient.     Follow up in about 6 months (around 1/28/2026).    Ko Franz MD  Internal Medicine / Primary Care  [facility]  7/28/2025

## 2025-07-29 LAB
OHS QRS DURATION: 80 MS
OHS QTC CALCULATION: 459 MS

## 2025-07-31 ENCOUNTER — LAB VISIT (OUTPATIENT)
Dept: LAB | Facility: HOSPITAL | Age: 65
End: 2025-07-31
Attending: INTERNAL MEDICINE
Payer: MEDICARE

## 2025-07-31 DIAGNOSIS — E78.5 HYPERLIPIDEMIA ASSOCIATED WITH TYPE 2 DIABETES MELLITUS: ICD-10-CM

## 2025-07-31 DIAGNOSIS — E11.65 TYPE 2 DIABETES MELLITUS WITH HYPERGLYCEMIA, WITHOUT LONG-TERM CURRENT USE OF INSULIN: ICD-10-CM

## 2025-07-31 DIAGNOSIS — E11.69 HYPERLIPIDEMIA ASSOCIATED WITH TYPE 2 DIABETES MELLITUS: ICD-10-CM

## 2025-07-31 LAB
ANION GAP (OHS): 9 MMOL/L (ref 8–16)
BUN SERPL-MCNC: 11 MG/DL (ref 8–23)
CALCIUM SERPL-MCNC: 9.6 MG/DL (ref 8.7–10.5)
CHLORIDE SERPL-SCNC: 106 MMOL/L (ref 95–110)
CHOLEST SERPL-MCNC: 178 MG/DL (ref 120–199)
CHOLEST/HDLC SERPL: 4 {RATIO} (ref 2–5)
CO2 SERPL-SCNC: 26 MMOL/L (ref 23–29)
CREAT SERPL-MCNC: 0.7 MG/DL (ref 0.5–1.4)
GFR SERPLBLD CREATININE-BSD FMLA CKD-EPI: >60 ML/MIN/1.73/M2
GLUCOSE SERPL-MCNC: 163 MG/DL (ref 70–110)
HDLC SERPL-MCNC: 45 MG/DL (ref 40–75)
HDLC SERPL: 25.3 % (ref 20–50)
LDLC SERPL CALC-MCNC: 88.4 MG/DL (ref 63–159)
NONHDLC SERPL-MCNC: 133 MG/DL
POTASSIUM SERPL-SCNC: 4.3 MMOL/L (ref 3.5–5.1)
SODIUM SERPL-SCNC: 141 MMOL/L (ref 136–145)
TRIGL SERPL-MCNC: 223 MG/DL (ref 30–150)

## 2025-07-31 PROCEDURE — 83718 ASSAY OF LIPOPROTEIN: CPT

## 2025-07-31 PROCEDURE — 84520 ASSAY OF UREA NITROGEN: CPT

## 2025-07-31 PROCEDURE — 36415 COLL VENOUS BLD VENIPUNCTURE: CPT

## 2025-08-05 ENCOUNTER — OFFICE VISIT (OUTPATIENT)
Dept: INTERNAL MEDICINE | Facility: CLINIC | Age: 65
End: 2025-08-05
Payer: MEDICARE

## 2025-08-05 VITALS
OXYGEN SATURATION: 98 % | HEIGHT: 67 IN | SYSTOLIC BLOOD PRESSURE: 112 MMHG | BODY MASS INDEX: 27.34 KG/M2 | DIASTOLIC BLOOD PRESSURE: 62 MMHG | HEART RATE: 92 BPM | WEIGHT: 174.19 LBS

## 2025-08-05 DIAGNOSIS — E11.59 HYPERTENSION ASSOCIATED WITH DIABETES: ICD-10-CM

## 2025-08-05 DIAGNOSIS — I15.2 HYPERTENSION ASSOCIATED WITH DIABETES: ICD-10-CM

## 2025-08-05 DIAGNOSIS — L30.8 ASTEATOTIC DERMATITIS: ICD-10-CM

## 2025-08-05 DIAGNOSIS — L21.9 SEBORRHEIC DERMATITIS: ICD-10-CM

## 2025-08-05 DIAGNOSIS — Z09 FOLLOW-UP EXAM: Primary | ICD-10-CM

## 2025-08-05 PROCEDURE — 4010F ACE/ARB THERAPY RXD/TAKEN: CPT | Mod: CPTII,S$GLB,, | Performed by: INTERNAL MEDICINE

## 2025-08-05 PROCEDURE — 99999 PR PBB SHADOW E&M-EST. PATIENT-LVL III: CPT | Mod: PBBFAC,,, | Performed by: INTERNAL MEDICINE

## 2025-08-05 PROCEDURE — 99214 OFFICE O/P EST MOD 30 MIN: CPT | Mod: S$GLB,,, | Performed by: INTERNAL MEDICINE

## 2025-08-05 PROCEDURE — 3061F NEG MICROALBUMINURIA REV: CPT | Mod: CPTII,S$GLB,, | Performed by: INTERNAL MEDICINE

## 2025-08-05 PROCEDURE — 1159F MED LIST DOCD IN RCRD: CPT | Mod: CPTII,S$GLB,, | Performed by: INTERNAL MEDICINE

## 2025-08-05 PROCEDURE — 3066F NEPHROPATHY DOC TX: CPT | Mod: CPTII,S$GLB,, | Performed by: INTERNAL MEDICINE

## 2025-08-05 PROCEDURE — 3008F BODY MASS INDEX DOCD: CPT | Mod: CPTII,S$GLB,, | Performed by: INTERNAL MEDICINE

## 2025-08-05 PROCEDURE — 3044F HG A1C LEVEL LT 7.0%: CPT | Mod: CPTII,S$GLB,, | Performed by: INTERNAL MEDICINE

## 2025-08-05 PROCEDURE — 3074F SYST BP LT 130 MM HG: CPT | Mod: CPTII,S$GLB,, | Performed by: INTERNAL MEDICINE

## 2025-08-05 PROCEDURE — 3078F DIAST BP <80 MM HG: CPT | Mod: CPTII,S$GLB,, | Performed by: INTERNAL MEDICINE

## 2025-08-05 PROCEDURE — 3072F LOW RISK FOR RETINOPATHY: CPT | Mod: CPTII,S$GLB,, | Performed by: INTERNAL MEDICINE

## 2025-08-05 RX ORDER — HYDROCORTISONE 25 MG/G
CREAM TOPICAL 2 TIMES DAILY
Qty: 28 G | Refills: 3 | Status: SHIPPED | OUTPATIENT
Start: 2025-08-05

## 2025-08-05 RX ORDER — LOSARTAN POTASSIUM 50 MG/1
50 TABLET ORAL DAILY
Qty: 90 TABLET | Refills: 3 | Status: SHIPPED | OUTPATIENT
Start: 2025-08-05

## 2025-08-05 NOTE — PROGRESS NOTES
Subjective:       Patient ID: Graham Caceres is a 65 y.o. male.    Chief Complaint: Follow-up and Medication Refill      HPI  Graham Caceres is a 65 y.o. year old male with HTN, HLD, asteatotic dermatitis, elevated LFTs presents for follow up. Labs reviewed.     Review of Systems   Constitutional:  Negative for activity change, appetite change, chills, fatigue, fever and unexpected weight change.   HENT:  Negative for congestion, rhinorrhea and sore throat.    Eyes:  Negative for visual disturbance.   Respiratory:  Negative for shortness of breath.    Cardiovascular:  Negative for chest pain.   Gastrointestinal:  Negative for abdominal pain, diarrhea, nausea and vomiting.   Genitourinary:  Negative for difficulty urinating and dysuria.   Musculoskeletal:  Negative for arthralgias, back pain and myalgias.   Skin:  Negative for color change and rash.   Neurological:  Negative for dizziness, weakness and headaches.         Past Medical History:   Diagnosis Date    Cirrhosis     Diverticulosis     History of hepatitis C, s/p successful Rx w/ SVR24 - 6/2017 4/7/2016    S/p harvoni w/ SVR    Hyperlipidemia 04/2016    Hypertension     Internal hemorrhoid     New onset type 2 diabetes mellitus 4/2016    Positive QuantiFERON-TB Gold test 4/2016    Sebopsoriasis     SIRS (systemic inflammatory response syndrome) 8/11/2018        Prior to Admission medications    Medication Sig Start Date End Date Taking? Authorizing Provider   blood sugar diagnostic (TRUE METRIX GLUCOSE TEST STRIP) Strp 3 times a day 11/22/22  Yes Blayne Allen MD   blood-glucose meter kit To check BG 2 times daily, to use with insurance preferred meter 9/10/20  Yes Jennifer Manzanares MD   clobetasol 0.05% (TEMOVATE) 0.05 % Oint Apply topically 2 (two) times daily. Use to affected areas for up to 2 weeks then take a 1 week break or decrease to 3 times weekly. Do not apply to groin or face. Apply to itchy areas on legs and hand 11/11/24  Yes Rivera Jacobsen MD    clotrimazole (LOTRIMIN) 1 % Soln Apply 4 drops to left ear 2 times daily for 14 days. 1/28/22  Yes Kerri Vasquez DNP, FNP-C   glipiZIDE 5 MG TR24 Take 1 tablet before lunch, 15 mins prior 1/6/25  Yes Lizeth Mcdonald APRN, FNP   ketoconazole (NIZORAL) 2 % cream Apply topically once daily. 1/26/22  Yes Cleo Bolaños FNP   ketoconazole (NIZORAL) 2 % shampoo Apply topically twice a week. Use to wash scalp and ears. Leave on for 5 minutes then wash off 7/28/25  Yes Ko Franz MD   lancets Misc To check BG 2 times daily, to use with insurance preferred meter 9/28/20  Yes Jenniefr Manzanares MD   prednisoLONE acetate (PRED FORTE) 1 % DrpS SHAKE LIQUID AND INSTILL 1 DROP IN BOTH EYES THREE TIMES DAILY 5/16/25  Yes Radha Montanez OD   rosuvastatin (CRESTOR) 40 MG Tab Take 1 tablet (40 mg total) by mouth every evening. 7/28/25  Yes Ko Franz MD   semaglutide (RYBELSUS) 7 mg tablet Take 1 tablet (7 mg total) by mouth once daily. Wait 30 mins before food , water only 1/6/25  Yes Lizeth Mcdonald APRN, SHANNAN   terbinafine HCL (LAMISIL) 1 % cream Apply topically 2 (two) times daily. 10/5/23  Yes Ko Franz MD   triamcinolone acetonide 0.1% (KENALOG) 0.1 % cream Apply topically 2 (two) times daily. 7/28/25  Yes Ko Franz MD   hydrocortisone 2.5 % cream Apply topically 2 (two) times daily. Use to affected areas for up to 2 weeks then take a 1 week break or decrease to 3 times weekly. Apply to dry itchy areas on face and ears 11/11/24 8/5/25 Yes Rivera Jacobsen MD   losartan (COZAAR) 50 MG tablet TAKE 1 TABLET EVERY DAY 5/21/25 8/5/25 Yes Ko Franz MD   hydrocortisone 2.5 % cream Apply topically 2 (two) times daily. Use to affected areas for up to 2 weeks then take a 1 week break or decrease to 3 times weekly. Apply to dry itchy areas on face and ears 8/5/25   Ko Franz MD   losartan (COZAAR) 50 MG tablet Take 1 tablet (50 mg total) by mouth once daily. 8/5/25   Ko Franz MD        Past  "medical history, surgical history, and family medical history reviewed and updated as appropriate.    Medications and allergies reviewed.     Objective:          Vitals:    08/05/25 1002   BP: 112/62   BP Location: Right arm   Patient Position: Sitting   Pulse: 92   SpO2: 98%   Weight: 79 kg (174 lb 2.6 oz)   Height: 5' 7" (1.702 m)     Body mass index is 27.28 kg/m².  Physical Exam  Constitutional:       General: He is not in acute distress.     Appearance: He is well-developed.   HENT:      Head: Normocephalic and atraumatic.      Nose: Nose normal.   Eyes:      General: No scleral icterus.     Extraocular Movements: Extraocular movements intact.   Neck:      Thyroid: No thyromegaly.      Vascular: No JVD.      Trachea: No tracheal deviation.   Cardiovascular:      Rate and Rhythm: Normal rate and regular rhythm.      Heart sounds: Normal heart sounds. No murmur heard.     No friction rub. No gallop.   Pulmonary:      Effort: Pulmonary effort is normal. No respiratory distress.      Breath sounds: Normal breath sounds. No wheezing or rales.   Abdominal:      General: Bowel sounds are normal. There is no distension.      Palpations: Abdomen is soft. There is no mass.      Tenderness: There is no abdominal tenderness.   Musculoskeletal:         General: No tenderness. Normal range of motion.      Cervical back: Normal range of motion and neck supple.   Lymphadenopathy:      Cervical: No cervical adenopathy.   Skin:     General: Skin is warm and dry.      Findings: No rash.   Neurological:      Mental Status: He is alert and oriented to person, place, and time.      Cranial Nerves: No cranial nerve deficit.      Deep Tendon Reflexes: Reflexes normal.   Psychiatric:         Behavior: Behavior normal.         Lab Results   Component Value Date    WBC 6.25 07/28/2025    HGB 15.8 07/28/2025    HCT 47.6 07/28/2025     07/28/2025    CHOL 178 07/31/2025    TRIG 223 (H) 07/31/2025    HDL 45 07/31/2025    ALT 86 (H) " 07/28/2025    AST 50 07/28/2025     07/31/2025    K 4.3 07/31/2025     07/31/2025    CREATININE 0.7 07/31/2025    BUN 11 07/31/2025    CO2 26 07/31/2025    TSH 2.852 01/13/2025    INR 1.0 08/04/2022    HGBA1C 6.9 (H) 07/28/2025       Assessment:       1. Follow-up exam    2. Hypertension associated with diabetes    3. Asteatotic dermatitis    4. Seborrheic dermatitis          Plan:     Graham was seen today for follow-up and medication refill.    Diagnoses and all orders for this visit:    Follow-up exam    Hypertension associated with diabetes  Comments:  blood pressure controlled, refilled losartan 50 mg  Orders:  -     losartan (COZAAR) 50 MG tablet; Take 1 tablet (50 mg total) by mouth once daily.    Asteatotic dermatitis  Comments:  persistent rash, psoriatic lesion with lichenfication on right wrist. left anterior tibial lesion    Seborrheic dermatitis  Comments:  refilled hydrocortisone  Orders:  -     hydrocortisone 2.5 % cream; Apply topically 2 (two) times daily. Use to affected areas for up to 2 weeks then take a 1 week break or decrease to 3 times weekly. Apply to dry itchy areas on face and ears    Benign physical examination, no issues identified. Will obtain routine labwork and age appropriate health screenings.     Health maintenance reviewed with patient.     Follow up in about 6 months (around 2/5/2026).    Ko Franz MD  Internal Medicine / Primary Care  [facility]  8/5/2025